# Patient Record
Sex: FEMALE | Race: WHITE | NOT HISPANIC OR LATINO | Employment: OTHER | URBAN - METROPOLITAN AREA
[De-identification: names, ages, dates, MRNs, and addresses within clinical notes are randomized per-mention and may not be internally consistent; named-entity substitution may affect disease eponyms.]

---

## 2017-01-10 ENCOUNTER — TREATMENT (OUTPATIENT)
Dept: CARDIAC REHAB | Facility: HOSPITAL | Age: 77
End: 2017-01-10

## 2017-01-10 DIAGNOSIS — J44.9 COPD, SEVERE (HCC): Primary | ICD-10-CM

## 2017-01-10 PROCEDURE — G0424 PULMONARY REHAB W EXER: HCPCS

## 2017-01-12 ENCOUNTER — TREATMENT (OUTPATIENT)
Dept: CARDIAC REHAB | Facility: HOSPITAL | Age: 77
End: 2017-01-12

## 2017-01-12 DIAGNOSIS — J44.9 COPD, SEVERE (HCC): Primary | ICD-10-CM

## 2017-01-12 PROCEDURE — G0424 PULMONARY REHAB W EXER: HCPCS

## 2017-01-19 ENCOUNTER — TREATMENT (OUTPATIENT)
Dept: CARDIAC REHAB | Facility: HOSPITAL | Age: 77
End: 2017-01-19

## 2017-01-19 DIAGNOSIS — J44.9 COPD, SEVERE (HCC): Primary | ICD-10-CM

## 2017-01-19 PROCEDURE — G0424 PULMONARY REHAB W EXER: HCPCS

## 2017-01-19 NOTE — PROGRESS NOTES
"CARDIAC/PULMONARY REHAB NUTRITION EDUCATION/ASSESSMENT      76 y.o.         Height: 62 in    Weight: 172 lb     BMI: 31.5 IBW: 110 +/- 10%    %IBW: 156%               Time seen: 2 PM   Diet Survey Score: 66   Weight Assessment: Obese  Weight Change:  weight loss of 8 lbs over the past 7 week(s)    Intentional?  Yes  Usual Weight: 181 lb  Desired Weight: 140 lb     Current Diet: \"Smaller portions\"   Appetite: fair  Factors limiting PO intake:  N/A  Taste/smell changes:  No Food records reviewed? Yes     Occupation: Retired  Job Activity Level: N/A    Routine Exercise: mild   Who does the patient live with: Lives alone  Who does the cooking at home:  patient  Spouse/significant other present for diet instruction today? No  Patient actively receiving lifestyle support from others at home? No       Pertinent Lab Values:   Total: No components found for: CHOLESTEROL  HDL:   HDL CHOLESTEROL   Date Value Ref Range Status   09/08/2015 52 40 - 60 mg/dL Final     Comment:     DF by IF @ 09/08/2015 06:58                                                       HDL Reference Ranges  (U.S[place]  Department of Health and Human Services ATP III Classifications)                     Low              < 40 mg/dl (major risk factor for CHD)                     High              >60 mg/dl (\"negative\" risk factor for CHD)       LDL:  LDL CHOLESTEROL    Date Value Ref Range Status   09/08/2015 51 0 - 100 mg/dL Final     Comment:     DF by IF @ 09/08/2015 06:58                                             LDL References Ranges   (U.S.  Department of Health and Human Services ATP III Classifications)                Optimal                       <100 mg/dL                Near Optimal               100-129 mg/dL                Borderline High            130-159 mg/dL                High                             160-189 mg/dL                Very High                     >189 mg/dL       Triglyceride: No components found for: TRIGLYCERIDE  Last " "HGBA1C with date if applicable:No components found for: A1C  Glucose:   GLUCOSE   Date Value Ref Range Status   12/21/2016 85 74 - 124 mg/dL Final   11/20/2015 149 (H) 65 - 99 mg/dL Final    Nutritional Supplements: multivitamin             Stated Problem Areas / Concerns: Eugenie states that her usual weight for \"years\" was 130 pounds. She attributes weight gain to steroids and decreased activity. She has recently lost 8 pounds by watching her portion sizes closer. She has a history of hypertension and high cholesterol.     Assessment / Recommendations: Offered encouragement for recent successful weight loss efforts. Encouraged more nutrient-dense, fiber-rich food choices. Discussed weight loss goals and strategies. Discussed the COPD Foundation's nutrition tips, DASH diet guidelines to help manage hypertension and cholesterol, food labels, and low sodium seasonings.   Motivation level toward diet compliance: strong         Instructed on: DASH diet, COPD Foundation's nutrition tips, weight loss strategies, food labels, low sodium seasonings  Written materials given: DASH diet, COPD Foundation's nutrition tips, weight loss strategies, food labels, low sodium seasonings       Goals: Follow DASH diet guidelines to help manage weight, hypertension, and COPD             2:49 PM  1/19/2017  Vonda Nair RD                        "

## 2017-01-26 ENCOUNTER — TREATMENT (OUTPATIENT)
Dept: CARDIAC REHAB | Facility: HOSPITAL | Age: 77
End: 2017-01-26

## 2017-01-26 DIAGNOSIS — J44.9 COPD, SEVERE (HCC): Primary | ICD-10-CM

## 2017-01-26 PROCEDURE — G0424 PULMONARY REHAB W EXER: HCPCS

## 2017-01-31 ENCOUNTER — TREATMENT (OUTPATIENT)
Dept: CARDIAC REHAB | Facility: HOSPITAL | Age: 77
End: 2017-01-31

## 2017-01-31 DIAGNOSIS — J44.9 COPD, SEVERE (HCC): Primary | ICD-10-CM

## 2017-01-31 PROCEDURE — G0424 PULMONARY REHAB W EXER: HCPCS

## 2017-02-08 ENCOUNTER — LAB (OUTPATIENT)
Dept: LAB | Facility: HOSPITAL | Age: 77
End: 2017-02-08

## 2017-02-08 ENCOUNTER — HOSPITAL ENCOUNTER (OUTPATIENT)
Dept: PET IMAGING | Facility: HOSPITAL | Age: 77
Discharge: HOME | End: 2017-02-08
Attending: INTERNAL MEDICINE | Admitting: INTERNAL MEDICINE

## 2017-02-08 DIAGNOSIS — D50.8 OTHER IRON DEFICIENCY ANEMIA: ICD-10-CM

## 2017-02-08 DIAGNOSIS — J70.0 PNEUMONITIS, RADIATION (HCC): ICD-10-CM

## 2017-02-08 DIAGNOSIS — C34.91 ADENOCARCINOMA OF LUNG, RIGHT (HCC): ICD-10-CM

## 2017-02-08 LAB
ALBUMIN SERPL-MCNC: 4 G/DL (ref 3.5–5.2)
ALBUMIN/GLOB SERPL: 1.2 G/DL (ref 1.1–2.4)
ALP SERPL-CCNC: 73 U/L (ref 38–116)
ALT SERPL W P-5'-P-CCNC: 12 U/L (ref 0–33)
ANION GAP SERPL CALCULATED.3IONS-SCNC: 13 MMOL/L
AST SERPL-CCNC: 15 U/L (ref 0–32)
BASOPHILS # BLD AUTO: 0.06 10*3/MM3 (ref 0–0.1)
BASOPHILS NFR BLD AUTO: 0.7 % (ref 0–1.1)
BILIRUB SERPL-MCNC: 0.3 MG/DL (ref 0.1–1.2)
BUN BLD-MCNC: 10 MG/DL (ref 6–20)
BUN/CREAT SERPL: 13 (ref 7.3–30)
CALCIUM SPEC-SCNC: 9.5 MG/DL (ref 8.5–10.2)
CHLORIDE SERPL-SCNC: 98 MMOL/L (ref 98–107)
CO2 SERPL-SCNC: 32 MMOL/L (ref 22–29)
CREAT BLD-MCNC: 0.77 MG/DL (ref 0.6–1.1)
CREAT BLDA-MCNC: 0.9 MG/DL (ref 0.6–1.3)
DEPRECATED RDW RBC AUTO: 47.2 FL (ref 37–49)
EOSINOPHIL # BLD AUTO: 0.1 10*3/MM3 (ref 0–0.36)
EOSINOPHIL NFR BLD AUTO: 1.2 % (ref 1–5)
ERYTHROCYTE [DISTWIDTH] IN BLOOD BY AUTOMATED COUNT: 13.8 % (ref 11.7–14.5)
FERRITIN SERPL-MCNC: 21.1 NG/ML
GFR SERPL CREATININE-BSD FRML MDRD: 73 ML/MIN/1.73
GLOBULIN UR ELPH-MCNC: 3.4 GM/DL (ref 1.8–3.5)
GLUCOSE BLD-MCNC: 122 MG/DL (ref 74–124)
HCT VFR BLD AUTO: 40.8 % (ref 34–45)
HGB BLD-MCNC: 12.8 G/DL (ref 11.5–14.9)
IMM GRANULOCYTES # BLD: 0.02 10*3/MM3 (ref 0–0.03)
IMM GRANULOCYTES NFR BLD: 0.2 % (ref 0–0.5)
IRON 24H UR-MRATE: 259 MCG/DL (ref 37–145)
IRON SATN MFR SERPL: 73 % (ref 14–48)
LYMPHOCYTES # BLD AUTO: 1.33 10*3/MM3 (ref 1–3.5)
LYMPHOCYTES NFR BLD AUTO: 16.3 % (ref 20–49)
MCH RBC QN AUTO: 29.2 PG (ref 27–33)
MCHC RBC AUTO-ENTMCNC: 31.4 G/DL (ref 32–35)
MCV RBC AUTO: 92.9 FL (ref 83–97)
MONOCYTES # BLD AUTO: 0.56 10*3/MM3 (ref 0.25–0.8)
MONOCYTES NFR BLD AUTO: 6.9 % (ref 4–12)
NEUTROPHILS # BLD AUTO: 6.09 10*3/MM3 (ref 1.5–7)
NEUTROPHILS NFR BLD AUTO: 74.7 % (ref 39–75)
NRBC BLD MANUAL-RTO: 0 /100 WBC (ref 0–0)
PLATELET # BLD AUTO: 328 10*3/MM3 (ref 150–375)
PMV BLD AUTO: 9.9 FL (ref 8.9–12.1)
POTASSIUM BLD-SCNC: 3.7 MMOL/L (ref 3.5–4.7)
PROT SERPL-MCNC: 7.4 G/DL (ref 6.3–8)
RBC # BLD AUTO: 4.39 10*6/MM3 (ref 3.9–5)
SODIUM BLD-SCNC: 143 MMOL/L (ref 134–145)
TIBC SERPL-MCNC: 353 MCG/DL (ref 249–505)
TRANSFERRIN SERPL-MCNC: 252 MG/DL (ref 200–360)
VIT B12 BLD-MCNC: 455 PG/ML (ref 250–999)
WBC NRBC COR # BLD: 8.16 10*3/MM3 (ref 4–10)

## 2017-02-08 PROCEDURE — 0 IOPAMIDOL 61 % SOLUTION: Performed by: INTERNAL MEDICINE

## 2017-02-08 PROCEDURE — 82565 ASSAY OF CREATININE: CPT

## 2017-02-08 PROCEDURE — 74177 CT ABD & PELVIS W/CONTRAST: CPT

## 2017-02-08 PROCEDURE — 82728 ASSAY OF FERRITIN: CPT | Performed by: INTERNAL MEDICINE

## 2017-02-08 PROCEDURE — 84466 ASSAY OF TRANSFERRIN: CPT | Performed by: INTERNAL MEDICINE

## 2017-02-08 PROCEDURE — 71260 CT THORAX DX C+: CPT

## 2017-02-08 PROCEDURE — 80053 COMPREHEN METABOLIC PANEL: CPT | Performed by: INTERNAL MEDICINE

## 2017-02-08 PROCEDURE — 82607 VITAMIN B-12: CPT | Performed by: INTERNAL MEDICINE

## 2017-02-08 PROCEDURE — 36415 COLL VENOUS BLD VENIPUNCTURE: CPT | Performed by: INTERNAL MEDICINE

## 2017-02-08 PROCEDURE — 83540 ASSAY OF IRON: CPT | Performed by: INTERNAL MEDICINE

## 2017-02-08 PROCEDURE — 85025 COMPLETE CBC W/AUTO DIFF WBC: CPT | Performed by: INTERNAL MEDICINE

## 2017-02-08 PROCEDURE — 25510000001 DIATRIZOATE MEGLUMINE & SODIUM PER 1 ML: Performed by: INTERNAL MEDICINE

## 2017-02-08 RX ADMIN — DIATRIZOATE MEGLUMINE AND DIATRIZOATE SODIUM 30 ML: 660; 100 LIQUID ORAL; RECTAL at 12:10

## 2017-02-08 RX ADMIN — IOPAMIDOL 85 ML: 612 INJECTION, SOLUTION INTRAVENOUS at 12:42

## 2017-02-09 ENCOUNTER — TREATMENT (OUTPATIENT)
Dept: CARDIAC REHAB | Facility: HOSPITAL | Age: 77
End: 2017-02-09

## 2017-02-09 DIAGNOSIS — J44.9 COPD, SEVERE (HCC): Primary | ICD-10-CM

## 2017-02-09 PROCEDURE — G0424 PULMONARY REHAB W EXER: HCPCS

## 2017-02-15 ENCOUNTER — OFFICE VISIT (OUTPATIENT)
Dept: ONCOLOGY | Facility: CLINIC | Age: 77
End: 2017-02-15

## 2017-02-15 ENCOUNTER — APPOINTMENT (OUTPATIENT)
Dept: LAB | Facility: HOSPITAL | Age: 77
End: 2017-02-15

## 2017-02-15 VITALS
WEIGHT: 174.4 LBS | RESPIRATION RATE: 18 BRPM | TEMPERATURE: 97.7 F | HEART RATE: 80 BPM | OXYGEN SATURATION: 91 % | DIASTOLIC BLOOD PRESSURE: 70 MMHG | HEIGHT: 62 IN | BODY MASS INDEX: 32.09 KG/M2 | SYSTOLIC BLOOD PRESSURE: 136 MMHG

## 2017-02-15 DIAGNOSIS — D50.8 OTHER IRON DEFICIENCY ANEMIA: ICD-10-CM

## 2017-02-15 DIAGNOSIS — C34.91 ADENOCARCINOMA OF RIGHT LUNG (HCC): Primary | ICD-10-CM

## 2017-02-15 PROCEDURE — G0463 HOSPITAL OUTPT CLINIC VISIT: HCPCS | Performed by: INTERNAL MEDICINE

## 2017-02-15 PROCEDURE — 99213 OFFICE O/P EST LOW 20 MIN: CPT | Performed by: INTERNAL MEDICINE

## 2017-02-15 RX ORDER — CHLORAL HYDRATE 500 MG
CAPSULE ORAL
COMMUNITY
End: 2019-09-17 | Stop reason: HOSPADM

## 2017-02-15 RX ORDER — ALBUTEROL SULFATE 2.5 MG/3ML
2.5 SOLUTION RESPIRATORY (INHALATION) EVERY 4 HOURS PRN
COMMUNITY
End: 2019-09-17 | Stop reason: HOSPADM

## 2017-02-15 NOTE — PROGRESS NOTES
Subjective  REASON FOR FOLLOWUP:  Two nodules in the right lung, each one of them pathologically confirmed to be adenocarcinoma. The first one is EGFR negative, ALK1 negative. The second one has not had any molecular analysis. The original one was requested to have EGFR and ALK1. So far we have not received any information in this regard. A CT scan of the brain shows no evidence of brain metastasis. ECOG performance status 1. No cancer-related pain. THE PATIENT UNDERWENT DEFINITIVE RADIATION THERAPY FOR BOTH NODULES..  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 Finally recovered from this.       History of Present Illness     This patient is here today in the company of her daughter. She is here today still complaining of shortness of breath but she is undergoing to pulmonary rehab that has been beneficial.. The patient is not having any alterations in her appetite. Her weight is stable. Her bowel function and urination are normal. No cardiac issues at this time. No edema in her lower extremities.         Past Medical History, Past Surgical HistoryVery extensive including hypertension, hyperlipidemia, carotid arterial disease with 1 carotid artery occluded on the right side, thrombophilia since age 25 when she developed vein thrombosis in the lower extremities, bilateral pulmonary emboli and she had an IVC filter placed then and required anticoagulation for almost the rest of her life.  She also has had a history of paroxysmal atrial fibrillation, she also has history of stress cardiomyopathy, and she also has a history of peripheral arterial disease, status post bypass operation for the lower extremities. The patient also has a history of COPD. She also has a history of hypothyroidism and also she has history of clostridium colitis.  She has had mammograms in the past, being normal. She has had colonoscopies  in the past, being normal or negative for malignant conditions. She never had any history of bladder cancer. 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 Margins of resection were free at that point.      The patient also developed a lung nodule in the right base in 2009 and after assessment by Dr. Lamb she had a wedge resection showing an adenocarcinoma. The EGFR analysis and ALK-1 analysis was never done on that tissue and we have requested this.  Later on this year she has had a couple of lung nodules as already stated and biopsy of one of them showing already an adenocarcinoma that is EGFR negative, ALK-1 pending.  A biopsy of the 2nd nodule is planned to be done on 02/22/2015.      As we recommended to the patient on 02/17/2016, we would like for her to have a CT scan of the brain to be sure that there is no brain metastases.  I did not order an MRI because the patient has an IVC filter in place and this is a metal piece that she has had in this location for a long time.      The patient also has a history of a stress fracture of one of her ankles and she has had osteoporosis with collapsed vertebrae in the past.      She also has a history of thrombophilia as stated before and never a workup was performed looking for any other specific cause.      ONCOLOGIC HISTORY: Ms. Narayan is a 75-year-old white female who was actually discussed in the multidisciplinary lung cancer conference a week ago with Alfredo Leiva and many other physicians. On that occasion we reviewed her CT scans and PET scans showing a couple of new nodules on the PET scan, one that was biopsied already proven to be adenocarcinoma and a 2nd one more laterally that was found incidentally on the PET scan that will be biopsied this coming Monday.  She has a history of wedge resection of a tumoral lesion in the right lung base in February 2009  for an adenocarcinoma. She actually is having her anniversary of smoking cessation today, 7 years with no cigarettes. In any event, since last summer, the patient has had a very difficult time, first of all with stress cardiomyopathy and ended up at [facility] for almost 3 weeks having terrible ejection fraction on an echocardiogram and subsequently after appropriate therapy, improved and has been well since then. She has an upcoming echocardiogram with Dr. Mcguire this week.  Besides this, she had almost 4 back-to-back infections with clostridium colitis since August until almost November requiring multiple therapies and visits to infectious disease to finally get rid of this infection altogether.  She lost close to 40 pounds of weight from this event.  Finally, since the 1st of December she has started to feel a little bit better.  She is back to eating, her weight has stabilized and she has not had any nausea, vomiting, abdominal pain, further diarrhea or fever. Urination is ongoing with no difficulty. She has had a minimal hacking cough with no sputum production or pleuritic pain. No modification in her usual degree of shortness of breath. She has not had any hemoptysis besides the one that she developed after the lung biopsy recently. This has already subsided.  The patient is not experiencing any need for O2 at nighttime, even though she has her machine at home.  She has had a better sense of well-being. She has gained back control of her life and she is running her house like she used to. Now she is taking care of her ill  who has Alzheimer’s dementia.     On 02/29/2016, the CT scan of the brain failed to document any brain metastasis and the biopsy of the second malignancy in the right lung was consistent with adenocarcinoma. We discussed the case with Dr. Thompson who will see her tomorrow morning in preparation for possible definitive radiation therapy. The patient will not be a  candidate to receive Tarceva on any of small molecules, and she will not be a candidate to receive any formal IV chemotherapy. She will be a candidate in the future to receive Opdivo at the most.   SURGICAL HISTORY:  She has had a partial hysterectomy. She has had a hernia in the lumbar spine.  She also has had a cyst in the stomach and completion of a hysterectomy. She also has had a biopsy and cyst removal of the left breast and also biopsy for skin lesions on the forehead. She has had a wedge resection on her right lung in . There was also removal of a pigmented lesion on the left nipple in .      Again, she also has a history of pulmonary embolus at age 24 and purple legs bilateral deep vein thrombosis that she has almost kept anticoagulated for most of her life.     GYN HISTORY:   1, para 1, no miscarriages. Last pelvic examination done in the last 3 years being normal.    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  She used to smoke up to 1-1/2 packs of cigarettes a day. She quit 7 years ago.  She does not drink any alcohol.    Review of Systems   General: no fever, chills, fatigue, weight changes, or lack of appetite.  Eyes: no epiphora, xerophthalmia,conjunctivitis, pain, glaucoma, blurred vision, blindness, secretion, photophobia, proptosis, diplopia.  Ears: no otorrhea, tinnitus, otorrhagia, deafness, pain, vertigo.  Nose: no rhinorrhea, epistaxis, alteration in perception of odors, sinuses pressure.  Mouth: no alteration in gums or teeth,  ulcers, no difficulty with mastication or deglut ion, no odynophagia.  Neck: no masses or pain, no thyroid alterations, no pain in muscles or arteries, no carotid odynia, no crepitation.  Respiratory: dry cough, sputum production,STABLE dyspnea,no trepopnea, pleuritic pain, hemoptysis.  Heart: no syncope, irregularity, palpitations, angina, orthopnea, paroxysmal nocturnal dyspnea.  Vascular  Venous: no tenderness,edema, palpable cords, postphlebitic syndrome, skin changes or ulcerations.  Vascular Arterial: no distal ischemia, claudication, gangrene, neuropathic ischemic pain, skin ulcers, paleness or cyanosis.  GI: no dysphagia, odynophagia, no regurgitation, no heartburn,no indigestion,no nausea,no vomiting,no hematemesis ,no melena,no jaundice,no distention, no obstipation,no enterorrhagia,no proctalgia,no anal  lesions, nochanges in bowel habits.  : no frequency, hesitancy, hematuria, discharge, pain.  Musculoskeletal: no muscle or tendon pain or inflammation, joint pain, edema, functional limitation, fasciculations, mass.  Neurologic: no headache, seizures, alterations on Craneal nerves, no motor or senssory deficit, normal coordination, no alteration in memory, orientation, calculation,writting, verbal or written language.  Skin: no rashes, pruritus or localized lesions.  Psychiatric: no anxiety, depression, agitation, delusions, proper insight.  A comprehensive 14 point review of systems was performed and was negative except as mentioned.    Medications:  The current medication list was reviewed in the EMR    ALLERGIES:    Allergies   Allergen Reactions   • Augmentin [Amoxicillin-Pot Clavulanate]    • Cephalexin    • Pentazocine    • Simvastatin        Objective      There were no vitals filed for this visit.  Current Status 7/27/2016   ECOG score 0       Physical Exam    GENERAL:  Well-developed, well-nourished  Patient  in no acute distress.   SKIN:  Warm, dry without rashes, purpura or petechiae.  HEENT:  Pupils were equal and reactive to light and accomodation, conjunctivas non injected, no pterigion, normal extraocular movements, normal visual acuity.   Mouth mucosa was moist, no exudates in oropharynx, normal gum line, normal roof of the mouth and pillars, normal papillations of the tongue.Ear canals were normal, as well tympanic membranes, normal hearing acuity.No pain in mastoid area or  erythema.  NECK:  Supple with good range of motion; no thyromegaly or masses, no JVD or bruits, no cervical adenopathies.No carotid arteries pain, no carotid abnormal pulsation or arterial dance.  LYMPHATICS:  No cervical, supraclavicular, axillary, epitrochlear or inguinal adenopathy.  CHEST:  Normal excursion of both lara thoraces, normal voice fremitus, no subcutaneous emphysema, normal axillas, no rashes or acanthosis nigricans. Lungs clear to percussion and auscultation, decreased breath sounds bilaterally, minimal wheezing,R BASE POSTERIOR crackles AND ronchi, no stridor, no rubs.  CARDIAC AND VASCULAR: PMI not displaced,no thrills, normal rate and regular rhythm, without murmurs, rubs or S3 or S4 right or left sided gallops. Normal femoral, popliteal, pedis, brachial and carotid pulses.  ABDOMEN:  Soft, nontender with no organomegaly or masses, no ascites, no collateral circulation,no distention,no Rod sign, no abdominal pain, no inguinal hernias,no umbilical hernias, no abdominal bruits. Normal bowel sounds.R abdominal wall hernia reductible  GENITAL: Not  Performed.  EXTREMITIES  AND SPINE:  No clubbing, cyanosis or edema, no deformities or pain .No kyphosis, scoliosis, deformities or pain in spine, ribs or pelvic bone.Trophic changes in the skin both LE associated with PVD, no ulcers.  NEUROLOGICAL:  Patient was awake, alert, oriented to time, person and place,normal gait and coordination.    RECENT LABS:  HeFerritin   Order: [id number]   Status:  Final result   Visible to patient:  No (Not Released) Dx:  Pneumonitis, radiation; Adenocarcinom...      Ref Range & Units 7d ago     Ferritin ng/mL 21.10   Resulting Agency   CBC LAB      Specimen Collected: 02/08/17 12:30 PM Last Resulted: 02/08/17  3:32 PM               WBC   Date Value Ref Range Status   02/08/2017 8.16 4.00 - 10.00 10*3/mm3 Final   11/20/2015 11.23 (H) 4.50 - 10.70 K/Cumm Final     RBC   Date Value Ref Range Status   02/08/2017 4.39 3.90  - 5.00 10*6/mm3 Final   11/20/2015 3.65 (L) 3.90 - 5.20 Million Final     HEMOGLOBIN   Date Value Ref Range Status   02/08/2017 12.8 11.5 - 14.9 g/dL Final   11/20/2015 10.3 (L) 11.9 - 15.5 g/dL Final     HEMATOCRIT   Date Value Ref Range Status   02/08/2017 40.8 34.0 - 45.0 % Final   11/20/2015 32.5 (L) 35.6 - 45.5 % Final     PLATELETS   Date Value Ref Range Status   02/08/2017 328 150 - 375 10*3/mm3 Final   11/20/2015 356 140 - 500 K/Cumm Final        Range & Units 7d ago      Glucose 74 - 124 mg/dL 122   BUN 6 - 20 mg/dL 10   Creatinine 0.60 - 1.10 mg/dL 0.77   Sodium 134 - 145 mmol/L 143   Potassium 3.5 - 4.7 mmol/L 3.7   Chloride 98 - 107 mmol/L 98   CO2 22.0 - 29.0 mmol/L 32.0 (H)   Calcium 8.5 - 10.2 mg/dL 9.5   Total Protein 6.3 - 8.0 g/dL 7.4   Albumin 3.50 - 5.20 g/dL 4.00   ALT (SGPT) 0 - 33 U/L 12   AST (SGOT) 0 - 32 U/L 15   Alkaline Phosphatase 38 - 116 U/L 73   Total Bilirubin 0.1 - 1.2 mg/dL 0.3   eGFR Non African Amer >60 mL/min/1.73 73   Globulin 1.8 - 3.5 gm/dL 3.4   A/G Ratio 1.1 - 2.4 g/dL 1.2   BUN/Creatinine Ratio 7.3 - 30.0 13.0   Anion Gap mmol/L 13.0   Resulting Agency   CBC LAB   Narrative   The MDRD GFR formula is only valid for adults with stable renal function between ages 18 and 70.      Specimen Collected: 02/08/17 12:30 PM Last Resulted: 02/08/17            FINDINGS CT CHEST  CHEST: 9 mm right upper lobe pulmonary nodule on image 25 is without  change. One centimeter right upper lobe nodule image 28 is without  change. There are right lower lobe surgical chain sutures with adjacent  soft tissue thickening without change in appearance. Peripheral right  lower lobe and middle lobe consolidation and scarring are present  obscuring evaluation of right middle lobe nodule. There has been no  change in appearance as compared to the previous exam. Tiny left  pulmonary nodules exhibit no change. These include a 4 mm subpleural  nodule left upper lobe on image 37. Bone windows demonstrate  no osseous  lesion.    Assessment/Plan       Problem #1. Two nodules in the right lung, each one of them pathologically confirmed to be adenocarcinoma. The first one is EGFR negative, ALK1 negative. The second one has not had any molecular analysis. The original one was requested to have EGFR and ALK1. . ECOG performance status 1. No cancer-related pain. THE PATIENT UNDERWENT DEFINITIVE RADIATION THERAPY FOR BOTH NODULES..  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 Finally recovered from this.         In the radiological analysis stated above, I see nothing to suggest any new pulmonary nodules or progressive disease at any level. Her physical examination otherwise remains unchanged. Under the present circumstances I advised the patient to remain in observation from my point of view, and I asked her to have a repeat CT scan of the chest and a CBC and a chemistry profile along with a ferritin in 11 weeks and review her back in 12 weeks.     The patient is taking a prenatal vitamin once a day for iron deficiency anemia. The hemoglobin has improved by 1 gram since the previous visit in December and I encouraged her to remain on this medicine.     She has no obvious clinical bleeding from receiving Xarelto.     She agrees to proceed.    The patient is under a lot of stress. Her  is very ill and she will not be surprised if he passes in the next few hours with dementia, living in a nursing facility.                                    2/15/2017      CC:

## 2017-03-03 ENCOUNTER — TELEPHONE (OUTPATIENT)
Dept: CARDIOLOGY | Facility: CLINIC | Age: 77
End: 2017-03-03

## 2017-03-03 DIAGNOSIS — R42 DIZZINESS: ICD-10-CM

## 2017-03-03 DIAGNOSIS — J98.4 DISEASE OF LUNG: ICD-10-CM

## 2017-03-03 DIAGNOSIS — R00.2 PALPITATIONS: ICD-10-CM

## 2017-03-03 DIAGNOSIS — I25.10 CARDIOVASCULAR DISEASE: ICD-10-CM

## 2017-03-03 DIAGNOSIS — R60.0 LOCALIZED EDEMA: ICD-10-CM

## 2017-03-03 DIAGNOSIS — I42.9 CARDIOMYOPATHY (HCC): Primary | ICD-10-CM

## 2017-03-03 NOTE — TELEPHONE ENCOUNTER
3/3/2017 @ 3:35pm Spoke to patient at length.  She has been having noticeable palpitations for about 1 week, mostly late in the afternoon.  She could not be specific about how long they last, just that she tries to get her mind off of it and ignore it and it eventually goes away.  She has also noticed swelling in her legs for the past 2 weeks with approx 6 lbs of water weight gain in that same time period.  She states she is on O2 and is always short of breath but that it is slightly worse when she is exerting herself recently.  Has not been seen in office since Feb 2016 and would like appt.  Right now next available is April 2017.  Please advise.

## 2017-03-03 NOTE — TELEPHONE ENCOUNTER
----- Message from Leela Michael sent at 3/2/2017  2:07 PM EST -----  Regarding: PALPITATIONS AND FEET ARE SWOLLEN  Contact: [phone]  She is calling with these concerns.  Is wanting to schedule an appt I told her it would be April.  Please advise

## 2017-03-09 ENCOUNTER — TREATMENT (OUTPATIENT)
Dept: CARDIAC REHAB | Facility: HOSPITAL | Age: 77
End: 2017-03-09

## 2017-03-09 DIAGNOSIS — J44.9 COPD, SEVERE (HCC): Primary | ICD-10-CM

## 2017-03-09 PROCEDURE — G0424 PULMONARY REHAB W EXER: HCPCS

## 2017-03-10 NOTE — TELEPHONE ENCOUNTER
Spoke to pt regarding testing Dr. Mcguire wants ordered and that he wants her worked in for an appointment to see him to review testing once complete.  Let her know that one of our schedulers will give her a call to get all of the appointments set up for her.  aw

## 2017-03-14 ENCOUNTER — TREATMENT (OUTPATIENT)
Dept: CARDIAC REHAB | Facility: HOSPITAL | Age: 77
End: 2017-03-14

## 2017-03-14 DIAGNOSIS — J44.9 COPD, SEVERE (HCC): Primary | ICD-10-CM

## 2017-03-14 PROCEDURE — G0424 PULMONARY REHAB W EXER: HCPCS

## 2017-03-15 ENCOUNTER — OFFICE VISIT (OUTPATIENT)
Dept: CARDIOLOGY | Facility: CLINIC | Age: 77
End: 2017-03-15

## 2017-03-15 ENCOUNTER — HOSPITAL ENCOUNTER (OUTPATIENT)
Dept: CARDIOLOGY | Facility: HOSPITAL | Age: 77
Discharge: HOME | End: 2017-03-15

## 2017-03-15 ENCOUNTER — HOSPITAL ENCOUNTER (OUTPATIENT)
Dept: CARDIOLOGY | Facility: HOSPITAL | Age: 77
Discharge: HOME | End: 2017-03-15
Attending: INTERNAL MEDICINE | Admitting: INTERNAL MEDICINE

## 2017-03-15 VITALS
WEIGHT: 174 LBS | HEIGHT: 62 IN | BODY MASS INDEX: 32.02 KG/M2 | SYSTOLIC BLOOD PRESSURE: 136 MMHG | DIASTOLIC BLOOD PRESSURE: 70 MMHG

## 2017-03-15 VITALS
DIASTOLIC BLOOD PRESSURE: 70 MMHG | HEIGHT: 62 IN | WEIGHT: 176 LBS | SYSTOLIC BLOOD PRESSURE: 116 MMHG | HEART RATE: 71 BPM | BODY MASS INDEX: 32.39 KG/M2

## 2017-03-15 DIAGNOSIS — R06.09 DYSPNEA ON EXERTION: ICD-10-CM

## 2017-03-15 DIAGNOSIS — R53.82 CHRONIC FATIGUE: ICD-10-CM

## 2017-03-15 DIAGNOSIS — I42.9 CARDIOMYOPATHY (HCC): ICD-10-CM

## 2017-03-15 DIAGNOSIS — I77.9 PERIPHERAL ARTERIAL OCCLUSIVE DISEASE (HCC): ICD-10-CM

## 2017-03-15 DIAGNOSIS — R42 DIZZINESS: ICD-10-CM

## 2017-03-15 DIAGNOSIS — R63.5 WEIGHT GAIN: ICD-10-CM

## 2017-03-15 DIAGNOSIS — R60.0 LOCALIZED EDEMA: ICD-10-CM

## 2017-03-15 DIAGNOSIS — R00.2 PALPITATIONS: Primary | ICD-10-CM

## 2017-03-15 DIAGNOSIS — C34.91 ADENOCARCINOMA OF RIGHT LUNG (HCC): ICD-10-CM

## 2017-03-15 LAB
CK MB SERPL-CCNC: 1.61 NG/ML
CK SERPL-CCNC: 43 U/L (ref 20–180)
ERYTHROCYTE [SEDIMENTATION RATE] IN BLOOD: 31 MM/HR (ref 0–30)
MAGNESIUM SERPL-MCNC: 2.2 MG/DL (ref 1.6–2.4)
NT-PROBNP SERPL-MCNC: 183.9 PG/ML (ref 0–900)
T3FREE SERPL-MCNC: 2.76 PG/ML (ref 2–4.4)
T4 FREE SERPL-MCNC: 1.04 NG/DL (ref 0.93–1.7)
TROPONIN T SERPL-MCNC: <0.01 NG/ML (ref 0–0.03)
TSH SERPL DL<=0.05 MIU/L-ACNC: 5.14 MIU/ML (ref 0.27–4.2)

## 2017-03-15 PROCEDURE — 84439 ASSAY OF FREE THYROXINE: CPT | Performed by: INTERNAL MEDICINE

## 2017-03-15 PROCEDURE — 82553 CREATINE MB FRACTION: CPT | Performed by: INTERNAL MEDICINE

## 2017-03-15 PROCEDURE — 36415 COLL VENOUS BLD VENIPUNCTURE: CPT | Performed by: INTERNAL MEDICINE

## 2017-03-15 PROCEDURE — 93000 ELECTROCARDIOGRAM COMPLETE: CPT | Performed by: INTERNAL MEDICINE

## 2017-03-15 PROCEDURE — 83880 ASSAY OF NATRIURETIC PEPTIDE: CPT | Performed by: INTERNAL MEDICINE

## 2017-03-15 PROCEDURE — 83735 ASSAY OF MAGNESIUM: CPT | Performed by: INTERNAL MEDICINE

## 2017-03-15 PROCEDURE — 84443 ASSAY THYROID STIM HORMONE: CPT | Performed by: INTERNAL MEDICINE

## 2017-03-15 PROCEDURE — 85652 RBC SED RATE AUTOMATED: CPT | Performed by: INTERNAL MEDICINE

## 2017-03-15 PROCEDURE — 84484 ASSAY OF TROPONIN QUANT: CPT | Performed by: INTERNAL MEDICINE

## 2017-03-15 PROCEDURE — 82550 ASSAY OF CK (CPK): CPT | Performed by: INTERNAL MEDICINE

## 2017-03-15 PROCEDURE — 93306 TTE W/DOPPLER COMPLETE: CPT | Performed by: INTERNAL MEDICINE

## 2017-03-15 PROCEDURE — 93306 TTE W/DOPPLER COMPLETE: CPT

## 2017-03-15 PROCEDURE — 99214 OFFICE O/P EST MOD 30 MIN: CPT | Performed by: INTERNAL MEDICINE

## 2017-03-15 PROCEDURE — 84481 FREE ASSAY (FT-3): CPT | Performed by: INTERNAL MEDICINE

## 2017-03-15 NOTE — PROGRESS NOTES
[facility]  Cardiology Office Visit Note        Subjective:     Encounter Date:03/15/2017      Patient ID: Ale Narayan   Age: 76 y.o.  Sex: female  :  1940  MRN: [id number]             Date of Office Visit: 03/15/2017  Encounter Provider: Artur Mcguire MD  Place of Service: [facility]     .    Chief Complaint:  History of Present Illness    The following portions of the patient's history were reviewed and updated as appropriate: allergies, current medications, past family history, past medical history, past social history, past surgical history and problem list.    Review of Systems   Constitution: Positive for chills. Negative for fever and weight gain.   HENT: Negative for congestion, headaches, hearing loss and sore throat.    Eyes: Positive for blurred vision. Negative for double vision.   Cardiovascular: Positive for palpitations. Negative for chest pain, claudication, cyanosis, dyspnea on exertion, irregular heartbeat, leg swelling, near-syncope, orthopnea, paroxysmal nocturnal dyspnea and syncope.   Respiratory: Positive for shortness of breath and wheezing. Negative for cough and snoring.    Endocrine: Negative for cold intolerance.   Hematologic/Lymphatic: Negative for adenopathy. Does not bruise/bleed easily.   Skin: Negative for rash.   Musculoskeletal: Negative for back pain.   Gastrointestinal: Negative for abdominal pain, change in bowel habit, constipation, diarrhea, nausea and vomiting.   Genitourinary: Negative for dysuria, frequency, hematuria and hesitancy.   Neurological: Positive for dizziness, light-headedness and numbness. Negative for disturbances in coordination, excessive daytime sleepiness, focal weakness and loss of balance.        Both Arms   Psychiatric/Behavioral: Positive for depression. Negative for memory loss. The patient is not nervous/anxious.    Allergic/Immunologic: Negative for hives.         ECG 12  Lead  Date/Time: 3/15/2017 10:06 AM  Performed by: YOLANDA RODRIGES JR  Authorized by: YOLANDA RODRIGES JR   Comparison: compared with previous ECG   Rhythm: atrial fibrillation  BPM: 78  Conduction: left bundle branch block  Clinical impression: abnormal ECG                       HPI     The patient is a 76-year-old white female, patient and partner Macy, history of stress induced cardiomyopathy in July 2012 with normal coronary arteries at that time, history of severe pulmonary hypertension due to COPD, hyperlipidemia, hypercoagulable state with recurrent DVT now with IVC filter placed, with recently diagnosed DVT in her neck she says, with recurrent C. difficile colitis, who presents for cardiac follow-up.  I last saw in the office in December 2015.  Her last echo was in September 2015 showing LVEF 60% with hypokinesis of the base of the inferior lateral wall, mild right ventricular enlargement with normal right ventricular systolic function, mild to moderate mitral regurgitation.  Cardiolite stress is that time showed no ischemia nor infarction with ejection fraction 75%.  She was treated for Tako Tsubo syndrome, she had various arrhythmias including multifocal atrial tachycardia, wandering atrial pacemaker as well as sinus rhythm with PACs.  She never had atrial fibrillation documented.    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  She thinks she may have redeveloped broken heart syndrome because of that.  Her ankles are not swollen now but it is in the morning she says.  She also complains of some dizziness when she changes her head position when standing, very briefly during no syncope.  She also complains of more palpitation were heart will race at times.  She also complains of chest tightness worse with palpation.    She is on Xarelto for recurrent DVT.    Cardiac risk factors: No  diabetes.  Positive for hypertension and elevated cholesterol.  No smoking for 5 years, 100-pack-year history.  No family history of early CAD.      Past Medical History   Diagnosis Date   • Adenocarcinoma of lung    • Asthma    • Carotid artery disease    • Clostridium difficile colitis    • Cough    • Deep vein thrombosis    • Emphysema of lung    • Hyperlipidemia    • Hypertension    • Hypothyroidism    • Lung cancer    • Osteoporosis    • Paroxysmal atrial fibrillation    • Peripheral arterial disease    • Pulmonary embolism    • Thrombophilia      Since age 25       Past Surgical History   Procedure Laterality Date   • Ankle surgery     • Brain surgery     • Hysterectomy     • Lung lobectomy  2009   • Other surgical history       Bypass of lower extremities   • Other surgical history       IVC filter placed   • Colonoscopy       Negative   • Breast surgery  2015     removal of benign melanoma spot on nipple of left breast 7/2013   • Hernia repair       Lumbar spine       Social History     Social History   • Marital status:      Spouse name: N/A   • Number of children: N/A   • Years of education: High School     Occupational History   • , retired [employer] [place], [place]      Social History Main Topics   • Smoking status: Former Smoker     Packs/day: 1.00     Years: 50.00     Types: Cigarettes     Start date: 1958     Quit date: 2008   • Smokeless tobacco: Former User   • Alcohol use No   • Drug use: No   • Sexual activity: Not on file     Other Topics Concern   • Not on file     Social History Narrative       Family History   Problem Relation Age of Onset   • Heart disease Mother    • Lung cancer Mother            Scheduled Meds:  Current Outpatient Prescriptions on File Prior to Visit   Medication Sig Dispense Refill   • ADVAIR DISKUS 500-50 MCG/DOSE DISKUS      • albuterol (PROVENTIL) (2.5 MG/3ML) 0.083% nebulizer solution Take 2.5 mg by nebulization Every 4 (Four) Hours As Needed  "for wheezing.     • ANORO ELLIPTA 62.5-25 MCG/INH aerosol powder       • atorvastatin (LIPITOR) 20 MG tablet Take 1 tablet by mouth every evening.     • carvedilol (COREG) 25 MG tablet Take 1 tablet by mouth 2 (two) times a day.     • cilostazol (PLETAL) 100 MG tablet Take 1 tablet by mouth 2 (two) times a day.     • esomeprazole (NexIUM) 40 MG capsule Take by mouth.     • furosemide (LASIX) 40 MG tablet Take 1 tablet by mouth daily.     • KLOR-CON 20 MEQ CR tablet      • Omega-3 Fatty Acids (FISH OIL) 1000 MG capsule capsule Take  by mouth Daily With Breakfast.     • Prenatal Vit-Fe Fumarate-FA (MULTI PRENATAL PO) Take  by mouth Daily.     • rivaroxaban (XARELTO) tablet Take 1 tablet by mouth daily.     • SYNTHROID 25 MCG tablet        No current facility-administered medications on file prior to visit.        Visit Vitals   • /70   • Pulse 71   • Ht 62\" (157.5 cm)   • Wt 176 lb (79.8 kg)   • LMP  (LMP Unknown)   • BMI 32.19 kg/m2       Objective:     Physical Exam   Constitutional: She is oriented to person, place, and time. She appears well-developed and well-nourished. No distress.   HENT:   Head: Normocephalic and atraumatic.   Right Ear: External ear normal.   Left Ear: External ear normal.   Mouth/Throat: Oropharynx is clear and moist. No oropharyngeal exudate.   Eyes: Conjunctivae and EOM are normal. Pupils are equal, round, and reactive to light. No scleral icterus.   Neck: Normal range of motion. Neck supple. No JVD present. No tracheal deviation present. No thyromegaly present.   Cardiovascular: Normal rate, S1 normal, S2 normal, normal heart sounds and intact distal pulses.  An irregularly irregular rhythm present. PMI is not displaced.  Exam reveals no gallop, no distant heart sounds, no friction rub and no decreased pulses.    No murmur heard.  Pulmonary/Chest: Effort normal and breath sounds normal. No accessory muscle usage. No respiratory distress. She has no wheezes. She has no rales. She " exhibits no tenderness.   Abdominal: Soft. Bowel sounds are normal. She exhibits no distension and no mass. There is no tenderness. There is no rebound and no guarding.   Musculoskeletal: Normal range of motion. She exhibits no edema, tenderness or deformity.   Lymphadenopathy:     She has no cervical adenopathy.   Neurological: She is alert and oriented to person, place, and time. She has normal reflexes. No cranial nerve deficit. Coordination normal.   Skin: Skin is dry. No rash noted. She is not diaphoretic. No erythema. No pallor.   Psychiatric: She has a normal mood and affect.             Lab Review:               Lab Review:         Lab Review     No results found for: CHOL  Lab Results   Component Value Date    HDL 52 09/08/2015     Lab Results   Component Value Date    LDL 51 09/08/2015     Lab Results   Component Value Date    TRIG 114 09/08/2015     No components found for: CHOLHDL  Lab Results   Component Value Date    GLUCOSE 122 02/08/2017    BUN 10 02/08/2017    CREATININE 0.90 02/08/2017    EGFRIFNONA 73 02/08/2017    BCR 13.0 02/08/2017    CO2 32.0 (H) 02/08/2017    CALCIUM 9.5 02/08/2017    ALBUMIN 4.00 02/08/2017    LABIL2 1.2 02/08/2017    AST 15 02/08/2017    ALT 12 02/08/2017     Lab Results   Component Value Date    GLUCOSE 122 02/08/2017    CALCIUM 9.5 02/08/2017     02/08/2017    K 3.7 02/08/2017    CO2 32.0 (H) 02/08/2017    CL 98 02/08/2017    BUN 10 02/08/2017    CREATININE 0.90 02/08/2017    EGFRIFNONA 73 02/08/2017    BCR 13.0 02/08/2017    ANIONGAP 13.0 02/08/2017     Lab Results   Component Value Date    WBC 8.16 02/08/2017    HGB 12.8 02/08/2017    HCT 40.8 02/08/2017    MCV 92.9 02/08/2017     02/08/2017     Lab Results   Component Value Date    DDIMER 235 09/08/2015     Lab Results   Component Value Date    TSH 0.11 (L) 09/07/2015     Lab Results   Component Value Date    CKTOTAL 34 09/15/2015     No results found for: DIGOXIN  Lab Results   Component Value Date     CKTOTAL 34 09/15/2015    CKMB 2.6 09/15/2015    TROPONINT <0.01 09/18/2015     Lab Results   Component Value Date    INR 1.1 02/22/2016    INR 1.3 (H) 02/01/2016    INR 1.2 (H) 09/09/2015    PROTIME 13.0 02/22/2016    PROTIME 15.8 (H) 02/01/2016    PROTIME 13.1 (H) 09/09/2015     CrCl cannot be calculated (Patient has no serum creatinine result on file.).    Assessment:          Diagnosis Plan   1. Palpitations  ECG 12 Lead    Cardiac Event Monitor    CK    CK-MB    Magnesium    proBNP    T3, Free    TSH    T4, free    Troponin T    Sedimentation Rate   2. Dizziness  ECG 12 Lead    Cardiac Event Monitor    CK    CK-MB    Magnesium    proBNP    T3, Free    TSH    T4, free    Troponin T    Sedimentation Rate   3. Cardiomyopathy  Cardiac Event Monitor    CK    CK-MB    Magnesium    proBNP    T3, Free    TSH    T4, free    Troponin T    Sedimentation Rate   4. Peripheral arterial occlusive disease     5. Dyspnea on exertion  Cardiac Event Monitor    CK    CK-MB    Magnesium    proBNP    T3, Free    TSH    T4, free    Troponin T    Sedimentation Rate   6. Adenocarcinoma of right lung     7. Localized edema  Cardiac Event Monitor    CK    CK-MB    Magnesium    proBNP    T3, Free    TSH    T4, free    Troponin T    Sedimentation Rate   8. Weight gain  Cardiac Event Monitor    CK    CK-MB    Magnesium    proBNP    T3, Free    TSH    T4, free    Troponin T    Sedimentation Rate   9. Chronic fatigue  Cardiac Event Monitor    CK    CK-MB    Magnesium    proBNP    T3, Free    TSH    T4, free    Troponin T    Sedimentation Rate          Assessment and Plan:    Ale was seen today for palpitations and dizziness.    Diagnoses and all orders for this visit:    Palpitations  -     ECG 12 Lead  -     Cardiac Event Monitor  -     CK  -     CK-MB  -     Magnesium  -     proBNP  -     T3, Free  -     TSH  -     T4, free  -     Troponin T  -     Sedimentation Rate    Dizziness  -     ECG 12 Lead  -     Cardiac Event Monitor  -      CK  -     CK-MB  -     Magnesium  -     proBNP  -     T3, Free  -     TSH  -     T4, free  -     Troponin T  -     Sedimentation Rate    Cardiomyopathy  -     Cardiac Event Monitor  -     CK  -     CK-MB  -     Magnesium  -     proBNP  -     T3, Free  -     TSH  -     T4, free  -     Troponin T  -     Sedimentation Rate    Peripheral arterial occlusive disease    Dyspnea on exertion  -     Cardiac Event Monitor  -     CK  -     CK-MB  -     Magnesium  -     proBNP  -     T3, Free  -     TSH  -     T4, free  -     Troponin T  -     Sedimentation Rate    Adenocarcinoma of right lung    Localized edema  -     Cardiac Event Monitor  -     CK  -     CK-MB  -     Magnesium  -     proBNP  -     T3, Free  -     TSH  -     T4, free  -     Troponin T  -     Sedimentation Rate    Weight gain  -     Cardiac Event Monitor  -     CK  -     CK-MB  -     Magnesium  -     proBNP  -     T3, Free  -     TSH  -     T4, free  -     Troponin T  -     Sedimentation Rate    Chronic fatigue  -     Cardiac Event Monitor  -     CK  -     CK-MB  -     Magnesium  -     proBNP  -     T3, Free  -     TSH  -     T4, free  -     Troponin T  -     Sedimentation Rate      She uses home oxygen for COPD.  She only uses it at night for the most part.  She is using oxygen now though.       she is not having diarrhea anymore.  She actually lost around 25 pounds with the C. difficile colitis.    Laboratory work was performed at Dr. JAGDISH Cavazos's office showing cholesterol 186, HDL 58, LDL 98, triglycerides 152, glucose 110, creatinine 0.85, potassium 4.6, CMP normal, CBC normal.  I will obtain a proBNP and other lab work to evaluate her edema, weight gain.      She does complain of chest pain is atypical for angina as it is worse with palpation.  She had normal coronary arteries in 2012.  I will not evaluate her for ischemia.  Her last stress test in 2015 was normal.    I will obtain echocardiogram with Doppler to evaluate her palpitations.  I'll get a two-week  Holter on her in fact.    She states that her last PET scan with [facility] was unremarkable, mean that her lung cancer is in check.      Her right ventricle is enlarged on echo the last time it was performed.  Echo has been done today but I have not had a chance to look at it yet.  She may have sleep apnea as a cause of her right ventricular enlargement.  Also, COPD could cause that as well.  She states she does not snore and does not have sleep apnea.    I think that some of the weight gain has depression from her 's passing.  Also, she recovered from C. difficile colitis and gained the weight back.  She lost 25 pounds with C. difficile colitis which she had 4 times.    A total of 25 minutes was spent in the care of this patient, including at least 13 minutes face-to-face with the patient.    I not only counseled the patient today on the significant factors noted in the assessment and plan, but I also recommended that the patient reduce salt and saturated animal fat intake in diet, as well as to perform scheduled exercise on a regular basis.      Plan:                  03/15/2017  3:21 PM  MD Artur Cabrales MD  3/15/2017, 3:21 PM    EMR Dragon/Transcription disclaimer:   Much of this encounter note is an electronic transcription/translation of spoken language to printed text. The electronic translation of spoken language may permit erroneous, or at times, nonsensical words or phrases to be inadvertently transcribed; Although I have reviewed the note for such errors, some may still exist.

## 2017-03-16 ENCOUNTER — TELEPHONE (OUTPATIENT)
Dept: CARDIOLOGY | Facility: HOSPITAL | Age: 77
End: 2017-03-16

## 2017-03-16 ENCOUNTER — TREATMENT (OUTPATIENT)
Dept: CARDIAC REHAB | Facility: HOSPITAL | Age: 77
End: 2017-03-16

## 2017-03-16 DIAGNOSIS — J44.9 COPD, SEVERE (HCC): Primary | ICD-10-CM

## 2017-03-16 LAB
BH CV ECHO MEAS - ACS: 1.5 CM
BH CV ECHO MEAS - AO MAX PG (FULL): 3.1 MMHG
BH CV ECHO MEAS - AO MAX PG: 5.9 MMHG
BH CV ECHO MEAS - AO MEAN PG (FULL): 2 MMHG
BH CV ECHO MEAS - AO MEAN PG: 4 MMHG
BH CV ECHO MEAS - AO ROOT AREA (BSA CORRECTED): 1.7
BH CV ECHO MEAS - AO ROOT AREA: 7.5 CM^2
BH CV ECHO MEAS - AO ROOT DIAM: 3.1 CM
BH CV ECHO MEAS - AO V2 MAX: 121 CM/SEC
BH CV ECHO MEAS - AO V2 MEAN: 90 CM/SEC
BH CV ECHO MEAS - AO V2 VTI: 26.4 CM
BH CV ECHO MEAS - AVA(I,A): 2.5 CM^2
BH CV ECHO MEAS - AVA(I,D): 2.5 CM^2
BH CV ECHO MEAS - AVA(V,A): 2.1 CM^2
BH CV ECHO MEAS - AVA(V,D): 2.1 CM^2
BH CV ECHO MEAS - BSA(HAYCOCK): 1.9 M^2
BH CV ECHO MEAS - BSA: 1.8 M^2
BH CV ECHO MEAS - BZI_BMI: 31.8 KILOGRAMS/M^2
BH CV ECHO MEAS - BZI_METRIC_HEIGHT: 157.5 CM
BH CV ECHO MEAS - BZI_METRIC_WEIGHT: 78.9 KG
BH CV ECHO MEAS - CONTRAST EF 4CH: 51.9 ML/M^2
BH CV ECHO MEAS - EDV(CUBED): 110.6 ML
BH CV ECHO MEAS - EDV(MOD-SP4): 54 ML
BH CV ECHO MEAS - EDV(TEICH): 107.5 ML
BH CV ECHO MEAS - EF(CUBED): 64.5 %
BH CV ECHO MEAS - EF(MOD-SP4): 51.9 %
BH CV ECHO MEAS - EF(TEICH): 55.9 %
BH CV ECHO MEAS - ESV(CUBED): 39.3 ML
BH CV ECHO MEAS - ESV(MOD-SP4): 26 ML
BH CV ECHO MEAS - ESV(TEICH): 47.4 ML
BH CV ECHO MEAS - FS: 29.2 %
BH CV ECHO MEAS - IVS/LVPW: 1.1
BH CV ECHO MEAS - IVSD: 1.1 CM
BH CV ECHO MEAS - LA DIMENSION: 3.7 CM
BH CV ECHO MEAS - LA/AO: 1.2
BH CV ECHO MEAS - LAT PEAK E' VEL: 11.3 CM/SEC
BH CV ECHO MEAS - LV DIASTOLIC VOL/BSA (35-75): 30 ML/M^2
BH CV ECHO MEAS - LV MASS(C)D: 181.9 GRAMS
BH CV ECHO MEAS - LV MASS(C)DI: 101 GRAMS/M^2
BH CV ECHO MEAS - LV MAX PG: 2.7 MMHG
BH CV ECHO MEAS - LV MEAN PG: 2 MMHG
BH CV ECHO MEAS - LV SYSTOLIC VOL/BSA (12-30): 14.4 ML/M^2
BH CV ECHO MEAS - LV V1 MAX: 82.8 CM/SEC
BH CV ECHO MEAS - LV V1 MEAN: 61.2 CM/SEC
BH CV ECHO MEAS - LV V1 VTI: 20.6 CM
BH CV ECHO MEAS - LVIDD: 4.8 CM
BH CV ECHO MEAS - LVIDS: 3.4 CM
BH CV ECHO MEAS - LVLD AP4: 7 CM
BH CV ECHO MEAS - LVLS AP4: 6.3 CM
BH CV ECHO MEAS - LVOT AREA (M): 3.1 CM^2
BH CV ECHO MEAS - LVOT AREA: 3.1 CM^2
BH CV ECHO MEAS - LVOT DIAM: 2 CM
BH CV ECHO MEAS - LVPWD: 1 CM
BH CV ECHO MEAS - MED PEAK E' VEL: 6.4 CM/SEC
BH CV ECHO MEAS - MV A DUR: 0.16 SEC
BH CV ECHO MEAS - MV A MAX VEL: 50 CM/SEC
BH CV ECHO MEAS - MV DEC SLOPE: 289 CM/SEC^2
BH CV ECHO MEAS - MV DEC TIME: 0.14 SEC
BH CV ECHO MEAS - MV E MAX VEL: 68.9 CM/SEC
BH CV ECHO MEAS - MV E/A: 1.4
BH CV ECHO MEAS - MV MAX PG: 1.8 MMHG
BH CV ECHO MEAS - MV MEAN PG: 1 MMHG
BH CV ECHO MEAS - MV P1/2T MAX VEL: 68.9 CM/SEC
BH CV ECHO MEAS - MV P1/2T: 69.8 MSEC
BH CV ECHO MEAS - MV V2 MAX: 68 CM/SEC
BH CV ECHO MEAS - MV V2 MEAN: 41.7 CM/SEC
BH CV ECHO MEAS - MV V2 VTI: 16.6 CM
BH CV ECHO MEAS - MVA P1/2T LCG: 3.2 CM^2
BH CV ECHO MEAS - MVA(P1/2T): 3.2 CM^2
BH CV ECHO MEAS - MVA(VTI): 3.9 CM^2
BH CV ECHO MEAS - PA MAX PG (FULL): 2.6 MMHG
BH CV ECHO MEAS - PA MAX PG: 4.6 MMHG
BH CV ECHO MEAS - PA V2 MAX: 107 CM/SEC
BH CV ECHO MEAS - PULM A REVS DUR: 0.11 SEC
BH CV ECHO MEAS - PULM A REVS VEL: 23.5 CM/SEC
BH CV ECHO MEAS - PULM DIAS VEL: 23.9 CM/SEC
BH CV ECHO MEAS - PULM S/D: 1.2
BH CV ECHO MEAS - PULM SYS VEL: 28.8 CM/SEC
BH CV ECHO MEAS - PVA(V,A): 3 CM^2
BH CV ECHO MEAS - PVA(V,D): 3 CM^2
BH CV ECHO MEAS - QP/QS: 0.91
BH CV ECHO MEAS - RAP SYSTOLE: 10 MMHG
BH CV ECHO MEAS - RV MAX PG: 2 MMHG
BH CV ECHO MEAS - RV MEAN PG: 1 MMHG
BH CV ECHO MEAS - RV V1 MAX: 70.9 CM/SEC
BH CV ECHO MEAS - RV V1 MEAN: 48.5 CM/SEC
BH CV ECHO MEAS - RV V1 VTI: 13 CM
BH CV ECHO MEAS - RVDD: 2.4 CM
BH CV ECHO MEAS - RVOT AREA: 4.5 CM^2
BH CV ECHO MEAS - RVOT DIAM: 2.4 CM
BH CV ECHO MEAS - RVSP: 42.7 MMHG
BH CV ECHO MEAS - SI(AO): 110.6 ML/M^2
BH CV ECHO MEAS - SI(CUBED): 39.6 ML/M^2
BH CV ECHO MEAS - SI(LVOT): 35.9 ML/M^2
BH CV ECHO MEAS - SI(MOD-SP4): 15.5 ML/M^2
BH CV ECHO MEAS - SI(TEICH): 33.3 ML/M^2
BH CV ECHO MEAS - SV(AO): 199.3 ML
BH CV ECHO MEAS - SV(CUBED): 71.3 ML
BH CV ECHO MEAS - SV(LVOT): 64.7 ML
BH CV ECHO MEAS - SV(MOD-SP4): 28 ML
BH CV ECHO MEAS - SV(RVOT): 58.8 ML
BH CV ECHO MEAS - SV(TEICH): 60.1 ML
BH CV ECHO MEAS - TAPSE (>1.6): 1.7 CM2
BH CV ECHO MEAS - TR MAX VEL: 286 CM/SEC
BH CV XLRA - RV BASE: 3.4 CM
BH CV XLRA - RV LENGTH: 6.3 CM
BH CV XLRA - RV MID: 2.5 CM
BH CV XLRA - TDI S': 8.7 CM/SEC
LEFT ATRIUM VOLUME INDEX: 43.6 ML/M2
LV EF 2D ECHO EST: 40 %

## 2017-03-16 PROCEDURE — G0424 PULMONARY REHAB W EXER: HCPCS

## 2017-03-16 NOTE — TELEPHONE ENCOUNTER
Echo shows LVEF 40% mostly due to septal hypokinesis from LBBB.  Mild RV enlargement and hypokinesis from COPD.  Mild AS.  This does not fit picture of takoTsubo s/d.  Will continue treatment for mild biventricular systolic CHF with lasix and coreg.  proBNP = 183, normal.  JVP normal on exam.  Will not change lasix dose.  Consider adding ace or ARB, but BP is on low side.    Await 2 week ziopatch.

## 2017-03-21 ENCOUNTER — TREATMENT (OUTPATIENT)
Dept: CARDIAC REHAB | Facility: HOSPITAL | Age: 77
End: 2017-03-21

## 2017-03-21 DIAGNOSIS — J44.9 COPD, SEVERE (HCC): Primary | ICD-10-CM

## 2017-03-21 PROCEDURE — G0424 PULMONARY REHAB W EXER: HCPCS

## 2017-03-28 ENCOUNTER — TREATMENT (OUTPATIENT)
Dept: CARDIAC REHAB | Facility: HOSPITAL | Age: 77
End: 2017-03-28

## 2017-03-28 DIAGNOSIS — J44.9 COPD, SEVERE (HCC): Primary | ICD-10-CM

## 2017-03-28 PROCEDURE — G0424 PULMONARY REHAB W EXER: HCPCS

## 2017-03-30 ENCOUNTER — TREATMENT (OUTPATIENT)
Dept: CARDIAC REHAB | Facility: HOSPITAL | Age: 77
End: 2017-03-30

## 2017-03-30 DIAGNOSIS — J44.9 COPD, SEVERE (HCC): Primary | ICD-10-CM

## 2017-03-30 PROCEDURE — G0424 PULMONARY REHAB W EXER: HCPCS

## 2017-04-04 ENCOUNTER — TREATMENT (OUTPATIENT)
Dept: CARDIAC REHAB | Facility: HOSPITAL | Age: 77
End: 2017-04-04

## 2017-04-04 DIAGNOSIS — J44.9 COPD, SEVERE (HCC): Primary | ICD-10-CM

## 2017-04-04 PROCEDURE — G0424 PULMONARY REHAB W EXER: HCPCS

## 2017-04-11 ENCOUNTER — TELEPHONE (OUTPATIENT)
Dept: CARDIOLOGY | Facility: CLINIC | Age: 77
End: 2017-04-11

## 2017-04-11 LAB — TOAL ENROLLMENT DAYS: 14

## 2017-04-12 NOTE — TELEPHONE ENCOUNTER
2 wk patch:  Rare PAC, SVT,  Benign findings.  Continue coreg.    No broken heart syndrome.                                Study Description  Monitor hooked-up on 3/15/2017 at 15:52 EDT. The monitor was scanned on 3/31/2017. The patient was monitored for 14 days 0 hours and 0 minutes. Indications for this exam include dizziness.        Study Findings  No symptoms reported during the monitoring period. No complications noted. The predominant rhythm noted during the testing period was sinus rhythm. Premature atrial contractions occured rarely. There were two episodes of supraventricular tachycardia. The peak heart rate was 163 beats per minute. Longest run of SVT was 12.2 seconds at a rate of 163 bpm. the other one was only 5 beats long.. Premature ventricular contractions occured rarely. Ventricular couplets. There were no episodes of ventricular tachycardia. Sinoatrial node conduction was normal. No atrioventricular block noted.       Study Impressions  An abnormal monitor study. Preliminary Findings  Patient had a min HR of 40 bpm, max HR of 184 bpm, and avg HR of 68 bpm.  Predominant underlying rhythm was Sinus Rhythm. First Degree AV Block was  present. Bundle Branch Block/IVCD was present. 2 Ventricular Tachycardia runs  occurred, the run with the fastest interval lasting 12.2 secs with a max rate of 184  bpm (avg 163 bpm); the run with the fastest interval was also the longest. Possible  Junctional Rhythm was present. Difficulty discerning atrial activity making definitive  diagnosis difficult to ascertain. Isolated SVEs were rare (<1.0%), SVE Couplets were  rare (<1.0%), and SVE Triplets were rare (<1.0%). Isolated VEs were rare (<1.0%),  VE Couplets were rare (<1.0%), and no VE Triplets were present. Ventricular  Trigeminy was present.

## 2017-04-18 ENCOUNTER — TREATMENT (OUTPATIENT)
Dept: CARDIAC REHAB | Facility: HOSPITAL | Age: 77
End: 2017-04-18

## 2017-04-18 DIAGNOSIS — J44.9 COPD, SEVERE (HCC): Primary | ICD-10-CM

## 2017-04-18 PROCEDURE — G0424 PULMONARY REHAB W EXER: HCPCS

## 2017-04-20 ENCOUNTER — TREATMENT (OUTPATIENT)
Dept: CARDIAC REHAB | Facility: HOSPITAL | Age: 77
End: 2017-04-20

## 2017-04-20 DIAGNOSIS — J44.9 COPD, SEVERE (HCC): Primary | ICD-10-CM

## 2017-04-20 PROCEDURE — G0424 PULMONARY REHAB W EXER: HCPCS

## 2017-04-25 ENCOUNTER — TREATMENT (OUTPATIENT)
Dept: CARDIAC REHAB | Facility: HOSPITAL | Age: 77
End: 2017-04-25

## 2017-04-25 DIAGNOSIS — J44.9 COPD, SEVERE (HCC): Primary | ICD-10-CM

## 2017-04-25 PROCEDURE — G0424 PULMONARY REHAB W EXER: HCPCS

## 2017-04-27 ENCOUNTER — TREATMENT (OUTPATIENT)
Dept: CARDIAC REHAB | Facility: HOSPITAL | Age: 77
End: 2017-04-27

## 2017-04-27 DIAGNOSIS — J44.9 COPD, SEVERE (HCC): Primary | ICD-10-CM

## 2017-04-27 PROCEDURE — G0424 PULMONARY REHAB W EXER: HCPCS

## 2017-05-03 ENCOUNTER — HOSPITAL ENCOUNTER (OUTPATIENT)
Dept: PET IMAGING | Facility: HOSPITAL | Age: 77
Discharge: HOME | End: 2017-05-03
Attending: INTERNAL MEDICINE | Admitting: INTERNAL MEDICINE

## 2017-05-03 ENCOUNTER — LAB (OUTPATIENT)
Dept: LAB | Facility: HOSPITAL | Age: 77
End: 2017-05-03

## 2017-05-03 DIAGNOSIS — C34.91 ADENOCARCINOMA OF RIGHT LUNG (HCC): ICD-10-CM

## 2017-05-03 DIAGNOSIS — D50.8 OTHER IRON DEFICIENCY ANEMIA: ICD-10-CM

## 2017-05-03 LAB
ALBUMIN SERPL-MCNC: 3.9 G/DL (ref 3.5–5.2)
ALBUMIN/GLOB SERPL: 1.1 G/DL (ref 1.1–2.4)
ALP SERPL-CCNC: 69 U/L (ref 38–116)
ALT SERPL W P-5'-P-CCNC: 8 U/L (ref 0–33)
ANION GAP SERPL CALCULATED.3IONS-SCNC: 11.1 MMOL/L
AST SERPL-CCNC: 14 U/L (ref 0–32)
BASOPHILS # BLD AUTO: 0.07 10*3/MM3 (ref 0–0.1)
BASOPHILS NFR BLD AUTO: 0.8 % (ref 0–1.1)
BILIRUB SERPL-MCNC: 0.2 MG/DL (ref 0.1–1.2)
BUN BLD-MCNC: 19 MG/DL (ref 6–20)
BUN/CREAT SERPL: 22.4 (ref 7.3–30)
CALCIUM SPEC-SCNC: 9.2 MG/DL (ref 8.5–10.2)
CHLORIDE SERPL-SCNC: 99 MMOL/L (ref 98–107)
CO2 SERPL-SCNC: 33.9 MMOL/L (ref 22–29)
CREAT BLD-MCNC: 0.85 MG/DL (ref 0.6–1.1)
CREAT BLDA-MCNC: 0.9 MG/DL (ref 0.6–1.3)
DEPRECATED RDW RBC AUTO: 48.2 FL (ref 37–49)
EOSINOPHIL # BLD AUTO: 0.17 10*3/MM3 (ref 0–0.36)
EOSINOPHIL NFR BLD AUTO: 1.9 % (ref 1–5)
ERYTHROCYTE [DISTWIDTH] IN BLOOD BY AUTOMATED COUNT: 13.6 % (ref 11.7–14.5)
FERRITIN SERPL-MCNC: 27.9 NG/ML
GFR SERPL CREATININE-BSD FRML MDRD: 65 ML/MIN/1.73
GLOBULIN UR ELPH-MCNC: 3.4 GM/DL (ref 1.8–3.5)
GLUCOSE BLD-MCNC: 110 MG/DL (ref 74–124)
HCT VFR BLD AUTO: 35 % (ref 34–45)
HGB BLD-MCNC: 10.9 G/DL (ref 11.5–14.9)
IMM GRANULOCYTES # BLD: 0.01 10*3/MM3 (ref 0–0.03)
IMM GRANULOCYTES NFR BLD: 0.1 % (ref 0–0.5)
LYMPHOCYTES # BLD AUTO: 1.39 10*3/MM3 (ref 1–3.5)
LYMPHOCYTES NFR BLD AUTO: 15.8 % (ref 20–49)
MCH RBC QN AUTO: 30.2 PG (ref 27–33)
MCHC RBC AUTO-ENTMCNC: 31.1 G/DL (ref 32–35)
MCV RBC AUTO: 97 FL (ref 83–97)
MONOCYTES # BLD AUTO: 0.78 10*3/MM3 (ref 0.25–0.8)
MONOCYTES NFR BLD AUTO: 8.9 % (ref 4–12)
NEUTROPHILS # BLD AUTO: 6.38 10*3/MM3 (ref 1.5–7)
NEUTROPHILS NFR BLD AUTO: 72.5 % (ref 39–75)
NRBC BLD MANUAL-RTO: 0 /100 WBC (ref 0–0)
PLATELET # BLD AUTO: 307 10*3/MM3 (ref 150–375)
PMV BLD AUTO: 10 FL (ref 8.9–12.1)
POTASSIUM BLD-SCNC: 4.7 MMOL/L (ref 3.5–4.7)
PROT SERPL-MCNC: 7.3 G/DL (ref 6.3–8)
RBC # BLD AUTO: 3.61 10*6/MM3 (ref 3.9–5)
SODIUM BLD-SCNC: 144 MMOL/L (ref 134–145)
WBC NRBC COR # BLD: 8.8 10*3/MM3 (ref 4–10)

## 2017-05-03 PROCEDURE — 0 IOPAMIDOL 61 % SOLUTION: Performed by: INTERNAL MEDICINE

## 2017-05-03 PROCEDURE — 36415 COLL VENOUS BLD VENIPUNCTURE: CPT

## 2017-05-03 PROCEDURE — 82565 ASSAY OF CREATININE: CPT

## 2017-05-03 PROCEDURE — 80053 COMPREHEN METABOLIC PANEL: CPT

## 2017-05-03 PROCEDURE — 71260 CT THORAX DX C+: CPT

## 2017-05-03 PROCEDURE — 82728 ASSAY OF FERRITIN: CPT

## 2017-05-03 PROCEDURE — 85025 COMPLETE CBC W/AUTO DIFF WBC: CPT

## 2017-05-03 RX ADMIN — IOPAMIDOL 75 ML: 612 INJECTION, SOLUTION INTRAVENOUS at 12:20

## 2017-05-09 ENCOUNTER — TREATMENT (OUTPATIENT)
Dept: CARDIAC REHAB | Facility: HOSPITAL | Age: 77
End: 2017-05-09

## 2017-05-09 DIAGNOSIS — J44.9 COPD, SEVERE (HCC): Primary | ICD-10-CM

## 2017-05-09 PROCEDURE — G0424 PULMONARY REHAB W EXER: HCPCS

## 2017-05-10 ENCOUNTER — APPOINTMENT (OUTPATIENT)
Dept: LAB | Facility: HOSPITAL | Age: 77
End: 2017-05-10

## 2017-05-10 ENCOUNTER — OFFICE VISIT (OUTPATIENT)
Dept: ONCOLOGY | Facility: CLINIC | Age: 77
End: 2017-05-10

## 2017-05-10 VITALS
RESPIRATION RATE: 18 BRPM | HEART RATE: 76 BPM | BODY MASS INDEX: 32.94 KG/M2 | SYSTOLIC BLOOD PRESSURE: 130 MMHG | DIASTOLIC BLOOD PRESSURE: 70 MMHG | TEMPERATURE: 97.9 F | WEIGHT: 179 LBS | HEIGHT: 62 IN

## 2017-05-10 DIAGNOSIS — C34.91 ADENOCARCINOMA OF RIGHT LUNG (HCC): Primary | ICD-10-CM

## 2017-05-10 DIAGNOSIS — D50.0 IRON DEFICIENCY ANEMIA DUE TO CHRONIC BLOOD LOSS: ICD-10-CM

## 2017-05-10 PROCEDURE — G0463 HOSPITAL OUTPT CLINIC VISIT: HCPCS | Performed by: INTERNAL MEDICINE

## 2017-05-10 PROCEDURE — 99214 OFFICE O/P EST MOD 30 MIN: CPT | Performed by: INTERNAL MEDICINE

## 2017-05-10 RX ORDER — DIPHENHYDRAMINE HCL 25 MG
25 CAPSULE ORAL ONCE
Status: CANCELLED | OUTPATIENT
Start: 2017-05-15

## 2017-05-10 RX ORDER — SODIUM CHLORIDE 9 MG/ML
250 INJECTION, SOLUTION INTRAVENOUS ONCE
Status: CANCELLED | OUTPATIENT
Start: 2017-05-15

## 2017-05-17 ENCOUNTER — INFUSION (OUTPATIENT)
Dept: ONCOLOGY | Facility: HOSPITAL | Age: 77
End: 2017-05-17

## 2017-05-17 ENCOUNTER — LAB (OUTPATIENT)
Dept: LAB | Facility: HOSPITAL | Age: 77
End: 2017-05-17

## 2017-05-17 VITALS
BODY MASS INDEX: 32.86 KG/M2 | DIASTOLIC BLOOD PRESSURE: 80 MMHG | TEMPERATURE: 97.9 F | HEART RATE: 63 BPM | SYSTOLIC BLOOD PRESSURE: 156 MMHG | WEIGHT: 180.8 LBS

## 2017-05-17 DIAGNOSIS — C34.91 ADENOCARCINOMA OF RIGHT LUNG (HCC): ICD-10-CM

## 2017-05-17 DIAGNOSIS — D50.0 IRON DEFICIENCY ANEMIA DUE TO CHRONIC BLOOD LOSS: Primary | ICD-10-CM

## 2017-05-17 LAB
BASOPHILS # BLD AUTO: 0.09 10*3/MM3 (ref 0–0.1)
BASOPHILS NFR BLD AUTO: 1 % (ref 0–1.1)
DEPRECATED RDW RBC AUTO: 44.7 FL (ref 37–49)
EOSINOPHIL # BLD AUTO: 0.15 10*3/MM3 (ref 0–0.36)
EOSINOPHIL NFR BLD AUTO: 1.6 % (ref 1–5)
ERYTHROCYTE [DISTWIDTH] IN BLOOD BY AUTOMATED COUNT: 13.2 % (ref 11.7–14.5)
HCT VFR BLD AUTO: 32.8 % (ref 34–45)
HGB BLD-MCNC: 10.7 G/DL (ref 11.5–14.9)
IMM GRANULOCYTES # BLD: 0.04 10*3/MM3 (ref 0–0.03)
IMM GRANULOCYTES NFR BLD: 0.4 % (ref 0–0.5)
IRON 24H UR-MRATE: 62 MCG/DL (ref 37–145)
IRON SATN MFR SERPL: 19 % (ref 14–48)
LYMPHOCYTES # BLD AUTO: 1.51 10*3/MM3 (ref 1–3.5)
LYMPHOCYTES NFR BLD AUTO: 16.2 % (ref 20–49)
MCH RBC QN AUTO: 30.2 PG (ref 27–33)
MCHC RBC AUTO-ENTMCNC: 32.6 G/DL (ref 32–35)
MCV RBC AUTO: 92.7 FL (ref 83–97)
MONOCYTES # BLD AUTO: 0.92 10*3/MM3 (ref 0.25–0.8)
MONOCYTES NFR BLD AUTO: 9.9 % (ref 4–12)
NEUTROPHILS # BLD AUTO: 6.61 10*3/MM3 (ref 1.5–7)
NEUTROPHILS NFR BLD AUTO: 70.9 % (ref 39–75)
NRBC BLD MANUAL-RTO: 0 /100 WBC (ref 0–0)
PLATELET # BLD AUTO: 231 10*3/MM3 (ref 150–375)
PMV BLD AUTO: 9.9 FL (ref 8.9–12.1)
RBC # BLD AUTO: 3.54 10*6/MM3 (ref 3.9–5)
TIBC SERPL-MCNC: 319 MCG/DL (ref 249–505)
TRANSFERRIN SERPL-MCNC: 228 MG/DL (ref 200–360)
WBC NRBC COR # BLD: 9.32 10*3/MM3 (ref 4–10)

## 2017-05-17 PROCEDURE — 63710000001 DIPHENHYDRAMINE PER 50 MG: Performed by: INTERNAL MEDICINE

## 2017-05-17 PROCEDURE — 85025 COMPLETE CBC W/AUTO DIFF WBC: CPT

## 2017-05-17 PROCEDURE — 25010000002 FERUMOXYTOL 510 MG/17ML SOLUTION 510 MG VIAL: Performed by: INTERNAL MEDICINE

## 2017-05-17 PROCEDURE — 83540 ASSAY OF IRON: CPT

## 2017-05-17 PROCEDURE — 96375 TX/PRO/DX INJ NEW DRUG ADDON: CPT | Performed by: INTERNAL MEDICINE

## 2017-05-17 PROCEDURE — 96374 THER/PROPH/DIAG INJ IV PUSH: CPT | Performed by: INTERNAL MEDICINE

## 2017-05-17 PROCEDURE — 36415 COLL VENOUS BLD VENIPUNCTURE: CPT

## 2017-05-17 PROCEDURE — 82728 ASSAY OF FERRITIN: CPT

## 2017-05-17 PROCEDURE — 36416 COLLJ CAPILLARY BLOOD SPEC: CPT

## 2017-05-17 PROCEDURE — 84466 ASSAY OF TRANSFERRIN: CPT

## 2017-05-17 RX ORDER — DIPHENHYDRAMINE HCL 25 MG
25 CAPSULE ORAL ONCE
Status: COMPLETED | OUTPATIENT
Start: 2017-05-17 | End: 2017-05-17

## 2017-05-17 RX ORDER — SODIUM CHLORIDE 9 MG/ML
250 INJECTION, SOLUTION INTRAVENOUS ONCE
Status: CANCELLED | OUTPATIENT
Start: 2017-05-17

## 2017-05-17 RX ORDER — DIPHENHYDRAMINE HCL 25 MG
25 CAPSULE ORAL ONCE
Status: CANCELLED | OUTPATIENT
Start: 2017-05-17

## 2017-05-17 RX ORDER — SODIUM CHLORIDE 9 MG/ML
250 INJECTION, SOLUTION INTRAVENOUS ONCE
Status: COMPLETED | OUTPATIENT
Start: 2017-05-17 | End: 2017-05-17

## 2017-05-17 RX ADMIN — FAMOTIDINE 20 MG: 10 INJECTION INTRAVENOUS at 13:48

## 2017-05-17 RX ADMIN — FERUMOXYTOL 510 MG: 510 INJECTION INTRAVENOUS at 14:18

## 2017-05-17 RX ADMIN — DIPHENHYDRAMINE HYDROCHLORIDE 25 MG: 25 CAPSULE ORAL at 13:48

## 2017-05-17 RX ADMIN — SODIUM CHLORIDE 250 ML: 900 INJECTION, SOLUTION INTRAVENOUS at 13:48

## 2017-05-18 LAB — FERRITIN SERPL-MCNC: 23.9 NG/ML

## 2017-05-24 ENCOUNTER — INFUSION (OUTPATIENT)
Dept: ONCOLOGY | Facility: HOSPITAL | Age: 77
End: 2017-05-24

## 2017-05-24 VITALS
DIASTOLIC BLOOD PRESSURE: 81 MMHG | SYSTOLIC BLOOD PRESSURE: 166 MMHG | TEMPERATURE: 98 F | HEART RATE: 75 BPM | WEIGHT: 179 LBS | BODY MASS INDEX: 32.53 KG/M2

## 2017-05-24 DIAGNOSIS — D50.0 IRON DEFICIENCY ANEMIA DUE TO CHRONIC BLOOD LOSS: Primary | ICD-10-CM

## 2017-05-24 PROCEDURE — 96375 TX/PRO/DX INJ NEW DRUG ADDON: CPT

## 2017-05-24 PROCEDURE — 25010000002 FERUMOXYTOL 510 MG/17ML SOLUTION 510 MG VIAL: Performed by: INTERNAL MEDICINE

## 2017-05-24 PROCEDURE — 96374 THER/PROPH/DIAG INJ IV PUSH: CPT

## 2017-05-24 PROCEDURE — 63710000001 DIPHENHYDRAMINE PER 50 MG: Performed by: INTERNAL MEDICINE

## 2017-05-24 RX ORDER — SODIUM CHLORIDE 9 MG/ML
250 INJECTION, SOLUTION INTRAVENOUS ONCE
Status: COMPLETED | OUTPATIENT
Start: 2017-05-24 | End: 2017-05-24

## 2017-05-24 RX ORDER — SODIUM CHLORIDE 9 MG/ML
250 INJECTION, SOLUTION INTRAVENOUS ONCE
Status: CANCELLED | OUTPATIENT
Start: 2017-05-24

## 2017-05-24 RX ORDER — DIPHENHYDRAMINE HCL 25 MG
25 CAPSULE ORAL ONCE
Status: COMPLETED | OUTPATIENT
Start: 2017-05-24 | End: 2017-05-24

## 2017-05-24 RX ORDER — DIPHENHYDRAMINE HCL 25 MG
25 CAPSULE ORAL ONCE
Status: CANCELLED | OUTPATIENT
Start: 2017-05-24

## 2017-05-24 RX ADMIN — FAMOTIDINE 20 MG: 10 INJECTION INTRAVENOUS at 14:22

## 2017-05-24 RX ADMIN — DIPHENHYDRAMINE HYDROCHLORIDE 25 MG: 25 CAPSULE ORAL at 14:22

## 2017-05-24 RX ADMIN — FERUMOXYTOL 510 MG: 510 INJECTION INTRAVENOUS at 14:49

## 2017-05-24 RX ADMIN — SODIUM CHLORIDE 250 ML: 900 INJECTION, SOLUTION INTRAVENOUS at 14:15

## 2017-06-06 ENCOUNTER — TREATMENT (OUTPATIENT)
Dept: CARDIAC REHAB | Facility: HOSPITAL | Age: 77
End: 2017-06-06

## 2017-06-06 ENCOUNTER — TELEPHONE (OUTPATIENT)
Dept: ONCOLOGY | Facility: CLINIC | Age: 77
End: 2017-06-06

## 2017-06-06 DIAGNOSIS — J44.9 COPD, SEVERE (HCC): Primary | ICD-10-CM

## 2017-06-06 PROCEDURE — G0424 PULMONARY REHAB W EXER: HCPCS

## 2017-06-06 NOTE — TELEPHONE ENCOUNTER
Pt. States she is on oxygen now (conserve only)  Is thinking about purchasing the constant which is $3500.00  rather than $2800.00 which is what the reserve will cost.  Feels like she does fine on the reserve.  Informed  Pt. It depends on how well she is doing with the reserve only and also what she can afford.  Pt. Feels she can get by with the reserve.  Thinks she will go with that for now.  Instructed to call for any other questions or concerns.  V/u.

## 2017-06-13 ENCOUNTER — TREATMENT (OUTPATIENT)
Dept: CARDIAC REHAB | Facility: HOSPITAL | Age: 77
End: 2017-06-13

## 2017-06-13 DIAGNOSIS — J44.9 COPD, SEVERE (HCC): Primary | ICD-10-CM

## 2017-06-13 PROCEDURE — G0424 PULMONARY REHAB W EXER: HCPCS

## 2017-06-27 ENCOUNTER — TREATMENT (OUTPATIENT)
Dept: CARDIAC REHAB | Facility: HOSPITAL | Age: 77
End: 2017-06-27

## 2017-06-27 DIAGNOSIS — J44.9 COPD, SEVERE (HCC): Primary | ICD-10-CM

## 2017-06-27 PROCEDURE — G0424 PULMONARY REHAB W EXER: HCPCS

## 2017-07-03 ENCOUNTER — HOSPITAL ENCOUNTER (OUTPATIENT)
Dept: PET IMAGING | Facility: HOSPITAL | Age: 77
Discharge: HOME | End: 2017-07-03
Attending: INTERNAL MEDICINE | Admitting: INTERNAL MEDICINE

## 2017-07-03 ENCOUNTER — LAB (OUTPATIENT)
Dept: LAB | Facility: HOSPITAL | Age: 77
End: 2017-07-03

## 2017-07-03 DIAGNOSIS — D50.0 IRON DEFICIENCY ANEMIA DUE TO CHRONIC BLOOD LOSS: ICD-10-CM

## 2017-07-03 DIAGNOSIS — C34.91 ADENOCARCINOMA OF RIGHT LUNG (HCC): ICD-10-CM

## 2017-07-03 LAB
ALBUMIN SERPL-MCNC: 4.1 G/DL (ref 3.5–5.2)
ALBUMIN/GLOB SERPL: 1.2 G/DL (ref 1.1–2.4)
ALP SERPL-CCNC: 65 U/L (ref 38–116)
ALT SERPL W P-5'-P-CCNC: 10 U/L (ref 0–33)
ANION GAP SERPL CALCULATED.3IONS-SCNC: 11.7 MMOL/L
AST SERPL-CCNC: 12 U/L (ref 0–32)
BASOPHILS # BLD AUTO: 0.06 10*3/MM3 (ref 0–0.1)
BASOPHILS NFR BLD AUTO: 0.8 % (ref 0–1.1)
BILIRUB SERPL-MCNC: 0.3 MG/DL (ref 0.1–1.2)
BUN BLD-MCNC: 13 MG/DL (ref 6–20)
BUN/CREAT SERPL: 16.5 (ref 7.3–30)
CALCIUM SPEC-SCNC: 9.3 MG/DL (ref 8.5–10.2)
CHLORIDE SERPL-SCNC: 100 MMOL/L (ref 98–107)
CO2 SERPL-SCNC: 32.3 MMOL/L (ref 22–29)
CREAT BLD-MCNC: 0.79 MG/DL (ref 0.6–1.1)
CREAT BLDA-MCNC: 0.8 MG/DL (ref 0.6–1.3)
DEPRECATED RDW RBC AUTO: 45.8 FL (ref 37–49)
EOSINOPHIL # BLD AUTO: 0.14 10*3/MM3 (ref 0–0.36)
EOSINOPHIL NFR BLD AUTO: 1.8 % (ref 1–5)
ERYTHROCYTE [DISTWIDTH] IN BLOOD BY AUTOMATED COUNT: 12.9 % (ref 11.7–14.5)
FERRITIN SERPL-MCNC: 193.7 NG/ML
GFR SERPL CREATININE-BSD FRML MDRD: 71 ML/MIN/1.73
GLOBULIN UR ELPH-MCNC: 3.5 GM/DL (ref 1.8–3.5)
GLUCOSE BLD-MCNC: 107 MG/DL (ref 74–124)
HCT VFR BLD AUTO: 36.2 % (ref 34–45)
HGB BLD-MCNC: 11.4 G/DL (ref 11.5–14.9)
IMM GRANULOCYTES # BLD: 0.02 10*3/MM3 (ref 0–0.03)
IMM GRANULOCYTES NFR BLD: 0.3 % (ref 0–0.5)
IRON 24H UR-MRATE: 76 MCG/DL (ref 37–145)
IRON SATN MFR SERPL: 30 % (ref 14–48)
LYMPHOCYTES # BLD AUTO: 1.22 10*3/MM3 (ref 1–3.5)
LYMPHOCYTES NFR BLD AUTO: 15.8 % (ref 20–49)
MCH RBC QN AUTO: 30.4 PG (ref 27–33)
MCHC RBC AUTO-ENTMCNC: 31.5 G/DL (ref 32–35)
MCV RBC AUTO: 96.5 FL (ref 83–97)
MONOCYTES # BLD AUTO: 0.54 10*3/MM3 (ref 0.25–0.8)
MONOCYTES NFR BLD AUTO: 7 % (ref 4–12)
NEUTROPHILS # BLD AUTO: 5.76 10*3/MM3 (ref 1.5–7)
NEUTROPHILS NFR BLD AUTO: 74.3 % (ref 39–75)
NRBC BLD MANUAL-RTO: 0 /100 WBC (ref 0–0)
PLATELET # BLD AUTO: 253 10*3/MM3 (ref 150–375)
PMV BLD AUTO: 10 FL (ref 8.9–12.1)
POTASSIUM BLD-SCNC: 4.1 MMOL/L (ref 3.5–4.7)
PROT SERPL-MCNC: 7.6 G/DL (ref 6.3–8)
RBC # BLD AUTO: 3.75 10*6/MM3 (ref 3.9–5)
SODIUM BLD-SCNC: 144 MMOL/L (ref 134–145)
TIBC SERPL-MCNC: 252 MCG/DL (ref 249–505)
TRANSFERRIN SERPL-MCNC: 180 MG/DL (ref 200–360)
WBC NRBC COR # BLD: 7.74 10*3/MM3 (ref 4–10)

## 2017-07-03 PROCEDURE — 0 IOPAMIDOL 61 % SOLUTION: Performed by: INTERNAL MEDICINE

## 2017-07-03 PROCEDURE — 85025 COMPLETE CBC W/AUTO DIFF WBC: CPT

## 2017-07-03 PROCEDURE — 84466 ASSAY OF TRANSFERRIN: CPT

## 2017-07-03 PROCEDURE — 82565 ASSAY OF CREATININE: CPT

## 2017-07-03 PROCEDURE — 36415 COLL VENOUS BLD VENIPUNCTURE: CPT

## 2017-07-03 PROCEDURE — 71260 CT THORAX DX C+: CPT

## 2017-07-03 PROCEDURE — 83540 ASSAY OF IRON: CPT

## 2017-07-03 PROCEDURE — 80053 COMPREHEN METABOLIC PANEL: CPT

## 2017-07-03 PROCEDURE — 82728 ASSAY OF FERRITIN: CPT

## 2017-07-03 RX ADMIN — IOPAMIDOL 75 ML: 612 INJECTION, SOLUTION INTRAVENOUS at 12:52

## 2017-07-10 ENCOUNTER — OFFICE VISIT (OUTPATIENT)
Dept: ONCOLOGY | Facility: CLINIC | Age: 77
End: 2017-07-10

## 2017-07-10 ENCOUNTER — APPOINTMENT (OUTPATIENT)
Dept: LAB | Facility: HOSPITAL | Age: 77
End: 2017-07-10

## 2017-07-10 VITALS
DIASTOLIC BLOOD PRESSURE: 80 MMHG | BODY MASS INDEX: 32.72 KG/M2 | HEART RATE: 72 BPM | RESPIRATION RATE: 16 BRPM | WEIGHT: 177.8 LBS | HEIGHT: 62 IN | SYSTOLIC BLOOD PRESSURE: 132 MMHG | TEMPERATURE: 98.3 F

## 2017-07-10 DIAGNOSIS — D50.0 IRON DEFICIENCY ANEMIA DUE TO CHRONIC BLOOD LOSS: ICD-10-CM

## 2017-07-10 DIAGNOSIS — R91.1 NODULE OF LEFT LUNG: ICD-10-CM

## 2017-07-10 DIAGNOSIS — J70.0 PNEUMONITIS, RADIATION (HCC): ICD-10-CM

## 2017-07-10 DIAGNOSIS — C34.91 ADENOCARCINOMA OF RIGHT LUNG (HCC): Primary | ICD-10-CM

## 2017-07-10 PROCEDURE — 99214 OFFICE O/P EST MOD 30 MIN: CPT | Performed by: INTERNAL MEDICINE

## 2017-07-10 PROCEDURE — G0463 HOSPITAL OUTPT CLINIC VISIT: HCPCS | Performed by: INTERNAL MEDICINE

## 2017-07-10 NOTE — PROGRESS NOTES
Subjective  REASON FOR FOLLOWUP:  Two nodules in the right lung, each one of them pathologically confirmed to be adenocarcinoma. The first one is EGFR negative, ALK1 negative. The second one has not had any molecular analysis. The original one was requested to have EGFR and ALK1. So far we have not received any information in this regard. A CT scan of the brain shows no evidence of brain metastasis. ECOG performance status 1. No cancer-related pain. THE PATIENT UNDERWENT DEFINITIVE RADIATION THERAPY FOR BOTH NODULES..  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 Finally recovered from this.       History of Present Illness     This patient is here today in the company of her daughter. She is here today still complaining of shortness of breath but she is undergoing to pulmonary rehab that has been beneficial.. The patient is not having any alterations in her appetite. Her weight is stable. Her bowel function and urination are normal. No cardiac issues at this time. No edema in her lower extremities.    She remains anticoagulated and she denies any evidence of blood loss in the gastrointestinal tract.  Today with her daughter already Haven difficult argumentative regarding the ability for her to function independently the ability for her to drive the car .  The patient has developed progressive amount or shortness of breath due to her COPD and the radiation pneumonitis and she has more limitations now the liver in spite of having proper oxygen a and has undergoing cardiac and Pulmonary rehabilitation.            Past Medical History, Past Surgical HistoryVery extensive including hypertension, hyperlipidemia, carotid arterial disease with 1 carotid artery occluded on the right side, thrombophilia since age 25 when she developed vein thrombosis in the lower extremities, bilateral pulmonary emboli and she had an  IVC filter placed then and required anticoagulation for almost the rest of her life.  She also has had a history of paroxysmal atrial fibrillation, she also has history of stress cardiomyopathy, and she also has a history of peripheral arterial disease, status post bypass operation for the lower extremities. The patient also has a history of COPD. She also has a history of hypothyroidism and also she has history of clostridium colitis.  She has had mammograms in the past, being normal. She has had colonoscopies in the past, being normal or negative for malignant conditions. She never had any history of bladder cancer. 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 Margins of resection were free at that point.      The patient also developed a lung nodule in the right base in 2009 and after assessment by Dr. Lamb she had a wedge resection showing an adenocarcinoma. The EGFR analysis and ALK-1 analysis was never done on that tissue and we have requested this.  Later on this year she has had a couple of lung nodules as already stated and biopsy of one of them showing already an adenocarcinoma that is EGFR negative, ALK-1 pending.  A biopsy of the 2nd nodule is planned to be done on 02/22/2015.      As we recommended to the patient on 02/17/2016, we would like for her to have a CT scan of the brain to be sure that there is no brain metastases.  I did not order an MRI because the patient has an IVC filter in place and this is a metal piece that she has had in this location for a long time.      The patient also has a history of a stress fracture of one of her ankles and she has had osteoporosis with collapsed vertebrae in the past.      She also has a history of thrombophilia as stated before and never a workup was performed looking for any other specific cause.      ONCOLOGIC HISTORY: Ms. Narayan is a 75-year-old white  female who was actually discussed in the multidisciplinary lung cancer conference a week ago with Alfredo Leiva and many other physicians. On that occasion we reviewed her CT scans and PET scans showing a couple of new nodules on the PET scan, one that was biopsied already proven to be adenocarcinoma and a 2nd one more laterally that was found incidentally on the PET scan that will be biopsied this coming Monday.  She has a history of wedge resection of a tumoral lesion in the right lung base in February 2009 for an adenocarcinoma. She actually is having her anniversary of smoking cessation today, 7 years with no cigarettes. In any event, since last summer, the patient has had a very difficult time, first of all with stress cardiomyopathy and ended up at [facility] for almost 3 weeks having terrible ejection fraction on an echocardiogram and subsequently after appropriate therapy, improved and has been well since then. She has an upcoming echocardiogram with Dr. Mcguire this week.  Besides this, she had almost 4 back-to-back infections with clostridium colitis since August until almost November requiring multiple therapies and visits to infectious disease to finally get rid of this infection altogether.  She lost close to 40 pounds of weight from this event.  Finally, since the 1st of December she has started to feel a little bit better.  She is back to eating, her weight has stabilized and she has not had any nausea, vomiting, abdominal pain, further diarrhea or fever. Urination is ongoing with no difficulty. She has had a minimal hacking cough with no sputum production or pleuritic pain. No modification in her usual degree of shortness of breath. She has not had any hemoptysis besides the one that she developed after the lung biopsy recently. This has already subsided.  The patient is not experiencing any need for O2 at nighttime, even though she has her machine at home.  She has had a better  sense of well-being. She has gained back control of her life and she is running her house like she used to. Now she is taking care of her ill  who has Alzheimer’s dementia.     On 2016, the CT scan of the brain failed to document any brain metastasis and the biopsy of the second malignancy in the right lung was consistent with adenocarcinoma. We discussed the case with Dr. Thompson who will see her tomorrow morning in preparation for possible definitive radiation therapy. The patient will not be a candidate to receive Tarceva on any of small molecules, and she will not be a candidate to receive any formal IV chemotherapy. She will be a candidate in the future to receive Opdivo at the most.   SURGICAL HISTORY:  She has had a partial hysterectomy. She has had a hernia in the lumbar spine.  She also has had a cyst in the stomach and completion of a hysterectomy. She also has had a biopsy and cyst removal of the left breast and also biopsy for skin lesions on the forehead. She has had a wedge resection on her right lung in . There was also removal of a pigmented lesion on the left nipple in .      Again, she also has a history of pulmonary embolus at age 24 and purple legs bilateral deep vein thrombosis that she has almost kept anticoagulated for most of her life.     GYN HISTORY:   1, para 1, no miscarriages. Last pelvic examination done in the last 3 years being normal.    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  She used to smoke up to 1-1/2 packs of cigarettes a day. She quit 7 years ago.  She does not drink any alcohol.    Review of Systems   General: no fever, chills, fatigue, weight changes, or lack of appetite.  Eyes: no epiphora, xerophthalmia,conjunctivitis, pain, glaucoma, blurred vision, blindness, secretion, photophobia, proptosis, diplopia.  Ears: no otorrhea, tinnitus, otorrhagia, deafness, pain, vertigo.  Nose:  no rhinorrhea, epistaxis, alteration in perception of odors, sinuses pressure.  Mouth: no alteration in gums or teeth,  ulcers, no difficulty with mastication or deglut ion, no odynophagia.  Neck: no masses or pain, no thyroid alterations, no pain in muscles or arteries, no carotid odynia, no crepitation.  Respiratory: dry cough, sputum production,STABLE dyspnea,no trepopnea, pleuritic pain, hemoptysis.  Heart: no syncope, irregularity, palpitations, angina, orthopnea, paroxysmal nocturnal dyspnea.  Vascular Venous: no tenderness,edema, palpable cords, postphlebitic syndrome, skin changes or ulcerations.  Vascular Arterial: no distal ischemia, claudication, gangrene, neuropathic ischemic pain, skin ulcers, paleness or cyanosis.  GI: no dysphagia, odynophagia, no regurgitation, no heartburn,no indigestion,no nausea,no vomiting,no hematemesis ,no melena,no jaundice,no distention, no obstipation,no enterorrhagia,no proctalgia,no anal  lesions, nochanges in bowel habits.  : no frequency, hesitancy, hematuria, discharge, pain.  Musculoskeletal: no muscle or tendon pain or inflammation, joint pain, edema, functional limitation, fasciculations, mass.  Neurologic: no headache, seizures, alterations on Craneal nerves, no motor or senssory deficit, normal coordination, no alteration in memory, orientation, calculation,writting, verbal or written language.  Skin: no rashes, pruritus or localized lesions.  Psychiatric: no anxiety, depression, agitation, delusions, proper insight.  A comprehensive 14 point review of systems was performed and was negative except as mentioned.    Medications:  The current medication list was reviewed in the EMR    ALLERGIES:    Allergies   Allergen Reactions   • Adhesive Tape    • Augmentin [Amoxicillin-Pot Clavulanate]    • Cephalexin    • Pentazocine    • Simvastatin        Objective      Vitals:    07/10/17 1553   BP: 132/80   Pulse: 72   Resp: 16   Temp: 98.3 °F (36.8 °C)   Weight: 177 lb  "12.8 oz (80.6 kg)   Height: 62.2\" (158 cm)   PainSc: 0-No pain     Current Status 7/10/2017   ECOG score 0       Physical Exam    GENERAL:  Well-developed, well-nourished  Patient  in no acute distress.   SKIN:  Warm, dry without rashes, purpura or petechiae.  HEENT:  Pupils were equal and reactive to light and accomodation, conjunctivas non injected, no pterigion, normal extraocular movements, normal visual acuity.   Mouth mucosa was moist, no exudates in oropharynx, normal gum line, normal roof of the mouth and pillars, normal papillations of the tongue.Ear canals were normal, as well tympanic membranes, normal hearing acuity.No pain in mastoid area or erythema.  NECK:  Supple with good range of motion; no thyromegaly or masses, no JVD or bruits, no cervical adenopathies.No carotid arteries pain, no carotid abnormal pulsation or arterial dance.  LYMPHATICS:  No cervical, supraclavicular, axillary, epitrochlear or inguinal adenopathy.  CHEST:  poor excursion of both lara thoraces, normal voice fremitus, no subcutaneous emphysema, normal axillas, no rashes or acanthosis nigricans. Lungs clear to percussion and auscultation, decreased breath sounds bilaterally, minimal wheezing,R BASE POSTERIOR crackles AND ronchi, no stridor, no rubs.  CARDIAC AND VASCULAR: PMI not displaced,no thrills, normal rate and regular rhythm, without murmurs, rubs or S3 or S4 right or left sided gallops. Normal femoral, popliteal, pedis, brachial and carotid pulses.  ABDOMEN:  Soft, nontender with no organomegaly or masses, no ascites, no collateral circulation,no distention,no Rod sign, no abdominal pain, no inguinal hernias,no umbilical hernias, no abdominal bruits. Normal bowel sounds.R abdominal wall hernia reductible  GENITAL: Not  Performed.  EXTREMITIES  AND SPINE:  No clubbing, cyanosis or edema, no deformities or pain .No kyphosis, scoliosis, deformities or pain in spine, ribs or pelvic bone.Trophic changes in the skin both LE " associated with PVD, no ulcers.  NEUROLOGICAL:  Patient was awake, alert, oriented to time, person and place,normal gait and coordination.    RECENT LABS:  HeFerritin   Order: [id number]   Status:  Final result   Visible to patient:  No (Not Released) Dx:  Pneumonitis, radiation; Adenocarcinom...      Ref Range & Units 7d ago     Ferritin ng/mL 21.10   Resulting Agency   CBC LAB      Specimen Collected: 02/08/17 12:30 PM Last Resulted: 02/08/17  3:32 PM               WBC   Date Value Ref Range Status   07/03/2017 7.74 4.00 - 10.00 10*3/mm3 Final     RBC   Date Value Ref Range Status   07/03/2017 3.75 (L) 3.90 - 5.00 10*6/mm3 Final     Hemoglobin   Date Value Ref Range Status   07/03/2017 11.4 (L) 11.5 - 14.9 g/dL Final     Hematocrit   Date Value Ref Range Status   07/03/2017 36.2 34.0 - 45.0 % Final     Platelets   Date Value Ref Range Status   07/03/2017 253 150 - 375 10*3/mm3 Final        CT CHEST WITH CONTRAST-      CLINICAL: Follow-up lung carcinoma.      COMPARISON: 05/03/2017.      FINDINGS: There is unchanged airspace disease within the right lung with  2 stable 9 mm nodular components again demonstrated. There has been no  interval change in the 6 mm noncalcified left upper lobe pulmonary  nodule nor the 2-3 mm subpleural nodule. Stable postoperative change at  the right lung base. There is bibasilar scar formation. Chronic pleural  and parenchymal changes involving both lung bases as well. No acute  airspace disease or new/enlarging mass.      Stable mediastinum. There are small mediastinal and hilar lymph nodes,  centimeter/subcentimeter in size. There is cardiac enlargement which is  stable, possible small sliding-type hiatal hernia. The esophagus is  otherwise within normal limits. Limited images through the upper abdomen  demonstrate an IVC filter in position.      CONCLUSION: Stable imaging of the chest, specifically no change in the 6  or 2-3 mm left upper lobe pulmonary nodules nor the nodular  airspace  disease at the right apex. Continue conservative CT surveillance.      This report was finalized on 7/5/2017 3:19 PM by Dr. Arnulfo Singh MD.      Component      Latest Ref Rng & Units 5/17/2017 7/3/2017   Ferritin      ng/mL 23.90 193.70         Component      Latest Ref Rng & Units 5/17/2017 7/3/2017   Iron      37 - 145 mcg/dL 62 76   Iron Saturation      14 - 48 % 19 30   Transferrin      200 - 360 mg/dL 228 180 (L)   TIBC      249 - 505 mcg/dL 319 252     Assessment/Plan       Problem #1. Two nodules in the right lung, each one of them pathologically confirmed to be adenocarcinoma. The first one is EGFR negative, ALK1 negative. The second one has not had any molecular analysis. The original one was requested to have EGFR and ALK1. . ECOG performance status 1. No cancer-related pain. THE PATIENT UNDERWENT DEFINITIVE RADIATION THERAPY FOR BOTH NODULES..  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 Finally recovered from this.          I personally reviewed her CT scan and agree with the radiologist interpretation in regard to the stability of her disease in the right hemithorax. On the other hand she has developed a 6 mm nodule in the left lung that is new. This is probably representation of another lung cancer. This is too small for biopsy. This is too small for PET scan therefore we are going to decide what to do as below. Also the patient has developed progressive iron deficiency anemia in the stage of taking prenatal vitamin. She remains on Coumadin. It is very likely that she has gastrointestinal blood loss even though she has no GI manifestations of this and neither evidence of melena. The patient does not eat any meat.   News is that the 6 mm left lung nodule has to stop growing and no other diseases B she will and the CT scan besides her very advanced COPD.  Area radiation pneumonitis in  the right hemithorax remains the same with no new lesions happening. there Is no pericardial or pleural effusions.  RECOMMENDATIONS:    1. For her lung cancer we have advised her to repeat a CT scan of the chest in11 weeks and review her back in 12 weeks. If the left lung nodule continues growing, I think I eventually will ask Dr. Thompson to review. Given the location of this doubt that the patient will be able to handle a CT-guided biopsy and a bronchoscopy could be challenging and cumbersome given her advanced COPD.     In regard to her iron deficiency anemia, the patient will be treated with Feraheme 510 mg IV on 2 different occasions.numbers above state improvement.    Physical part of handle his the argument between care of her daughter regarding the ability to function.  I explained to each one of them personally and individually as well as bilaterally at the issues in regard overall health that she is spurious and including care advanced COPD and Peripheral vascular disease and oxygen dependency.  The Patient Does Believe That She Will Be Able to Continuetaking Care on Living on Her Own , Driving on Her Own for the Time Being.  She Has Not Required Any Help at This Point a.  Going to Cardiac and Pulmonary Rehabilitation  Not Change Anything Else from the Point of View of Her Comorbidities with Medications for My Side of the Story, finally they calmed down.                              7/10/2017      CC:

## 2017-07-18 ENCOUNTER — TREATMENT (OUTPATIENT)
Dept: CARDIAC REHAB | Facility: HOSPITAL | Age: 77
End: 2017-07-18

## 2017-07-18 DIAGNOSIS — J44.9 COPD, SEVERE (HCC): Primary | ICD-10-CM

## 2017-07-18 PROCEDURE — G0424 PULMONARY REHAB W EXER: HCPCS

## 2017-07-25 ENCOUNTER — TREATMENT (OUTPATIENT)
Dept: CARDIAC REHAB | Facility: HOSPITAL | Age: 77
End: 2017-07-25

## 2017-07-25 DIAGNOSIS — J44.9 COPD, SEVERE (HCC): Primary | ICD-10-CM

## 2017-07-25 PROCEDURE — G0424 PULMONARY REHAB W EXER: HCPCS

## 2017-10-02 ENCOUNTER — HOSPITAL ENCOUNTER (OUTPATIENT)
Dept: PET IMAGING | Facility: HOSPITAL | Age: 77
Discharge: HOME | End: 2017-10-02
Attending: INTERNAL MEDICINE | Admitting: INTERNAL MEDICINE

## 2017-10-02 ENCOUNTER — LAB (OUTPATIENT)
Dept: LAB | Facility: HOSPITAL | Age: 77
End: 2017-10-02

## 2017-10-02 DIAGNOSIS — D50.0 IRON DEFICIENCY ANEMIA DUE TO CHRONIC BLOOD LOSS: ICD-10-CM

## 2017-10-02 DIAGNOSIS — C34.91 ADENOCARCINOMA OF RIGHT LUNG (HCC): ICD-10-CM

## 2017-10-02 DIAGNOSIS — J70.0 PNEUMONITIS, RADIATION (HCC): ICD-10-CM

## 2017-10-02 LAB
ALBUMIN SERPL-MCNC: 4.1 G/DL (ref 3.5–5.2)
ALBUMIN/GLOB SERPL: 1.2 G/DL (ref 1.1–2.4)
ALP SERPL-CCNC: 66 U/L (ref 38–116)
ALT SERPL W P-5'-P-CCNC: 7 U/L (ref 0–33)
ANION GAP SERPL CALCULATED.3IONS-SCNC: 10.3 MMOL/L
AST SERPL-CCNC: 12 U/L (ref 0–32)
BASOPHILS # BLD AUTO: 0.06 10*3/MM3 (ref 0–0.1)
BASOPHILS NFR BLD AUTO: 0.7 % (ref 0–1.1)
BILIRUB SERPL-MCNC: 0.3 MG/DL (ref 0.1–1.2)
BUN BLD-MCNC: 18 MG/DL (ref 6–20)
BUN/CREAT SERPL: 22.2 (ref 7.3–30)
CALCIUM SPEC-SCNC: 9.4 MG/DL (ref 8.5–10.2)
CHLORIDE SERPL-SCNC: 100 MMOL/L (ref 98–107)
CO2 SERPL-SCNC: 34.7 MMOL/L (ref 22–29)
CREAT BLD-MCNC: 0.81 MG/DL (ref 0.6–1.1)
CREAT BLDA-MCNC: 1 MG/DL (ref 0.6–1.3)
DEPRECATED RDW RBC AUTO: 44.6 FL (ref 37–49)
EOSINOPHIL # BLD AUTO: 0.2 10*3/MM3 (ref 0–0.36)
EOSINOPHIL NFR BLD AUTO: 2.4 % (ref 1–5)
ERYTHROCYTE [DISTWIDTH] IN BLOOD BY AUTOMATED COUNT: 12.4 % (ref 11.7–14.5)
GFR SERPL CREATININE-BSD FRML MDRD: 69 ML/MIN/1.73
GLOBULIN UR ELPH-MCNC: 3.3 GM/DL (ref 1.8–3.5)
GLUCOSE BLD-MCNC: 86 MG/DL (ref 74–124)
HCT VFR BLD AUTO: 37 % (ref 34–45)
HGB BLD-MCNC: 11.7 G/DL (ref 11.5–14.9)
IMM GRANULOCYTES # BLD: 0.01 10*3/MM3 (ref 0–0.03)
IMM GRANULOCYTES NFR BLD: 0.1 % (ref 0–0.5)
LYMPHOCYTES # BLD AUTO: 1.42 10*3/MM3 (ref 1–3.5)
LYMPHOCYTES NFR BLD AUTO: 17.2 % (ref 20–49)
MCH RBC QN AUTO: 30.9 PG (ref 27–33)
MCHC RBC AUTO-ENTMCNC: 31.6 G/DL (ref 32–35)
MCV RBC AUTO: 97.6 FL (ref 83–97)
MONOCYTES # BLD AUTO: 0.72 10*3/MM3 (ref 0.25–0.8)
MONOCYTES NFR BLD AUTO: 8.7 % (ref 4–12)
NEUTROPHILS # BLD AUTO: 5.85 10*3/MM3 (ref 1.5–7)
NEUTROPHILS NFR BLD AUTO: 70.9 % (ref 39–75)
NRBC BLD MANUAL-RTO: 0 /100 WBC (ref 0–0)
PLATELET # BLD AUTO: 265 10*3/MM3 (ref 150–375)
PMV BLD AUTO: 10.2 FL (ref 8.9–12.1)
POTASSIUM BLD-SCNC: 4.4 MMOL/L (ref 3.5–4.7)
PROT SERPL-MCNC: 7.4 G/DL (ref 6.3–8)
RBC # BLD AUTO: 3.79 10*6/MM3 (ref 3.9–5)
SODIUM BLD-SCNC: 145 MMOL/L (ref 134–145)
WBC NRBC COR # BLD: 8.26 10*3/MM3 (ref 4–10)

## 2017-10-02 PROCEDURE — 85025 COMPLETE CBC W/AUTO DIFF WBC: CPT | Performed by: INTERNAL MEDICINE

## 2017-10-02 PROCEDURE — 82565 ASSAY OF CREATININE: CPT

## 2017-10-02 PROCEDURE — 0 IOPAMIDOL 61 % SOLUTION: Performed by: INTERNAL MEDICINE

## 2017-10-02 PROCEDURE — 71260 CT THORAX DX C+: CPT

## 2017-10-02 PROCEDURE — 36415 COLL VENOUS BLD VENIPUNCTURE: CPT | Performed by: INTERNAL MEDICINE

## 2017-10-02 PROCEDURE — 80053 COMPREHEN METABOLIC PANEL: CPT | Performed by: INTERNAL MEDICINE

## 2017-10-02 RX ADMIN — IOPAMIDOL 85 ML: 612 INJECTION, SOLUTION INTRAVENOUS at 13:42

## 2017-10-09 ENCOUNTER — APPOINTMENT (OUTPATIENT)
Dept: LAB | Facility: HOSPITAL | Age: 77
End: 2017-10-09

## 2017-10-09 ENCOUNTER — OFFICE VISIT (OUTPATIENT)
Dept: ONCOLOGY | Facility: CLINIC | Age: 77
End: 2017-10-09

## 2017-10-09 VITALS
HEART RATE: 61 BPM | TEMPERATURE: 98.1 F | HEIGHT: 62 IN | OXYGEN SATURATION: 95 % | WEIGHT: 178.2 LBS | RESPIRATION RATE: 16 BRPM | BODY MASS INDEX: 32.79 KG/M2 | DIASTOLIC BLOOD PRESSURE: 92 MMHG | SYSTOLIC BLOOD PRESSURE: 180 MMHG

## 2017-10-09 DIAGNOSIS — C34.91 ADENOCARCINOMA OF RIGHT LUNG (HCC): Primary | ICD-10-CM

## 2017-10-09 DIAGNOSIS — R91.1 NODULE OF LEFT LUNG: ICD-10-CM

## 2017-10-09 DIAGNOSIS — D50.0 IRON DEFICIENCY ANEMIA DUE TO CHRONIC BLOOD LOSS: ICD-10-CM

## 2017-10-09 PROCEDURE — 99213 OFFICE O/P EST LOW 20 MIN: CPT | Performed by: INTERNAL MEDICINE

## 2017-10-09 PROCEDURE — G0463 HOSPITAL OUTPT CLINIC VISIT: HCPCS | Performed by: INTERNAL MEDICINE

## 2017-10-09 NOTE — PROGRESS NOTES
Subjective  REASON FOR FOLLOWUP:  Two nodules in the right lung, each one of them pathologically confirmed to be adenocarcinoma. The first one is EGFR negative, ALK1 negative. The second one has not had any molecular analysis. The original one was requested to have EGFR and ALK1. So far we have not received any information in this regard. A CT scan of the brain shows no evidence of brain metastasis. ECOG performance status 1. No cancer-related pain. THE PATIENT UNDERWENT DEFINITIVE RADIATION THERAPY FOR BOTH NODULES..  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 Finally recovered from this.       History of Present Illness     This patient is here today in the company of her daughter. She is here today still complaining of shortness of breath but she is undergoing to pulmonary rehab that has been beneficial.. The patient is not having any alterations in her appetite. Her weight is stable. Her bowel function and urination are normal. No cardiac issues at this time. No edema in her lower extremities.    She remains anticoagulated and she denies any evidence of blood loss in the gastrointestinal tract.            Past Medical History, Past Surgical HistoryVery extensive including hypertension, hyperlipidemia, carotid arterial disease with 1 carotid artery occluded on the right side, thrombophilia since age 25 when she developed vein thrombosis in the lower extremities, bilateral pulmonary emboli and she had an IVC filter placed then and required anticoagulation for almost the rest of her life.  She also has had a history of paroxysmal atrial fibrillation, she also has history of stress cardiomyopathy, and she also has a history of peripheral arterial disease, status post bypass operation for the lower extremities. The patient also has a history of COPD. She also has a history of hypothyroidism and also she has  history of clostridium colitis.  She has had mammograms in the past, being normal. She has had colonoscopies in the past, being normal or negative for malignant conditions. She never had any history of bladder cancer. 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 Margins of resection were free at that point.      The patient also developed a lung nodule in the right base in 2009 and after assessment by Dr. Lamb she had a wedge resection showing an adenocarcinoma. The EGFR analysis and ALK-1 analysis was never done on that tissue and we have requested this.  Later on this year she has had a couple of lung nodules as already stated and biopsy of one of them showing already an adenocarcinoma that is EGFR negative, ALK-1 pending.  A biopsy of the 2nd nodule is planned to be done on 02/22/2015.      As we recommended to the patient on 02/17/2016, we would like for her to have a CT scan of the brain to be sure that there is no brain metastases.  I did not order an MRI because the patient has an IVC filter in place and this is a metal piece that she has had in this location for a long time.      The patient also has a history of a stress fracture of one of her ankles and she has had osteoporosis with collapsed vertebrae in the past.      She also has a history of thrombophilia as stated before and never a workup was performed looking for any other specific cause.      ONCOLOGIC HISTORY: Ms. Narayan is a 75-year-old white female who was actually discussed in the multidisciplinary lung cancer conference a week ago with Alfredo Leiva and many other physicians. On that occasion we reviewed her CT scans and PET scans showing a couple of new nodules on the PET scan, one that was biopsied already proven to be adenocarcinoma and a 2nd one more laterally that was found incidentally on the PET scan that will be biopsied this  coming Monday.  She has a history of wedge resection of a tumoral lesion in the right lung base in February 2009 for an adenocarcinoma. She actually is having her anniversary of smoking cessation today, 7 years with no cigarettes. In any event, since last summer, the patient has had a very difficult time, first of all with stress cardiomyopathy and ended up at [facility] for almost 3 weeks having terrible ejection fraction on an echocardiogram and subsequently after appropriate therapy, improved and has been well since then. She has an upcoming echocardiogram with Dr. Mcguire this week.  Besides this, she had almost 4 back-to-back infections with clostridium colitis since August until almost November requiring multiple therapies and visits to infectious disease to finally get rid of this infection altogether.  She lost close to 40 pounds of weight from this event.  Finally, since the 1st of December she has started to feel a little bit better.  She is back to eating, her weight has stabilized and she has not had any nausea, vomiting, abdominal pain, further diarrhea or fever. Urination is ongoing with no difficulty. She has had a minimal hacking cough with no sputum production or pleuritic pain. No modification in her usual degree of shortness of breath. She has not had any hemoptysis besides the one that she developed after the lung biopsy recently. This has already subsided.  The patient is not experiencing any need for O2 at nighttime, even though she has her machine at home.  She has had a better sense of well-being. She has gained back control of her life and she is running her house like she used to. Now she is taking care of her ill  who has Alzheimer’s dementia.     On 02/29/2016, the CT scan of the brain failed to document any brain metastasis and the biopsy of the second malignancy in the right lung was consistent with adenocarcinoma. We discussed the case with Dr. Thompson who will see  her tomorrow morning in preparation for possible definitive radiation therapy. The patient will not be a candidate to receive Tarceva on any of small molecules, and she will not be a candidate to receive any formal IV chemotherapy. She will be a candidate in the future to receive Opdivo at the most.   SURGICAL HISTORY:  She has had a partial hysterectomy. She has had a hernia in the lumbar spine.  She also has had a cyst in the stomach and completion of a hysterectomy. She also has had a biopsy and cyst removal of the left breast and also biopsy for skin lesions on the forehead. She has had a wedge resection on her right lung in . There was also removal of a pigmented lesion on the left nipple in .      Again, she also has a history of pulmonary embolus at age 24 and purple legs bilateral deep vein thrombosis that she has almost kept anticoagulated for most of her life.     GYN HISTORY:   1, para 1, no miscarriages. Last pelvic examination done in the last 3 years being normal.    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  She used to smoke up to 1-1/2 packs of cigarettes a day. She quit 7 years ago.  She does not drink any alcohol.    Review of Systems   General: no fever, chills, fatigue, weight changes, or lack of appetite.  Eyes: no epiphora, xerophthalmia,conjunctivitis, pain, glaucoma, blurred vision, blindness, secretion, photophobia, proptosis, diplopia.  Ears: no otorrhea, tinnitus, otorrhagia, deafness, pain, vertigo.  Nose: no rhinorrhea, epistaxis, alteration in perception of odors, sinuses pressure.  Mouth: no alteration in gums or teeth,  ulcers, no difficulty with mastication or deglut ion, no odynophagia.  Neck: no masses or pain, no thyroid alterations, no pain in muscles or arteries, no carotid odynia, no crepitation.  Respiratory: dry cough, sputum production,STABLE dyspnea,no trepopnea, pleuritic pain,  "hemoptysis.  Heart: no syncope, irregularity, palpitations, angina, orthopnea, paroxysmal nocturnal dyspnea.  Vascular Venous: no tenderness,edema, palpable cords, postphlebitic syndrome, skin changes or ulcerations.  Vascular Arterial: no distal ischemia, claudication, gangrene, neuropathic ischemic pain, skin ulcers, paleness or cyanosis.  GI: no dysphagia, odynophagia, no regurgitation, no heartburn,no indigestion,no nausea,no vomiting,no hematemesis ,no melena,no jaundice,no distention, no obstipation,no enterorrhagia,no proctalgia,no anal  lesions, nochanges in bowel habits.  : no frequency, hesitancy, hematuria, discharge, pain.  Musculoskeletal: no muscle or tendon pain or inflammation, joint pain, edema, functional limitation, fasciculations, mass.  Neurologic: no headache, seizures, alterations on Craneal nerves, no motor or senssory deficit, normal coordination, no alteration in memory, orientation, calculation,writting, verbal or written language.  Skin: no rashes, pruritus or localized lesions.  Psychiatric: no anxiety, depression, agitation, delusions, proper insight.  A comprehensive 14 point review of systems was performed and was negative except as mentioned.    Medications:  The current medication list was reviewed in the EMR    ALLERGIES:    Allergies   Allergen Reactions   • Adhesive Tape    • Augmentin [Amoxicillin-Pot Clavulanate]    • Cephalexin    • Pentazocine    • Simvastatin        Objective      Vitals:    10/09/17 1500   BP: 180/92   Pulse: 61   Resp: 16   Temp: 98.1 °F (36.7 °C)   TempSrc: Oral   SpO2: 95%   Weight: 178 lb 3.2 oz (80.8 kg)   Height: 62.2\" (158 cm)   PainSc: 0-No pain     Current Status 10/9/2017   ECOG score 0       Physical Exam    GENERAL:  Well-developed, well-nourished  Patient  in no acute distress.   SKIN:  Warm, dry without rashes, purpura or petechiae.  HEENT:  Pupils were equal and reactive to light and accomodation, conjunctivas non injected, no pterigion, " normal extraocular movements, normal visual acuity.   Mouth mucosa was moist, no exudates in oropharynx, normal gum line, normal roof of the mouth and pillars, normal papillations of the tongue.Ear canals were normal, as well tympanic membranes, normal hearing acuity.No pain in mastoid area or erythema.  NECK:  Supple with good range of motion; no thyromegaly or masses, no JVD or bruits, no cervical adenopathies.No carotid arteries pain, no carotid abnormal pulsation or arterial dance.  LYMPHATICS:  No cervical, supraclavicular, axillary, epitrochlear or inguinal adenopathy.  CHEST:  poor excursion of both lara thoraces, normal voice fremitus, no subcutaneous emphysema, normal axillas, no rashes or acanthosis nigricans. Lungs clear to percussion and auscultation, decreased breath sounds bilaterally, minimal wheezing,R BASE POSTERIOR crackles AND ronchi, no stridor, no rubs.  CARDIAC AND VASCULAR: PMI not displaced,no thrills, normal rate and regular rhythm, without murmurs, rubs or S3 or S4 right or left sided gallops. Normal femoral, popliteal, pedis, brachial and carotid pulses.  ABDOMEN:  Soft, nontender with no organomegaly or masses, no ascites, no collateral circulation,no distention,no [place] sign, no abdominal pain, no inguinal hernias,no umbilical hernias, no abdominal bruits. Normal bowel sounds.R abdominal wall hernia reductible  GENITAL: Not  Performed.  EXTREMITIES  AND SPINE:  No clubbing, cyanosis or edema, no deformities or pain .No kyphosis, scoliosis, deformities or pain in spine, ribs or pelvic bone.Trophic changes in the skin both LE associated with PVD, no ulcers.  NEUROLOGICAL:  Patient was awake, alert, oriented to time, person and place,normal gait and coordination.    RECENT LABS:          WBC   Date Value Ref Range Status   10/02/2017 8.26 4.00 - 10.00 10*3/mm3 Final     RBC   Date Value Ref Range Status   10/02/2017 3.79 (L) 3.90 - 5.00 10*6/mm3 Final     Hemoglobin   Date Value Ref Range  Status   10/02/2017 11.7 11.5 - 14.9 g/dL Final     Hematocrit   Date Value Ref Range Status   10/02/2017 37.0 34.0 - 45.0 % Final     Platelets   Date Value Ref Range Status   10/02/2017 265 150 - 375 10*3/mm3 Final      CT CHEST WITH IV CONTRAST      HISTORY: 77-year-old female with wedge resection of lung cancer in the  right lower lobe in the past. Subsequently, chemoradiation for a right  upper lobe lung cancer. Follow-up pulmonary nodules.      TECHNIQUE: Radiation dose reduction techniques were utilized, including  automated exposure control and exposure modulation based on body size.   3 mm images were obtained through the chest after the administration of  IV contrast. Compared with previous chest CT from 07/03/2017.      FINDINGS: The nodular density at the posterior aspect of the right upper  lobe where there is radiation pneumonitis appears stable. Slightly  laterally is a 9 mm nodular density which is stable as well. There may  be a new 5 mm right upper lobe pulmonary nodule, image 26. There are a  few tiny left upper lobe nodular opacities which are stable. The  scarring and atelectatic change at the lower lobes is stable. There are  no pleural or pericardial effusions. There is no lymphadenopathy within  the chest.      IMPRESSION:  The previously seen nodular densities appear stable, but  there may be a new 5 mm right upper lobe nodule. Conservative  surveillance is recommended with a follow-up chest CT in 3 months.      This report was finalized on 10/4/2017 1:44 PM by Dr. Lynda Kwon MD.      Assessment/Plan       Problem #1. Two nodules in the right lung, each one of them pathologically confirmed to be adenocarcinoma. The first one is EGFR negative, ALK1 negative. The second one has not had any molecular analysis. The original one was requested to have EGFR and ALK1. . ECOG performance status 1. No cancer-related pain. THE PATIENT UNDERWENT DEFINITIVE RADIATION THERAPY FOR BOTH NODULES..   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 Finally recovered from this.          I personally reviewed her CT scan and agree with the radiologist interpretation in regard to the stability of her disease in the right hemithorax. I question new nodule on the left that has changed up and down over time, no definitive growth .    RECOMMENDATIONS:    1. For her lung cancer we have advised her to repeat a CT scan of the chest in11 weeks and review her back in 12 weeks. If the left lung nodule  grows, I think I eventually will ask radiation to review. Given the location of this doubt that the patient will be able to handle a CT-guided biopsy and a bronchoscopy could be challenging and cumbersome given her advanced COPD. Check ferritin iron profile in 11 weeks and cmp                                  10/9/2017      CC:

## 2017-10-18 ENCOUNTER — OFFICE VISIT (OUTPATIENT)
Dept: CARDIOLOGY | Facility: CLINIC | Age: 77
End: 2017-10-18

## 2017-10-18 VITALS
WEIGHT: 179 LBS | DIASTOLIC BLOOD PRESSURE: 68 MMHG | HEART RATE: 62 BPM | BODY MASS INDEX: 30.56 KG/M2 | HEIGHT: 64 IN | SYSTOLIC BLOOD PRESSURE: 124 MMHG

## 2017-10-18 DIAGNOSIS — I48.0 PAROXYSMAL ATRIAL FIBRILLATION (HCC): ICD-10-CM

## 2017-10-18 DIAGNOSIS — J43.9 PULMONARY EMPHYSEMA, UNSPECIFIED EMPHYSEMA TYPE (HCC): ICD-10-CM

## 2017-10-18 DIAGNOSIS — I42.0 DILATED CARDIOMYOPATHY (HCC): Primary | ICD-10-CM

## 2017-10-18 PROCEDURE — 99214 OFFICE O/P EST MOD 30 MIN: CPT | Performed by: INTERNAL MEDICINE

## 2017-10-18 PROCEDURE — 93000 ELECTROCARDIOGRAM COMPLETE: CPT | Performed by: INTERNAL MEDICINE

## 2017-10-18 RX ORDER — ALBUTEROL SULFATE 90 UG/1
2 AEROSOL, METERED RESPIRATORY (INHALATION) EVERY 4 HOURS PRN
COMMUNITY
End: 2019-09-17 | Stop reason: HOSPADM

## 2017-10-18 RX ORDER — MULTIVIT WITH MINERALS/LUTEIN
1000 TABLET ORAL DAILY
COMMUNITY
End: 2019-09-17 | Stop reason: HOSPADM

## 2017-10-18 RX ORDER — LANOLIN ALCOHOL/MO/W.PET/CERES
1000 CREAM (GRAM) TOPICAL DAILY
COMMUNITY
End: 2019-09-17 | Stop reason: HOSPADM

## 2017-10-18 RX ORDER — PROPAFENONE HYDROCHLORIDE 150 MG/1
150 TABLET, COATED ORAL EVERY 8 HOURS
COMMUNITY
End: 2019-09-17 | Stop reason: HOSPADM

## 2017-10-18 NOTE — PROGRESS NOTES
Date of Office Visit: 10/18/2017  Encounter Provider: Flavio Del Rosario MD  Place of Service: [facility] CARDIOLOGY  Patient Name: Ale Narayan  :1940  Harmeet Payne MD    Chief Complaint   Patient presents with   • Atrial Fibrillation     History of Present Illness  The patient is a 7-year-old white female with a history of paroxysmal atrial fibrillation who enters the office today to continue her cardiac care here at [facility].  She had previously seen Dr. Artur Mcguire before he left after [facility].    The patient was diagnosed with atrial fibrillation a while back.  She has been on Rythmol and this has been fairly effective controlling her atrial fibrillation.  Today in the office she is in sinus rhythm with a first-degree AV block.  She had recent evaluation done in March of this past year with an echocardiogram that shows that her left ventricular function was mildly decreased at 40%.  She also has mild mitral valve regurgitation as well as aortic valve stenosis which was also considered to be mild.  Her right ventricle is dilated.  She does have a history of severe COPD and is now oxygen dependent.  Symptomatically he occasionally she'll notice some palpitations but she does not complain of any chest pain, dizziness, peripheral edema.  She is chronically dyspneic.  She is on anticoagulation primarily for deep venous thrombosis and thromboembolism.  She has had an IVC filter placed in the past because of pulmonary emboli.  She also continues on Pletal because of peripheral vascular disease.    Past Medical History:   Diagnosis Date   • Adenocarcinoma of lung    • Asthma    • Carotid artery disease    • Cough    • Deep vein thrombosis    • Emphysema of lung    • Hyperlipidemia    • Hypertension    • Hypothyroidism    • Lung cancer    • Osteoporosis    • Paroxysmal atrial fibrillation    • Peripheral arterial disease    • Pulmonary embolism    • Thrombophilia      Since age 25         Past Surgical History:   Procedure Laterality Date   • ANKLE SURGERY     • BREAST SURGERY  2015    removal of benign melanoma spot on nipple of left breast 7/2013   • COLONOSCOPY      Negative   • HERNIA REPAIR      Lumbar spine   • HYSTERECTOMY     • LUNG LOBECTOMY  2009   • OTHER SURGICAL HISTORY      Bypass of lower extremities   • OTHER SURGICAL HISTORY      IVC filter placed           Current Outpatient Prescriptions:   •  albuterol (PROVENTIL HFA;VENTOLIN HFA) 108 (90 Base) MCG/ACT inhaler, Inhale 2 puffs Every 4 (Four) Hours As Needed for Wheezing., Disp: , Rfl:   •  albuterol (PROVENTIL) (2.5 MG/3ML) 0.083% nebulizer solution, Take 2.5 mg by nebulization Every 4 (Four) Hours As Needed for wheezing., Disp: , Rfl:   •  ANORO ELLIPTA 62.5-25 MCG/INH aerosol powder , , Disp: , Rfl:   •  atorvastatin (LIPITOR) 20 MG tablet, Take 10 mg by mouth Every Evening., Disp: , Rfl:   •  carvedilol (COREG) 25 MG tablet, Take 1 tablet by mouth 2 (two) times a day., Disp: , Rfl:   •  cilostazol (PLETAL) 100 MG tablet, Take 1 tablet by mouth 2 (two) times a day., Disp: , Rfl:   •  esomeprazole (NexIUM) 40 MG capsule, Take 20 mg by mouth Every Morning Before Breakfast., Disp: , Rfl:   •  furosemide (LASIX) 40 MG tablet, Take 20 mg by mouth Daily., Disp: , Rfl:   •  KLOR-CON 20 MEQ CR tablet, Take 20 mEq by mouth Daily., Disp: , Rfl:   •  magnesium oxide (MAGOX) 400 (241.3 Mg) MG tablet tablet, Take 400 mg by mouth Daily., Disp: , Rfl:   •  O2 (OXYGEN), Inhale As Needed. 2 1/2 liter, Disp: , Rfl:   •  Omega-3 Fatty Acids (FISH OIL) 1000 MG capsule capsule, Take  by mouth Daily With Breakfast., Disp: , Rfl:   •  Prenatal Vit-Fe Fumarate-FA (MULTI PRENATAL PO), Take  by mouth Daily., Disp: , Rfl:   •  propafenone (RYTHMOL) 150 MG tablet, Take 150 mg by mouth Every 8 (Eight) Hours., Disp: , Rfl:   •  rivaroxaban (XARELTO) tablet, Take 1 tablet by mouth daily., Disp: , Rfl:   •  SYNTHROID 25 MCG tablet, Take 25  "mcg by mouth Daily., Disp: , Rfl:   •  vitamin B-12 (CYANOCOBALAMIN) 1000 MCG tablet, Take 1,000 mcg by mouth Daily., Disp: , Rfl:   •  vitamin E 1000 UNIT capsule, Take 1,000 Units by mouth Daily., Disp: , Rfl:   •  vitamin E 1000 UNIT capsule, Take 1,000 Units by mouth Daily., Disp: , Rfl:       Social History     Social History   • Marital status:      Spouse name: N/A   • Number of children: N/A   • Years of education: High School     Occupational History   • , retired [employer] [place], [place]      Social History Main Topics   • Smoking status: Former Smoker     Packs/day: 1.00     Years: 50.00     Types: Cigarettes     Start date: 1958     Quit date: 2008   • Smokeless tobacco: Former User   • Alcohol use No   • Drug use: No   • Sexual activity: Not on file     Other Topics Concern   • Not on file     Social History Narrative         Review of Systems   Constitution: Negative.   HENT: Negative.    Eyes: Negative.    Cardiovascular: Positive for dyspnea on exertion and palpitations.   Respiratory: Positive for shortness of breath.    Endocrine: Negative.    Skin: Negative.    Musculoskeletal: Negative.    Gastrointestinal: Negative.    Neurological: Negative.    Psychiatric/Behavioral: Negative.        Procedures      ECG 12 Lead  Date/Time: 10/18/2017 3:23 PM  Performed by: HOWARD LAND  Authorized by: HOWARD LAND   Comparison: compared with previous ECG from 3/15/2017  Comparison to previous ECG: Atrial fibrillation no longer present  Rhythm: sinus rhythm  Rate: normal  Conduction: 1st degree and non-specific intraventricular conduction delay  QRS axis: normal                 Objective:    /68  Pulse 62  Ht 64\" (162.6 cm)  Wt 179 lb (81.2 kg)  LMP  (LMP Unknown)  BMI 30.73 kg/m2        Physical Exam   Constitutional: She is oriented to person, place, and time. She appears well-developed and well-nourished.   HENT:   Head: Normocephalic.   Eyes: Pupils are " equal, round, and reactive to light.   Neck: Normal range of motion. No JVD present. Carotid bruit is not present. No thyromegaly present.   Cardiovascular: Normal rate, regular rhythm, S1 normal, S2 normal, normal heart sounds and intact distal pulses.  Exam reveals no gallop and no friction rub.    No murmur heard.  Pulmonary/Chest: Effort normal. She has decreased breath sounds.   Abdominal: Soft. Bowel sounds are normal.   Musculoskeletal: She exhibits no edema.   Neurological: She is alert and oriented to person, place, and time.   Skin: Skin is warm, dry and intact. No erythema.   Psychiatric: She has a normal mood and affect.   Vitals reviewed.          Assessment:       Diagnosis Plan   1. Dilated cardiomyopathy     2. Paroxysmal atrial fibrillation     3. Pulmonary emphysema, unspecified emphysema type       Atrial Fibrillation and Atrial Flutter  Assessment  • The patient has paroxysmal atrial fibrillation  • The patient's CHADS2-VASc score is 5  • A PCG1WY2-VACe score of 2 or more is considered a high risk for a thromboembolic event    Plan  • Attempt to maintain sinus rhythm  • Continue propafenone for rhythm control         Plan:     I did not make any changes in her medication.  She appears to be stable.  I will see her back in 6 months.

## 2017-12-27 ENCOUNTER — HOSPITAL ENCOUNTER (OUTPATIENT)
Dept: PET IMAGING | Facility: HOSPITAL | Age: 77
Discharge: HOME | End: 2017-12-27
Attending: INTERNAL MEDICINE | Admitting: INTERNAL MEDICINE

## 2017-12-27 ENCOUNTER — LAB (OUTPATIENT)
Dept: LAB | Facility: HOSPITAL | Age: 77
End: 2017-12-27

## 2017-12-27 DIAGNOSIS — D50.0 IRON DEFICIENCY ANEMIA DUE TO CHRONIC BLOOD LOSS: ICD-10-CM

## 2017-12-27 DIAGNOSIS — R91.1 NODULE OF LEFT LUNG: ICD-10-CM

## 2017-12-27 DIAGNOSIS — C34.91 ADENOCARCINOMA OF RIGHT LUNG (HCC): ICD-10-CM

## 2017-12-27 LAB
ALBUMIN SERPL-MCNC: 4 G/DL (ref 3.5–5.2)
ALBUMIN/GLOB SERPL: 1.2 G/DL (ref 1.1–2.4)
ALP SERPL-CCNC: 67 U/L (ref 38–116)
ALT SERPL W P-5'-P-CCNC: <5 U/L (ref 0–33)
ANION GAP SERPL CALCULATED.3IONS-SCNC: 8.2 MMOL/L
AST SERPL-CCNC: 11 U/L (ref 0–32)
BASOPHILS # BLD AUTO: 0.08 10*3/MM3 (ref 0–0.1)
BASOPHILS NFR BLD AUTO: 0.9 % (ref 0–1.1)
BILIRUB SERPL-MCNC: 0.2 MG/DL (ref 0.1–1.2)
BUN BLD-MCNC: 20 MG/DL (ref 6–20)
BUN/CREAT SERPL: 23.5 (ref 7.3–30)
CALCIUM SPEC-SCNC: 8.9 MG/DL (ref 8.5–10.2)
CHLORIDE SERPL-SCNC: 101 MMOL/L (ref 98–107)
CO2 SERPL-SCNC: 33.8 MMOL/L (ref 22–29)
CREAT BLD-MCNC: 0.85 MG/DL (ref 0.6–1.1)
CREAT BLDA-MCNC: 0.9 MG/DL (ref 0.6–1.3)
DEPRECATED RDW RBC AUTO: 45.9 FL (ref 37–49)
EOSINOPHIL # BLD AUTO: 0.22 10*3/MM3 (ref 0–0.36)
EOSINOPHIL NFR BLD AUTO: 2.5 % (ref 1–5)
ERYTHROCYTE [DISTWIDTH] IN BLOOD BY AUTOMATED COUNT: 12.7 % (ref 11.7–14.5)
FERRITIN SERPL-MCNC: 121.8 NG/ML
GFR SERPL CREATININE-BSD FRML MDRD: 65 ML/MIN/1.73
GLOBULIN UR ELPH-MCNC: 3.4 GM/DL (ref 1.8–3.5)
GLUCOSE BLD-MCNC: 102 MG/DL (ref 74–124)
HCT VFR BLD AUTO: 36.2 % (ref 34–45)
HGB BLD-MCNC: 11 G/DL (ref 11.5–14.9)
IMM GRANULOCYTES # BLD: 0.02 10*3/MM3 (ref 0–0.03)
IMM GRANULOCYTES NFR BLD: 0.2 % (ref 0–0.5)
IRON 24H UR-MRATE: 90 MCG/DL (ref 37–145)
IRON SATN MFR SERPL: 31 % (ref 14–48)
LYMPHOCYTES # BLD AUTO: 1.52 10*3/MM3 (ref 1–3.5)
LYMPHOCYTES NFR BLD AUTO: 17.2 % (ref 20–49)
MCH RBC QN AUTO: 30.2 PG (ref 27–33)
MCHC RBC AUTO-ENTMCNC: 30.4 G/DL (ref 32–35)
MCV RBC AUTO: 99.5 FL (ref 83–97)
MONOCYTES # BLD AUTO: 0.68 10*3/MM3 (ref 0.25–0.8)
MONOCYTES NFR BLD AUTO: 7.7 % (ref 4–12)
NEUTROPHILS # BLD AUTO: 6.34 10*3/MM3 (ref 1.5–7)
NEUTROPHILS NFR BLD AUTO: 71.5 % (ref 39–75)
NRBC BLD MANUAL-RTO: 0 /100 WBC (ref 0–0)
PLATELET # BLD AUTO: 276 10*3/MM3 (ref 150–375)
PMV BLD AUTO: 10.2 FL (ref 8.9–12.1)
POTASSIUM BLD-SCNC: 5 MMOL/L (ref 3.5–4.7)
PROT SERPL-MCNC: 7.4 G/DL (ref 6.3–8)
RBC # BLD AUTO: 3.64 10*6/MM3 (ref 3.9–5)
SODIUM BLD-SCNC: 143 MMOL/L (ref 134–145)
TIBC SERPL-MCNC: 291 MCG/DL (ref 249–505)
TRANSFERRIN SERPL-MCNC: 208 MG/DL (ref 200–360)
WBC NRBC COR # BLD: 8.86 10*3/MM3 (ref 4–10)

## 2017-12-27 PROCEDURE — 80053 COMPREHEN METABOLIC PANEL: CPT | Performed by: INTERNAL MEDICINE

## 2017-12-27 PROCEDURE — 0 IOPAMIDOL 61 % SOLUTION: Performed by: INTERNAL MEDICINE

## 2017-12-27 PROCEDURE — 85025 COMPLETE CBC W/AUTO DIFF WBC: CPT | Performed by: INTERNAL MEDICINE

## 2017-12-27 PROCEDURE — 71260 CT THORAX DX C+: CPT

## 2017-12-27 PROCEDURE — 36415 COLL VENOUS BLD VENIPUNCTURE: CPT | Performed by: INTERNAL MEDICINE

## 2017-12-27 PROCEDURE — 84466 ASSAY OF TRANSFERRIN: CPT | Performed by: INTERNAL MEDICINE

## 2017-12-27 PROCEDURE — 82728 ASSAY OF FERRITIN: CPT | Performed by: INTERNAL MEDICINE

## 2017-12-27 PROCEDURE — 83540 ASSAY OF IRON: CPT | Performed by: INTERNAL MEDICINE

## 2017-12-27 PROCEDURE — 82565 ASSAY OF CREATININE: CPT

## 2017-12-27 RX ADMIN — IOPAMIDOL 75 ML: 612 INJECTION, SOLUTION INTRAVENOUS at 13:42

## 2018-01-03 ENCOUNTER — OFFICE VISIT (OUTPATIENT)
Dept: ONCOLOGY | Facility: CLINIC | Age: 78
End: 2018-01-03

## 2018-01-03 ENCOUNTER — APPOINTMENT (OUTPATIENT)
Dept: LAB | Facility: HOSPITAL | Age: 78
End: 2018-01-03

## 2018-01-03 VITALS
TEMPERATURE: 98.1 F | BODY MASS INDEX: 33.31 KG/M2 | DIASTOLIC BLOOD PRESSURE: 70 MMHG | RESPIRATION RATE: 16 BRPM | HEART RATE: 66 BPM | HEIGHT: 62 IN | WEIGHT: 181 LBS | SYSTOLIC BLOOD PRESSURE: 138 MMHG

## 2018-01-03 DIAGNOSIS — J70.0 PNEUMONITIS, RADIATION (HCC): ICD-10-CM

## 2018-01-03 DIAGNOSIS — D50.0 IRON DEFICIENCY ANEMIA DUE TO CHRONIC BLOOD LOSS: ICD-10-CM

## 2018-01-03 DIAGNOSIS — C34.91 ADENOCARCINOMA OF RIGHT LUNG (HCC): Primary | ICD-10-CM

## 2018-01-03 PROCEDURE — G0463 HOSPITAL OUTPT CLINIC VISIT: HCPCS | Performed by: INTERNAL MEDICINE

## 2018-01-03 PROCEDURE — 99214 OFFICE O/P EST MOD 30 MIN: CPT | Performed by: INTERNAL MEDICINE

## 2018-01-03 RX ORDER — TRAMADOL HYDROCHLORIDE 50 MG/1
50 TABLET ORAL EVERY 6 HOURS PRN
Status: ON HOLD | COMMUNITY
Start: 2017-09-29 | End: 2019-10-07

## 2018-01-03 NOTE — PROGRESS NOTES
Subjective  REASON FOR FOLLOWUP:  Two nodules in the right lung, each one of them pathologically confirmed to be adenocarcinoma. The first one is EGFR negative, ALK1 negative. The second one has not had any molecular analysis. The original one was requested to have EGFR and ALK1. So far we have not received any information in this regard. A CT scan of the brain shows no evidence of brain metastasis. ECOG performance status 1. No cancer-related pain. THE PATIENT UNDERWENT DEFINITIVE RADIATION THERAPY FOR BOTH NODULES..  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 Finally recovered from this.       History of Present Illness     This patient is here today in the company of her daughter. She is here today still complaining of shortness of breath but she is undergoing to pulmonary rehab that has been beneficial.. The patient is not having any alterations in her appetite. Her weight is stable. Her bowel function and urination are normal. No cardiac issues at this time. No edema in her lower extremities.    She remains anticoagulated and she denies any evidence of blood loss in the gastrointestinal tract.            Past Medical History, Past Surgical HistoryVery extensive including hypertension, hyperlipidemia, carotid arterial disease with 1 carotid artery occluded on the right side, thrombophilia since age 25 when she developed vein thrombosis in the lower extremities, bilateral pulmonary emboli and she had an IVC filter placed then and required anticoagulation for almost the rest of her life.  She also has had a history of paroxysmal atrial fibrillation, she also has history of stress cardiomyopathy, and she also has a history of peripheral arterial disease, status post bypass operation for the lower extremities. The patient also has a history of COPD. She also has a history of hypothyroidism and also she has  history of clostridium colitis.  She has had mammograms in the past, being normal. She has had colonoscopies in the past, being normal or negative for malignant conditions. She never had any history of bladder cancer. 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 Margins of resection were free at that point.      The patient also developed a lung nodule in the right base in 2009 and after assessment by Dr. Lamb she had a wedge resection showing an adenocarcinoma. The EGFR analysis and ALK-1 analysis was never done on that tissue and we have requested this.  Later on this year she has had a couple of lung nodules as already stated and biopsy of one of them showing already an adenocarcinoma that is EGFR negative, ALK-1 pending.  A biopsy of the 2nd nodule is planned to be done on 02/22/2015.      As we recommended to the patient on 02/17/2016, we would like for her to have a CT scan of the brain to be sure that there is no brain metastases.  I did not order an MRI because the patient has an IVC filter in place and this is a metal piece that she has had in this location for a long time.      The patient also has a history of a stress fracture of one of her ankles and she has had osteoporosis with collapsed vertebrae in the past.      She also has a history of thrombophilia as stated before and never a workup was performed looking for any other specific cause.      ONCOLOGIC HISTORY: Ms. Narayan is a 75-year-old white female who was actually discussed in the multidisciplinary lung cancer conference a week ago with Alfredo Leiva and many other physicians. On that occasion we reviewed her CT scans and PET scans showing a couple of new nodules on the PET scan, one that was biopsied already proven to be adenocarcinoma and a 2nd one more laterally that was found incidentally on the PET scan that will be biopsied this  coming Monday.  She has a history of wedge resection of a tumoral lesion in the right lung base in February 2009 for an adenocarcinoma. She actually is having her anniversary of smoking cessation today, 7 years with no cigarettes. In any event, since last summer, the patient has had a very difficult time, first of all with stress cardiomyopathy and ended up at [facility] for almost 3 weeks having terrible ejection fraction on an echocardiogram and subsequently after appropriate therapy, improved and has been well since then. She has an upcoming echocardiogram with Dr. Mcguire this week.  Besides this, she had almost 4 back-to-back infections with clostridium colitis since August until almost November requiring multiple therapies and visits to infectious disease to finally get rid of this infection altogether.  She lost close to 40 pounds of weight from this event.  Finally, since the 1st of December she has started to feel a little bit better.  She is back to eating, her weight has stabilized and she has not had any nausea, vomiting, abdominal pain, further diarrhea or fever. Urination is ongoing with no difficulty. She has had a minimal hacking cough with no sputum production or pleuritic pain. No modification in her usual degree of shortness of breath. She has not had any hemoptysis besides the one that she developed after the lung biopsy recently. This has already subsided.  The patient is not experiencing any need for O2 at nighttime, even though she has her machine at home.  She has had a better sense of well-being. She has gained back control of her life and she is running her house like she used to. Now she is taking care of her ill  who has Alzheimer’s dementia.     On 02/29/2016, the CT scan of the brain failed to document any brain metastasis and the biopsy of the second malignancy in the right lung was consistent with adenocarcinoma. We discussed the case with Dr. Thompson who will see  her tomorrow morning in preparation for possible definitive radiation therapy. The patient will not be a candidate to receive Tarceva on any of small molecules, and she will not be a candidate to receive any formal IV chemotherapy. She will be a candidate in the future to receive Opdivo at the most.   SURGICAL HISTORY:  She has had a partial hysterectomy. She has had a hernia in the lumbar spine.  She also has had a cyst in the stomach and completion of a hysterectomy. She also has had a biopsy and cyst removal of the left breast and also biopsy for skin lesions on the forehead. She has had a wedge resection on her right lung in . There was also removal of a pigmented lesion on the left nipple in .      Again, she also has a history of pulmonary embolus at age 24 and purple legs bilateral deep vein thrombosis that she has almost kept anticoagulated for most of her life.     GYN HISTORY:   1, para 1, no miscarriages. Last pelvic examination done in the last 3 years being normal.    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  She used to smoke up to 1-1/2 packs of cigarettes a day. She quit 7 years ago.  She does not drink any alcohol.    Review of Systems   General: no fever, chills, fatigue, weight changes, or lack of appetite.  Eyes: no epiphora, xerophthalmia,conjunctivitis, pain, glaucoma, blurred vision, blindness, secretion, photophobia, proptosis, diplopia.  Ears: no otorrhea, tinnitus, otorrhagia, deafness, pain, vertigo.  Nose: no rhinorrhea, epistaxis, alteration in perception of odors, sinuses pressure.  Mouth: no alteration in gums or teeth,  ulcers, no difficulty with mastication or deglut ion, no odynophagia.  Neck: no masses or pain, no thyroid alterations, no pain in muscles or arteries, no carotid odynia, no crepitation.  Respiratory: dry cough, sputum production,STABLE dyspnea,no trepopnea, pleuritic pain,  "hemoptysis.  Heart: no syncope, irregularity, palpitations, angina, orthopnea, paroxysmal nocturnal dyspnea.  Vascular Venous: no tenderness,edema, palpable cords, postphlebitic syndrome, skin changes or ulcerations.  Vascular Arterial: no distal ischemia, claudication, gangrene, neuropathic ischemic pain, skin ulcers, paleness or cyanosis.  GI: no dysphagia, odynophagia, no regurgitation, no heartburn,no indigestion,no nausea,no vomiting,no hematemesis ,no melena,no jaundice,no distention, no obstipation,no enterorrhagia,no proctalgia,no anal  lesions, nochanges in bowel habits.  : no frequency, hesitancy, hematuria, discharge, pain.  Musculoskeletal: no muscle or tendon pain or inflammation, joint pain, edema, functional limitation, fasciculations, mass.  Neurologic: no headache, seizures, alterations on Craneal nerves, no motor or senssory deficit, normal coordination, no alteration in memory, orientation, calculation,writting, verbal or written language.  Skin: no rashes, pruritus or localized lesions.  Psychiatric: no anxiety, depression, agitation, delusions, proper insight.  A comprehensive 14 point review of systems was performed and was negative except as mentioned.    Medications:  The current medication list was reviewed in the EMR    ALLERGIES:    Allergies   Allergen Reactions   • Adhesive Tape    • Augmentin [Amoxicillin-Pot Clavulanate]    • Cephalexin    • Pentazocine        Objective      Vitals:    01/03/18 1602   BP: 138/70   Pulse: 66   Resp: 16   Temp: 98.1 °F (36.7 °C)   Weight: 82.1 kg (181 lb)   Height: 158 cm (62.21\")   PainSc: 0-No pain     Current Status 1/3/2018   ECOG score 0       Physical Exam    GENERAL:  Well-developed, well-nourished  Patient  in no acute distress.   SKIN:  Warm, dry without rashes, purpura or petechiae.  HEENT:  Pupils were equal and reactive to light and accomodation, conjunctivas non injected, no pterigion, normal extraocular movements, normal visual acuity. "   Mouth mucosa was moist, no exudates in oropharynx, normal gum line, normal roof of the mouth and pillars, normal papillations of the tongue.Ear canals were normal, as well tympanic membranes, normal hearing acuity.No pain in mastoid area or erythema.  NECK:  Supple with good range of motion; no thyromegaly or masses, no JVD or bruits, no cervical adenopathies.No carotid arteries pain, no carotid abnormal pulsation or arterial dance.  LYMPHATICS:  No cervical, supraclavicular, axillary, epitrochlear or inguinal adenopathy.  CHEST:  poor excursion of both lara thoraces, normal voice fremitus, no subcutaneous emphysema, normal axillas, no rashes or acanthosis nigricans. Lungs clear to percussion and auscultation, decreased breath sounds bilaterally, minimal wheezing,R BASE POSTERIOR crackles AND ronchi, no stridor, no rubs.  CARDIAC AND VASCULAR: PMI not displaced,no thrills, normal rate and regular rhythm, without murmurs, rubs or S3 or S4 right or left sided gallops. Normal femoral, popliteal, pedis, brachial and carotid pulses.  ABDOMEN:  Soft, nontender with no organomegaly or masses, no ascites, no collateral circulation,no distention,no Rod sign, no abdominal pain, no inguinal hernias,no umbilical hernias, no abdominal bruits. Normal bowel sounds.R abdominal wall hernia reductible  GENITAL: Not  Performed.  EXTREMITIES  AND SPINE:  No clubbing, cyanosis or edema, no deformities or pain .No kyphosis, scoliosis, deformities or pain in spine, ribs or pelvic bone.Trophic changes in the skin both LE associated with PVD, no ulcers.  NEUROLOGICAL:  Patient was awake, alert, oriented to time, person and place,normal gait and coordination.    RECENT LABS:CT CHEST W CONTRAST-      CLINICAL HISTORY: Follow-up pulmonary nodules. History of lung carcinoma  and resection.      TECHNIQUE: Spiral CT images were obtained through the chest with IV  contrast and were reconstructed in 3 mm thick axial slices.      Radiation  dose reduction techniques were utilized, including automated  exposure control and exposure modulation based on body size.      COMPARISON: Multiple previous CT scans of the chest, the most recent  dated 10/2/2017.      FINDINGS: Surgical chain sutures are noted in the right lower lobe.  There is curvilinear increased soft tissue density adjacent to the  sutures that appears unchanged. This is compatible with postoperative  scarring. Other areas of similar curvilinear increased density in the  right upper lobe and in the left lower lobe are also stable. A few tiny  areas of nodularity in both lungs are also unchanged. There is no  compelling evidence of recurrent primary neoplasm or metastatic disease  within the chest. There is no mediastinal or hilar or axillary  lymphadenopathy. There are no pleural effusions. Pleural thickening in  the lateral aspect of the right lung base is unchanged.      IMPRESSION:  Multiple areas of curvilinear increased density in both  lungs and also few tiny nodular opacities in both lungs showing no  significant change since the most recent CT chest dated 10/2/2017. There  is no compelling evidence of recurrent primary lung neoplasm or  metastatic disease within the chest.      This report was finalized on 12/28/2017 9:57 AM by Dr. Jad Garrido        Assessment/Plan       Problem #1. Two nodules in the right lung, each one of them pathologically confirmed to be adenocarcinoma. The first one is EGFR negative, ALK1 negative. The second one has not had any molecular analysis. The original one was requested to have EGFR and ALK1. . ECOG performance status 1. No cancer-related pain. THE PATIENT UNDERWENT DEFINITIVE RADIATION THERAPY FOR BOTH NODULES..  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 Finally recovered from this.          I personally reviewed her CT scan and agree with the  radiologist interpretation in regard to the stability of her disease .I do not find any obvious radiological evidence of recurrent disease or hemithorax and the chronic lung changes associated with previous surgery, COPD and previous radiation therapy remain the same in my opinion with no obvious pericardial effusion, no obvious pleural effusion, no mediastinal adenopathy. The visible areas of the liver remain unchanged and there is no obvious alteration in the adrenal glands as far as I can tell.    2. The patient has iron deficiency anemia. She is chronically anticoagulated. We have checked Hemoccults on her in the past and been negative. Her hemoglobin at this time is 11. Her ferritin, iron profile are appropriate. The patient has been advised to remain on her ferrous sulfate for the time being.     From the point of view of argument that she has with her daughter in regard to the status of her disease, I tried to settle them down in the best way possible, discussing issues with them in regard to the radiological analysis, the clinical evolution of her disease process and took me 20 more minutes into this discussion today.     Overall, I think it is already a major good issue for the patient to be stable from the point of view of all her comorbidities and her problems. I discussed this with them and they were willing to be in agreement once for the first time.      RECOMMENDATIONS:  I advised the patient to have a repeat CT scan of the chest in 15 weeks along with a CBC, CMP, ferritin, iron profile at the same time. Hopefully she will remain with no need for any further intervention from my point of view.                                 1/3/2018      CC:

## 2018-04-20 ENCOUNTER — LAB (OUTPATIENT)
Dept: LAB | Facility: HOSPITAL | Age: 78
End: 2018-04-20

## 2018-04-20 ENCOUNTER — HOSPITAL ENCOUNTER (OUTPATIENT)
Dept: PET IMAGING | Facility: HOSPITAL | Age: 78
Discharge: HOME | End: 2018-04-20
Attending: INTERNAL MEDICINE | Admitting: INTERNAL MEDICINE

## 2018-04-20 DIAGNOSIS — J70.0 PNEUMONITIS, RADIATION (HCC): ICD-10-CM

## 2018-04-20 DIAGNOSIS — C34.91 ADENOCARCINOMA OF RIGHT LUNG (HCC): ICD-10-CM

## 2018-04-20 DIAGNOSIS — D50.0 IRON DEFICIENCY ANEMIA DUE TO CHRONIC BLOOD LOSS: ICD-10-CM

## 2018-04-20 LAB
ALBUMIN SERPL-MCNC: 4 G/DL (ref 3.5–5.2)
ALBUMIN/GLOB SERPL: 1.2 G/DL (ref 1.1–2.4)
ALP SERPL-CCNC: 64 U/L (ref 38–116)
ALT SERPL W P-5'-P-CCNC: <5 U/L (ref 0–33)
ANION GAP SERPL CALCULATED.3IONS-SCNC: 7.8 MMOL/L
AST SERPL-CCNC: 12 U/L (ref 0–32)
BASOPHILS # BLD AUTO: 0.06 10*3/MM3 (ref 0–0.1)
BASOPHILS NFR BLD AUTO: 0.8 % (ref 0–1.1)
BILIRUB SERPL-MCNC: 0.3 MG/DL (ref 0.1–1.2)
BUN BLD-MCNC: 17 MG/DL (ref 6–20)
BUN/CREAT SERPL: 19.5 (ref 7.3–30)
CALCIUM SPEC-SCNC: 9.2 MG/DL (ref 8.5–10.2)
CHLORIDE SERPL-SCNC: 100 MMOL/L (ref 98–107)
CO2 SERPL-SCNC: 36.2 MMOL/L (ref 22–29)
CREAT BLD-MCNC: 0.87 MG/DL (ref 0.6–1.1)
CREAT BLDA-MCNC: 0.9 MG/DL (ref 0.6–1.3)
DEPRECATED RDW RBC AUTO: 45.6 FL (ref 37–49)
EOSINOPHIL # BLD AUTO: 0.2 10*3/MM3 (ref 0–0.36)
EOSINOPHIL NFR BLD AUTO: 2.6 % (ref 1–5)
ERYTHROCYTE [DISTWIDTH] IN BLOOD BY AUTOMATED COUNT: 13 % (ref 11.7–14.5)
FERRITIN SERPL-MCNC: 64.7 NG/ML
GFR SERPL CREATININE-BSD FRML MDRD: 63 ML/MIN/1.73
GLOBULIN UR ELPH-MCNC: 3.3 GM/DL (ref 1.8–3.5)
GLUCOSE BLD-MCNC: 92 MG/DL (ref 74–124)
HCT VFR BLD AUTO: 34.9 % (ref 34–45)
HGB BLD-MCNC: 10.3 G/DL (ref 11.5–14.9)
IMM GRANULOCYTES # BLD: 0.02 10*3/MM3 (ref 0–0.03)
IMM GRANULOCYTES NFR BLD: 0.3 % (ref 0–0.5)
IRON 24H UR-MRATE: 74 MCG/DL (ref 37–145)
IRON SATN MFR SERPL: 27 % (ref 14–48)
LYMPHOCYTES # BLD AUTO: 1.15 10*3/MM3 (ref 1–3.5)
LYMPHOCYTES NFR BLD AUTO: 14.8 % (ref 20–49)
MCH RBC QN AUTO: 28.3 PG (ref 27–33)
MCHC RBC AUTO-ENTMCNC: 29.5 G/DL (ref 32–35)
MCV RBC AUTO: 95.9 FL (ref 83–97)
MONOCYTES # BLD AUTO: 0.63 10*3/MM3 (ref 0.25–0.8)
MONOCYTES NFR BLD AUTO: 8.1 % (ref 4–12)
NEUTROPHILS # BLD AUTO: 5.73 10*3/MM3 (ref 1.5–7)
NEUTROPHILS NFR BLD AUTO: 73.4 % (ref 39–75)
NRBC BLD MANUAL-RTO: 0 /100 WBC (ref 0–0)
PLATELET # BLD AUTO: 283 10*3/MM3 (ref 150–375)
PMV BLD AUTO: 10.1 FL (ref 8.9–12.1)
POTASSIUM BLD-SCNC: 4.4 MMOL/L (ref 3.5–4.7)
PROT SERPL-MCNC: 7.3 G/DL (ref 6.3–8)
RBC # BLD AUTO: 3.64 10*6/MM3 (ref 3.9–5)
SODIUM BLD-SCNC: 144 MMOL/L (ref 134–145)
TIBC SERPL-MCNC: 270 MCG/DL (ref 249–505)
TRANSFERRIN SERPL-MCNC: 193 MG/DL (ref 200–360)
WBC NRBC COR # BLD: 7.79 10*3/MM3 (ref 4–10)

## 2018-04-20 PROCEDURE — 83540 ASSAY OF IRON: CPT | Performed by: INTERNAL MEDICINE

## 2018-04-20 PROCEDURE — 71260 CT THORAX DX C+: CPT

## 2018-04-20 PROCEDURE — 25010000002 IOPAMIDOL 61 % SOLUTION: Performed by: INTERNAL MEDICINE

## 2018-04-20 PROCEDURE — 82565 ASSAY OF CREATININE: CPT

## 2018-04-20 PROCEDURE — 85025 COMPLETE CBC W/AUTO DIFF WBC: CPT | Performed by: INTERNAL MEDICINE

## 2018-04-20 PROCEDURE — 36415 COLL VENOUS BLD VENIPUNCTURE: CPT | Performed by: INTERNAL MEDICINE

## 2018-04-20 PROCEDURE — 80053 COMPREHEN METABOLIC PANEL: CPT | Performed by: INTERNAL MEDICINE

## 2018-04-20 PROCEDURE — 82728 ASSAY OF FERRITIN: CPT | Performed by: INTERNAL MEDICINE

## 2018-04-20 PROCEDURE — 84466 ASSAY OF TRANSFERRIN: CPT | Performed by: INTERNAL MEDICINE

## 2018-04-20 RX ADMIN — IOPAMIDOL 75 ML: 612 INJECTION, SOLUTION INTRAVENOUS at 14:00

## 2018-04-27 ENCOUNTER — OFFICE VISIT (OUTPATIENT)
Dept: ONCOLOGY | Facility: CLINIC | Age: 78
End: 2018-04-27

## 2018-04-27 ENCOUNTER — APPOINTMENT (OUTPATIENT)
Dept: LAB | Facility: HOSPITAL | Age: 78
End: 2018-04-27

## 2018-04-27 VITALS
TEMPERATURE: 98 F | HEIGHT: 62 IN | RESPIRATION RATE: 18 BRPM | BODY MASS INDEX: 33.05 KG/M2 | SYSTOLIC BLOOD PRESSURE: 138 MMHG | HEART RATE: 60 BPM | DIASTOLIC BLOOD PRESSURE: 70 MMHG | OXYGEN SATURATION: 96 % | WEIGHT: 179.6 LBS

## 2018-04-27 DIAGNOSIS — J70.0 PNEUMONITIS, RADIATION (HCC): ICD-10-CM

## 2018-04-27 DIAGNOSIS — D50.0 IRON DEFICIENCY ANEMIA DUE TO CHRONIC BLOOD LOSS: ICD-10-CM

## 2018-04-27 DIAGNOSIS — C34.91 ADENOCARCINOMA OF RIGHT LUNG (HCC): Primary | ICD-10-CM

## 2018-04-27 PROCEDURE — G0463 HOSPITAL OUTPT CLINIC VISIT: HCPCS | Performed by: INTERNAL MEDICINE

## 2018-04-27 PROCEDURE — 99214 OFFICE O/P EST MOD 30 MIN: CPT | Performed by: INTERNAL MEDICINE

## 2018-04-27 RX ORDER — HEPATITIS A VACCINE, INACTIVATED 50 [IU]/ML
INJECTION, SUSPENSION INTRAMUSCULAR
COMMUNITY
Start: 2018-04-26 | End: 2019-09-17 | Stop reason: HOSPADM

## 2018-04-27 NOTE — PROGRESS NOTES
Subjective  REASON FOR FOLLOWUP:  Two nodules in the right lung, each one of them pathologically confirmed to be adenocarcinoma. The first one is EGFR negative, ALK1 negative. The second one has not had any molecular analysis. The original one was requested to have EGFR and ALK1. So far we have not received any information in this regard. A CT scan of the brain shows no evidence of brain metastasis. ECOG performance status 1. No cancer-related pain. THE PATIENT UNDERWENT DEFINITIVE RADIATION THERAPY FOR BOTH NODULES..  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 Finally recovered from this.       History of Present Illness   This patient returns today to the office for followup in company of her son. She remains on oxygen. She has advanced COPD. The good news is that she has not had any new respiratory infections. Her appetite remains acceptable. Her weight is stable. Bowel function is normal. Urination is normal. No new cardiovascular issues. No recent infections. She remains functional and able to participate in the life of her family. She just got yesterday a hepatitis A vaccine. No issues in regard to this fact.            Past Medical History, Past Surgical HistoryVery extensive including hypertension, hyperlipidemia, carotid arterial disease with 1 carotid artery occluded on the right side, thrombophilia since age 25 when she developed vein thrombosis in the lower extremities, bilateral pulmonary emboli and she had an IVC filter placed then and required anticoagulation for almost the rest of her life.  She also has had a history of paroxysmal atrial fibrillation, she also has history of stress cardiomyopathy, and she also has a history of peripheral arterial disease, status post bypass operation for the lower extremities. The patient also has a history of COPD. She also has a history of hypothyroidism  and also she has history of clostridium colitis.  She has had mammograms in the past, being normal. She has had colonoscopies in the past, being normal or negative for malignant conditions. She never had any history of bladder cancer. 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 Margins of resection were free at that point.      The patient also developed a lung nodule in the right base in 2009 and after assessment by Dr. Lamb she had a wedge resection showing an adenocarcinoma. The EGFR analysis and ALK-1 analysis was never done on that tissue and we have requested this.  Later on this year she has had a couple of lung nodules as already stated and biopsy of one of them showing already an adenocarcinoma that is EGFR negative, ALK-1 pending.  A biopsy of the 2nd nodule is planned to be done on 02/22/2015.      As we recommended to the patient on 02/17/2016, we would like for her to have a CT scan of the brain to be sure that there is no brain metastases.  I did not order an MRI because the patient has an IVC filter in place and this is a metal piece that she has had in this location for a long time.      The patient also has a history of a stress fracture of one of her ankles and she has had osteoporosis with collapsed vertebrae in the past.      She also has a history of thrombophilia as stated before and never a workup was performed looking for any other specific cause.      ONCOLOGIC HISTORY: Ms. Narayan is a 75-year-old white female who was actually discussed in the multidisciplinary lung cancer conference a week ago with Gary Lamb, Alfredo and many other physicians. On that occasion we reviewed her CT scans and PET scans showing a couple of new nodules on the PET scan, one that was biopsied already proven to be adenocarcinoma and a 2nd one more laterally that was found incidentally on the PET scan that will be  biopsied this coming Monday.  She has a history of wedge resection of a tumoral lesion in the right lung base in February 2009 for an adenocarcinoma. She actually is having her anniversary of smoking cessation today, 7 years with no cigarettes. In any event, since last summer, the patient has had a very difficult time, first of all with stress cardiomyopathy and ended up at [facility] for almost 3 weeks having terrible ejection fraction on an echocardiogram and subsequently after appropriate therapy, improved and has been well since then. She has an upcoming echocardiogram with Dr. Mcguire this week.  Besides this, she had almost 4 back-to-back infections with clostridium colitis since August until almost November requiring multiple therapies and visits to infectious disease to finally get rid of this infection altogether.  She lost close to 40 pounds of weight from this event.  Finally, since the 1st of December she has started to feel a little bit better.  She is back to eating, her weight has stabilized and she has not had any nausea, vomiting, abdominal pain, further diarrhea or fever. Urination is ongoing with no difficulty. She has had a minimal hacking cough with no sputum production or pleuritic pain. No modification in her usual degree of shortness of breath. She has not had any hemoptysis besides the one that she developed after the lung biopsy recently. This has already subsided.  The patient is not experiencing any need for O2 at nighttime, even though she has her machine at home.  She has had a better sense of well-being. She has gained back control of her life and she is running her house like she used to. Now she is taking care of her ill  who has Alzheimer’s dementia.     On 02/29/2016, the CT scan of the brain failed to document any brain metastasis and the biopsy of the second malignancy in the right lung was consistent with adenocarcinoma. We discussed the case with   Donna who will see her tomorrow morning in preparation for possible definitive radiation therapy. The patient will not be a candidate to receive Tarceva on any of small molecules, and she will not be a candidate to receive any formal IV chemotherapy. She will be a candidate in the future to receive Opdivo at the most.   SURGICAL HISTORY:  She has had a partial hysterectomy. She has had a hernia in the lumbar spine.  She also has had a cyst in the stomach and completion of a hysterectomy. She also has had a biopsy and cyst removal of the left breast and also biopsy for skin lesions on the forehead. She has had a wedge resection on her right lung in . There was also removal of a pigmented lesion on the left nipple in .      Again, she also has a history of pulmonary embolus at age 24 and purple legs bilateral deep vein thrombosis that she has almost kept anticoagulated for most of her life.     GYN HISTORY:   1, para 1, no miscarriages. Last pelvic examination done in the last 3 years being normal.    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  She used to smoke up to 1-1/2 packs of cigarettes a day. She quit 7 years ago.  She does not drink any alcohol.    Review of Systems     General: no fever, chills, fatigue, weight changes, or lack of appetite.  Eyes: no epiphora, xerophthalmia,conjunctivitis, pain, glaucoma, blurred vision, blindness, secretion, photophobia, proptosis, diplopia.  Ears: no otorrhea, tinnitus, otorrhagia, deafness, pain, vertigo.  Nose: no rhinorrhea, epistaxis, alteration in perception of odors, sinuses pressure.  Mouth: no alteration in gums or teeth,  ulcers, no difficulty with mastication or deglut ion, no odynophagia.  Neck: no masses or pain, no thyroid alterations, no pain in muscles or arteries, no carotid odynia, no crepitation.  Respiratory: unchanged cough, sputum production, dyspnea,no trepopnea,  "pleuritic pain, hemoptysis.  Heart: no syncope, irregularity, palpitations, angina, orthopnea, paroxysmal nocturnal dyspnea.  Vascular Venous: no tenderness,edema, palpable cords, postphlebitic syndrome, skin changes or ulcerations.  Vascular Arterial: no distal ischemia, claudication, gangrene, neuropathic ischemic pain, skin ulcers, paleness or cyanosis.  GI: no dysphagia, odynophagia, no regurgitation, no heartburn,no indigestion,no nausea,no vomiting,no hematemesis ,no melena,no jaundice,no distention, no obstipation,no enterorrhagia,no proctalgia,no anal  lesions, nochanges in bowel habits.  : no frequency, hesitancy, hematuria, discharge, pain.  Musculoskeletal: no muscle or tendon pain or inflammation, joint pain, edema, functional limitation, fasciculations, mass.  Neurologic: no headache, seizures, alterations on Craneal nerves, no motor or senssory deficit, normal coordination, no alteration in memory, orientation, calculation,writting, verbal or written language.  Skin: no rashes, pruritus or localized lesions.  Psychiatric: no anxiety, depression, agitation, delusions, proper insight.    Medications:  The current medication list was reviewed in the EMR    ALLERGIES:    Allergies   Allergen Reactions   • Adhesive Tape    • Augmentin [Amoxicillin-Pot Clavulanate]    • Cephalexin    • Pentazocine        Objective      Vitals:    04/27/18 1508   BP: 138/70   Pulse: 60   Resp: 18   Temp: 98 °F (36.7 °C)   SpO2: 96%  Comment: at rest   Weight: 81.5 kg (179 lb 9.6 oz)   Height: 158 cm (62.21\")   PainSc: 0-No pain     Current Status 4/27/2018   ECOG score 0       Physical Exam    GENERAL:  Well-developed, well-nourished  Patient  in no acute distress.   SKIN:  Warm, dry without rashes, purpura or petechiae.  HEENT:  Pupils were equal and reactive to light and accomodation, conjunctivas non injected, no pterigion, normal extraocular movements, normal visual acuity.   Mouth mucosa was moist, no exudates in " oropharynx, normal gum line, normal roof of the mouth and pillars, normal papillations of the tongue  NECK:  Supple with good range of motion; no thyromegaly or masses, no JVD or bruits, no cervical adenopathies.No carotid arteries pain, no carotid abnormal pulsation or arterial dance.  LYMPHATICS:  No cervical, supraclavicular, axillary, epitrochlear or inguinal adenopathy.  CHEST:  decreased excursion of both lara thoraces, normal voice fremitus, no subcutaneous emphysema, normal axillas, no rashes or acanthosis nigricans. Lungs clear to percussion and auscultation, decreased breath sounds bilaterally, minimal wheezing, no crackles or ronchi, no stridor, no rubs.  CARDIAC AND VASCULAR  normal rate and regular rhythm, without murmurs, rubs or S3 or S4 right or left sided gallops. Normal femoral, popliteal, pedis, brachial and carotid pulses.  ABDOMEN:  Soft, nontender with no organomegaly or masses, no ascites, no collateral circulation,no distention,no Buckley sign, no abdominal pain, no inguinal hernias,no umbilical hernias, no abdominal bruits. Normal bowel sounds.  GENITAL: Not  Performed.  EXTREMITIES  AND SPINE:  No clubbing, cyanosis or edema, no deformities or pain .No kyphosis, scoliosis, deformities or pain in spine, ribs or pelvic bone.  NEUROLOGICAL:  Patient was awake, alert, oriented to time        RECENT LABS:Order: [id number]   Status:  Final result   Visible to patient:  No (Not Released) Dx:  Pneumonitis, radiation; Iron deficien...    Ref Range & Units 7d ago   Ferritin ng/mL 64.70    Resulting Agency   [facility]      Specimen Collected: 04/20/18 14:00 Last Resulted: 04/20/18 16:04              CT CHEST W CONTRAST-     CLINICAL HISTORY: History of lung carcinoma and resection. Follow-up  pulmonary nodules.     TECHNIQUE: Spiral CT images were obtained through the chest with IV  contrast and were reconstructed in 3 mm thick axial slices.     Radiation dose reduction techniques were utilized,  including automated  exposure control and exposure modulation based on body size.     COMPARISON: CT chest dated 12/27/2017.     FINDINGS: Again seen are surgical chain sutures in the right lower lobe.  There is a curvilinear band of soft tissue adjacent to the surgical  sutures consistent with postop scarring appears unchanged. A thin  curvilinear opacities in both lungs are also unchanged and are  consistent with fibrotic scarring. There are no focal infiltrates or  discrete pulmonary nodules. There is no mediastinal or hilar or axillary  lymphadenopathy. There are no pleural effusions. Images through the  upper abdomen show no significant abnormality.     IMPRESSION:  Postoperative changes as noted. Scattered areas of  curvilinear fibrosis in both lungs showing stability since 12/27/2017.  There is no compelling evidence of recurrent primary lung neoplasm or  metastatic disease in the chest or visualized upper abdomen.     This report was finalized on 4/24/2018 3:00 PM by Dr. Jad Garrido M.D..      Assessment/Plan       Problem #1. Two nodules in the right lung, each one of them pathologically confirmed to be adenocarcinoma. The first one is EGFR negative, ALK1 negative. The second one has not had any molecular analysis. The original one was requested to have EGFR and ALK1. . ECOG performance status 1. No cancer-related pain. THE PATIENT UNDERWENT DEFINITIVE RADIATION THERAPY FOR BOTH NODULES..  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 Finally recovered from this.          I personally reviewed her CT scan and agree with the radiologist interpretation in regard to the stability of her disease .I do not find any obvious radiological evidence of recurrent disease and the chronic lung changes associated with previous surgery, COPD and previous radiation therapy remain the same in my opinion with no  obvious pericardial effusion, no obvious pleural effusion, no mediastinal adenopathy. The visible areas of the liver remain unchanged and there is no obvious alteration in the adrenal glands as far as I can tell.    2. The patient has iron deficiency anemia. She is chronically anticoagulated. We have checked Hemoccults on her in the past and been negative. Her hemoglobin at this time is 12. Her ferritin, iron profile are appropriate. The patient has been advised to remain on her ferrous sulfate for the time being.     Given all the above facts and the lack of recurrence of lung cancer, the patient raised the question if she could have cataract surgery. From my point of view, I find no reason why now.     Otherwise, I will review her back in 4 months with a CBC, CMP and CT scan of the chest the week before MD appointment. I advised her to try to stay away from people who are sick, avoid respiratory infection, contagious process at any cost because any kind of cold or respiratory problem can take her into major difficulties. She is aware of that. I discussed all these facts with the patient and her son who asked the question about if she would be a candidate to receive Keytruda. I told him that if in the future she has recurrence of her cancer that will be the way to go but right now, I find no reason to proceed with this medication. She understood.                                     4/27/2018      CC:

## 2018-08-17 ENCOUNTER — LAB (OUTPATIENT)
Dept: LAB | Facility: HOSPITAL | Age: 78
End: 2018-08-17

## 2018-08-17 ENCOUNTER — HOSPITAL ENCOUNTER (OUTPATIENT)
Dept: PET IMAGING | Facility: HOSPITAL | Age: 78
Discharge: HOME | End: 2018-08-17
Attending: INTERNAL MEDICINE | Admitting: INTERNAL MEDICINE

## 2018-08-17 DIAGNOSIS — C34.91 ADENOCARCINOMA OF RIGHT LUNG (HCC): ICD-10-CM

## 2018-08-17 LAB
ALBUMIN SERPL-MCNC: 4.1 G/DL (ref 3.5–5.2)
ALBUMIN/GLOB SERPL: 1.1 G/DL (ref 1.1–2.4)
ALP SERPL-CCNC: 75 U/L (ref 38–116)
ALT SERPL W P-5'-P-CCNC: 9 U/L (ref 0–33)
ANION GAP SERPL CALCULATED.3IONS-SCNC: 10.1 MMOL/L
AST SERPL-CCNC: 13 U/L (ref 0–32)
BASOPHILS # BLD AUTO: 0.07 10*3/MM3 (ref 0–0.1)
BASOPHILS NFR BLD AUTO: 0.8 % (ref 0–1.1)
BILIRUB SERPL-MCNC: 0.3 MG/DL (ref 0.1–1.2)
BUN BLD-MCNC: 16 MG/DL (ref 6–20)
BUN/CREAT SERPL: 21.9 (ref 7.3–30)
CALCIUM SPEC-SCNC: 9.3 MG/DL (ref 8.5–10.2)
CHLORIDE SERPL-SCNC: 98 MMOL/L (ref 98–107)
CO2 SERPL-SCNC: 35.9 MMOL/L (ref 22–29)
CREAT BLD-MCNC: 0.73 MG/DL (ref 0.6–1.1)
CREAT BLDA-MCNC: 0.8 MG/DL (ref 0.6–1.3)
DEPRECATED RDW RBC AUTO: 47 FL (ref 37–49)
EOSINOPHIL # BLD AUTO: 0.17 10*3/MM3 (ref 0–0.36)
EOSINOPHIL NFR BLD AUTO: 1.9 % (ref 1–5)
ERYTHROCYTE [DISTWIDTH] IN BLOOD BY AUTOMATED COUNT: 13.5 % (ref 11.7–14.5)
GFR SERPL CREATININE-BSD FRML MDRD: 78 ML/MIN/1.73
GLOBULIN UR ELPH-MCNC: 3.6 GM/DL (ref 1.8–3.5)
GLUCOSE BLD-MCNC: 95 MG/DL (ref 74–124)
HCT VFR BLD AUTO: 34.9 % (ref 34–45)
HGB BLD-MCNC: 10.5 G/DL (ref 11.5–14.9)
IMM GRANULOCYTES # BLD: 0.02 10*3/MM3 (ref 0–0.03)
IMM GRANULOCYTES NFR BLD: 0.2 % (ref 0–0.5)
LYMPHOCYTES # BLD AUTO: 1.2 10*3/MM3 (ref 1–3.5)
LYMPHOCYTES NFR BLD AUTO: 13.5 % (ref 20–49)
MCH RBC QN AUTO: 28.5 PG (ref 27–33)
MCHC RBC AUTO-ENTMCNC: 30.1 G/DL (ref 32–35)
MCV RBC AUTO: 94.8 FL (ref 83–97)
MONOCYTES # BLD AUTO: 0.7 10*3/MM3 (ref 0.25–0.8)
MONOCYTES NFR BLD AUTO: 7.9 % (ref 4–12)
NEUTROPHILS # BLD AUTO: 6.75 10*3/MM3 (ref 1.5–7)
NEUTROPHILS NFR BLD AUTO: 75.7 % (ref 39–75)
NRBC BLD MANUAL-RTO: 0 /100 WBC (ref 0–0)
PLATELET # BLD AUTO: 304 10*3/MM3 (ref 150–375)
PMV BLD AUTO: 10.4 FL (ref 8.9–12.1)
POTASSIUM BLD-SCNC: 4.4 MMOL/L (ref 3.5–4.7)
PROT SERPL-MCNC: 7.7 G/DL (ref 6.3–8)
RBC # BLD AUTO: 3.68 10*6/MM3 (ref 3.9–5)
SODIUM BLD-SCNC: 144 MMOL/L (ref 134–145)
WBC NRBC COR # BLD: 8.91 10*3/MM3 (ref 4–10)

## 2018-08-17 PROCEDURE — 80053 COMPREHEN METABOLIC PANEL: CPT | Performed by: INTERNAL MEDICINE

## 2018-08-17 PROCEDURE — 36415 COLL VENOUS BLD VENIPUNCTURE: CPT | Performed by: INTERNAL MEDICINE

## 2018-08-17 PROCEDURE — 71260 CT THORAX DX C+: CPT

## 2018-08-17 PROCEDURE — 82565 ASSAY OF CREATININE: CPT

## 2018-08-17 PROCEDURE — 25010000002 IOPAMIDOL 61 % SOLUTION: Performed by: INTERNAL MEDICINE

## 2018-08-17 PROCEDURE — 85025 COMPLETE CBC W/AUTO DIFF WBC: CPT | Performed by: INTERNAL MEDICINE

## 2018-08-17 RX ADMIN — IOPAMIDOL 75 ML: 612 INJECTION, SOLUTION INTRAVENOUS at 13:31

## 2018-08-24 ENCOUNTER — APPOINTMENT (OUTPATIENT)
Dept: LAB | Facility: HOSPITAL | Age: 78
End: 2018-08-24

## 2018-08-24 ENCOUNTER — OFFICE VISIT (OUTPATIENT)
Dept: ONCOLOGY | Facility: CLINIC | Age: 78
End: 2018-08-24

## 2018-08-24 VITALS
HEART RATE: 56 BPM | SYSTOLIC BLOOD PRESSURE: 184 MMHG | RESPIRATION RATE: 16 BRPM | TEMPERATURE: 98.6 F | DIASTOLIC BLOOD PRESSURE: 90 MMHG | BODY MASS INDEX: 32.39 KG/M2 | WEIGHT: 176 LBS | HEIGHT: 62 IN

## 2018-08-24 DIAGNOSIS — C34.91 ADENOCARCINOMA OF RIGHT LUNG (HCC): Primary | ICD-10-CM

## 2018-08-24 DIAGNOSIS — J70.0 PNEUMONITIS, RADIATION (HCC): ICD-10-CM

## 2018-08-24 DIAGNOSIS — D50.0 IRON DEFICIENCY ANEMIA DUE TO CHRONIC BLOOD LOSS: ICD-10-CM

## 2018-08-24 PROCEDURE — 99214 OFFICE O/P EST MOD 30 MIN: CPT | Performed by: INTERNAL MEDICINE

## 2018-08-24 PROCEDURE — G0463 HOSPITAL OUTPT CLINIC VISIT: HCPCS | Performed by: INTERNAL MEDICINE

## 2018-08-24 RX ORDER — RIVAROXABAN 20 MG/1
TABLET, FILM COATED ORAL
Status: ON HOLD | COMMUNITY
End: 2019-10-07

## 2018-08-24 RX ORDER — CILOSTAZOL 100 MG/1
TABLET ORAL
COMMUNITY
End: 2019-09-17 | Stop reason: HOSPADM

## 2018-08-24 NOTE — PROGRESS NOTES
Subjective  REASON FOR FOLLOWUP:  Two nodules in the right lung, each one of them pathologically confirmed to be adenocarcinoma. The first one is EGFR negative, ALK1 negative. The second one has not had any molecular analysis. The original one was requested to have EGFR and ALK1. So far we have not received any information in this regard. A CT scan of the brain shows no evidence of brain metastasis. ECOG performance status 1. No cancer-related pain. THE PATIENT UNDERWENT DEFINITIVE RADIATION THERAPY FOR BOTH NODULES..  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 Finally recovered from this.       History of Present Illness   This patient returns today to the office for followup in company of her son. She remains on oxygen. She has advanced COPD. The good news is that she has not had any new respiratory infections. Her appetite remains acceptable. Her weight is stable. Bowel function is normal. Urination is normal. No new cardiovascular issues. No recent infections. She remains functional and able to participate in the life of her family. She just got yesterday a hepatitis A vaccine. No issues in regard to this fact.            Past Medical History, Past Surgical HistoryVery extensive including hypertension, hyperlipidemia, carotid arterial disease with 1 carotid artery occluded on the right side, thrombophilia since age 25 when she developed vein thrombosis in the lower extremities, bilateral pulmonary emboli and she had an IVC filter placed then and required anticoagulation for almost the rest of her life.  She also has had a history of paroxysmal atrial fibrillation, she also has history of stress cardiomyopathy, and she also has a history of peripheral arterial disease, status post bypass operation for the lower extremities. The patient also has a history of COPD. She also has a history of hypothyroidism  and also she has history of clostridium colitis.  She has had mammograms in the past, being normal. She has had colonoscopies in the past, being normal or negative for malignant conditions. She never had any history of bladder cancer. 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 Margins of resection were free at that point.      The patient also developed a lung nodule in the right base in 2009 and after assessment by Dr. Lamb she had a wedge resection showing an adenocarcinoma. The EGFR analysis and ALK-1 analysis was never done on that tissue and we have requested this.  Later on this year she has had a couple of lung nodules as already stated and biopsy of one of them showing already an adenocarcinoma that is EGFR negative, ALK-1 pending.  A biopsy of the 2nd nodule is planned to be done on 02/22/2015.      As we recommended to the patient on 02/17/2016, we would like for her to have a CT scan of the brain to be sure that there is no brain metastases.  I did not order an MRI because the patient has an IVC filter in place and this is a metal piece that she has had in this location for a long time.      The patient also has a history of a stress fracture of one of her ankles and she has had osteoporosis with collapsed vertebrae in the past.      She also has a history of thrombophilia as stated before and never a workup was performed looking for any other specific cause.      ONCOLOGIC HISTORY: Ms. Narayan is a 75-year-old white female who was actually discussed in the multidisciplinary lung cancer conference a week ago with Gary Lamb, Alfredo and many other physicians. On that occasion we reviewed her CT scans and PET scans showing a couple of new nodules on the PET scan, one that was biopsied already proven to be adenocarcinoma and a 2nd one more laterally that was found incidentally on the PET scan that will be  biopsied this coming Monday.  She has a history of wedge resection of a tumoral lesion in the right lung base in February 2009 for an adenocarcinoma. She actually is having her anniversary of smoking cessation today, 7 years with no cigarettes. In any event, since last summer, the patient has had a very difficult time, first of all with stress cardiomyopathy and ended up at [facility] for almost 3 weeks having terrible ejection fraction on an echocardiogram and subsequently after appropriate therapy, improved and has been well since then. She has an upcoming echocardiogram with Dr. Mcguire this week.  Besides this, she had almost 4 back-to-back infections with clostridium colitis since August until almost November requiring multiple therapies and visits to infectious disease to finally get rid of this infection altogether.  She lost close to 40 pounds of weight from this event.  Finally, since the 1st of December she has started to feel a little bit better.  She is back to eating, her weight has stabilized and she has not had any nausea, vomiting, abdominal pain, further diarrhea or fever. Urination is ongoing with no difficulty. She has had a minimal hacking cough with no sputum production or pleuritic pain. No modification in her usual degree of shortness of breath. She has not had any hemoptysis besides the one that she developed after the lung biopsy recently. This has already subsided.  The patient is not experiencing any need for O2 at nighttime, even though she has her machine at home.  She has had a better sense of well-being. She has gained back control of her life and she is running her house like she used to. Now she is taking care of her ill  who has Alzheimer’s dementia.     On 02/29/2016, the CT scan of the brain failed to document any brain metastasis and the biopsy of the second malignancy in the right lung was consistent with adenocarcinoma. We discussed the case with   Donna who will see her tomorrow morning in preparation for possible definitive radiation therapy. The patient will not be a candidate to receive Tarceva on any of small molecules, and she will not be a candidate to receive any formal IV chemotherapy. She will be a candidate in the future to receive Opdivo at the most.   SURGICAL HISTORY:  She has had a partial hysterectomy. She has had a hernia in the lumbar spine.  She also has had a cyst in the stomach and completion of a hysterectomy. She also has had a biopsy and cyst removal of the left breast and also biopsy for skin lesions on the forehead. She has had a wedge resection on her right lung in . There was also removal of a pigmented lesion on the left nipple in .      Again, she also has a history of pulmonary embolus at age 24 and purple legs bilateral deep vein thrombosis that she has almost kept anticoagulated for most of her life.     GYN HISTORY:   1, para 1, no miscarriages. Last pelvic examination done in the last 3 years being normal.    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  She used to smoke up to 1-1/2 packs of cigarettes a day. She quit 7 years ago.  She does not drink any alcohol.    Review of Systems     General: no fever, chills, fatigue, weight changes, or lack of appetite.  Eyes: no epiphora, xerophthalmia,conjunctivitis, pain, glaucoma, blurred vision, blindness, secretion, photophobia, proptosis, diplopia.  Ears: no otorrhea, tinnitus, otorrhagia, deafness, pain, vertigo.  Nose: no rhinorrhea, epistaxis, alteration in perception of odors, sinuses pressure.  Mouth: no alteration in gums or teeth,  ulcers, no difficulty with mastication or deglut ion, no odynophagia.  Neck: no masses or pain, no thyroid alterations, no pain in muscles or arteries, no carotid odynia, no crepitation.  Respiratory: unchanged cough, sputum production, dyspnea,no trepopnea,  "pleuritic pain, hemoptysis.  Heart: no syncope, irregularity, palpitations, angina, orthopnea, paroxysmal nocturnal dyspnea.  Vascular Venous: no tenderness,edema, palpable cords, postphlebitic syndrome, skin changes or ulcerations.  Vascular Arterial: no distal ischemia, claudication, gangrene, neuropathic ischemic pain, skin ulcers, paleness or cyanosis.  GI: no dysphagia, odynophagia, no regurgitation, no heartburn,no indigestion,no nausea,no vomiting,no hematemesis ,no melena,no jaundice,no distention, no obstipation,no enterorrhagia,no proctalgia,no anal  lesions, nochanges in bowel habits.  : no frequency, hesitancy, hematuria, discharge, pain.  Musculoskeletal: no muscle or tendon pain or inflammation, joint pain, edema, functional limitation, fasciculations, mass.  Neurologic: no headache, seizures, alterations on Craneal nerves, no motor or senssory deficit, normal coordination, no alteration in memory, orientation, calculation,writting, verbal or written language.  Skin: no rashes, pruritus or localized lesions.  Psychiatric: no anxiety, depression, agitation, delusions, proper insight.    Medications:  The current medication list was reviewed in the EMR    ALLERGIES:    Allergies   Allergen Reactions   • Adhesive Tape    • Augmentin [Amoxicillin-Pot Clavulanate]    • Cephalexin    • Metoclopramide Unknown (See Comments)   • Pentazocine    • Simvastatin Unknown (See Comments)   • Sucralfate Unknown (See Comments)       Objective      Vitals:    08/24/18 1500   BP: (!) 184/90  Comment: rechecked by Asha manually   Pulse: 56   Resp: 16   Temp: 98.6 °F (37 °C)   TempSrc: Oral   SpO2: Comment: unable to get a reading   Weight: 79.8 kg (176 lb)   Height: 157 cm (61.81\")  Comment: new height   PainSc: 0-No pain     Current Status 8/24/2018   ECOG score 1       Physical Exam    GENERAL:  Well-developed, well-nourished  Patient  in no acute distress.   SKIN:  Warm, dry ,NO rashes,NO purpura ,NO " petechiae.  HEENT:  Pupils were equal and reactive to light and accomodation, conjunctivas non injected, no pterigion, normal extraocular movements, normal visual acuity.   Mouth mucosa was moist, no exudates in oropharynx, normal gum line, normal roof of the mouth and pillars, normal papillations of the tongue.  NECK:  Supple with good range of motion; no thyromegaly or masses, no JVD or bruits, no cervical adenopathies.No carotid arteries pain, no carotid abnormal pulsation , NO arterial dance.  LYMPHATICS:  No cervical, NO supraclavicular, NO axillary,NO epitrochlear , NO inguinal adenopathy.  CHEST:  decreased excursion of both lara thoraces, normal voice fremitus, no subcutaneous emphysema, normal axillas, no rashes or acanthosis nigricans. decreased breath sounds bilaterally, no wheezing,NO crackles NO ronchi, NO stridor, NO rubs.  CARDIAC AND VASCULAR:  normal rate and regular rhythm, without murmurs,NO rubs NO S3 NO S4 right or left . Normal femoral, popliteal, pedis, brachial and carotid pulses.  ABDOMEN:  Soft, nontender with no organomegaly or masses, no ascites, no collateral circulation,no distention,no Welda sign, no abdominal pain, no inguinal hernias,no umbilical hernia, no abdominal bruits. Normal bowel sounds.  GENITAL: Not  Performed.  EXTREMITIES  AND SPINE:  No clubbing, cyanosis or edema, no deformities or pain .No kyphosis, scoliosis, deformities or pain in spine, ribs or pelvic bone.  NEUROLOGICAL:  Patient was awake, alert, oriented to time, person and place.            RECENT LABS:CT CHEST W CONTRAST-     CLINICAL HISTORY: Lung carcinoma. Status post resection. Restaging.     TECHNIQUE: Spiral CT images were obtained through the chest with IV  contrast and were reconstructed in 3 mm thick slices.     Radiation dose reduction techniques were utilized, including automated  exposure control and exposure modulation based on body size.     COMPARISON: CT chest dated 4/20/2018.     FINDINGS:  Again seen are surgical chain sutures in the inferior aspect  of the right lower lobe thin curvilinear band of increased density  persists within the right lower lobe adjacent to the sutures. This is  unchanged and consistent with scarring. Additional linear areas of  abnormal density are also identified in both lungs that appear stable.  There are no discrete masses or focal infiltrates. There is no  mediastinal or hilar or axillary lymphadenopathy. The heart is mildly  enlarged. No pleural effusions are identified.     IMPRESSION:  Stable areas of linear scarring scattered throughout both  lungs. There is no evidence of recurrent primary lung neoplasm or  metastatic disease within the chest or visualized upper abdomen.     This report was finalized on 8/20/2018 1:41 PM by Dr. Jad Garrido M.D.           Assessment/Plan       Problem #1. Two nodules in the right lung, each one of them pathologically confirmed to be adenocarcinoma. The first one is EGFR negative, ALK1 negative. The second one has not had any molecular analysis. The original one was requested to have EGFR and ALK1. . ECOG performance status 1. No cancer-related pain. THE PATIENT UNDERWENT DEFINITIVE RADIATION THERAPY FOR BOTH NODULES..  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 Finally recovered from this.          I personally reviewed her CT scan and agree with the radiologist interpretation in regard to the stability of her disease .I do not find any obvious radiological evidence of recurrent disease and the chronic lung changes associated with previous surgery, COPD and previous radiation therapy remain the same in my opinion with no obvious pericardial effusion, no obvious pleural effusion, no mediastinal adenopathy. The visible areas of the liver remain unchanged and there is no obvious alteration in the adrenal glands as far as I can  tell.    2. The patient has iron deficiency anemia. She is chronically anticoagulated. We have checked Hemoccults on her in the past and been negative. Her hemoglobin at this time is 10.5. Her ferritin, iron profile are appropriate. The patient has been advised to remain on her ferrous sulfate for the time being.         Otherwise, I will review her back in 4 months with a CBC, CMP and CT scan of the chest the week before MD appointment. I advised her to try to stay away from people who are sick, avoid respiratory infection, contagious process at any cost because any kind of cold or respiratory problem can take her into major difficulties. She is aware of that. I discussed all these facts with the patient and her son who asked the question about if she would be a candidate to receive Keytruda. I told him that if in the future she has recurrence of her cancer that will be the way to go but right now, I find no reason to proceed with this medication. She understood.                                     8/24/2018      CC:

## 2018-12-07 ENCOUNTER — LAB (OUTPATIENT)
Dept: LAB | Facility: HOSPITAL | Age: 78
End: 2018-12-07

## 2018-12-07 ENCOUNTER — HOSPITAL ENCOUNTER (OUTPATIENT)
Dept: PET IMAGING | Facility: HOSPITAL | Age: 78
Discharge: HOME | End: 2018-12-07
Attending: INTERNAL MEDICINE | Admitting: INTERNAL MEDICINE

## 2018-12-07 DIAGNOSIS — C34.91 ADENOCARCINOMA OF RIGHT LUNG (HCC): ICD-10-CM

## 2018-12-07 DIAGNOSIS — D50.0 IRON DEFICIENCY ANEMIA DUE TO CHRONIC BLOOD LOSS: ICD-10-CM

## 2018-12-07 DIAGNOSIS — J70.0 PNEUMONITIS, RADIATION (HCC): ICD-10-CM

## 2018-12-07 LAB
ALBUMIN SERPL-MCNC: 4.1 G/DL (ref 3.5–5.2)
ALBUMIN/GLOB SERPL: 1.2 G/DL (ref 1.1–2.4)
ALP SERPL-CCNC: 74 U/L (ref 38–116)
ALT SERPL W P-5'-P-CCNC: 8 U/L (ref 0–33)
ANION GAP SERPL CALCULATED.3IONS-SCNC: 10.9 MMOL/L
AST SERPL-CCNC: 11 U/L (ref 0–32)
BASOPHILS # BLD AUTO: 0.06 10*3/MM3 (ref 0–0.1)
BASOPHILS NFR BLD AUTO: 0.7 % (ref 0–1.1)
BILIRUB SERPL-MCNC: 0.3 MG/DL (ref 0.1–1.2)
BUN BLD-MCNC: 14 MG/DL (ref 6–20)
BUN/CREAT SERPL: 20 (ref 7.3–30)
CALCIUM SPEC-SCNC: 9.2 MG/DL (ref 8.5–10.2)
CHLORIDE SERPL-SCNC: 100 MMOL/L (ref 98–107)
CO2 SERPL-SCNC: 35.1 MMOL/L (ref 22–29)
CREAT BLD-MCNC: 0.7 MG/DL (ref 0.6–1.1)
CREAT BLDA-MCNC: 0.8 MG/DL (ref 0.6–1.3)
DEPRECATED RDW RBC AUTO: 48.7 FL (ref 37–49)
EOSINOPHIL # BLD AUTO: 0.16 10*3/MM3 (ref 0–0.36)
EOSINOPHIL NFR BLD AUTO: 1.8 % (ref 1–5)
ERYTHROCYTE [DISTWIDTH] IN BLOOD BY AUTOMATED COUNT: 14 % (ref 11.7–14.5)
FERRITIN SERPL-MCNC: 21.3 NG/ML
GFR SERPL CREATININE-BSD FRML MDRD: 81 ML/MIN/1.73
GLOBULIN UR ELPH-MCNC: 3.3 GM/DL (ref 1.8–3.5)
GLUCOSE BLD-MCNC: 103 MG/DL (ref 74–124)
HCT VFR BLD AUTO: 34.5 % (ref 34–45)
HGB BLD-MCNC: 10.4 G/DL (ref 11.5–14.9)
HGB RETIC QN: 30.4 PG (ref 29.8–36.1)
IMM GRANULOCYTES # BLD: 0.03 10*3/MM3 (ref 0–0.03)
IMM GRANULOCYTES NFR BLD: 0.3 % (ref 0–0.5)
IMM RETICS NFR: 9.2 % (ref 3–15.8)
IRON 24H UR-MRATE: 59 MCG/DL (ref 37–145)
IRON SATN MFR SERPL: 17 % (ref 14–48)
LYMPHOCYTES # BLD AUTO: 1.05 10*3/MM3 (ref 1–3.5)
LYMPHOCYTES NFR BLD AUTO: 12.1 % (ref 20–49)
MCH RBC QN AUTO: 28.7 PG (ref 27–33)
MCHC RBC AUTO-ENTMCNC: 30.1 G/DL (ref 32–35)
MCV RBC AUTO: 95 FL (ref 83–97)
MONOCYTES # BLD AUTO: 0.67 10*3/MM3 (ref 0.25–0.8)
MONOCYTES NFR BLD AUTO: 7.7 % (ref 4–12)
NEUTROPHILS # BLD AUTO: 6.69 10*3/MM3 (ref 1.5–7)
NEUTROPHILS NFR BLD AUTO: 77.4 % (ref 39–75)
NRBC BLD MANUAL-RTO: 0 /100 WBC (ref 0–0)
PLATELET # BLD AUTO: 281 10*3/MM3 (ref 150–375)
PMV BLD AUTO: 9.9 FL (ref 8.9–12.1)
POTASSIUM BLD-SCNC: 4.4 MMOL/L (ref 3.5–4.7)
PROT SERPL-MCNC: 7.4 G/DL (ref 6.3–8)
RBC # BLD AUTO: 3.63 10*6/MM3 (ref 3.9–5)
RETICS/RBC NFR AUTO: 1.62 % (ref 0.6–2)
SODIUM BLD-SCNC: 146 MMOL/L (ref 134–145)
TIBC SERPL-MCNC: 342 MCG/DL (ref 249–505)
TRANSFERRIN SERPL-MCNC: 244 MG/DL (ref 200–360)
WBC NRBC COR # BLD: 8.66 10*3/MM3 (ref 4–10)

## 2018-12-07 PROCEDURE — 25010000002 IOPAMIDOL 61 % SOLUTION: Performed by: INTERNAL MEDICINE

## 2018-12-07 PROCEDURE — 71260 CT THORAX DX C+: CPT

## 2018-12-07 PROCEDURE — 83540 ASSAY OF IRON: CPT | Performed by: INTERNAL MEDICINE

## 2018-12-07 PROCEDURE — 80053 COMPREHEN METABOLIC PANEL: CPT | Performed by: INTERNAL MEDICINE

## 2018-12-07 PROCEDURE — 85046 RETICYTE/HGB CONCENTRATE: CPT | Performed by: INTERNAL MEDICINE

## 2018-12-07 PROCEDURE — 82565 ASSAY OF CREATININE: CPT

## 2018-12-07 PROCEDURE — 36415 COLL VENOUS BLD VENIPUNCTURE: CPT | Performed by: INTERNAL MEDICINE

## 2018-12-07 PROCEDURE — 85025 COMPLETE CBC W/AUTO DIFF WBC: CPT | Performed by: INTERNAL MEDICINE

## 2018-12-07 PROCEDURE — 84466 ASSAY OF TRANSFERRIN: CPT | Performed by: INTERNAL MEDICINE

## 2018-12-07 PROCEDURE — 82728 ASSAY OF FERRITIN: CPT | Performed by: INTERNAL MEDICINE

## 2018-12-07 RX ADMIN — IOPAMIDOL 75 ML: 612 INJECTION, SOLUTION INTRAVENOUS at 13:42

## 2018-12-18 ENCOUNTER — APPOINTMENT (OUTPATIENT)
Dept: LAB | Facility: HOSPITAL | Age: 78
End: 2018-12-18

## 2018-12-18 ENCOUNTER — OFFICE VISIT (OUTPATIENT)
Dept: ONCOLOGY | Facility: CLINIC | Age: 78
End: 2018-12-18

## 2018-12-18 VITALS
WEIGHT: 177.5 LBS | DIASTOLIC BLOOD PRESSURE: 74 MMHG | TEMPERATURE: 98.4 F | SYSTOLIC BLOOD PRESSURE: 145 MMHG | HEIGHT: 62 IN | HEART RATE: 65 BPM | OXYGEN SATURATION: 92 % | RESPIRATION RATE: 18 BRPM | BODY MASS INDEX: 32.66 KG/M2

## 2018-12-18 DIAGNOSIS — D50.0 IRON DEFICIENCY ANEMIA DUE TO CHRONIC BLOOD LOSS: ICD-10-CM

## 2018-12-18 DIAGNOSIS — I42.0 DILATED CARDIOMYOPATHY (HCC): ICD-10-CM

## 2018-12-18 DIAGNOSIS — C34.91 ADENOCARCINOMA OF RIGHT LUNG (HCC): Primary | ICD-10-CM

## 2018-12-18 PROCEDURE — G0463 HOSPITAL OUTPT CLINIC VISIT: HCPCS | Performed by: INTERNAL MEDICINE

## 2018-12-18 PROCEDURE — 99215 OFFICE O/P EST HI 40 MIN: CPT | Performed by: INTERNAL MEDICINE

## 2018-12-18 RX ORDER — FERROUS SULFATE 325(65) MG
325 TABLET ORAL EVERY OTHER DAY
Qty: 30 TABLET | Refills: 3 | Status: SHIPPED | OUTPATIENT
Start: 2018-12-18 | End: 2019-09-02 | Stop reason: SDUPTHER

## 2018-12-18 NOTE — PROGRESS NOTES
Subjective  REASON FOR FOLLOWUP:  Two nodules in the right lung, each one of them pathologically confirmed to be adenocarcinoma. The first one is EGFR negative, ALK1 negative. The second one has not had any molecular analysis. The original one was requested to have EGFR and ALK1. So far we have not received any information in this regard. A CT scan of the brain shows no evidence of brain metastasis. ECOG performance status 1. No cancer-related pain. THE PATIENT UNDERWENT DEFINITIVE RADIATION THERAPY FOR BOTH NODULES..  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 Finally recovered from this.       History of Present Illness   This patient returns today to the office for followup in company of her friend. She remains on oxygen. She has advanced COPD. The good news is that she has not had any new respiratory infections. Her appetite remains acceptable. Her weight is stable. Bowel function is normal. Urination is normal. No new cardiovascular issues. No recent infections. She remains functional and able to participate in the life of her family.            Past Medical History, Past Surgical HistoryVery extensive including hypertension, hyperlipidemia, carotid arterial disease with 1 carotid artery occluded on the right side, thrombophilia since age 25 when she developed vein thrombosis in the lower extremities, bilateral pulmonary emboli and she had an IVC filter placed then and required anticoagulation for almost the rest of her life.  She also has had a history of paroxysmal atrial fibrillation, she also has history of stress cardiomyopathy, and she also has a history of peripheral arterial disease, status post bypass operation for the lower extremities. The patient also has a history of COPD. She also has a history of hypothyroidism and also she has history of clostridium colitis.  She has had mammograms in the  past, being normal. She has had colonoscopies in the past, being normal or negative for malignant conditions. She never had any history of bladder cancer. 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 Margins of resection were free at that point.      The patient also developed a lung nodule in the right base in 2009 and after assessment by Dr. Lamb she had a wedge resection showing an adenocarcinoma. The EGFR analysis and ALK-1 analysis was never done on that tissue and we have requested this.  Later on this year she has had a couple of lung nodules as already stated and biopsy of one of them showing already an adenocarcinoma that is EGFR negative, ALK-1 pending.  A biopsy of the 2nd nodule is planned to be done on 02/22/2015.      As we recommended to the patient on 02/17/2016, we would like for her to have a CT scan of the brain to be sure that there is no brain metastases.  I did not order an MRI because the patient has an IVC filter in place and this is a metal piece that she has had in this location for a long time.      The patient also has a history of a stress fracture of one of her ankles and she has had osteoporosis with collapsed vertebrae in the past.      She also has a history of thrombophilia as stated before and never a workup was performed looking for any other specific cause.      ONCOLOGIC HISTORY: Ms. Narayan is a 75-year-old white female who was actually discussed in the multidisciplinary lung cancer conference a week ago with Gary Lamb, Alfredo and many other physicians. On that occasion we reviewed her CT scans and PET scans showing a couple of new nodules on the PET scan, one that was biopsied already proven to be adenocarcinoma and a 2nd one more laterally that was found incidentally on the PET scan that will be biopsied this coming Monday.  She has a history of wedge resection of a tumoral  lesion in the right lung base in February 2009 for an adenocarcinoma. She actually is having her anniversary of smoking cessation today, 7 years with no cigarettes. In any event, since last summer, the patient has had a very difficult time, first of all with stress cardiomyopathy and ended up at [facility] for almost 3 weeks having terrible ejection fraction on an echocardiogram and subsequently after appropriate therapy, improved and has been well since then. She has an upcoming echocardiogram with Dr. Mcugire this week.  Besides this, she had almost 4 back-to-back infections with clostridium colitis since August until almost November requiring multiple therapies and visits to infectious disease to finally get rid of this infection altogether.  She lost close to 40 pounds of weight from this event.  Finally, since the 1st of December she has started to feel a little bit better.  She is back to eating, her weight has stabilized and she has not had any nausea, vomiting, abdominal pain, further diarrhea or fever. Urination is ongoing with no difficulty. She has had a minimal hacking cough with no sputum production or pleuritic pain. No modification in her usual degree of shortness of breath. She has not had any hemoptysis besides the one that she developed after the lung biopsy recently. This has already subsided.  The patient is not experiencing any need for O2 at nighttime, even though she has her machine at home.  She has had a better sense of well-being. She has gained back control of her life and she is running her house like she used to. Now she is taking care of her ill  who has Alzheimer’s dementia.     On 02/29/2016, the CT scan of the brain failed to document any brain metastasis and the biopsy of the second malignancy in the right lung was consistent with adenocarcinoma. We discussed the case with Dr. Thompson who will see her tomorrow morning in preparation for possible definitive  radiation therapy. The patient will not be a candidate to receive Tarceva on any of small molecules, and she will not be a candidate to receive any formal IV chemotherapy. She will be a candidate in the future to receive Opdivo at the most.   SURGICAL HISTORY:  She has had a partial hysterectomy. She has had a hernia in the lumbar spine.  She also has had a cyst in the stomach and completion of a hysterectomy. She also has had a biopsy and cyst removal of the left breast and also biopsy for skin lesions on the forehead. She has had a wedge resection on her right lung in . There was also removal of a pigmented lesion on the left nipple in .      Again, she also has a history of pulmonary embolus at age 24 and purple legs bilateral deep vein thrombosis that she has almost kept anticoagulated for most of her life.     GYN HISTORY:   1, para 1, no miscarriages. Last pelvic examination done in the last 3 years being normal.    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  She used to smoke up to 1-1/2 packs of cigarettes a day. She quit 7 years ago.  She does not drink any alcohol.    Review of Systems         General: no fever, no chills, no fatigue,no weight changes, no lack of appetite.  Eyes: no epiphora, xerophthalmia,conjunctivitis, pain, glaucoma, blurred vision, blindness, secretion, photophobia, proptosis, diplopia.  Ears: no otorrhea, tinnitus, otorrhagia, deafness, pain, vertigo.  Nose: no rhinorrhea, no epistaxis, no alteration in perception of odors, no sinuses pressure.  Mouth: no alteration in gums or teeth,  No ulcers, no difficulty with mastication or deglut ion, no odynophagia.  Neck: no masses or pain, no thyroid alterations, no pain in muscles or arteries, no carotid odynia, no crepitation.  Respiratory: no changes in her chronic cough, and sputum production,and dyspnea,no trepopnea, no pleuritic pain,no hemoptysis.  Heart:  "no syncope, no irregularity, no palpitations, no angina,no orthopnea,no paroxysmal nocturnal dyspnea.  Vascular Venous: no tenderness,no edema,no palpable cords,no postphlebitic syndrome, no skin changes no ulcerations.  Vascular Arterial: no distal ischemia, noclaudication, no gangrene, no neuropathic ischemic pain, no skin ulcers, no paleness no cyanosis.  GI: no dysphagia, no odynophagia, no regurgitation, no heartburn,no indigestion,no nausea,no vomiting,no hematemesis ,no melena,no jaundice,no distention, no obstipation,no enterorrhagia,no proctalgia,no anal  lesions, no changes in bowel habits.  : no frequency, no hesitancy, no hematuria, no discharge,no  pain.  Musculoskeletal: no muscle or tendon pain or inflammation,no  joint pain, no edema, no functional limitation,no fasciculations, no mass.  Neurologic: no headache, no seizures, noalterations on Craneal nerves, no motor deficit, no sensory deficit, normal coordination, no alteration in memory,normal orientation, calculation,normal writting, verbal and written language.  Skin: no rashes,no pruritus no localized lesions.  Psychiatric: no anxiety, no depression,no agitation, no delusions, proper insight.    Medications:  The current medication list was reviewed in the EMR    ALLERGIES:    Allergies   Allergen Reactions   • Adhesive Tape    • Augmentin [Amoxicillin-Pot Clavulanate]    • Cephalexin    • Metoclopramide Unknown (See Comments)   • Pentazocine    • Simvastatin Unknown (See Comments)   • Sucralfate Unknown (See Comments)       Objective      Vitals:    12/18/18 1437   BP: 145/74   Pulse: 65   Resp: 18   Temp: 98.4 °F (36.9 °C)   TempSrc: Oral   SpO2: 92%  Comment: 3L   Weight: 80.5 kg (177 lb 8 oz)   Height: 157 cm (61.81\")   PainSc: 0-No pain     Current Status 12/18/2018   ECOG score 1       Physical Exam        GENERAL:  Well-developed, well-nourished  Patient  in no acute distress.   SKIN:  Warm, dry ,NO rashes,NO purpura ,NO " petechiae.  HEENT:  Pupils were equal and reactive to light and accomodation, conjunctivas non injected, no pterigion, normal extraocular movements, normal visual acuity.   Mouth mucosa was moist, no exudates in oropharynx, normal gum line, normal roof of the mouth and pillars, normal papillations of the tongue.  NECK:  Supple with good range of motion; no thyromegaly or masses, no JVD or bruits, no cervical adenopathies.No carotid arteries pain, no carotid abnormal pulsation , NO arterial dance.  LYMPHATICS:  No cervical, NO supraclavicular, NO axillary,NO epitrochlear , NO inguinal adenopathy.  CHEST:   no subcutaneous emphysema, normal axillas, no rashes or acanthosis nigricans. Lungs clear to percussion and auscultation, severe decrease breath sounds bilaterally, no wheezing,NO crackles NO ronchi, NO stridor, NO rubs.  CARDIAC AND VASCULAR:  normal rate and regular rhythm, without murmurs,NO rubs NO S3 NO S4 right or left . Normal femoral, popliteal, pedis, brachial and carotid pulses.  ABDOMEN:  Soft, nontender with no organomegaly or masses, no ascites, no collateral circulation,no distention,no Rod sign, no abdominal pain, no inguinal hernias,no umbilical hernia, no abdominal bruits. Normal bowel sounds.  GENITAL: Not  Performed.  EXTREMITIES  AND SPINE:  No clubbing, cyanosis or edema, no deformities or pain .No kyphosis, scoliosis, deformities or pain in spine, ribs or pelvic bone.  NEUROLOGICAL:  Patient was awake, alert, oriented to time, person and place.            RECENT LABS:CT CHEST WITH IV CONTRAST     HISTORY: 78-year-old female with wedge resection of right lower lobe  lung cancer in the past. Subsequently, chemoradiation for right lower  lobe lung cancer. Follow-up.     TECHNIQUE: Radiation dose reduction techniques were utilized, including  automated exposure control and exposure modulation based on body size.   3 mm images were obtained through the chest after the administration of  IV  contrast. Compared with previous CT from 08/17/2018.     FINDINGS: The parenchymal scars and chronic opacities which include  nodular densities and some ground glass opacities appear stable. No  definite new pulmonary opacities are seen and there are no pleural or  pericardial effusions. There is no lymphadenopathy. There is no new  abnormality in the visualized upper abdomen. Examination is stable and  there is no new abnormality.     This report was finalized on 12/10/2018 4:08 PM by Dr. Lynda Kwon M.D.     to patient:  No (Not Released) Dx:  Pneumonitis, radiation (CMS/HCC); Iro...   Important Suggestion Newer results are available. Click to view them now.    Ref Range & Units 11d ago   Glucose 74 - 124 mg/dL 103    BUN 6 - 20 mg/dL 14    Creatinine 0.60 - 1.10 mg/dL 0.70    Sodium 134 - 145 mmol/L 146 Abnormally high     Potassium 3.5 - 4.7 mmol/L 4.4    Chloride 98 - 107 mmol/L 100    CO2 22.0 - 29.0 mmol/L 35.1 Abnormally high     Calcium 8.5 - 10.2 mg/dL 9.2    Total Protein 6.3 - 8.0 g/dL 7.4    Albumin 3.50 - 5.20 g/dL 4.10    ALT (SGPT) 0 - 33 U/L 8    AST (SGOT) 0 - 32 U/L 11    Alkaline Phosphatase 38 - 116 U/L 74    Total Bilirubin 0.1 - 1.2 mg/dL 0.3    eGFR Non African Amer >60 mL/min/1.73 81            Auto Differential   Order: [id number] - Part of Panel Order [id number]   Status:  Final result   Visible to patient:  No (Not Released) Dx:  Pneumonitis, radiation (CMS/HCC); Iro...    Ref Range & Units 11d ago   WBC 4.00 - 10.00 10*3/mm3 8.66    RBC 3.90 - 5.00 10*6/mm3 3.63 Abnormally low     Hemoglobin 11.5 - 14.9 g/dL 10.4 Abnormally low     Hematocrit 34.0 - 45.0 % 34.5    MCV 83.0 - 97.0 fL 95.0    MCH 27.0 - 33.0 pg 28.7    MCHC 32.0 - 35.0 g/dL 30.1 Abnormally low     RDW 11.7 - 14.5 % 14.0    RDW-SD 37.0 - 49.0 fl 48.7    MPV 8.9 - 12.1 fL 9.9    Platelets 150 - 375 10*3/mm3 281    Neutrophil % 39.0 - 75.0 % 77.4 Abnormally high     Lymphocyte % 20.0 - 49.0 % 12.1 Abnormally low              Ferritin   Order: [id number]   Status:  Final result   Visible to patient:  No (Not Released) Dx:  Pneumonitis, radiation (CMS/HCC); Iro...    Ref Range & Units 11d ago   Ferritin ng/mL 21.30    Resulting Agency   CBC LAB         Specimen Collected: 12/07/18 11:10 Last Resulted                      Assessment/Plan         1. This patient has history of lung cancer on many occasions. The last time that we treated her was close to 2 years ago with radiation therapy to 2 different locations by Dr. Thompson. Since then the patient has not had any evidence of recurrent disease and she is very cynthia that this phenomenon has not happened at this point. Radiologically speaking with the CT scan stated above, nothing new has happened besides the significant change in her lungs associated with her COPD.     Given the above facts, I have advised the patient to have a repeat CT scan of the chest in 4 months and review her at that time.   2. The patient has iron deficiency anemia. I have advised her to take her ferrous sulfate that she is not taking and I have sent a new prescription to her pharmacy. The patient has been on chronic Coumadin therapy and it is very likely that she has an element of GI bleeding. Obviously, stopping Coumadin is not an option and given the fact that she has no major GI symptomatology makes me to wonder about how to proceed. Hopefully with the iron, her hemoglobin will improve. With the CT scan of the chest that she will have done for the next visit, I will include a CT scan of the abdomen to further delineate her abdominal anatomy including her colon and her small intestine.   3. The patient has COPD dependent on oxygen. She raised the question about any other devices to carry oxygen throughout and now she has a concentrator that is doing a great job. She has learned to carry concentrator in a moraima and this has made a difference in regard to her mobility. We were glad to collaborate into this  situation.     I discussed all these facts with the patient and her friend today. A copy of the report of the CT scan that I personally reviewed in the PACS system at [facility] was provided to her. I E-prescribed her iron tablet that she will take 3 times a week.                                       12/18/2018      CC:

## 2019-03-05 ENCOUNTER — HOSPITAL ENCOUNTER (OUTPATIENT)
Dept: PET IMAGING | Facility: HOSPITAL | Age: 79
Discharge: HOME | End: 2019-03-05
Attending: INTERNAL MEDICINE | Admitting: INTERNAL MEDICINE

## 2019-03-05 ENCOUNTER — LAB (OUTPATIENT)
Dept: LAB | Facility: HOSPITAL | Age: 79
End: 2019-03-05

## 2019-03-05 DIAGNOSIS — I42.0 DILATED CARDIOMYOPATHY (HCC): ICD-10-CM

## 2019-03-05 DIAGNOSIS — D50.0 IRON DEFICIENCY ANEMIA DUE TO CHRONIC BLOOD LOSS: ICD-10-CM

## 2019-03-05 DIAGNOSIS — C34.91 ADENOCARCINOMA OF RIGHT LUNG (HCC): ICD-10-CM

## 2019-03-05 LAB
ALBUMIN SERPL-MCNC: 4.3 G/DL (ref 3.5–5.2)
ALBUMIN/GLOB SERPL: 1.2 G/DL (ref 1.1–2.4)
ALP SERPL-CCNC: 77 U/L (ref 38–116)
ALT SERPL W P-5'-P-CCNC: 6 U/L (ref 0–33)
ANION GAP SERPL CALCULATED.3IONS-SCNC: 11.7 MMOL/L
AST SERPL-CCNC: 13 U/L (ref 0–32)
BASOPHILS # BLD AUTO: 0.08 10*3/MM3 (ref 0–0.2)
BASOPHILS NFR BLD AUTO: 0.9 % (ref 0–1.5)
BILIRUB SERPL-MCNC: 0.3 MG/DL (ref 0.1–1.2)
BUN BLD-MCNC: 15 MG/DL (ref 6–20)
BUN/CREAT SERPL: 21.4 (ref 7.3–30)
CALCIUM SPEC-SCNC: 9.6 MG/DL (ref 8.5–10.2)
CHLORIDE SERPL-SCNC: 98 MMOL/L (ref 98–107)
CO2 SERPL-SCNC: 34.3 MMOL/L (ref 22–29)
CREAT BLD-MCNC: 0.7 MG/DL (ref 0.6–1.1)
CREAT BLDA-MCNC: 0.8 MG/DL (ref 0.6–1.3)
DEPRECATED RDW RBC AUTO: 47.6 FL (ref 37–54)
EOSINOPHIL # BLD AUTO: 0.14 10*3/MM3 (ref 0–0.4)
EOSINOPHIL NFR BLD AUTO: 1.6 % (ref 0.3–6.2)
ERYTHROCYTE [DISTWIDTH] IN BLOOD BY AUTOMATED COUNT: 13.6 % (ref 12.3–15.4)
FERRITIN SERPL-MCNC: 44.4 NG/ML (ref 13–150)
GFR SERPL CREATININE-BSD FRML MDRD: 81 ML/MIN/1.73
GLOBULIN UR ELPH-MCNC: 3.5 GM/DL (ref 1.8–3.5)
GLUCOSE BLD-MCNC: 103 MG/DL (ref 74–124)
HCT VFR BLD AUTO: 38.6 % (ref 34–46.6)
HGB BLD-MCNC: 11.8 G/DL (ref 12–15.9)
HGB RETIC QN AUTO: 32.3 PG (ref 29.8–36.1)
IMM GRANULOCYTES # BLD AUTO: 0.02 10*3/MM3 (ref 0–0.05)
IMM GRANULOCYTES NFR BLD AUTO: 0.2 % (ref 0–0.5)
IMM RETICS NFR: 8.1 % (ref 3–15.8)
IRON 24H UR-MRATE: 74 MCG/DL (ref 37–145)
IRON SATN MFR SERPL: 22 % (ref 14–48)
LYMPHOCYTES # BLD AUTO: 1.16 10*3/MM3 (ref 0.7–3.1)
LYMPHOCYTES NFR BLD AUTO: 13 % (ref 19.6–45.3)
MCH RBC QN AUTO: 29 PG (ref 26.6–33)
MCHC RBC AUTO-ENTMCNC: 30.6 G/DL (ref 31.5–35.7)
MCV RBC AUTO: 94.8 FL (ref 79–97)
MONOCYTES # BLD AUTO: 0.63 10*3/MM3 (ref 0.1–0.9)
MONOCYTES NFR BLD AUTO: 7 % (ref 5–12)
NEUTROPHILS # BLD AUTO: 6.92 10*3/MM3 (ref 1.4–7)
NEUTROPHILS NFR BLD AUTO: 77.3 % (ref 42.7–76)
NRBC BLD AUTO-RTO: 0 /100 WBC (ref 0–0)
PLATELET # BLD AUTO: 275 10*3/MM3 (ref 140–450)
PMV BLD AUTO: 10.4 FL (ref 6–12)
POTASSIUM BLD-SCNC: 4.1 MMOL/L (ref 3.5–4.7)
PROT SERPL-MCNC: 7.8 G/DL (ref 6.3–8)
RBC # BLD AUTO: 4.07 10*6/MM3 (ref 3.77–5.28)
RETICS/RBC NFR AUTO: 1.41 % (ref 0.5–1.5)
SODIUM BLD-SCNC: 144 MMOL/L (ref 134–145)
TIBC SERPL-MCNC: 339 MCG/DL (ref 249–505)
TRANSFERRIN SERPL-MCNC: 242 MG/DL (ref 200–360)
WBC NRBC COR # BLD: 8.95 10*3/MM3 (ref 3.4–10.8)

## 2019-03-05 PROCEDURE — 83540 ASSAY OF IRON: CPT | Performed by: INTERNAL MEDICINE

## 2019-03-05 PROCEDURE — 80053 COMPREHEN METABOLIC PANEL: CPT | Performed by: INTERNAL MEDICINE

## 2019-03-05 PROCEDURE — 71260 CT THORAX DX C+: CPT

## 2019-03-05 PROCEDURE — 74160 CT ABDOMEN W/CONTRAST: CPT

## 2019-03-05 PROCEDURE — 84466 ASSAY OF TRANSFERRIN: CPT | Performed by: INTERNAL MEDICINE

## 2019-03-05 PROCEDURE — 36415 COLL VENOUS BLD VENIPUNCTURE: CPT | Performed by: INTERNAL MEDICINE

## 2019-03-05 PROCEDURE — 82565 ASSAY OF CREATININE: CPT

## 2019-03-05 PROCEDURE — 85025 COMPLETE CBC W/AUTO DIFF WBC: CPT | Performed by: INTERNAL MEDICINE

## 2019-03-05 PROCEDURE — 0 DIATRIZOATE MEGLUMINE & SODIUM PER 1 ML: Performed by: INTERNAL MEDICINE

## 2019-03-05 PROCEDURE — 82728 ASSAY OF FERRITIN: CPT | Performed by: INTERNAL MEDICINE

## 2019-03-05 PROCEDURE — 25010000002 IOPAMIDOL 61 % SOLUTION: Performed by: INTERNAL MEDICINE

## 2019-03-05 PROCEDURE — 85046 RETICYTE/HGB CONCENTRATE: CPT | Performed by: INTERNAL MEDICINE

## 2019-03-05 RX ADMIN — IOPAMIDOL 85 ML: 612 INJECTION, SOLUTION INTRAVENOUS at 13:30

## 2019-03-05 RX ADMIN — DIATRIZOATE MEGLUMINE AND DIATRIZOATE SODIUM 30 ML: 660; 100 LIQUID ORAL; RECTAL at 12:37

## 2019-03-12 ENCOUNTER — APPOINTMENT (OUTPATIENT)
Dept: LAB | Facility: HOSPITAL | Age: 79
End: 2019-03-12

## 2019-03-12 ENCOUNTER — OFFICE VISIT (OUTPATIENT)
Dept: ONCOLOGY | Facility: CLINIC | Age: 79
End: 2019-03-12

## 2019-03-12 ENCOUNTER — APPOINTMENT (OUTPATIENT)
Dept: ONCOLOGY | Facility: CLINIC | Age: 79
End: 2019-03-12

## 2019-03-12 VITALS
HEIGHT: 62 IN | RESPIRATION RATE: 18 BRPM | DIASTOLIC BLOOD PRESSURE: 70 MMHG | TEMPERATURE: 98.1 F | HEART RATE: 58 BPM | SYSTOLIC BLOOD PRESSURE: 110 MMHG | OXYGEN SATURATION: 92 % | BODY MASS INDEX: 33.2 KG/M2 | WEIGHT: 180.4 LBS

## 2019-03-12 DIAGNOSIS — C34.91 ADENOCARCINOMA OF RIGHT LUNG (HCC): Primary | ICD-10-CM

## 2019-03-12 PROCEDURE — 99214 OFFICE O/P EST MOD 30 MIN: CPT | Performed by: NURSE PRACTITIONER

## 2019-03-12 PROCEDURE — G0463 HOSPITAL OUTPT CLINIC VISIT: HCPCS | Performed by: NURSE PRACTITIONER

## 2019-03-12 RX ORDER — PREDNISONE 1 MG/1
TABLET ORAL
COMMUNITY
End: 2019-09-17 | Stop reason: HOSPADM

## 2019-03-12 NOTE — PROGRESS NOTES
Subjective  REASON FOR FOLLOWUP:  Two nodules in the right lung, each one of them pathologically confirmed to be adenocarcinoma. The first one is EGFR negative, ALK1 negative. The second one has not had any molecular analysis. The original one was requested to have EGFR and ALK1. So far we have not received any information in this regard. A CT scan of the brain shows no evidence of brain metastasis. ECOG performance status 1. No cancer-related pain. THE PATIENT UNDERWENT DEFINITIVE RADIATION THERAPY FOR BOTH NODULES..  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 Finally recovered from this.       Lung Cancer   Associated symptoms include fatigue and headaches. Pertinent negatives include no abdominal pain, chest pain, chills, coughing, fever, nausea, numbness, rash, vomiting or weakness.   Patient is a 78-year-old female with the above-mentioned history here today for follow-up visit and scan review.  She reports that she has been having some intermittent headaches.  She states sometimes they only last about 45 minutes and resolve on their own, other times she has to take tramadol.  She denies any balance or coordination problems.  She does report some occasional blurred vision but states that she has cataracts.  She denies double vision.  She denies any bleeding issues and continues on Xarelto.  She denies any changes in her respiratory status.  She continues on oxygen at 3 L.  She denies fevers or chills.      Past Medical History, Past Surgical HistoryVery extensive including hypertension, hyperlipidemia, carotid arterial disease with 1 carotid artery occluded on the right side, thrombophilia since age 25 when she developed vein thrombosis in the lower extremities, bilateral pulmonary emboli and she had an IVC filter placed then and required anticoagulation for almost the rest of her life.  She also has had  a history of paroxysmal atrial fibrillation, she also has history of stress cardiomyopathy, and she also has a history of peripheral arterial disease, status post bypass operation for the lower extremities. The patient also has a history of COPD. She also has a history of hypothyroidism and also she has history of clostridium colitis.  She has had mammograms in the past, being normal. She has had colonoscopies in the past, being normal or negative for malignant conditions. She never had any history of bladder cancer. 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 Margins of resection were free at that point.      The patient also developed a lung nodule in the right base in 2009 and after assessment by Dr. Lamb she had a wedge resection showing an adenocarcinoma. The EGFR analysis and ALK-1 analysis was never done on that tissue and we have requested this.  Later on this year she has had a couple of lung nodules as already stated and biopsy of one of them showing already an adenocarcinoma that is EGFR negative, ALK-1 pending.  A biopsy of the 2nd nodule is planned to be done on 02/22/2015.      As we recommended to the patient on 02/17/2016, we would like for her to have a CT scan of the brain to be sure that there is no brain metastases.  I did not order an MRI because the patient has an IVC filter in place and this is a metal piece that she has had in this location for a long time.      The patient also has a history of a stress fracture of one of her ankles and she has had osteoporosis with collapsed vertebrae in the past.      She also has a history of thrombophilia as stated before and never a workup was performed looking for any other specific cause.      ONCOLOGIC HISTORY: Ms. Narayan is a 75-year-old white female who was actually discussed in the multidisciplinary lung cancer conference a week ago with Gary  Alfredo Lamb and many other physicians. On that occasion we reviewed her CT scans and PET scans showing a couple of new nodules on the PET scan, one that was biopsied already proven to be adenocarcinoma and a 2nd one more laterally that was found incidentally on the PET scan that will be biopsied this coming Monday.  She has a history of wedge resection of a tumoral lesion in the right lung base in February 2009 for an adenocarcinoma. She actually is having her anniversary of smoking cessation today, 7 years with no cigarettes. In any event, since last summer, the patient has had a very difficult time, first of all with stress cardiomyopathy and ended up at [facility] for almost 3 weeks having terrible ejection fraction on an echocardiogram and subsequently after appropriate therapy, improved and has been well since then. She has an upcoming echocardiogram with Dr. Mcguire this week.  Besides this, she had almost 4 back-to-back infections with clostridium colitis since August until almost November requiring multiple therapies and visits to infectious disease to finally get rid of this infection altogether.  She lost close to 40 pounds of weight from this event.  Finally, since the 1st of December she has started to feel a little bit better.  She is back to eating, her weight has stabilized and she has not had any nausea, vomiting, abdominal pain, further diarrhea or fever. Urination is ongoing with no difficulty. She has had a minimal hacking cough with no sputum production or pleuritic pain. No modification in her usual degree of shortness of breath. She has not had any hemoptysis besides the one that she developed after the lung biopsy recently. This has already subsided.  The patient is not experiencing any need for O2 at nighttime, even though she has her machine at home.  She has had a better sense of well-being. She has gained back control of her life and she is running her house like she used  to. Now she is taking care of her ill  who has Alzheimer’s dementia.     On 2016, the CT scan of the brain failed to document any brain metastasis and the biopsy of the second malignancy in the right lung was consistent with adenocarcinoma. We discussed the case with Dr. Thompson who will see her tomorrow morning in preparation for possible definitive radiation therapy. The patient will not be a candidate to receive Tarceva on any of small molecules, and she will not be a candidate to receive any formal IV chemotherapy. She will be a candidate in the future to receive Opdivo at the most.   SURGICAL HISTORY:  She has had a partial hysterectomy. She has had a hernia in the lumbar spine.  She also has had a cyst in the stomach and completion of a hysterectomy. She also has had a biopsy and cyst removal of the left breast and also biopsy for skin lesions on the forehead. She has had a wedge resection on her right lung in . There was also removal of a pigmented lesion on the left nipple in .      Again, she also has a history of pulmonary embolus at age 24 and purple legs bilateral deep vein thrombosis that she has almost kept anticoagulated for most of her life.     GYN HISTORY:   1, para 1, no miscarriages. Last pelvic examination done in the last 3 years being normal.    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  She used to smoke up to 1-1/2 packs of cigarettes a day. She quit 7 years ago.  She does not drink any alcohol.      Review of Systems   Constitutional: Positive for fatigue. Negative for activity change, appetite change, chills and fever.   HENT: Negative for mouth sores, nosebleeds and trouble swallowing.    Respiratory: Positive for shortness of breath (chronic, unchanged). Negative for cough.    Cardiovascular: Negative for chest pain and leg swelling.   Gastrointestinal: Negative for abdominal pain, constipation,  "diarrhea, nausea and vomiting.   Genitourinary: Negative for difficulty urinating.   Skin: Negative for rash.   Neurological: Positive for headaches. Negative for dizziness, weakness and numbness.   Hematological: Negative for adenopathy. Does not bruise/bleed easily.   Psychiatric/Behavioral: Negative for sleep disturbance.          Medications:  The current medication list was reviewed in the EMR    ALLERGIES:    Allergies   Allergen Reactions   • Adhesive Tape    • Augmentin [Amoxicillin-Pot Clavulanate]    • Cephalexin    • Metoclopramide Unknown (See Comments)   • Pentazocine    • Simvastatin Unknown (See Comments)   • Sucralfate Unknown (See Comments)       Objective      Vitals:    03/12/19 1349   BP: 110/70   Pulse: 58   Resp: 18   Temp: 98.1 °F (36.7 °C)   SpO2: 92%  Comment: 3 liters of oxygen   Weight: 81.8 kg (180 lb 6.4 oz)   Height: 157 cm (61.81\")   PainSc: 0-No pain     Current Status 3/12/2019   ECOG score 1       Physical Exam   Constitutional: She is oriented to person, place, and time. She appears well-developed and well-nourished. No distress.   Wearing oxygen per nasal cannula at 3 L.   HENT:   Head: Normocephalic and atraumatic.   Mouth/Throat: Oropharynx is clear and moist and mucous membranes are normal. No oropharyngeal exudate.   Eyes: Pupils are equal, round, and reactive to light.   Neck: Normal range of motion.   Cardiovascular: Normal rate, regular rhythm and normal heart sounds.   No murmur heard.  Pulmonary/Chest: Effort normal. No respiratory distress. She has decreased breath sounds. She has no wheezes. She has no rhonchi. She has no rales.   Abdominal: Soft. Normal appearance and bowel sounds are normal. She exhibits no distension. There is no hepatosplenomegaly.   Musculoskeletal: Normal range of motion. She exhibits no edema.   Neurological: She is alert and oriented to person, place, and time.   Skin: Skin is warm and dry. No rash noted.   Psychiatric: She has a normal mood and " affect.   Vitals reviewed.        RECENT LABS:  Lab Results   Component Value Date    WBC 8.95 03/05/2019    HGB 11.8 (L) 03/05/2019    HCT 38.6 03/05/2019    MCV 94.8 03/05/2019     03/05/2019     Lab Results   Component Value Date    GLUCOSE 103 03/05/2019    BUN 15 03/05/2019    CREATININE 0.70 03/05/2019    EGFRIFNONA 81 03/05/2019    BCR 21.4 03/05/2019    K 4.1 03/05/2019    CO2 34.3 (H) 03/05/2019    CALCIUM 9.6 03/05/2019    ALBUMIN 4.30 03/05/2019    AST 13 03/05/2019    ALT 6 03/05/2019     3/5/2019 CT CHEST AND ABDOMEN WITH IV CONTRAST  IMPRESSION:  1. Stable chronic findings within the chest. There is no lymphadenopathy  or new abnormality.  2. There is no evidence for metastatic disease within the abdomen and  there is no new abnormality.      Assessment/Plan         1. History of lung cancer on many occasions. The last time that we treated her was close to 2 years ago with radiation therapy to 2 different locations by Dr. Thompson. Since then the patient has not had any evidence of recurrent disease.  The patient will continue close follow-up and return in 3 months with repeat CT scan of the chest at that time.     2. Iron deficiency anemia:   She continues on ferrous sulfate and reports that she is taking it as prescribed every other day.  She seems to be tolerating this well.  Her hemoglobin has improved to 11.8.  Continue to monitor.     3.  Chronic anticoagulation: on Xarelto due to history of PE in 2009. Denies bleeding.     4.  COPD, oxygen dependent.  Followed by Dr. Fernandes    PLAN:  1.  Return in 3 months for follow-up with Dr. Cash with a CT scan of the chest and labs done 1 week prior.  2.  Continue iron.                                     3/13/2019      CC:

## 2019-05-28 ENCOUNTER — HOSPITAL ENCOUNTER (OUTPATIENT)
Dept: PET IMAGING | Facility: HOSPITAL | Age: 79
Discharge: HOME | End: 2019-05-28
Admitting: INTERNAL MEDICINE

## 2019-05-28 ENCOUNTER — TELEPHONE (OUTPATIENT)
Dept: GENERAL RADIOLOGY | Facility: HOSPITAL | Age: 79
End: 2019-05-28

## 2019-05-28 DIAGNOSIS — C34.91 ADENOCARCINOMA OF RIGHT LUNG (HCC): ICD-10-CM

## 2019-05-28 DIAGNOSIS — C34.91 ADENOCARCINOMA OF RIGHT LUNG (HCC): Primary | ICD-10-CM

## 2019-05-28 DIAGNOSIS — D50.0 IRON DEFICIENCY ANEMIA DUE TO CHRONIC BLOOD LOSS: ICD-10-CM

## 2019-05-28 PROCEDURE — 71250 CT THORAX DX C-: CPT

## 2019-05-28 NOTE — TELEPHONE ENCOUNTER
----- Message from Lashawn Robin RN sent at 5/28/2019  1:29 PM EDT -----  Please add lab appt (CBC CMP Ferritin Iron Profile and RETIC) to MD visit next week  Labs were not done at scan appt  Thanks  Mayank

## 2019-05-28 NOTE — PROGRESS NOTES
Lab orders noted in place prior to CT scan so labs not drawn at CT scan appt.  Lab orders placed to be drawn at MD appt 6/4/19  Message sent to appt desk to add lab appt prior to seeing MD

## 2019-06-04 ENCOUNTER — APPOINTMENT (OUTPATIENT)
Dept: LAB | Facility: HOSPITAL | Age: 79
End: 2019-06-04

## 2019-06-04 ENCOUNTER — OFFICE VISIT (OUTPATIENT)
Dept: ONCOLOGY | Facility: CLINIC | Age: 79
End: 2019-06-04

## 2019-06-04 ENCOUNTER — LAB (OUTPATIENT)
Dept: LAB | Facility: HOSPITAL | Age: 79
End: 2019-06-04

## 2019-06-04 VITALS
OXYGEN SATURATION: 95 % | TEMPERATURE: 98.5 F | HEART RATE: 72 BPM | SYSTOLIC BLOOD PRESSURE: 164 MMHG | DIASTOLIC BLOOD PRESSURE: 70 MMHG | WEIGHT: 177.4 LBS | RESPIRATION RATE: 18 BRPM | BODY MASS INDEX: 32.65 KG/M2 | HEIGHT: 62 IN

## 2019-06-04 DIAGNOSIS — D50.0 IRON DEFICIENCY ANEMIA DUE TO CHRONIC BLOOD LOSS: ICD-10-CM

## 2019-06-04 DIAGNOSIS — C34.91 ADENOCARCINOMA OF RIGHT LUNG (HCC): Primary | ICD-10-CM

## 2019-06-04 DIAGNOSIS — C34.91 ADENOCARCINOMA OF RIGHT LUNG (HCC): ICD-10-CM

## 2019-06-04 LAB
ALBUMIN SERPL-MCNC: 4 G/DL (ref 3.5–5.2)
ALBUMIN/GLOB SERPL: 1.2 G/DL (ref 1.1–2.4)
ALP SERPL-CCNC: 69 U/L (ref 38–116)
ALT SERPL W P-5'-P-CCNC: 7 U/L (ref 0–33)
ANION GAP SERPL CALCULATED.3IONS-SCNC: 8.5 MMOL/L
AST SERPL-CCNC: 10 U/L (ref 0–32)
BASOPHILS # BLD AUTO: 0.07 10*3/MM3 (ref 0–0.2)
BASOPHILS NFR BLD AUTO: 0.8 % (ref 0–1.5)
BILIRUB SERPL-MCNC: 0.3 MG/DL (ref 0.2–1.2)
BUN BLD-MCNC: 14 MG/DL (ref 6–20)
BUN/CREAT SERPL: 18.4 (ref 7.3–30)
CALCIUM SPEC-SCNC: 9.4 MG/DL (ref 8.5–10.2)
CHLORIDE SERPL-SCNC: 100 MMOL/L (ref 98–107)
CO2 SERPL-SCNC: 35.5 MMOL/L (ref 22–29)
CREAT BLD-MCNC: 0.76 MG/DL (ref 0.6–1.1)
DEPRECATED RDW RBC AUTO: 46.1 FL (ref 37–54)
EOSINOPHIL # BLD AUTO: 0.14 10*3/MM3 (ref 0–0.4)
EOSINOPHIL NFR BLD AUTO: 1.7 % (ref 0.3–6.2)
ERYTHROCYTE [DISTWIDTH] IN BLOOD BY AUTOMATED COUNT: 13 % (ref 12.3–15.4)
FERRITIN SERPL-MCNC: 34.6 NG/ML (ref 13–150)
GFR SERPL CREATININE-BSD FRML MDRD: 74 ML/MIN/1.73
GLOBULIN UR ELPH-MCNC: 3.3 GM/DL (ref 1.8–3.5)
GLUCOSE BLD-MCNC: 107 MG/DL (ref 74–124)
HCT VFR BLD AUTO: 36 % (ref 34–46.6)
HGB BLD-MCNC: 11 G/DL (ref 12–15.9)
HGB RETIC QN AUTO: 33.8 PG (ref 29.8–36.1)
IMM GRANULOCYTES # BLD AUTO: 0.02 10*3/MM3 (ref 0–0.05)
IMM GRANULOCYTES NFR BLD AUTO: 0.2 % (ref 0–0.5)
IMM RETICS NFR: 7.2 % (ref 3–15.8)
IRON 24H UR-MRATE: 45 MCG/DL (ref 37–145)
IRON SATN MFR SERPL: 14 % (ref 14–48)
LYMPHOCYTES # BLD AUTO: 1.28 10*3/MM3 (ref 0.7–3.1)
LYMPHOCYTES NFR BLD AUTO: 15.3 % (ref 19.6–45.3)
MCH RBC QN AUTO: 29.2 PG (ref 26.6–33)
MCHC RBC AUTO-ENTMCNC: 30.6 G/DL (ref 31.5–35.7)
MCV RBC AUTO: 95.5 FL (ref 79–97)
MONOCYTES # BLD AUTO: 0.56 10*3/MM3 (ref 0.1–0.9)
MONOCYTES NFR BLD AUTO: 6.7 % (ref 5–12)
NEUTROPHILS # BLD AUTO: 6.31 10*3/MM3 (ref 1.7–7)
NEUTROPHILS NFR BLD AUTO: 75.3 % (ref 42.7–76)
NRBC BLD AUTO-RTO: 0 /100 WBC (ref 0–0.2)
PLATELET # BLD AUTO: 264 10*3/MM3 (ref 140–450)
PMV BLD AUTO: 9.9 FL (ref 6–12)
POTASSIUM BLD-SCNC: 4.5 MMOL/L (ref 3.5–4.7)
PROT SERPL-MCNC: 7.3 G/DL (ref 6.3–8)
RBC # BLD AUTO: 3.77 10*6/MM3 (ref 3.77–5.28)
RETICS/RBC NFR AUTO: 1.22 % (ref 0.7–1.9)
SODIUM BLD-SCNC: 144 MMOL/L (ref 134–145)
TIBC SERPL-MCNC: 332 MCG/DL (ref 249–505)
TRANSFERRIN SERPL-MCNC: 237 MG/DL (ref 200–360)
WBC NRBC COR # BLD: 8.38 10*3/MM3 (ref 3.4–10.8)

## 2019-06-04 PROCEDURE — 80053 COMPREHEN METABOLIC PANEL: CPT | Performed by: INTERNAL MEDICINE

## 2019-06-04 PROCEDURE — 36415 COLL VENOUS BLD VENIPUNCTURE: CPT | Performed by: INTERNAL MEDICINE

## 2019-06-04 PROCEDURE — 99214 OFFICE O/P EST MOD 30 MIN: CPT | Performed by: INTERNAL MEDICINE

## 2019-06-04 PROCEDURE — 85046 RETICYTE/HGB CONCENTRATE: CPT | Performed by: INTERNAL MEDICINE

## 2019-06-04 PROCEDURE — 84466 ASSAY OF TRANSFERRIN: CPT | Performed by: INTERNAL MEDICINE

## 2019-06-04 PROCEDURE — 82728 ASSAY OF FERRITIN: CPT | Performed by: INTERNAL MEDICINE

## 2019-06-04 PROCEDURE — 83540 ASSAY OF IRON: CPT | Performed by: INTERNAL MEDICINE

## 2019-06-04 PROCEDURE — 85025 COMPLETE CBC W/AUTO DIFF WBC: CPT | Performed by: INTERNAL MEDICINE

## 2019-06-04 NOTE — PROGRESS NOTES
Subjective  REASON FOR FOLLOWUP:  Two nodules in the right lung, each one of them pathologically confirmed to be adenocarcinoma. The first one is EGFR negative, ALK1 negative. The second one has not had any molecular analysis. The original one was requested to have EGFR and ALK1. So far we have not received any information in this regard. A CT scan of the brain shows no evidence of brain metastasis. ECOG performance status 1. No cancer-related pain. THE PATIENT UNDERWENT DEFINITIVE RADIATION THERAPY FOR BOTH NODULES..  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 Finally recovered from this.       This patient returns today to the office for followup in company of her son. She is here today after recent CT scans have been done that fortunately show no evidence of lung cancer recurrence. Overall, the patient’s shortness of breath associated with her COPD remains about the same with no hemoptysis, no recent infections and no exacerbations. She is not using any prednisone at this time. She has been seen by Pulmonary Medicine in this regard. Her appetite remains good. Her weight is stable. Her bowel function and urination are normal. No musculoskeletal new problems. No neurological issues. She remains at home on permanent oxygen supervised and helped by her family.            Past Medical History, Past Surgical HistoryVery extensive including hypertension, hyperlipidemia, carotid arterial disease with 1 carotid artery occluded on the right side, thrombophilia since age 25 when she developed vein thrombosis in the lower extremities, bilateral pulmonary emboli and she had an IVC filter placed then and required anticoagulation for almost the rest of her life.  She also has had a history of paroxysmal atrial fibrillation, she also has history of stress cardiomyopathy, and she also has a history of peripheral arterial  disease, status post bypass operation for the lower extremities. The patient also has a history of COPD. She also has a history of hypothyroidism and also she has history of clostridium colitis.  She has had mammograms in the past, being normal. She has had colonoscopies in the past, being normal or negative for malignant conditions. She never had any history of bladder cancer. 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 Margins of resection were free at that point.      The patient also developed a lung nodule in the right base in 2009 and after assessment by Dr. Lamb she had a wedge resection showing an adenocarcinoma. The EGFR analysis and ALK-1 analysis was never done on that tissue and we have requested this.  Later on this year she has had a couple of lung nodules as already stated and biopsy of one of them showing already an adenocarcinoma that is EGFR negative, ALK-1 pending.  A biopsy of the 2nd nodule is planned to be done on 02/22/2015.      As we recommended to the patient on 02/17/2016, we would like for her to have a CT scan of the brain to be sure that there is no brain metastases.  I did not order an MRI because the patient has an IVC filter in place and this is a metal piece that she has had in this location for a long time.      The patient also has a history of a stress fracture of one of her ankles and she has had osteoporosis with collapsed vertebrae in the past.      She also has a history of thrombophilia as stated before and never a workup was performed looking for any other specific cause.      ONCOLOGIC HISTORY: Ms. Narayan is a 75-year-old white female who was actually discussed in the multidisciplinary lung cancer conference a week ago with Alfredo Leiva and many other physicians. On that occasion we reviewed her CT scans and PET scans showing a couple of new nodules on the PET scan,  one that was biopsied already proven to be adenocarcinoma and a 2nd one more laterally that was found incidentally on the PET scan that will be biopsied this coming Monday.  She has a history of wedge resection of a tumoral lesion in the right lung base in February 2009 for an adenocarcinoma. She actually is having her anniversary of smoking cessation today, 7 years with no cigarettes. In any event, since last summer, the patient has had a very difficult time, first of all with stress cardiomyopathy and ended up at [facility] for almost 3 weeks having terrible ejection fraction on an echocardiogram and subsequently after appropriate therapy, improved and has been well since then. She has an upcoming echocardiogram with Dr. Mcguire this week.  Besides this, she had almost 4 back-to-back infections with clostridium colitis since August until almost November requiring multiple therapies and visits to infectious disease to finally get rid of this infection altogether.  She lost close to 40 pounds of weight from this event.  Finally, since the 1st of December she has started to feel a little bit better.  She is back to eating, her weight has stabilized and she has not had any nausea, vomiting, abdominal pain, further diarrhea or fever. Urination is ongoing with no difficulty. She has had a minimal hacking cough with no sputum production or pleuritic pain. No modification in her usual degree of shortness of breath. She has not had any hemoptysis besides the one that she developed after the lung biopsy recently. This has already subsided.  The patient is not experiencing any need for O2 at nighttime, even though she has her machine at home.  She has had a better sense of well-being. She has gained back control of her life and she is running her house like she used to. Now she is taking care of her ill  who has Alzheimer’s dementia.     On 02/29/2016, the CT scan of the brain failed to document any brain  metastasis and the biopsy of the second malignancy in the right lung was consistent with adenocarcinoma. We discussed the case with Dr. Thompson who will see her tomorrow morning in preparation for possible definitive radiation therapy. The patient will not be a candidate to receive Tarceva on any of small molecules, and she will not be a candidate to receive any formal IV chemotherapy. She will be a candidate in the future to receive Opdivo at the most.   SURGICAL HISTORY:  She has had a partial hysterectomy. She has had a hernia in the lumbar spine.  She also has had a cyst in the stomach and completion of a hysterectomy. She also has had a biopsy and cyst removal of the left breast and also biopsy for skin lesions on the forehead. She has had a wedge resection on her right lung in . There was also removal of a pigmented lesion on the left nipple in .      Again, she also has a history of pulmonary embolus at age 24 and purple legs bilateral deep vein thrombosis that she has almost kept anticoagulated for most of her life.     GYN HISTORY:   1, para 1, no miscarriages. Last pelvic examination done in the last 3 years being normal.    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  She used to smoke up to 1-1/2 packs of cigarettes a day. She quit 7 years ago.  She does not drink any alcohol.     Review of Systems   General: no fever, no chills, no fatigue,no weight changes, no lack of appetite.  Eyes: no epiphora, xerophthalmia,conjunctivitis, pain, glaucoma, blurred vision, blindness, secretion, photophobia, proptosis, diplopia.  Ears: no otorrhea, tinnitus, otorrhagia, deafness, pain, vertigo.  Nose: no rhinorrhea, no epistaxis, no alteration in perception of odors, no sinuses pressure.  Mouth: no alteration in gums or teeth,  No ulcers, no difficulty with mastication or deglut ion, no odynophagia.  Neck: no masses or pain, no thyroid  "alterations, no pain in muscles or arteries, no carotid odynia, no crepitation.  Respiratory: no cough, no sputum production,no dyspnea,no trepopnea, no pleuritic pain,no hemoptysis.  Heart: no syncope, no irregularity, no palpitations, no angina,no orthopnea,no paroxysmal nocturnal dyspnea.  Vascular Venous: no tenderness,no edema,no palpable cords,no postphlebitic syndrome, no skin changes no ulcerations.  Vascular Arterial: no distal ischemia, noclaudication, no gangrene, no neuropathic ischemic pain, no skin ulcers, no paleness no cyanosis.  GI: no dysphagia, no odynophagia, no regurgitation, no heartburn,no indigestion,no nausea,no vomiting,no hematemesis ,no melena,no jaundice,no distention, no obstipation,no enterorrhagia,no proctalgia,no anal  lesions, no changes in bowel habits.  : no frequency, no hesitancy, no hematuria, no discharge,no  pain.  Musculoskeletal: no muscle or tendon pain or inflammation,no  joint pain, no edema, no functional limitation,no fasciculations, no mass.  Neurologic: no headache, no seizures, noalterations on Craneal nerves, no motor deficit, no sensory deficit, normal coordination, no alteration in memory,normal orientation, calculation,normal writting, verbal and written language.  Skin: no rashes,no pruritus no localized lesions.  Psychiatric: no anxiety, no depression,no agitation, no delusions, proper insight.              Medications:  The current medication list was reviewed in the EMR    ALLERGIES:    Allergies   Allergen Reactions   • Adhesive Tape    • Augmentin [Amoxicillin-Pot Clavulanate]    • Cephalexin    • Metoclopramide Unknown (See Comments)   • Pentazocine    • Simvastatin Unknown (See Comments)   • Sucralfate Unknown (See Comments)       Objective      Vitals:    06/04/19 1358   BP: 164/70   Pulse: 72   Resp: 18   Temp: 98.5 °F (36.9 °C)   TempSrc: Oral   SpO2: 95%   Weight: 80.5 kg (177 lb 6.4 oz)   Height: 157 cm (61.81\")   PainSc: 0-No pain     Current " Status 6/4/2019   ECOG score 1       Physical Exam            GENERAL:  Well-developed, well-nourished  Patient  in no acute distress.   SKIN:  Warm, dry ,NO rashes,NO purpura ,NO petechiae.  HEENT:  Pupils were equal and reactive to light and accomodation, conjunctivas non injected, no pterigion, normal extraocular movements, normal visual acuity.   Mouth mucosa was moist, no exudates in oropharynx, normal gum line, normal roof of the mouth and pillars, normal papillations of the tongue.  NECK:  Supple with good range of motion; no thyromegaly or masses, no JVD or bruits, no cervical adenopathies.No carotid arteries pain, no carotid abnormal pulsation , NO arterial dance.  LYMPHATICS:  No cervical, NO supraclavicular, NO axillary,NO epitrochlear , NO inguinal adenopathy.  CHEST:  Normal excursion of both lara thoraces, normal voice fremitus, no subcutaneous emphysema, normal axillas, no rashes or acanthosis nigricans. Lungs clear to percussion and auscultation, decreased breath sounds bilaterally, minimal wheezing,NO crackles NO ronchi, NO stridor, NO rubs.  CARDIAC AND VASCULAR:  normal rate and regular rhythm, without murmurs,NO rubs NO S3 NO S4 right or left . Normal femoral, popliteal, pedis, brachial and carotid pulses.  ABDOMEN:  Soft, nontender with no organomegaly or masses, no ascites, no collateral circulation,no distention,no Milan sign, no abdominal pain, no inguinal hernias,no umbilical hernia, no abdominal bruits. Normal bowel sounds.  GENITAL: Not  Performed.  EXTREMITIES  AND SPINE:  No clubbing, cyanosis or edema, no deformities or pain .No kyphosis, scoliosis, deformities or pain in spine, ribs or pelvic bone.  NEUROLOGICAL:  Patient was awake, alert, oriented to time, person and place.            RECENT LABS:Differential   Order: [id number] - Part of Panel Order [id number]   Status:  Final result   Visible to patient:  No (Not Released)   Dx:  Iron deficiency anemia due to chronic...    Ref Range  & Units 14:02   WBC 3.40 - 10.80 10*3/mm3 8.38    RBC 3.77 - 5.28 10*6/mm3 3.77    Hemoglobin 12.0 - 15.9 g/dL 11.0 Abnormally low     Hematocrit 34.0 - 46.6 % 36.0    MCV 79.0 - 97.0 fL 95.5    MCH 26.6 - 33.0 pg 29.2    MCHC 31.5 - 35.7 g/dL 30.6 Abnormally low     RDW 12.3 - 15.4 % 13.0    RDW-SD 37.0 - 54.0 fl 46.1    MPV 6.0 - 12.0 fL 9.9    Platelets 140 - 450 10*3/mm3 264    Neutrophil % 42.7 - 76.0 % 75.3    Lymphocyte % 19.6 - 45.3 % 15.3 Abnormally low     Monocyte % 5.0 - 12.0 % 6.7    Eosinophil % 0.3 - 6.2 % 1.7           CT CHEST WITHOUT CONTRAST-     Radiation dose reduction techniques were utilized, including automated  exposure control and exposure modulation based on body size.     CLINICAL: History of lung carcinoma status post right lower lobe wedge  resection and chemoradiation.     COMPARISON: 03/05/2019.     FINDINGS: No mediastinal or hilar mass/adenopathy has developed. Cardiac  size stable. No pericardial or esophageal abnormality.     Volume loss right hemithorax, similar to the previous examination. There  is chronic pleural and parenchymal change, no acute airspace disease,  pleural effusion or right lung mass has developed.     There is chronic pleural and parenchymal change involving the left lung.  No effusion, acute airspace disease or suspicious left lung mass has  developed.     No lytic or sclerotic bone lesion. Limited images through the upper  abdomen are unremarkable.     CONCLUSION: Stable CT scan of the chest. No indication of an acute  pulmonary process, tumor recurrence or metastasis.        This report was finalized on 5/29/2019 10:28 AM by Dr. Arnulfo Singh M.D.                Assessment/Plan         1. This patient has history of lung cancer on many occasions. The last time that we treated her was close to 3 years ago with radiation therapy to 2 different locations by Dr. Thompson. Since then the patient has not had any evidence of recurrent disease and she is very  cynthia that this phenomenon has not happened at this point. Radiologically speaking with the CT scan stated above, nothing new has happened besides the significant change in her lungs associated with her COPD.     Given the above facts, I have advised the patient to have a repeat CT scan of the chest in 6 months and review her at that time.   2. The patient has iron deficiency anemia. I have advised her to take her ferrous sulfate that she is not taking and I have sent a new prescription to her pharmacy. The patient has been on chronic Coumadin therapy and it is very likely that she has an element of GI bleeding. Obviously, stopping Coumadin is not an option and given the fact that she has no major GI symptomatology makes me to wonder about how to proceed. Hopefully with the iron, her hemoglobin will improve.   3. The patient has COPD dependent on oxygen. She raised the question about any other devices to carry oxygen throughout and now she has a concentrator that is doing a great job. She has learned to carry concentrator in a moraima and this has made a difference in regard to her mobility. We were glad to collaborate into this situation.     4.The patient raised the question about the possibility of being seen at the [facility] in order to have a special procedure for patients who have chronic hypoxemia and respiratory failure. I am aware of the procedure but I am not sure that a patient with so many cancers and radiation therapy will be a candidate for this. Besides her pulmonary capacity is so marginal that anything that goes wrong it will tilt her into intubation mechanical ventilation, tracheostomy and long-term care. Obviously she is not willing to go in this direction by the end of her life. She already has discussed these facts with Nelson Fernandes MD who has discouraged her as well from going in this direction.     She will be reviewed by her primary care this afternoon.    I made her an  appointment to continue with her followup CT scans in 6 months chest and abdomen along with CBC, CMP, ferritin, iron profile the week before MD appointment.     I discussed this with her son as well.                                          6/4/2019      CC:

## 2019-09-03 RX ORDER — FERROUS SULFATE 325(65) MG
TABLET ORAL
Qty: 30 TABLET | Refills: 2 | Status: SHIPPED | OUTPATIENT
Start: 2019-09-03 | End: 2019-09-17 | Stop reason: HOSPADM

## 2019-09-05 ENCOUNTER — HOSPITAL ENCOUNTER (INPATIENT)
Facility: HOSPITAL | Age: 79
LOS: 12 days | Discharge: NURSING, REHAB OR LONG-TERM CARE | End: 2019-09-17
Attending: INTERNAL MEDICINE | Admitting: INTERNAL MEDICINE

## 2019-09-05 DIAGNOSIS — D50.0 BLOOD LOSS ANEMIA: Primary | ICD-10-CM

## 2019-09-05 DIAGNOSIS — K62.5 RECTAL BLEEDING: ICD-10-CM

## 2019-09-05 PROBLEM — D64.9 ANEMIA: Status: ACTIVE | Noted: 2019-09-05

## 2019-09-05 LAB
ABO GROUP BLD: NORMAL
BASOPHILS # BLD AUTO: 0.1 10*3/MM3 (ref 0–0.2)
BASOPHILS NFR BLD AUTO: 1 % (ref 0–1.5)
BILIRUB UR QL STRIP: NEGATIVE
BLD GP AB SCN SERPL QL: NEGATIVE
CLARITY UR: CLEAR
COLOR UR: YELLOW
DEPRECATED RDW RBC AUTO: 49.6 FL (ref 37–54)
EOSINOPHIL # BLD AUTO: 0.21 10*3/MM3 (ref 0–0.4)
EOSINOPHIL NFR BLD AUTO: 2 % (ref 0.3–6.2)
ERYTHROCYTE [DISTWIDTH] IN BLOOD BY AUTOMATED COUNT: 14.2 % (ref 12.3–15.4)
GLUCOSE UR STRIP-MCNC: NEGATIVE MG/DL
HCT VFR BLD AUTO: 28.7 % (ref 34–46.6)
HGB BLD-MCNC: 8.5 G/DL (ref 12–15.9)
HGB UR QL STRIP.AUTO: NEGATIVE
IMM GRANULOCYTES # BLD AUTO: 0.04 10*3/MM3 (ref 0–0.05)
IMM GRANULOCYTES NFR BLD AUTO: 0.4 % (ref 0–0.5)
KETONES UR QL STRIP: NEGATIVE
LEUKOCYTE ESTERASE UR QL STRIP.AUTO: NEGATIVE
LYMPHOCYTES # BLD AUTO: 1.71 10*3/MM3 (ref 0.7–3.1)
LYMPHOCYTES NFR BLD AUTO: 16.5 % (ref 19.6–45.3)
MAGNESIUM SERPL-MCNC: 1.9 MG/DL (ref 1.6–2.4)
MCH RBC QN AUTO: 28.8 PG (ref 26.6–33)
MCHC RBC AUTO-ENTMCNC: 29.6 G/DL (ref 31.5–35.7)
MCV RBC AUTO: 97.3 FL (ref 79–97)
MONOCYTES # BLD AUTO: 0.66 10*3/MM3 (ref 0.1–0.9)
MONOCYTES NFR BLD AUTO: 6.4 % (ref 5–12)
NEUTROPHILS # BLD AUTO: 7.63 10*3/MM3 (ref 1.7–7)
NEUTROPHILS NFR BLD AUTO: 73.7 % (ref 42.7–76)
NITRITE UR QL STRIP: NEGATIVE
NRBC BLD AUTO-RTO: 0 /100 WBC (ref 0–0.2)
PH UR STRIP.AUTO: <=5 [PH] (ref 5–8)
PLATELET # BLD AUTO: 302 10*3/MM3 (ref 140–450)
PMV BLD AUTO: 9.7 FL (ref 6–12)
PROT UR QL STRIP: NEGATIVE
RBC # BLD AUTO: 2.95 10*6/MM3 (ref 3.77–5.28)
RH BLD: POSITIVE
SP GR UR STRIP: 1.01 (ref 1–1.03)
T&S EXPIRATION DATE: NORMAL
UROBILINOGEN UR QL STRIP: NORMAL
WBC NRBC COR # BLD: 10.35 10*3/MM3 (ref 3.4–10.8)

## 2019-09-05 PROCEDURE — 85025 COMPLETE CBC W/AUTO DIFF WBC: CPT | Performed by: INTERNAL MEDICINE

## 2019-09-05 PROCEDURE — 86850 RBC ANTIBODY SCREEN: CPT | Performed by: INTERNAL MEDICINE

## 2019-09-05 PROCEDURE — 86923 COMPATIBILITY TEST ELECTRIC: CPT

## 2019-09-05 PROCEDURE — 94799 UNLISTED PULMONARY SVC/PX: CPT

## 2019-09-05 PROCEDURE — 86900 BLOOD TYPING SEROLOGIC ABO: CPT | Performed by: INTERNAL MEDICINE

## 2019-09-05 PROCEDURE — 93005 ELECTROCARDIOGRAM TRACING: CPT | Performed by: INTERNAL MEDICINE

## 2019-09-05 PROCEDURE — 86901 BLOOD TYPING SEROLOGIC RH(D): CPT | Performed by: INTERNAL MEDICINE

## 2019-09-05 PROCEDURE — 83735 ASSAY OF MAGNESIUM: CPT | Performed by: INTERNAL MEDICINE

## 2019-09-05 PROCEDURE — 93010 ELECTROCARDIOGRAM REPORT: CPT | Performed by: INTERNAL MEDICINE

## 2019-09-05 PROCEDURE — 81003 URINALYSIS AUTO W/O SCOPE: CPT | Performed by: INTERNAL MEDICINE

## 2019-09-05 PROCEDURE — 94640 AIRWAY INHALATION TREATMENT: CPT

## 2019-09-05 PROCEDURE — 99223 1ST HOSP IP/OBS HIGH 75: CPT | Performed by: INTERNAL MEDICINE

## 2019-09-05 RX ORDER — FUROSEMIDE 40 MG/1
40 TABLET ORAL DAILY
Status: CANCELLED | OUTPATIENT
Start: 2019-09-05

## 2019-09-05 RX ORDER — LEVOTHYROXINE SODIUM 0.05 MG/1
50 TABLET ORAL
Status: CANCELLED | OUTPATIENT
Start: 2019-09-06

## 2019-09-05 RX ORDER — IPRATROPIUM BROMIDE AND ALBUTEROL SULFATE 2.5; .5 MG/3ML; MG/3ML
3 SOLUTION RESPIRATORY (INHALATION)
Status: DISCONTINUED | OUTPATIENT
Start: 2019-09-05 | End: 2019-09-17 | Stop reason: HOSPADM

## 2019-09-05 RX ORDER — FUROSEMIDE 20 MG/1
20 TABLET ORAL 2 TIMES DAILY
COMMUNITY
End: 2019-10-06

## 2019-09-05 RX ORDER — FUROSEMIDE 40 MG/1
40 TABLET ORAL DAILY
Status: DISCONTINUED | OUTPATIENT
Start: 2019-09-05 | End: 2019-09-05

## 2019-09-05 RX ORDER — BUDESONIDE 0.5 MG/2ML
0.5 INHALANT ORAL
Status: CANCELLED | OUTPATIENT
Start: 2019-09-05

## 2019-09-05 RX ORDER — POTASSIUM CHLORIDE 1.5 G/1.77G
20 POWDER, FOR SOLUTION ORAL DAILY
Status: DISCONTINUED | OUTPATIENT
Start: 2019-09-05 | End: 2019-09-17 | Stop reason: HOSPADM

## 2019-09-05 RX ORDER — PROPAFENONE HYDROCHLORIDE 150 MG/1
150 TABLET, COATED ORAL EVERY 8 HOURS SCHEDULED
Status: DISCONTINUED | OUTPATIENT
Start: 2019-09-05 | End: 2019-09-06

## 2019-09-05 RX ORDER — DEXTROSE, SODIUM CHLORIDE, AND POTASSIUM CHLORIDE 5; .45; .15 G/100ML; G/100ML; G/100ML
50 INJECTION INTRAVENOUS CONTINUOUS
Status: DISCONTINUED | OUTPATIENT
Start: 2019-09-05 | End: 2019-09-07

## 2019-09-05 RX ORDER — LEVOTHYROXINE SODIUM 0.05 MG/1
50 TABLET ORAL
Status: DISCONTINUED | OUTPATIENT
Start: 2019-09-06 | End: 2019-09-17 | Stop reason: HOSPADM

## 2019-09-05 RX ORDER — ATORVASTATIN CALCIUM 20 MG/1
20 TABLET, FILM COATED ORAL NIGHTLY
Status: DISCONTINUED | OUTPATIENT
Start: 2019-09-05 | End: 2019-09-17 | Stop reason: HOSPADM

## 2019-09-05 RX ORDER — POTASSIUM CHLORIDE 750 MG/1
20 CAPSULE, EXTENDED RELEASE ORAL DAILY
Status: ON HOLD | COMMUNITY
End: 2019-10-09

## 2019-09-05 RX ORDER — POLYETHYLENE GLYCOL 3350, SODIUM CHLORIDE, POTASSIUM CHLORIDE, SODIUM BICARBONATE, AND SODIUM SULFATE 240; 5.84; 2.98; 6.72; 22.72 G/4L; G/4L; G/4L; G/4L; G/4L
2000 POWDER, FOR SOLUTION ORAL EVERY 8 HOURS SCHEDULED
Status: COMPLETED | OUTPATIENT
Start: 2019-09-05 | End: 2019-09-06

## 2019-09-05 RX ORDER — CARVEDILOL 25 MG/1
25 TABLET ORAL 2 TIMES DAILY WITH MEALS
Status: DISCONTINUED | OUTPATIENT
Start: 2019-09-05 | End: 2019-09-17 | Stop reason: HOSPADM

## 2019-09-05 RX ORDER — POTASSIUM CHLORIDE 1.5 G/1.77G
20 POWDER, FOR SOLUTION ORAL DAILY
Status: CANCELLED | OUTPATIENT
Start: 2019-09-05

## 2019-09-05 RX ORDER — CARVEDILOL 25 MG/1
25 TABLET ORAL 2 TIMES DAILY WITH MEALS
Status: CANCELLED | OUTPATIENT
Start: 2019-09-05

## 2019-09-05 RX ORDER — ATORVASTATIN CALCIUM 20 MG/1
20 TABLET, FILM COATED ORAL NIGHTLY
Status: CANCELLED | OUTPATIENT
Start: 2019-09-05

## 2019-09-05 RX ORDER — ESOMEPRAZOLE MAGNESIUM 40 MG/1
20 CAPSULE, DELAYED RELEASE ORAL
Status: ON HOLD | COMMUNITY
End: 2021-01-01

## 2019-09-05 RX ORDER — DEXTROSE, SODIUM CHLORIDE, AND POTASSIUM CHLORIDE 5; .45; .15 G/100ML; G/100ML; G/100ML
100 INJECTION INTRAVENOUS CONTINUOUS
Status: CANCELLED | OUTPATIENT
Start: 2019-09-05

## 2019-09-05 RX ORDER — MELATONIN
1000 DAILY
Status: ON HOLD | COMMUNITY
End: 2021-01-01

## 2019-09-05 RX ORDER — CARVEDILOL 25 MG/1
25 TABLET ORAL 2 TIMES DAILY WITH MEALS
COMMUNITY
End: 2019-10-13 | Stop reason: HOSPADM

## 2019-09-05 RX ORDER — IPRATROPIUM BROMIDE AND ALBUTEROL SULFATE 2.5; .5 MG/3ML; MG/3ML
3 SOLUTION RESPIRATORY (INHALATION)
Status: CANCELLED | OUTPATIENT
Start: 2019-09-05

## 2019-09-05 RX ORDER — ARFORMOTEROL TARTRATE 15 UG/2ML
15 SOLUTION RESPIRATORY (INHALATION)
Status: DISCONTINUED | OUTPATIENT
Start: 2019-09-05 | End: 2019-09-17 | Stop reason: HOSPADM

## 2019-09-05 RX ORDER — BUDESONIDE 0.5 MG/2ML
0.5 INHALANT ORAL
Status: DISCONTINUED | OUTPATIENT
Start: 2019-09-05 | End: 2019-09-17 | Stop reason: HOSPADM

## 2019-09-05 RX ORDER — ZOLPIDEM TARTRATE 5 MG/1
5 TABLET ORAL NIGHTLY PRN
Status: DISCONTINUED | OUTPATIENT
Start: 2019-09-05 | End: 2019-09-06

## 2019-09-05 RX ORDER — PANTOPRAZOLE SODIUM 40 MG/1
40 TABLET, DELAYED RELEASE ORAL
Status: DISCONTINUED | OUTPATIENT
Start: 2019-09-05 | End: 2019-09-17 | Stop reason: HOSPADM

## 2019-09-05 RX ADMIN — CARVEDILOL 25 MG: 25 TABLET, FILM COATED ORAL at 22:09

## 2019-09-05 RX ADMIN — POLYETHYLENE GLYCOL-3350 AND ELECTROLYTES WITH FLAVOR PACK 2000 ML: 240; 5.84; 2.98; 6.72; 22.72 POWDER, FOR SOLUTION ORAL at 20:55

## 2019-09-05 RX ADMIN — BUDESONIDE 0.5 MG: 0.5 SUSPENSION RESPIRATORY (INHALATION) at 21:30

## 2019-09-05 RX ADMIN — ATORVASTATIN CALCIUM 20 MG: 20 TABLET, FILM COATED ORAL at 21:08

## 2019-09-05 RX ADMIN — ARFORMOTEROL TARTRATE 15 MCG: 15 SOLUTION RESPIRATORY (INHALATION) at 21:30

## 2019-09-05 RX ADMIN — PROPAFENONE HYDROCHLORIDE 150 MG: 150 TABLET, COATED ORAL at 21:08

## 2019-09-05 RX ADMIN — IPRATROPIUM BROMIDE AND ALBUTEROL SULFATE 3 ML: 2.5; .5 SOLUTION RESPIRATORY (INHALATION) at 17:29

## 2019-09-05 NOTE — H&P
Date of admission: September 5, 2019    Chief complaint: Anemia, black stool and blood per rectum    History of present illness:    This is a 78-year-old lady.  She has a history of past adenocarcinoma of the lung.  She had a resection for lung cancer in 2009 and radiation treatment for a second lesion in 2016.  Patient has atherosclerotic vascular disease, COPD, hypothyroidism, hypertension, past PE/DVTs (on Xarelto) and osteoarthritis.  The patient was in her usual state of health and then 5 days ago she noticed red blood on her stool with wiping.  Her stools have been black (she is on iron) since that time she has had 2 or 3 bloody bowel movements a day.  Her stools remain black.  Her stools are formed but soft.  She has had no abdominal discomfort.  She has had no chest pain, shortness of breath or fatigue.    Patient saw me yesterday.  On physical exam she had melanotic stool which was markedly heme positive.  I did a CBC and her hemoglobin came back at 8.5.  This is a significant decrease from prior H&H's.  Patient was called and told to come to the hospital for admission.    Past medical history:    Allergies: Augmentin, Carafate, Keflex, Reglan, Talwin, Zocor    Current medications:  Atorvastatin 20 mg a day  Carvedilol 25 twice daily  Pletal 100 mg twice daily  Ferrous sulfate 325 daily  Fish oil 1000 mg daily  Lasix 40 mill grams a day  Magnesium 400 mg daily  Nexium 20 mg daily  Potassium chloride 20 mg a day  Pro-air HFA 2 puffs every 4 as needed  Rythmol 150 3 times a day  Sertraline 50 mg a day  Synthroid 50 mcg a day  Tramadol 50 mg 3 times daily as needed  Trelegy 1 puff daily  Tylenol as needed  Xarelto 20 mg a day (she has been off this medication for the past 2 days)    Past medical history:  Essential hypertension  Hyperlipidemia  GERD  MISSY  Osteoarthritis  Hypothyroid  Peripheral vascular disease  COPD  Distant polymyalgia rheumatica  Past DVT/PEs, multiple occasions  Paroxysmal atrial  fibrillation  Takasubo cardiomyopathy  and   Malignant melanoma in situ left breast region   Adenocarcinoma of the lung 2009 likely resected, 2016 treated with radiation    Surgical history:  Bilateral tubal ligation 196  Total hysterectomy 1971  Nephrectomy   Vena cava filter 1989  Hernia repair at   Breast biopsy   Vena cava filter   Video-assisted thorascopic wedge resection right lower lobe 2009  Vein stripping 2010  Femorofemoral bypass right lower extremity 2012  Open reduction internal fixation left bimalleolar ankle fracture 2013  Irrigation and debridement removal of hardware left ankle     Last colonoscopy  4 5  polyps resected from the rectum.  One polyp was adenomatous.  The others were hyperplastic.  EGD 2015 small hiatal hernia, otherwise normal.    Family history:  Father  age 54, esophageal rupture  Mother  age 65, lung cancer and heart disease.  Patient has 3 brothers and one sister who are living.  They are in their 70s.  One has prostate cancer.  One has polymyalgia rheumatica.  She had a brother who  age 7.  The death was accidental.  The patient has a daughter 62.  She has hypertension irritable bowel syndrome and hyperlipidemia.  The patient has 8 grandchildren and 6 great-grandchildren    Social history  Former smoker.  80-pack-year history of cigarette abuse.  Quit   The patient rarely drinks alcohol.  The patient is a .  Her   in 2016.  She has been  5 times.    The patient lives alone.  The patient is sedentary.    Review of systems:  General: No fevers or chills.  Energy down.  Patient is lost several pounds over the past few weeks.  She sleeps well at night.  Neurologic: No current issues.  Cardiorespiratory: No chest pain.  Shortness of breath with more than usual activity.  No leg edema.  No palpitations.  GI: See HPI.  : Urinates 5-6 times a day and couple times at night.  No incontinence.  GYN:  Negative.  Dermatologic: Unremarkable.  Musculoskeletal: Unremarkable.  Psychiatric: No depression.  Patient does suffer from anxiety.  She is not suicidal homicidal.    Physical exam:  Vital signs: /72, pulse 66, respirate 12, temperature afebrile, weight 170, height 5 feet 4 inches  Appearance: Elderly  lady with central obesity.  She is sitting in bed.  She is in no distress.  HEENT: Normocephalic and atraumatic.  Scalp unremarkable.  Pupils are round and equal.  Pharynx clear mucous membranes moist.  Neck: Decreased range of motion.  No lymphadenopathy, JVD or thyromegaly.  Lungs: Decreased breath sounds.  Wheezes, rales or rhonchi.  Heart: PMI not palpable.  Tones distant.  Regular rate and rhythm.  Peripheral vascular exam: Radial pulses difficult to palpate.  Good brachial pulses.  Good popliteal pulses.  Dorsal needle and posterior tibial pulses not palpable.  Abdomen: Protuberant with a large fat pad.  Normal active bowel sounds.  No rebound, tenderness or guarding.  Rectal exam: (Done yesterday) patient has some skin tags and hemorrhoids.  She had decent sphincter tone.  Was black.  Stool was markedly heme positive.  I cannot appreciate any masses.  Extremities: No clubbing, cyanosis or edema.  There was atrophy in the muscles of her arms and legs.  Neurologic: Nonfocal    Laboratory data:  CMP from yesterday: Glucose 109, BUN 27, creatinine 0.80, sodium 143, potassium 3.9, chloride 100, carbon dioxide 34, calcium 9.4, total protein 7, albumin 4, globulin 3, total bilirubin 0.3, alk phos 63, AST 10, ALT 6  CBC from yesterday: White blood cell count 11, hemoglobin 8.5, hematocrit 26.9, MCV 90, platelet count 315  TSH from yesterday 1.69    Impression:  1.  GI bleed.  I am not ensure entirely sure if this is an upper or lower GI bleed.  Patient has been on Xarelto.  She has been on iron.  2.  Anemia.  Patient had anemia of chronic inflammation.  Due to blood loss she has an acute drop in her  H&H.  3.  COPD  4.  Atherosclerotic vascular disease  5.  Essential hypertension  6.  Hyperlipidemia.  7.  History of DVTs and PEs.  Patient was anticoagulated    Plan:  The patient has been admitted.  Xarelto was discontinued over 24 hours ago.  The patient was placed on a proton pump inhibitor.  We will follow her H&H is closely.  She has been typed and screened for blood.  The patient is going to be seen by gastroenterology.  I believe she will need panendoscopy  The patient is a no code

## 2019-09-05 NOTE — H&P (VIEW-ONLY)
[facility], [facility]  Initial Inpatient Consult Note    Referring Provider: Dr Payne    Reason for Consultation: anemia, GI bleed    Subjective     History of present illness:      Thank you for requesting my opinion.    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  GI is consulted for these issues.  She says that symptoms started on the 1st.  She had a bowel movement and noticed that there was some red blood mixed in with the stool as well as with wiping.  Her stools were also black but she does take iron.  She thought it was related to medication that she was taking so did not pay too much attention to it but then the symptoms recurred yesterday.  She subsequently went to her primary care provider who performed a rectal exam, this did demonstrate dark stool that was heme positive.  He had her stop her Xarelto and checked labs.  Her hemoglobin, which was 11 on June 4 had declined to 8.5.  She denies any excessive hematochezia.  She denies any recent NSAID use.  She denies any abdominal pain associated with the bleeding.  Her last colonoscopy was in 2015 and was notable for multiple colon polyps.  She has not had repeat colonoscopy since that time.  She does not think that she has had any recent EGD.    She is on continuous home oxygen at 3 L per nasal cannula which is stable for her.  She denies that she has had any changes with her baseline shortness of breath or any chest pain.  However, she does say that she is not as active as she used to be due to being on oxygen.    There is no family history of GI malignancy to her knowledge.    She says her last dose of xarelto was 2 days ago.        Past Medical History:  Past Medical History:   Diagnosis Date   • Adenocarcinoma of lung (CMS/HCC)    • Asthma    • Carotid artery disease (CMS/HCC)    • Cough    • Deep vein thrombosis (CMS/HCC)    •  Emphysema of lung (CMS/HCC)    • Hyperlipidemia    • Hypertension    • Hypothyroidism    • Lung cancer (CMS/HCC)    • Osteoporosis    • Paroxysmal atrial fibrillation (CMS/HCC)    • Peripheral arterial disease (CMS/HCC)    • Pulmonary embolism (CMS/HCC)    • Thrombophilia (CMS/HCC)     Since age 25       Past Surgical History:  Past Surgical History:   Procedure Laterality Date   • ANKLE SURGERY     • BREAST SURGERY      removal of benign melanoma spot on nipple of left breast 2013   • COLONOSCOPY      Negative   • HERNIA REPAIR      Lumbar spine   • HYSTERECTOMY     • LUNG LOBECTOMY     • OTHER SURGICAL HISTORY      Bypass of lower extremities   • OTHER SURGICAL HISTORY      IVC filter placed        Social History:   Social History     Tobacco Use   • Smoking status: Former Smoker     Packs/day: 1.00     Years: 50.00     Pack years: 50.00     Types: Cigarettes     Start date:      Last attempt to quit:      Years since quittin.6   • Smokeless tobacco: Former User   Substance Use Topics   • Alcohol use: No        Family History:  Family History   Problem Relation Age of Onset   • Lung cancer Mother        Home Meds:  Medications Prior to Admission   Medication Sig Dispense Refill Last Dose   • Albuterol Sulfate (PROAIR HFA IN) ProAir HFA   2 puffs every 4 hours as needed   Taking   • ascorbic acid (EQL VITAMIN C) 1000 MG tablet Take 1,000 mg by mouth Daily.   Taking   • atorvastatin (LIPITOR) 20 MG tablet Take 10 mg by mouth Every Evening.   Taking   • carvedilol (COREG) 25 MG tablet Take 25 mg by mouth 2 (Two) Times a Day With Meals.      • cholecalciferol (VITAMIN D3) 1000 units tablet Take 1,000 Units by mouth Daily.      • cilostazol (PLETAL) 100 MG tablet Take 1 tablet by mouth 2 (two) times a day.   Taking   • Diphenhydramine-APAP, sleep, (TYLENOL PM EXTRA STRENGTH PO) Tylenol PM   Substitutions-   Taking   • esomeprazole (nexIUM) 40 MG capsule Take 20 mg by mouth Every Morning Before  Breakfast.      • ferrous sulfate 325 (65 FE) MG tablet TAKE ONE TABLET BY MOUTH EVERY OTHER DAY 30 tablet 2    • furosemide (LASIX) 20 MG tablet Take 20 mg by mouth 2 (Two) Times a Day.      • O2 (OXYGEN) Inhale As Needed. 2 1/2 liter   Taking   • Omega-3 Fatty Acids (FISH OIL) 1000 MG capsule capsule Take  by mouth Daily With Breakfast.   Taking   • potassium chloride (MICRO-K) 10 MEQ CR capsule Take 20 mEq by mouth Daily.      • propafenone (RYTHMOL) 150 MG tablet Take 150 mg by mouth Every 8 (Eight) Hours.   Taking   • rivaroxaban (XARELTO) 20 MG tablet Xarelto 20 mg tablet   1 tab(s) orally once a day #30 Tablet(s) Substitutions Allowed   9/3/2019   • sertraline (ZOLOFT) 50 MG tablet Take 50 mg by mouth Daily.      • SYNTHROID 25 MCG tablet Take 50 mcg by mouth Daily.   Taking   • traMADol (ULTRAM) 50 MG tablet 50 mg Every 6 (Six) Hours As Needed for Moderate Pain .   Taking   • vitamin B-12 (CYANOCOBALAMIN) 1000 MCG tablet Take 1,000 mcg by mouth Daily.   Taking   • vitamin E 1000 UNIT capsule Take 1,000 Units by mouth Daily.   Taking   • albuterol (PROVENTIL HFA;VENTOLIN HFA) 108 (90 Base) MCG/ACT inhaler Inhale 2 puffs Every 4 (Four) Hours As Needed for Wheezing.   Not Taking   • albuterol (PROVENTIL) (2.5 MG/3ML) 0.083% nebulizer solution Take 2.5 mg by nebulization Every 4 (Four) Hours As Needed for wheezing.   Taking   • Ascorbic Acid (VITAMIN C PO) Take  by mouth Daily.   Not Taking   • cilostazol (PLETAL) 100 MG tablet Pletal 100 mg tablet   1 tab(s) 2 times a day orally #60 Tablet(s) Substitutions Allowed   Not Taking   • magnesium oxide (MAGOX) 400 (241.3 Mg) MG tablet tablet Take 400 mg by mouth Daily.   Not Taking   • predniSONE (DELTASONE) 5 MG tablet prednisone 5 mg tablet   Take 1 tablet every day by oral route.   Not Taking   • Prenatal Vit-Fe Fumarate-FA (MULTI PRENATAL PO) Take  by mouth Daily.   Not Taking   • rivaroxaban (XARELTO) tablet Take 1 tablet by mouth daily.   Not Taking   • VAQTA  50 UNIT/ML injection    Taking   • vitamin E 1000 UNIT capsule Take 1,000 Units by mouth Daily.   Not Taking       Current Meds:     arformoterol 15 mcg Nebulization BID - RT   atorvastatin 20 mg Oral Nightly   budesonide 0.5 mg Nebulization BID - RT   carvedilol 25 mg Oral BID With Meals   ipratropium-albuterol 3 mL Nebulization 4x Daily - RT   [START ON 9/6/2019] levothyroxine 50 mcg Oral Q AM   pantoprazole 40 mg Oral Q AM   potassium chloride 20 mEq Oral Daily   propafenone 150 mg Oral Q8H   sertraline 50 mg Oral Daily       Allergies:  Allergies   Allergen Reactions   • Adhesive Tape    • Augmentin [Amoxicillin-Pot Clavulanate]    • Cephalexin    • Metoclopramide Unknown (See Comments)   • Pentazocine    • Simvastatin Unknown (See Comments)   • Sucralfate Unknown (See Comments)       Review of Systems  All systems were reviewed and negative except for:  Gastrointestinal: positive for  dark stool, bright red blood per rectum     Objective     Vital Signs  Heart Rate:  [60] 60  Resp:  [24] 24    Physical Exam:  Constitutional:    Alert, cooperative, in no acute distress, appears stated age   Eyes:            Lids and lashes normal, conjunctivae and sclerae normal, no   icterus   Ears, nose, mouth and throat:   Normal appearance of external ears and nose, no oral lesions, no thrush, oral mucosa moist   Respiratory:     Diminished bilaterally on auscultation, respirations regular, even and unlabored, on nasal cannula    Cardiovascular:    Regular rhythm and normal rate, normal S1 and S2, no            murmur, no gallop, palpable distal pulses, no lower extremity edema   Gastrointestinal:    Soft, non-distended, non-tender to palpation, no guarding, no rebound tenderness, normal bowel sounds, no palpable masses or organomegaly  Rectal exam: deferred   Musculoskeletal:   Normal station, no atrophy, no tenderness to palpation, normal digits and nails   Skin:   Normal color, no bleeding, bruising, rashes or lesions    Lymphatics:   No palpable cervical or supraclavicular adenopathy   Psychiatric:  Judgement and insight: normal   Orientation to person, place and time: normal   Mood and affect: normal       Results Review:   I reviewed the patient's new clinical results.    Results from last 7 days   Lab Units 09/05/19  1545   WBC 10*3/mm3 10.35   HEMOGLOBIN g/dL 8.5*   HEMATOCRIT % 28.7*   PLATELETS 10*3/mm3 302             Invalid input(s): LABALBU, PROT          Lab Results   Lab Value Date/Time    LIPASE 12 (L) 11/17/2015 1401       Radiology:  Imaging Results (last 72 hours)     ** No results found for the last 72 hours. **          Assessment/Plan       Anemia      Impression  1. Anemia: drop of ~ 3 g since June, setting of rectal bleeding, heme positive stool    2. Rectal bleeding, dark stool: heme positive in her pcp's office    3. Lung cancer s/p resection, COPD: on 3L nasal cannula    4. Venous thromboembolic disease: on xarelto, last dose 2 days ago    5. Personal hx of polyps: last colonoscopy 4 years ago    Plan  Given the patient's lung disease as well as need for continuous oxygen, I requested pulmonary consultation for clearance prior to endoscopy.  I spoke with Dr. Moore who feels that the patient is at her baseline and that there are not any medication changes to be made to improve her status.  Given the patient's anemia, rectal bleeding, history of polyps as well as need for ongoing anticoagulation, will proceed with EGD and colonoscopy tomorrow for further evaluation.  She is a higher risk endoscopy candidate given her overall medical conditions but overall the benefit of endoscopy outweighs the risk at this time.    Follow Hb    Transfuse as per Dr Payne    Clear liquids, NPO at MN, bowel prep this evening    I discussed the patients findings and my recommendations with patient, nursing staff and Dr Payne and Dr Angelina Ruiz MD  [facility]      Dictated utilizing Tommy  dictation

## 2019-09-05 NOTE — CONSULTS
[facility]  Initial Inpatient Consult Note    Referring Provider: Dr Payne    Reason for Consultation: anemia, GI bleed    Subjective     History of present illness:      Thank you for requesting my opinion.    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  GI is consulted for these issues.  She says that symptoms started on the 1st.  She had a bowel movement and noticed that there was some red blood mixed in with the stool as well as with wiping.  Her stools were also black but she does take iron.  She thought it was related to medication that she was taking so did not pay too much attention to it but then the symptoms recurred yesterday.  She subsequently went to her primary care provider who performed a rectal exam, this did demonstrate dark stool that was heme positive.  He had her stop her Xarelto and checked labs.  Her hemoglobin, which was 11 on June 4 had declined to 8.5.  She denies any excessive hematochezia.  She denies any recent NSAID use.  She denies any abdominal pain associated with the bleeding.  Her last colonoscopy was in 2015 and was notable for multiple colon polyps.  She has not had repeat colonoscopy since that time.  She does not think that she has had any recent EGD.    She is on continuous home oxygen at 3 L per nasal cannula which is stable for her.  She denies that she has had any changes with her baseline shortness of breath or any chest pain.  However, she does say that she is not as active as she used to be due to being on oxygen.    There is no family history of GI malignancy to her knowledge.    She says her last dose of xarelto was 2 days ago.        Past Medical History:  Past Medical History:   Diagnosis Date   • Adenocarcinoma of lung (CMS/HCC)    • Asthma    • Carotid artery disease (CMS/HCC)    • Cough    • Deep vein thrombosis (CMS/HCC)    •  Emphysema of lung (CMS/HCC)    • Hyperlipidemia    • Hypertension    • Hypothyroidism    • Lung cancer (CMS/HCC)    • Osteoporosis    • Paroxysmal atrial fibrillation (CMS/HCC)    • Peripheral arterial disease (CMS/HCC)    • Pulmonary embolism (CMS/HCC)    • Thrombophilia (CMS/HCC)     Since age 25       Past Surgical History:  Past Surgical History:   Procedure Laterality Date   • ANKLE SURGERY     • BREAST SURGERY      removal of benign melanoma spot on nipple of left breast 2013   • COLONOSCOPY      Negative   • HERNIA REPAIR      Lumbar spine   • HYSTERECTOMY     • LUNG LOBECTOMY     • OTHER SURGICAL HISTORY      Bypass of lower extremities   • OTHER SURGICAL HISTORY      IVC filter placed        Social History:   Social History     Tobacco Use   • Smoking status: Former Smoker     Packs/day: 1.00     Years: 50.00     Pack years: 50.00     Types: Cigarettes     Start date:      Last attempt to quit:      Years since quittin.6   • Smokeless tobacco: Former User   Substance Use Topics   • Alcohol use: No        Family History:  Family History   Problem Relation Age of Onset   • Lung cancer Mother        Home Meds:  Medications Prior to Admission   Medication Sig Dispense Refill Last Dose   • Albuterol Sulfate (PROAIR HFA IN) ProAir HFA   2 puffs every 4 hours as needed   Taking   • ascorbic acid (EQL VITAMIN C) 1000 MG tablet Take 1,000 mg by mouth Daily.   Taking   • atorvastatin (LIPITOR) 20 MG tablet Take 10 mg by mouth Every Evening.   Taking   • carvedilol (COREG) 25 MG tablet Take 25 mg by mouth 2 (Two) Times a Day With Meals.      • cholecalciferol (VITAMIN D3) 1000 units tablet Take 1,000 Units by mouth Daily.      • cilostazol (PLETAL) 100 MG tablet Take 1 tablet by mouth 2 (two) times a day.   Taking   • Diphenhydramine-APAP, sleep, (TYLENOL PM EXTRA STRENGTH PO) Tylenol PM   Substitutions-   Taking   • esomeprazole (nexIUM) 40 MG capsule Take 20 mg by mouth Every Morning Before  Breakfast.      • ferrous sulfate 325 (65 FE) MG tablet TAKE ONE TABLET BY MOUTH EVERY OTHER DAY 30 tablet 2    • furosemide (LASIX) 20 MG tablet Take 20 mg by mouth 2 (Two) Times a Day.      • O2 (OXYGEN) Inhale As Needed. 2 1/2 liter   Taking   • Omega-3 Fatty Acids (FISH OIL) 1000 MG capsule capsule Take  by mouth Daily With Breakfast.   Taking   • potassium chloride (MICRO-K) 10 MEQ CR capsule Take 20 mEq by mouth Daily.      • propafenone (RYTHMOL) 150 MG tablet Take 150 mg by mouth Every 8 (Eight) Hours.   Taking   • rivaroxaban (XARELTO) 20 MG tablet Xarelto 20 mg tablet   1 tab(s) orally once a day #30 Tablet(s) Substitutions Allowed   9/3/2019   • sertraline (ZOLOFT) 50 MG tablet Take 50 mg by mouth Daily.      • SYNTHROID 25 MCG tablet Take 50 mcg by mouth Daily.   Taking   • traMADol (ULTRAM) 50 MG tablet 50 mg Every 6 (Six) Hours As Needed for Moderate Pain .   Taking   • vitamin B-12 (CYANOCOBALAMIN) 1000 MCG tablet Take 1,000 mcg by mouth Daily.   Taking   • vitamin E 1000 UNIT capsule Take 1,000 Units by mouth Daily.   Taking   • albuterol (PROVENTIL HFA;VENTOLIN HFA) 108 (90 Base) MCG/ACT inhaler Inhale 2 puffs Every 4 (Four) Hours As Needed for Wheezing.   Not Taking   • albuterol (PROVENTIL) (2.5 MG/3ML) 0.083% nebulizer solution Take 2.5 mg by nebulization Every 4 (Four) Hours As Needed for wheezing.   Taking   • Ascorbic Acid (VITAMIN C PO) Take  by mouth Daily.   Not Taking   • cilostazol (PLETAL) 100 MG tablet Pletal 100 mg tablet   1 tab(s) 2 times a day orally #60 Tablet(s) Substitutions Allowed   Not Taking   • magnesium oxide (MAGOX) 400 (241.3 Mg) MG tablet tablet Take 400 mg by mouth Daily.   Not Taking   • predniSONE (DELTASONE) 5 MG tablet prednisone 5 mg tablet   Take 1 tablet every day by oral route.   Not Taking   • Prenatal Vit-Fe Fumarate-FA (MULTI PRENATAL PO) Take  by mouth Daily.   Not Taking   • rivaroxaban (XARELTO) tablet Take 1 tablet by mouth daily.   Not Taking   • VAQTA  50 UNIT/ML injection    Taking   • vitamin E 1000 UNIT capsule Take 1,000 Units by mouth Daily.   Not Taking       Current Meds:     arformoterol 15 mcg Nebulization BID - RT   atorvastatin 20 mg Oral Nightly   budesonide 0.5 mg Nebulization BID - RT   carvedilol 25 mg Oral BID With Meals   ipratropium-albuterol 3 mL Nebulization 4x Daily - RT   [START ON 9/6/2019] levothyroxine 50 mcg Oral Q AM   pantoprazole 40 mg Oral Q AM   potassium chloride 20 mEq Oral Daily   propafenone 150 mg Oral Q8H   sertraline 50 mg Oral Daily       Allergies:  Allergies   Allergen Reactions   • Adhesive Tape    • Augmentin [Amoxicillin-Pot Clavulanate]    • Cephalexin    • Metoclopramide Unknown (See Comments)   • Pentazocine    • Simvastatin Unknown (See Comments)   • Sucralfate Unknown (See Comments)       Review of Systems  All systems were reviewed and negative except for:  Gastrointestinal: positive for  dark stool, bright red blood per rectum     Objective     Vital Signs  Heart Rate:  [60] 60  Resp:  [24] 24    Physical Exam:  Constitutional:    Alert, cooperative, in no acute distress, appears stated age   Eyes:            Lids and lashes normal, conjunctivae and sclerae normal, no   icterus   Ears, nose, mouth and throat:   Normal appearance of external ears and nose, no oral lesions, no thrush, oral mucosa moist   Respiratory:     Diminished bilaterally on auscultation, respirations regular, even and unlabored, on nasal cannula    Cardiovascular:    Regular rhythm and normal rate, normal S1 and S2, no            murmur, no gallop, palpable distal pulses, no lower extremity edema   Gastrointestinal:    Soft, non-distended, non-tender to palpation, no guarding, no rebound tenderness, normal bowel sounds, no palpable masses or organomegaly  Rectal exam: deferred   Musculoskeletal:   Normal station, no atrophy, no tenderness to palpation, normal digits and nails   Skin:   Normal color, no bleeding, bruising, rashes or lesions    Lymphatics:   No palpable cervical or supraclavicular adenopathy   Psychiatric:  Judgement and insight: normal   Orientation to person, place and time: normal   Mood and affect: normal       Results Review:   I reviewed the patient's new clinical results.    Results from last 7 days   Lab Units 09/05/19  1545   WBC 10*3/mm3 10.35   HEMOGLOBIN g/dL 8.5*   HEMATOCRIT % 28.7*   PLATELETS 10*3/mm3 302             Invalid input(s): LABALBU, PROT          Lab Results   Lab Value Date/Time    LIPASE 12 (L) 11/17/2015 1401       Radiology:  Imaging Results (last 72 hours)     ** No results found for the last 72 hours. **          Assessment/Plan       Anemia      Impression  1. Anemia: drop of ~ 3 g since June, setting of rectal bleeding, heme positive stool    2. Rectal bleeding, dark stool: heme positive in her pcp's office    3. Lung cancer s/p resection, COPD: on 3L nasal cannula    4. Venous thromboembolic disease: on xarelto, last dose 2 days ago    5. Personal hx of polyps: last colonoscopy 4 years ago    Plan  Given the patient's lung disease as well as need for continuous oxygen, I requested pulmonary consultation for clearance prior to endoscopy.  I spoke with Dr. Moore who feels that the patient is at her baseline and that there are not any medication changes to be made to improve her status.  Given the patient's anemia, rectal bleeding, history of polyps as well as need for ongoing anticoagulation, will proceed with EGD and colonoscopy tomorrow for further evaluation.  She is a higher risk endoscopy candidate given her overall medical conditions but overall the benefit of endoscopy outweighs the risk at this time.    Follow Hb    Transfuse as per Dr Payne    Clear liquids, NPO at MN, bowel prep this evening    I discussed the patients findings and my recommendations with patient, nursing staff and Dr Payne and Dr Angelina Ruiz MD  [facility], [place] [facility]      Dictated utilizing Tommy  dictation

## 2019-09-05 NOTE — CONSULTS
CONSULT NOTE    Patient Identification:  Ale Narayan  78 y.o.  female  1940  [id number]            Requesting physician: Dr RODRIGUEZ Ruiz    Reason for Consultation:  COPD chronic hypoxic resp faliure    CC: bleeding    History of Present Illness:  Patient is a very nice 78-year-old female with a history of COPD with an FEV1 around 36% diffusion capacity of 17 who follows with Dr. Fernandes in our office who presented to the hospital with bloody bowel movements ongoing over the past 4 to 5 days.  She states her bowel movements have been black but also tinges of red when she wipes.  Of note is she does take iron supplements.  Her hemoglobin was checked in her primary care physician's office and Hemoccult was 8.5 rectally stable today.  Her Xarelto was stopped at greater than 24 hours ago.  She has no chest pain no worsening shortness of breath.  She is compliant with her trelegy.  She does have a nebulizer her she does not have to use it daily.  She has not been in the hospital in the past year for respiratory difficulty.  She has not required a prednisone burst as an outpatient either.  She feels that her breathing is at its baseline.  No worse at present no chest tightness no wheezing.  No increased sputum production no hemoptysis  Review of Systems:  CONSTITUTIONAL:  Denies fevers or chills  EYE:  No new vision changes  EAR:  No change in hearing  CARDIAC:  No chest pain  PULMONARY:  No productive cough or shortness of breath  GI: Melena hematochezia  RENAL:  No dysuria or urinary frequency  MUSCULOSKELETAL:  No musculoskeletal complaints  ENDOCRINE:  No heat or cold intolerance  INTEGUMENTARY: No skin rashes  NEUROLOGICAL:  No dizziness or confusion.  No seizure activity  PSYCHIATRIC:  No new anxiety or depression  12 system review of systems performed and all else negative    Past Medical History:   Diagnosis Date   • Adenocarcinoma of lung (CMS/HCC)    • Asthma    • Carotid artery disease (CMS/HCC)     • Cough    • Deep vein thrombosis (CMS/HCC)    • Emphysema of lung (CMS/HCC)    • Hyperlipidemia    • Hypertension    • Hypothyroidism    • Lung cancer (CMS/HCC)    • Osteoporosis    • Paroxysmal atrial fibrillation (CMS/HCC)    • Peripheral arterial disease (CMS/HCC)    • Pulmonary embolism (CMS/HCC)    • Thrombophilia (CMS/HCC)     Since age 25       Past Surgical History:   Procedure Laterality Date   • ANKLE SURGERY     • BREAST SURGERY  2015    removal of benign melanoma spot on nipple of left breast 7/2013   • COLONOSCOPY      Negative   • HERNIA REPAIR      Lumbar spine   • HYSTERECTOMY     • LUNG LOBECTOMY  2009   • OTHER SURGICAL HISTORY      Bypass of lower extremities   • OTHER SURGICAL HISTORY      IVC filter placed        Medications Prior to Admission   Medication Sig Dispense Refill Last Dose   • Albuterol Sulfate (PROAIR HFA IN) ProAir HFA   2 puffs every 4 hours as needed   Taking   • ascorbic acid (EQL VITAMIN C) 1000 MG tablet Take 1,000 mg by mouth Daily.   Taking   • atorvastatin (LIPITOR) 20 MG tablet Take 10 mg by mouth Every Evening.   Taking   • carvedilol (COREG) 25 MG tablet Take 25 mg by mouth 2 (Two) Times a Day With Meals.      • cholecalciferol (VITAMIN D3) 1000 units tablet Take 1,000 Units by mouth Daily.      • cilostazol (PLETAL) 100 MG tablet Take 1 tablet by mouth 2 (two) times a day.   Taking   • Diphenhydramine-APAP, sleep, (TYLENOL PM EXTRA STRENGTH PO) Tylenol PM   Substitutions-   Taking   • esomeprazole (nexIUM) 40 MG capsule Take 20 mg by mouth Every Morning Before Breakfast.      • ferrous sulfate 325 (65 FE) MG tablet TAKE ONE TABLET BY MOUTH EVERY OTHER DAY 30 tablet 2    • furosemide (LASIX) 20 MG tablet Take 20 mg by mouth 2 (Two) Times a Day.      • O2 (OXYGEN) Inhale As Needed. 2 1/2 liter   Taking   • Omega-3 Fatty Acids (FISH OIL) 1000 MG capsule capsule Take  by mouth Daily With Breakfast.   Taking   • potassium chloride (MICRO-K) 10 MEQ CR capsule Take 20 mEq  by mouth Daily.      • propafenone (RYTHMOL) 150 MG tablet Take 150 mg by mouth Every 8 (Eight) Hours.   Taking   • rivaroxaban (XARELTO) 20 MG tablet Xarelto 20 mg tablet   1 tab(s) orally once a day #30 Tablet(s) Substitutions Allowed   9/3/2019   • sertraline (ZOLOFT) 50 MG tablet Take 50 mg by mouth Daily.      • SYNTHROID 25 MCG tablet Take 50 mcg by mouth Daily.   Taking   • traMADol (ULTRAM) 50 MG tablet 50 mg Every 6 (Six) Hours As Needed for Moderate Pain .   Taking   • vitamin B-12 (CYANOCOBALAMIN) 1000 MCG tablet Take 1,000 mcg by mouth Daily.   Taking   • vitamin E 1000 UNIT capsule Take 1,000 Units by mouth Daily.   Taking   • albuterol (PROVENTIL HFA;VENTOLIN HFA) 108 (90 Base) MCG/ACT inhaler Inhale 2 puffs Every 4 (Four) Hours As Needed for Wheezing.   Not Taking   • albuterol (PROVENTIL) (2.5 MG/3ML) 0.083% nebulizer solution Take 2.5 mg by nebulization Every 4 (Four) Hours As Needed for wheezing.   Taking   • Ascorbic Acid (VITAMIN C PO) Take  by mouth Daily.   Not Taking   • cilostazol (PLETAL) 100 MG tablet Pletal 100 mg tablet   1 tab(s) 2 times a day orally #60 Tablet(s) Substitutions Allowed   Not Taking   • magnesium oxide (MAGOX) 400 (241.3 Mg) MG tablet tablet Take 400 mg by mouth Daily.   Not Taking   • predniSONE (DELTASONE) 5 MG tablet prednisone 5 mg tablet   Take 1 tablet every day by oral route.   Not Taking   • Prenatal Vit-Fe Fumarate-FA (MULTI PRENATAL PO) Take  by mouth Daily.   Not Taking   • rivaroxaban (XARELTO) tablet Take 1 tablet by mouth daily.   Not Taking   • VAQTA 50 UNIT/ML injection    Taking   • vitamin E 1000 UNIT capsule Take 1,000 Units by mouth Daily.   Not Taking       Allergies   Allergen Reactions   • Adhesive Tape    • Augmentin [Amoxicillin-Pot Clavulanate]    • Cephalexin    • Metoclopramide Unknown (See Comments)   • Pentazocine    • Simvastatin Unknown (See Comments)   • Sucralfate Unknown (See Comments)       Social History     Socioeconomic History   •  Marital status:      Spouse name: Not on file   • Number of children: Not on file   • Years of education: High School   • Highest education level: Not on file   Occupational History   • Occupation: [employer], retired     Employer: [employer] [place], [place]    Tobacco Use   • Smoking status: Former Smoker     Packs/day: 1.00     Years: 50.00     Pack years: 50.00     Types: Cigarettes     Start date:      Last attempt to quit:      Years since quittin.6   • Smokeless tobacco: Former User   Substance and Sexual Activity   • Alcohol use: No   • Drug use: No   • Sexual activity: Defer       Family History   Problem Relation Age of Onset   • Lung cancer Mother        Physical Exam:  LMP  (LMP Unknown)   There is no height or weight on file to calculate BMI.   General appearance: NAD, conversant   Eyes: anicteric sclerae, moist conjunctivae; no lid-lag; PERRLA  HENT: Atraumatic; oropharynx clear with moist mucous membranes and no mucosal ulcerations; normal hard and soft palate  Neck: Trachea midline; FROM, supple, no thyromegaly or lymphadenopathy  Lungs: Diminished but no wheeze no rhonchi unlabored respiratory effort with normal respiratory effort and no intercostal retractions  CV: RRR, no rub  Abdomen: Soft, non-tender; no masses or HSM  Extremities: No peripheral edema or extremity lymphadenopathy  Skin: Normal temperature, turgor and texture; no rash, ulcers or subcutaneous nodules  Psych: Appropriate affect, alert and oriented to person, place and time    LABS:  Results from last 7 days   Lab Units 19  1545   WBC 10*3/mm3 10.35   HEMOGLOBIN g/dL 8.5*   PLATELETS 10*3/mm3 302     Results from last 7 days   Lab Units 19  1545   MAGNESIUM mg/dL 1.9   CrCl cannot be calculated (Patient's most recent lab result is older than the maximum 30 days allowed.).    Imaging: I personally visualized the images of scans/x-rays performed within last 3 days.  Imaging Results (most recent)      None          Assessment / Recommendations:  Very severe COPD FEV1 36%  History of lung cancer 2009  Chronic hypoxic respiratory failure on her home 3 L  GI bleed  Anemia  History of PE on anticoagulation held now for greater than 24 hours    Her hemoglobin is stable compared to yesterday.  I will leave decision regarding Kcentra to GI and primary however her hemoglobin is relatively stable she is hemodynamically stable.  Spent over 24 hours since her last dose.    Ill place her on brovana, budesonide, and duonebs scheduled while in house to sub for her trelegy.     Cont 3L     Breathing seems stable at her baseline.  She is on her baseline oxygen she has no wheeze.  She has no frequent exacerbations.  She is certainly at increased risk of perioperative pulmonary complications in regards to her endoscopy however I do not believe she has any modifiable pulmonary disease.  She is not wheezing at present shows no signs of acute exacerbations of steroids are not indicated.    I would suggest trying to minimize operative time as best able.    Reviewed outpatient pulmonary function testing last imaging and laboratory values.    D/w Dr Ruiz    Thanks for allowing us to participate in the care of this patient.  LPC is always available to discuss any concerns, questions or to help coordinate the patient's care.          Alirio Alfaro MD  [facility]  09/05/19  5:13 PM

## 2019-09-06 ENCOUNTER — ANESTHESIA (OUTPATIENT)
Dept: GASTROENTEROLOGY | Facility: HOSPITAL | Age: 79
End: 2019-09-06

## 2019-09-06 ENCOUNTER — ANESTHESIA EVENT (OUTPATIENT)
Dept: GASTROENTEROLOGY | Facility: HOSPITAL | Age: 79
End: 2019-09-06

## 2019-09-06 LAB
ANION GAP SERPL CALCULATED.3IONS-SCNC: 13.3 MMOL/L (ref 5–15)
BASOPHILS # BLD AUTO: 0.09 10*3/MM3 (ref 0–0.2)
BASOPHILS NFR BLD AUTO: 0.9 % (ref 0–1.5)
BUN BLD-MCNC: 23 MG/DL (ref 8–23)
BUN/CREAT SERPL: 26.1 (ref 7–25)
CALCIUM SPEC-SCNC: 8.2 MG/DL (ref 8.6–10.5)
CHLORIDE SERPL-SCNC: 98 MMOL/L (ref 98–107)
CO2 SERPL-SCNC: 30.7 MMOL/L (ref 22–29)
CREAT BLD-MCNC: 0.88 MG/DL (ref 0.57–1)
DEPRECATED RDW RBC AUTO: 50.2 FL (ref 37–54)
EOSINOPHIL # BLD AUTO: 0.3 10*3/MM3 (ref 0–0.4)
EOSINOPHIL NFR BLD AUTO: 2.9 % (ref 0.3–6.2)
ERYTHROCYTE [DISTWIDTH] IN BLOOD BY AUTOMATED COUNT: 14.3 % (ref 12.3–15.4)
GFR SERPL CREATININE-BSD FRML MDRD: 62 ML/MIN/1.73
GLUCOSE BLD-MCNC: 122 MG/DL (ref 65–99)
HCT VFR BLD AUTO: 27 % (ref 34–46.6)
HCT VFR BLD AUTO: 27.6 % (ref 34–46.6)
HCT VFR BLD AUTO: 28.6 % (ref 34–46.6)
HCT VFR BLD AUTO: 28.8 % (ref 34–46.6)
HGB BLD-MCNC: 8 G/DL (ref 12–15.9)
HGB BLD-MCNC: 8.4 G/DL (ref 12–15.9)
HGB BLD-MCNC: 8.4 G/DL (ref 12–15.9)
HGB BLD-MCNC: 8.5 G/DL (ref 12–15.9)
IMM GRANULOCYTES # BLD AUTO: 0.05 10*3/MM3 (ref 0–0.05)
IMM GRANULOCYTES NFR BLD AUTO: 0.5 % (ref 0–0.5)
INR PPP: 1.08 (ref 0.9–1.1)
LYMPHOCYTES # BLD AUTO: 2.19 10*3/MM3 (ref 0.7–3.1)
LYMPHOCYTES NFR BLD AUTO: 21.2 % (ref 19.6–45.3)
MAGNESIUM SERPL-MCNC: 2 MG/DL (ref 1.6–2.4)
MCH RBC QN AUTO: 28.7 PG (ref 26.6–33)
MCHC RBC AUTO-ENTMCNC: 29.6 G/DL (ref 31.5–35.7)
MCV RBC AUTO: 96.8 FL (ref 79–97)
MONOCYTES # BLD AUTO: 0.85 10*3/MM3 (ref 0.1–0.9)
MONOCYTES NFR BLD AUTO: 8.2 % (ref 5–12)
NEUTROPHILS # BLD AUTO: 6.84 10*3/MM3 (ref 1.7–7)
NEUTROPHILS NFR BLD AUTO: 66.3 % (ref 42.7–76)
NRBC BLD AUTO-RTO: 0 /100 WBC (ref 0–0.2)
PLATELET # BLD AUTO: 281 10*3/MM3 (ref 140–450)
PMV BLD AUTO: 10.4 FL (ref 6–12)
POTASSIUM BLD-SCNC: 3.8 MMOL/L (ref 3.5–5.2)
PROTHROMBIN TIME: 13.7 SECONDS (ref 11.7–14.2)
RBC # BLD AUTO: 2.79 10*6/MM3 (ref 3.77–5.28)
SODIUM BLD-SCNC: 142 MMOL/L (ref 136–145)
WBC NRBC COR # BLD: 10.32 10*3/MM3 (ref 3.4–10.8)

## 2019-09-06 PROCEDURE — 25010000002 ONDANSETRON PER 1 MG: Performed by: INTERNAL MEDICINE

## 2019-09-06 PROCEDURE — 0DB68ZX EXCISION OF STOMACH, VIA NATURAL OR ARTIFICIAL OPENING ENDOSCOPIC, DIAGNOSTIC: ICD-10-PCS | Performed by: INTERNAL MEDICINE

## 2019-09-06 PROCEDURE — 85018 HEMOGLOBIN: CPT | Performed by: INTERNAL MEDICINE

## 2019-09-06 PROCEDURE — 25010000002 PROPOFOL 10 MG/ML EMULSION: Performed by: ANESTHESIOLOGY

## 2019-09-06 PROCEDURE — 85014 HEMATOCRIT: CPT | Performed by: INTERNAL MEDICINE

## 2019-09-06 PROCEDURE — 93010 ELECTROCARDIOGRAM REPORT: CPT | Performed by: INTERNAL MEDICINE

## 2019-09-06 PROCEDURE — 25010000002 PROPOFOL 1000 MG/ML EMULSION: Performed by: ANESTHESIOLOGY

## 2019-09-06 PROCEDURE — 93005 ELECTROCARDIOGRAM TRACING: CPT | Performed by: INTERNAL MEDICINE

## 2019-09-06 PROCEDURE — 63710000001 DIPHENHYDRAMINE PER 50 MG: Performed by: INTERNAL MEDICINE

## 2019-09-06 PROCEDURE — 45382 COLONOSCOPY W/CONTROL BLEED: CPT | Performed by: INTERNAL MEDICINE

## 2019-09-06 PROCEDURE — 83735 ASSAY OF MAGNESIUM: CPT | Performed by: INTERNAL MEDICINE

## 2019-09-06 PROCEDURE — 85610 PROTHROMBIN TIME: CPT | Performed by: INTERNAL MEDICINE

## 2019-09-06 PROCEDURE — 0DBN8ZX EXCISION OF SIGMOID COLON, VIA NATURAL OR ARTIFICIAL OPENING ENDOSCOPIC, DIAGNOSTIC: ICD-10-PCS | Performed by: INTERNAL MEDICINE

## 2019-09-06 PROCEDURE — 94799 UNLISTED PULMONARY SVC/PX: CPT

## 2019-09-06 PROCEDURE — 88305 TISSUE EXAM BY PATHOLOGIST: CPT | Performed by: INTERNAL MEDICINE

## 2019-09-06 PROCEDURE — 99232 SBSQ HOSP IP/OBS MODERATE 35: CPT | Performed by: INTERNAL MEDICINE

## 2019-09-06 PROCEDURE — 45380 COLONOSCOPY AND BIOPSY: CPT | Performed by: INTERNAL MEDICINE

## 2019-09-06 PROCEDURE — 85025 COMPLETE CBC W/AUTO DIFF WBC: CPT | Performed by: INTERNAL MEDICINE

## 2019-09-06 PROCEDURE — 80048 BASIC METABOLIC PNL TOTAL CA: CPT | Performed by: INTERNAL MEDICINE

## 2019-09-06 PROCEDURE — 0W3P8ZZ CONTROL BLEEDING IN GASTROINTESTINAL TRACT, VIA NATURAL OR ARTIFICIAL OPENING ENDOSCOPIC: ICD-10-PCS | Performed by: INTERNAL MEDICINE

## 2019-09-06 PROCEDURE — 43239 EGD BIOPSY SINGLE/MULTIPLE: CPT | Performed by: INTERNAL MEDICINE

## 2019-09-06 RX ORDER — SODIUM CHLORIDE 0.9 % (FLUSH) 0.9 %
10 SYRINGE (ML) INJECTION AS NEEDED
Status: CANCELLED | OUTPATIENT
Start: 2019-09-06

## 2019-09-06 RX ORDER — ACETAMINOPHEN 325 MG/1
650 TABLET ORAL EVERY 6 HOURS PRN
Status: DISCONTINUED | OUTPATIENT
Start: 2019-09-06 | End: 2019-09-17 | Stop reason: HOSPADM

## 2019-09-06 RX ORDER — PROMETHAZINE HYDROCHLORIDE 25 MG/ML
12.5 INJECTION, SOLUTION INTRAMUSCULAR; INTRAVENOUS ONCE AS NEEDED
Status: DISCONTINUED | OUTPATIENT
Start: 2019-09-06 | End: 2019-09-06 | Stop reason: HOSPADM

## 2019-09-06 RX ORDER — LIDOCAINE HYDROCHLORIDE 20 MG/ML
INJECTION, SOLUTION INFILTRATION; PERINEURAL AS NEEDED
Status: DISCONTINUED | OUTPATIENT
Start: 2019-09-06 | End: 2019-09-06 | Stop reason: SURG

## 2019-09-06 RX ORDER — SODIUM CHLORIDE, SODIUM LACTATE, POTASSIUM CHLORIDE, CALCIUM CHLORIDE 600; 310; 30; 20 MG/100ML; MG/100ML; MG/100ML; MG/100ML
1000 INJECTION, SOLUTION INTRAVENOUS CONTINUOUS
Status: CANCELLED | OUTPATIENT
Start: 2019-09-06

## 2019-09-06 RX ORDER — DIPHENHYDRAMINE HCL 25 MG
50 CAPSULE ORAL NIGHTLY PRN
Status: DISCONTINUED | OUTPATIENT
Start: 2019-09-06 | End: 2019-09-17 | Stop reason: HOSPADM

## 2019-09-06 RX ORDER — PROMETHAZINE HYDROCHLORIDE 25 MG/1
25 TABLET ORAL ONCE AS NEEDED
Status: DISCONTINUED | OUTPATIENT
Start: 2019-09-06 | End: 2019-09-06 | Stop reason: HOSPADM

## 2019-09-06 RX ORDER — SODIUM CHLORIDE 0.9 % (FLUSH) 0.9 %
10 SYRINGE (ML) INJECTION AS NEEDED
Status: DISCONTINUED | OUTPATIENT
Start: 2019-09-06 | End: 2019-09-06

## 2019-09-06 RX ORDER — LIDOCAINE HYDROCHLORIDE 10 MG/ML
0.5 INJECTION, SOLUTION INFILTRATION; PERINEURAL ONCE AS NEEDED
Status: DISCONTINUED | OUTPATIENT
Start: 2019-09-06 | End: 2019-09-06

## 2019-09-06 RX ORDER — ACETAMINOPHEN 500 MG
1000 TABLET ORAL NIGHTLY PRN
Status: DISCONTINUED | OUTPATIENT
Start: 2019-09-06 | End: 2019-09-17 | Stop reason: HOSPADM

## 2019-09-06 RX ORDER — PROPOFOL 10 MG/ML
VIAL (ML) INTRAVENOUS AS NEEDED
Status: DISCONTINUED | OUTPATIENT
Start: 2019-09-06 | End: 2019-09-06 | Stop reason: SURG

## 2019-09-06 RX ORDER — SODIUM CHLORIDE, SODIUM LACTATE, POTASSIUM CHLORIDE, CALCIUM CHLORIDE 600; 310; 30; 20 MG/100ML; MG/100ML; MG/100ML; MG/100ML
1000 INJECTION, SOLUTION INTRAVENOUS CONTINUOUS
Status: DISCONTINUED | OUTPATIENT
Start: 2019-09-06 | End: 2019-09-07

## 2019-09-06 RX ORDER — ONDANSETRON 2 MG/ML
4 INJECTION INTRAMUSCULAR; INTRAVENOUS EVERY 6 HOURS PRN
Status: DISCONTINUED | OUTPATIENT
Start: 2019-09-06 | End: 2019-09-09

## 2019-09-06 RX ORDER — PROMETHAZINE HYDROCHLORIDE 25 MG/1
25 SUPPOSITORY RECTAL ONCE AS NEEDED
Status: DISCONTINUED | OUTPATIENT
Start: 2019-09-06 | End: 2019-09-06 | Stop reason: HOSPADM

## 2019-09-06 RX ORDER — LIDOCAINE HYDROCHLORIDE 10 MG/ML
0.5 INJECTION, SOLUTION INFILTRATION; PERINEURAL ONCE AS NEEDED
Status: CANCELLED | OUTPATIENT
Start: 2019-09-06

## 2019-09-06 RX ADMIN — ACETAMINOPHEN 650 MG: 325 TABLET, FILM COATED ORAL at 02:16

## 2019-09-06 RX ADMIN — DIPHENHYDRAMINE HYDROCHLORIDE 50 MG: 25 CAPSULE ORAL at 21:29

## 2019-09-06 RX ADMIN — PROPOFOL 100 MCG/KG/MIN: 10 INJECTION, EMULSION INTRAVENOUS at 14:43

## 2019-09-06 RX ADMIN — SODIUM CHLORIDE, POTASSIUM CHLORIDE, SODIUM LACTATE AND CALCIUM CHLORIDE 1000 ML: 600; 310; 30; 20 INJECTION, SOLUTION INTRAVENOUS at 13:24

## 2019-09-06 RX ADMIN — SERTRALINE 50 MG: 50 TABLET, FILM COATED ORAL at 17:43

## 2019-09-06 RX ADMIN — BUDESONIDE 0.5 MG: 0.5 SUSPENSION RESPIRATORY (INHALATION) at 08:03

## 2019-09-06 RX ADMIN — PROPAFENONE HYDROCHLORIDE 150 MG: 150 TABLET, COATED ORAL at 06:12

## 2019-09-06 RX ADMIN — LEVOTHYROXINE SODIUM 50 MCG: 50 TABLET ORAL at 06:12

## 2019-09-06 RX ADMIN — ONDANSETRON 4 MG: 2 INJECTION INTRAMUSCULAR; INTRAVENOUS at 20:12

## 2019-09-06 RX ADMIN — ARFORMOTEROL TARTRATE 15 MCG: 15 SOLUTION RESPIRATORY (INHALATION) at 07:56

## 2019-09-06 RX ADMIN — ATORVASTATIN CALCIUM 20 MG: 20 TABLET, FILM COATED ORAL at 20:12

## 2019-09-06 RX ADMIN — POLYETHYLENE GLYCOL-3350 AND ELECTROLYTES WITH FLAVOR PACK 2000 ML: 240; 5.84; 2.98; 6.72; 22.72 POWDER, FOR SOLUTION ORAL at 06:12

## 2019-09-06 RX ADMIN — PROPOFOL 50 MG: 10 INJECTION, EMULSION INTRAVENOUS at 14:43

## 2019-09-06 RX ADMIN — ARFORMOTEROL TARTRATE 15 MCG: 15 SOLUTION RESPIRATORY (INHALATION) at 20:01

## 2019-09-06 RX ADMIN — ACETAMINOPHEN 1000 MG: 500 TABLET, FILM COATED ORAL at 21:28

## 2019-09-06 RX ADMIN — BUDESONIDE 0.5 MG: 0.5 SUSPENSION RESPIRATORY (INHALATION) at 20:01

## 2019-09-06 RX ADMIN — POTASSIUM CHLORIDE, DEXTROSE MONOHYDRATE AND SODIUM CHLORIDE 50 ML/HR: 150; 5; 450 INJECTION, SOLUTION INTRAVENOUS at 01:02

## 2019-09-06 RX ADMIN — PROPOFOL 50 MG: 10 INJECTION, EMULSION INTRAVENOUS at 14:45

## 2019-09-06 RX ADMIN — SODIUM CHLORIDE, PRESERVATIVE FREE 10 ML: 5 INJECTION INTRAVENOUS at 13:24

## 2019-09-06 RX ADMIN — IPRATROPIUM BROMIDE AND ALBUTEROL SULFATE 3 ML: 2.5; .5 SOLUTION RESPIRATORY (INHALATION) at 11:54

## 2019-09-06 RX ADMIN — LIDOCAINE HYDROCHLORIDE 60 MG: 20 INJECTION, SOLUTION INFILTRATION; PERINEURAL at 14:43

## 2019-09-06 RX ADMIN — PROPOFOL 50 MG: 10 INJECTION, EMULSION INTRAVENOUS at 14:48

## 2019-09-06 RX ADMIN — CARVEDILOL 25 MG: 25 TABLET, FILM COATED ORAL at 17:43

## 2019-09-06 RX ADMIN — POTASSIUM CHLORIDE 20 MEQ: 1.5 POWDER, FOR SOLUTION ORAL at 17:43

## 2019-09-06 NOTE — INTERVAL H&P NOTE
H&P reviewed. The patient was examined and there are no changes to the H&P.         .     [facility]– [place]         Critical Care Progress Note    Patient: Berto Davila Date of Service: 4/14/2019   male, 46 year old  Admit Date: 4/12/2019   Attending: Any Lomas MD      Patient Description:  46 year old year old male admitted with ALTERED MENTAL STATUS      ICU Interval History  Berto Davila is a 46 year old male who presents with the reported history that he was found unresponsive. His parents tell me that he has history of severe alcoholism for at least 7 years and when he drinks daily he sometimes does not eat well. He also smokes heavily. He has history of seizures in the past. No suggestion by friends, EMS, or ED staff at [facility] for seizure-like activity.  There was no mention of any recent head trauma.  CT scan showed an old right cerebellar lesion and no acute appearing abnormalities.  Seen by Dr. Lorenzo Neurology for Altered mental status yesterday.      13 Apr 18  Pt remains sedated, intubated, and mechanically ventilated.  Lactate improved    24 HOUR Summary:  Pt loaded with valproic acid overnight for seizure activity.  Remains sedated on ventilator.  Followed by Neurology for AMS/Seizure disorder.  Ketonuria persists.  IVF increased to 125.  Heparin held for hematuria. Continue restraints    PHYSICAL EXAM    Vital 24 Hour Range Most Recent Value   Temperature Temp  Min: 97.4 °F (36.3 °C)  Max: 98.3 °F (36.8 °C) 98.3 °F (36.8 °C)   Pulse Pulse  Min: 53  Max: 101 53   Respiratory Resp  Min: 10  Max: 16 10   Blood Pressure BP  Min: 85/53  Max: 130/79 98/58   Pulse Oximetry SpO2  Min: 98 %  Max: 100 % 100 %   Art. BP No data recorded     O2 No data recorded       Vital Most Recent Value First Value   Weight 70.4 kg Weight: 65.5 kg   Height 5' 5\" (165.1 cm) Height: 5' 5\" (165.1 cm)   BMI 25.83 N/A     Examination:  Appearence: 46 year old year old male who appears sedated.   Neurologic:  Sedated, not currently seizing.   HEENT: Pupils equal and Sclera  nonicteric  Neck:  Supple, Non-tender and JVD absent  Chest:  Symmetric and Nontender  Lungs:  Clear to auscultation bilaterally  Heart:  Regular rate and rhythm and S1, S2 present  Abdomen: Soft, Bowel sounds present and Nontender  Extremities: Edema absent    Busby catheter, PIV x 3.     Imaging:         ABGs:  Recent Labs   Lab 04/13/19  2211 04/13/19  1015 04/13/19  0452   APH 7.40 7.48* 7.46*   APCO2 42 33* 35   APO2 114* 150* 147*   AHCO3 26 24 24       Laboratory Results:  Recent Labs   Lab 04/14/19  0751 04/14/19  0545 04/13/19 2000 04/13/19  1416 04/13/19  0907 04/13/19  0353 04/12/19  1940 04/12/19  1115   SODIUM  --  144  --   --   --  142 141 142   POTASSIUM  --  3.5  --  4.8 3.9 3.6 3.0* 3.1*   CHLORIDE  --  109*  --   --   --  108* 104 99   CO2  --  27  --   --   --  24 25 26   BUN  --  6  --   --   --  11 13 10   CREATININE  --  0.56*  --   --   --  0.66* 0.80 0.77   GLUCOSE  --  94  --   --   --  88 170* 153*   PHOS  --  3.0 2.8  --   --  1.8*  --   --    AST  --  46*  --   --   --  18 18 22   GPT  --  18  --   --   --  17 21 24   BILIRUBIN  --  0.2  --   --   --  0.3 0.3 0.4   WBC  --  3.7*  --   --   --  10.8  --  3.9*   HGB 11.9* 11.3* 12.2* 12.7*  --  12.7*  --  17.2*   HCT  --  35.0*  --   --   --  37.0*  --  48.3   PLT  --  101*  --   --   --  149  --  275   PT  --   --   --   --   --   --   --  10.4   INR  --   --   --   --   --   --   --  1.0   PTT  --   --   --   --   --   --   --  25            CURRENT MEDICATIONS  • folic acid  1 mg Intravenous Daily   • pantoprazole  40 mg Intravenous 2 times per day   • thiamine  100 mg Per NG tube Daily   • vitamin - therapeutic multivitamins w/minerals  1 tablet Per NG tube Daily   • levothyroxine  100 mcg Per NG tube QA AC   • valproate sodium (DEPACON) MAINTENANCE IVPB  250 mg Intravenous 3 times per day   • VALPROIC ACID - PHARMACIST MONITORED   Does not apply See Admin Instructions   • sodium chloride (PF)  2 mL Injection 2 times per day           CONTINUOUS INFUSIONS  • propofol (DIPRIVAN) infusion 40 mcg/kg/min (04/14/19 0622)   • dextrose 5 % / sodium chloride 0.45% infusion 75 mL/hr at 04/14/19 0753   • sodium chloride 0.9% infusion     • sodium chloride 0.9% infusion     • MIDAZolam (VERSED) 100 mg in sodium chloride 0.9% 100 mL infusion 10 mg/hr (04/14/19 0108)   • dextrose 5 % infusion     • fentaNYL 1000 mcg in sodium chloride 0.9% 100 mL infusion premix 25 mcg/hr (04/13/19 1201)       Plan:  Continue supportive and ventilator care.  Myoclonic activity: Continue propofol as written.  Case d/w Dr. Lorenzo Neurology.  Will obtain continuous EEG.   Hematuria appears to be improved.  Will resume low dose SQ hep  Ketosis: Increase IVF to 125ml/hr  Increase TF to 25 ml/hr.   DKA protocol and D51/2 NS infusion for Case discussed with Dr. Rueda.    Hypothyroidism:  Continue synthroid per preop regimen    MAINTENANCE THERAPIES  DVT prophylaxis:  SCDs, SQ hep 5000 tid.   GI prophylaxis: Protonix  Nutrition:  Tube feedings, static dosing  Sedation Holiday: Unable to perform today due to medical contraindications plan for tomorrow.     I have personally examined the patient and reviewed all pertinent data. Evaluation and management 37  time was  minutes.  This does not include time spent in procedures and teaching.  The patient's cares and treatment plan were discussed with Patient/Family, Nursing, Pharmacist and Intensivist.    Bucky Felix PA-C  4/14/2019  9:03 AM    Critical Care Staff:  I have seen and examined the patient. I agree with the PA assessment and plan. Medical decision making by me. Patient continues to have intermittent convulsive appearing activity involving eyes, upper ext and bucking the vent per RN. No such activity was noted by me. Wonder if those movements represent myoclonic jerks. No report of GPEDs/PLEDs on EEG yet. He is on large doze midazolam, propofol and was loaded with valproic acid overnight. He is placed on EEG and  neurology is following.  Appreciate Neurology input. Will discuss need for repeat imaging.     Will continue to monitor    He is on appropriate prophylaxis while watching for hematuria    Full code    I personally spent 32 min of Critical care time.    Any Lomas MD

## 2019-09-06 NOTE — ANESTHESIA POSTPROCEDURE EVALUATION
"Patient: Ale Narayan    Procedure Summary     Date:  09/06/19 [facility]    Anesthesia Start:  1436 Anesthesia Stop:  1550    Procedures:       COLONOSCOPY to cecum with biopsy / polypectomy and  APC cautery (N/A )      ESOPHAGOGASTRODUODENOSCOPY with biopsies (N/A Esophagus) Diagnosis:       Blood loss anemia      Rectal bleeding      (Blood loss anemia [D50.0])      (Rectal bleeding [K62.5])    Surgeon:  Alexsandra Ruiz MD Provider:  Gunnar Bernstein MD    Anesthesia Type:  MAC ASA Status:  3          Anesthesia Type: MAC  Last vitals  BP   178/79 (09/06/19 1316)   Temp   36.3 °C (97.3 °F) (09/06/19 0748)   Pulse   63 (09/06/19 1310)   Resp   21 (09/06/19 1310)     SpO2   99 % (09/06/19 1316)     Post Anesthesia Care and Evaluation    Level of consciousness: awake  Pain management: adequate  Airway patency: patent  Anesthetic complications: No anesthetic complications    Cardiovascular status: acceptable  Respiratory status: acceptable  Hydration status: acceptable    Comments: /79 (BP Location: Right arm, Patient Position: Lying)   Pulse 63   Temp 36.3 °C (97.3 °F) (Oral)   Resp 21   Ht 157.5 cm (62\")   LMP  (LMP Unknown)   SpO2 99%   BMI 32.45 kg/m²         "

## 2019-09-06 NOTE — PLAN OF CARE
Problem: Patient Care Overview  Goal: Plan of Care Review  Outcome: Ongoing (interventions implemented as appropriate)   09/06/19 0621   Coping/Psychosocial   Plan of Care Reviewed With patient   Plan of Care Review   Progress no change   OTHER   Outcome Summary pt hgb remaining stable so far. Having EGD/C-scope today. Denies dizziness/lightheadedness. ALLISON. Will closely monitor.       Problem: Fall Risk (Adult)  Goal: Absence of Fall  Outcome: Ongoing (interventions implemented as appropriate)      Problem: Anemia (Adult)  Goal: Symptom Improvement  Outcome: Ongoing (interventions implemented as appropriate)      Problem: Gastrointestinal Bleeding (Adult)  Goal: Signs and Symptoms of Listed Potential Problems Will be Absent, Minimized or Managed (Gastrointestinal Bleeding)  Outcome: Ongoing (interventions implemented as appropriate)

## 2019-09-06 NOTE — PLAN OF CARE
Problem: Patient Care Overview  Goal: Plan of Care Review  Outcome: Ongoing (interventions implemented as appropriate)   09/05/19 2021   Coping/Psychosocial   Plan of Care Reviewed With patient   Plan of Care Review   Progress no change   OTHER   Outcome Summary Direct admit for GI bleeding, anemia. on 2-3L o2, ALLISON. To get bowel prep this p.m. for EGD/C-scope tomorrow. H/H Q8. Will closely monitor.       Problem: Fall Risk (Adult)  Goal: Identify Related Risk Factors and Signs and Symptoms  Outcome: Outcome(s) achieved Date Met: 09/05/19    Goal: Absence of Fall  Outcome: Ongoing (interventions implemented as appropriate)      Problem: Anemia (Adult)  Goal: Identify Related Risk Factors and Signs and Symptoms  Outcome: Outcome(s) achieved Date Met: 09/05/19    Goal: Symptom Improvement  Outcome: Ongoing (interventions implemented as appropriate)      Problem: Gastrointestinal Bleeding (Adult)  Goal: Signs and Symptoms of Listed Potential Problems Will be Absent, Minimized or Managed (Gastrointestinal Bleeding)  Outcome: Ongoing (interventions implemented as appropriate)      Problem: Skin Injury Risk (Adult)  Goal: Identify Related Risk Factors and Signs and Symptoms  Outcome: Outcome(s) achieved Date Met: 09/05/19    Goal: Skin Health and Integrity  Outcome: Ongoing (interventions implemented as appropriate)

## 2019-09-06 NOTE — PROGRESS NOTES
"  Daily Progress Note.   [address]  9/6/2019    Patient:  Name:  Ale Narayan  MRN:  [id number]  1940  78 y.o.  female         CC:bms dark    Interval History:  Follow-up for severe COPD and chronic hypoxia.  Patient denies any worsening shortness of breath.  No cough no significant sputum production.  No hemoptysis overnight.  No chest tightness no wheeze.  She feels her breathing is at her baseline.  Bowel movements have cleared for her overnight.  ROS: No fever, no diarrhea, no chest pain  PMFSSH: no change    Physical Exam:  /42 (BP Location: Right arm, Patient Position: Sitting)   Pulse 57   Temp 97.3 °F (36.3 °C) (Oral)   Resp 18   Ht 157.5 cm (62\")   LMP  (LMP Unknown)   SpO2 95%   BMI 32.45 kg/m²   Body mass index is 32.45 kg/m².    Intake/Output Summary (Last 24 hours) at 9/6/2019 1003  Last data filed at 9/5/2019 1645  Gross per 24 hour   Intake --   Output 300 ml   Net -300 ml   General appearance: NAD, conversant   Eyes: anicteric sclerae, moist conjunctivae; no lid-lag; PERRLA  HENT: Atraumatic; oropharynx clear with moist mucous membranes and no mucosal ulcerations; normal hard and soft palate  Neck: Trachea midline; FROM, supple, no thyromegaly or lymphadenopathy  Lungs: Diminished but no wheeze no rhonchi unlabored respiratory effort with normal respiratory effort and no intercostal retractions  CV: RRR, no rub  Abdomen: Soft, non-tender; no masses or HSM  Extremities: No peripheral edema or extremity lymphadenopathy  Skin: Normal temperature, turgor and texture; no rash, ulcers or subcutaneous nodules  Psych: Appropriate affect, alert and oriented to person, place and time    Data Review:  Notable Labs:  Results from last 7 days   Lab Units 09/06/19  0755 09/06/19  0351 09/06/19  0014 09/05/19  1545   WBC 10*3/mm3  --  10.32  --  10.35   HEMOGLOBIN g/dL 8.5* 8.0* 8.4* 8.5*   PLATELETS 10*3/mm3  --  281  --  302     Results from last 7 days   Lab Units " 09/06/19  0351 09/05/19  1545   SODIUM mmol/L 142  --    POTASSIUM mmol/L 3.8  --    CHLORIDE mmol/L 98  --    CO2 mmol/L 30.7*  --    BUN mg/dL 23  --    CREATININE mg/dL 0.88  --    GLUCOSE mg/dL 122*  --    CALCIUM mg/dL 8.2*  --    MAGNESIUM mg/dL 2.0 1.9   CrCl cannot be calculated (Unknown ideal weight.).    Imaging:  Reviewed chest images personally from past 3 days    Scheduled meds:      arformoterol 15 mcg Nebulization BID - RT   atorvastatin 20 mg Oral Nightly   budesonide 0.5 mg Nebulization BID - RT   carvedilol 25 mg Oral BID With Meals   ipratropium-albuterol 3 mL Nebulization 4x Daily - RT   levothyroxine 50 mcg Oral Q AM   pantoprazole 40 mg Oral Q AM   potassium chloride 20 mEq Oral Daily   propafenone 150 mg Oral Q8H   sertraline 50 mg Oral Daily       ASSESSMENT  /  PLAN:  Very severe COPD FEV1 36%  History of lung cancer 2009  Chronic hypoxic respiratory failure on her home 3 L  GI bleed  Anemia  History of PE on anticoagulation held now for greater than 24 hours    Cont brovana  Cont neb budesonide  Cont duonebs scheduled  Encourage IS  Cont 3L home oxygen  gib investigations per gi/primary    D/w Dr Ruiz last night, Dr Payne this am.    From perioperative risk perspective:  Breathing seems stable at her baseline.  She is on her baseline oxygen she has no wheeze.  She has no frequent exacerbations.  She is certainly at increased risk of perioperative pulmonary complications in regards to her endoscopy however I do not believe she has any modifiable pulmonary disease.  She is not wheezing at present shows no signs of acute exacerbations of steroids are not indicated.     I would suggest trying to minimize operative time as best able    Alirio Alfaro MD  [facility]  09/06/19  10:03 AM

## 2019-09-06 NOTE — ANESTHESIA PREPROCEDURE EVALUATION
Anesthesia Evaluation                  Airway   Mallampati: II  Dental      Pulmonary - normal exam   (+) a smoker Former, lung cancer, COPD, asthma,   Cardiovascular - normal exam    (+) hypertension, dysrhythmias Paroxysmal Atrial Fib, PVD, DVT, hyperlipidemia,       Neuro/Psych  (+) psychiatric history,     GI/Hepatic/Renal/Endo    (+)   hypothyroidism,     Musculoskeletal     Abdominal    Substance History      OB/GYN          Other                        Anesthesia Plan    ASA 3     MAC     Anesthetic plan, all risks, benefits, and alternatives have been provided, discussed and informed consent has been obtained with: patient.

## 2019-09-06 NOTE — PROGRESS NOTES
History:  This is a 78-year-old lady is resting comfortably today.  He is having no abdominal pain.  No diarrhea.  She is having no chest pain.  She is having no shortness of air she has had some dysrhythmia.  Her EKG on admission was either atrial fibrillation controlled ventricular rate or junctional rhythm.  EKG this morning looks like a junctional rhythm.  Cardiology has been consulted.  Patient is scheduled for panendoscopy to evaluate her GI bleed and anemia.    Allergies  Adhesive tape; Augmentin [amoxicillin-pot clavulanate]; Cephalexin; Metoclopramide; Pentazocine; Simvastatin; and Sucralfate      Current Facility-Administered Medications:   •  acetaminophen (TYLENOL) tablet 650 mg, 650 mg, Oral, Q6H PRN, Harmeet Payne MD, 650 mg at 09/06/19 0216  •  arformoterol (BROVANA) nebulizer solution 15 mcg, 15 mcg, Nebulization, BID - RT, Alirio Alfaro MD, 15 mcg at 09/06/19 0756  •  atorvastatin (LIPITOR) tablet 20 mg, 20 mg, Oral, Nightly, Chet Payne Jr., MD, 20 mg at 09/05/19 2108  •  budesonide (PULMICORT) nebulizer solution 0.5 mg, 0.5 mg, Nebulization, BID - RT, Chet Payne Jr., MD, 0.5 mg at 09/06/19 0803  •  carvedilol (COREG) tablet 25 mg, 25 mg, Oral, BID With Meals, Chet Payne Jr., MD, 25 mg at 09/05/19 2209  •  dextrose 5 % and sodium chloride 0.45 % with KCl 20 mEq/L infusion, 50 mL/hr, Intravenous, Continuous, Harmeet Payne MD, Last Rate: 50 mL/hr at 09/06/19 0102, 50 mL/hr at 09/06/19 0102  •  ipratropium-albuterol (DUO-NEB) nebulizer solution 3 mL, 3 mL, Nebulization, 4x Daily - RT, Chet Payne Jr., MD, 3 mL at 09/05/19 1729  •  levothyroxine (SYNTHROID, LEVOTHROID) tablet 50 mcg, 50 mcg, Oral, Q AM, Chet Payne Jr., MD, 50 mcg at 09/06/19 0612  •  pantoprazole (PROTONIX) EC tablet 40 mg, 40 mg, Oral, Q AM, Harmeet Payne MD  •  potassium chloride (KLOR-CON) packet 20 mEq, 20 mEq, Oral, Daily, Chet Payne Jr., MD  •  propafenone (RYTHMOL)  "tablet 150 mg, 150 mg, Oral, Q8H, Harmeet Payne MD, 150 mg at 09/06/19 0612  •  sertraline (ZOLOFT) tablet 50 mg, 50 mg, Oral, Daily, Chet Payne Jr., MD  •  zolpidem (AMBIEN) tablet 5 mg, 5 mg, Oral, Nightly PRN, Harmeet Payne MD    /42 (BP Location: Right arm, Patient Position: Sitting)   Pulse 57   Temp 97.3 °F (36.3 °C) (Oral)   Resp 18   Ht 157.5 cm (62\")   LMP  (LMP Unknown)   SpO2 95%   BMI 32.45 kg/m²     Physical Exam  Without change from admission    Lab Results (last 24 hours)     Procedure Component Value Units Date/Time    Basic Metabolic Panel [[id number]]  (Abnormal) Collected:  09/06/19 0351    Specimen:  Blood Updated:  09/06/19 0538     Glucose 122 mg/dL      BUN 23 mg/dL      Creatinine 0.88 mg/dL      Sodium 142 mmol/L      Potassium 3.8 mmol/L      Chloride 98 mmol/L      CO2 30.7 mmol/L      Calcium 8.2 mg/dL      eGFR Non African Amer 62 mL/min/1.73      BUN/Creatinine Ratio 26.1     Anion Gap 13.3 mmol/L     Narrative:       GFR Normal >60  Chronic Kidney Disease <60  Kidney Failure <15    Magnesium [[id number]]  (Normal) Collected:  09/06/19 0351    Specimen:  Blood Updated:  09/06/19 0447     Magnesium 2.0 mg/dL     Protime-INR [[id number]]  (Normal) Collected:  09/06/19 0351    Specimen:  Blood Updated:  09/06/19 0440     Protime 13.7 Seconds      INR 1.08    CBC Auto Differential [[id number]]  (Abnormal) Collected:  09/06/19 0351    Specimen:  Blood Updated:  09/06/19 0428     WBC 10.32 10*3/mm3      RBC 2.79 10*6/mm3      Hemoglobin 8.0 g/dL      Hematocrit 27.0 %      MCV 96.8 fL      MCH 28.7 pg      MCHC 29.6 g/dL      RDW 14.3 %      RDW-SD 50.2 fl      MPV 10.4 fL      Platelets 281 10*3/mm3      Neutrophil % 66.3 %      Lymphocyte % 21.2 %      Monocyte % 8.2 %      Eosinophil % 2.9 %      Basophil % 0.9 %      Immature Grans % 0.5 %      Neutrophils, Absolute 6.84 10*3/mm3      Lymphocytes, Absolute 2.19 10*3/mm3      Monocytes, Absolute 0.85 10*3/mm3      " Eosinophils, Absolute 0.30 10*3/mm3      Basophils, Absolute 0.09 10*3/mm3      Immature Grans, Absolute 0.05 10*3/mm3      nRBC 0.0 /100 WBC     Hemoglobin & Hematocrit, Blood [[id number]]  (Abnormal) Collected:  09/06/19 0014    Specimen:  Blood from Arm, Right Updated:  09/06/19 0024     Hemoglobin 8.4 g/dL      Hematocrit 27.6 %     Urinalysis With Microscopic If Indicated (No Culture) - Urine, Clean Catch [[id number]]  (Normal) Collected:  09/05/19 1615    Specimen:  Urine, Clean Catch Updated:  09/05/19 1646     Color, UA Yellow     Appearance, UA Clear     pH, UA <=5.0     Specific Gravity, UA 1.013     Glucose, UA Negative     Ketones, UA Negative     Bilirubin, UA Negative     Blood, UA Negative     Protein, UA Negative     Leuk Esterase, UA Negative     Nitrite, UA Negative     Urobilinogen, UA 0.2 E.U./dL    Narrative:       Urine microscopic not indicated.    Magnesium [[id number]]  (Normal) Collected:  09/05/19 1545    Specimen:  Blood from Arm, Right Updated:  09/05/19 1616     Magnesium 1.9 mg/dL     CBC Auto Differential [[id number]]  (Abnormal) Collected:  09/05/19 1545    Specimen:  Blood from Arm, Right Updated:  09/05/19 1558     WBC 10.35 10*3/mm3      RBC 2.95 10*6/mm3      Hemoglobin 8.5 g/dL      Hematocrit 28.7 %      MCV 97.3 fL      MCH 28.8 pg      MCHC 29.6 g/dL      RDW 14.2 %      RDW-SD 49.6 fl      MPV 9.7 fL      Platelets 302 10*3/mm3      Neutrophil % 73.7 %      Lymphocyte % 16.5 %      Monocyte % 6.4 %      Eosinophil % 2.0 %      Basophil % 1.0 %      Immature Grans % 0.4 %      Neutrophils, Absolute 7.63 10*3/mm3      Lymphocytes, Absolute 1.71 10*3/mm3      Monocytes, Absolute 0.66 10*3/mm3      Eosinophils, Absolute 0.21 10*3/mm3      Basophils, Absolute 0.10 10*3/mm3      Immature Grans, Absolute 0.04 10*3/mm3      nRBC 0.0 /100 WBC         EKG on admission was read as atrial fibrillation rate of about 63.  Left bundle branch block.  I am not sure if it did not represent  first-degree AV block or junctional rhythm.  The rhythm was very regular.    EKG this morning is a junctional rhythm with a rate in the mid 40s.  The patient still has the left bundle branch block.    Imp:  1.  GI bleed.  Panendoscopy as planned.  2.  Anemia.  Hemoglobin is 8.  The patient is having no symptoms from the anemia  4.  History of atrial fib, cardiomyopathy and atherosclerotic vascular disease.  Patient has a supraventricular dysrhythmia      Plan:  I will transfuse if her hemoglobin drops below 8.  I am going to have cardiology see this patient.  I reviewed her old records.  She had a Zio patch in the past which demonstrated similar dysrhythmia.  Ejection fraction on last echocardiogram was about 40  Hopefully she can get the endoscopy soon    Harmeet Payne MD  9/6/2019  8:10 AM

## 2019-09-06 NOTE — CONSULTS
Patient Name: Ale Narayan  Age/Sex: 78 y.o. female  : 1940  MRN: [id number]    Date of Admission: 2019  Date of Encounter Visit: 19  Encounter Provider: Flavio Del Rosario MD  Place of Service: [facility] CARDIOLOGY      Referring Provider: Harmeet Payne MD  Patient Care Team:  Harmeet Payne MD as PCP - General  Harmeet Payne MD as PCP - Claims Attributed  Yoan Cash MD as Consulting Physician (Hematology and Oncology)  Devon Lamb MD as Referring Physician (Thoracic Surgery)    Subjective:   Consulted for: Atrial fibrillation     Chief Complaint: Anemia    History of Present Illness:  Ale Narayan is a 78 y.o. female patient of mine with a history of PAF (on Xarelto), hypertension, hyperlipidemia, PE/DVT (IVC filter), COPD, and lung cancer (s/p resection ).    She has had issues with atrial fibrillation for a while and has been on Rhythmol. At her last office visit in 2017 she was in NSR. She denied any chest pain or palpitations at that time.  Previously it was thought that she might have a Takotsubo cardiomyopathy.  Her echocardiogram however did not seem to fit the exact picture.  She has been treated for dilated cardiomyopathy.    She was admitted to the hospital yesterday after seeing her PCP and complaining of bloody stool and occasional black stools since . Her stool was heme positive and her Hgb 8.5.  She was admitted for further evaluation. EKG on arrival showed atrial fibrillation with controlled rate of 60's.  Her Xarelto has been stopped (none since Wed).  GI has been consulted. There is plan for scope today.     We have been consulted for her atrial fibrillation.  At times her telemetry appears to be NSR alternating with atrial fibrillation. She is on her home Coreg and Rhythmol.           Previous Cardiac Testing  ECHO 03/15/2017  · Right ventricular cavity is mildly dilated.  · Mildly reduced right  ventricular systolic function noted.  · Left ventricular wall thickness is consistent with borderline concentric hypertrophy.  · Left ventricular function is mildly decreased. Estimated EF = 40%.  · Mild mitral valve regurgitation is present  · Mild aortic valve stenosis is present.    ZIO patch 04/15/2017  Study Findings No symptoms reported during the monitoring period. No complications noted. The predominant rhythm noted during the testing period was sinus rhythm. Premature atrial contractions occured rarely. There were two episodes of supraventricular tachycardia. The peak heart rate was 163 beats per minute. Longest run of SVT was 12.2 seconds at a rate of 163 bpm. the other one was only 5 beats long.. Premature ventricular contractions occured rarely. Ventricular couplets. There were no episodes of ventricular tachycardia. Sinoatrial node conduction was normal. No atrioventricular block noted.   Study Impressions An abnormal monitor study. Preliminary Findings  Patient had a min HR of 40 bpm, max HR of 184 bpm, and avg HR of 68 bpm.  Predominant underlying rhythm was Sinus Rhythm. First Degree AV Block was  present. Bundle Branch Block/IVCD was present. 2 Ventricular Tachycardia runs  occurred, the run with the fastest interval lasting 12.2 secs with a max rate of 184  bpm (avg 163 bpm); the run with the fastest interval was also the longest. Possible  Junctional Rhythm was present. Difficulty discerning atrial activity making definitive  diagnosis difficult to ascertain. Isolated SVEs were rare (<1.0%), SVE Couplets were  rare (<1.0%), and SVE Triplets were rare (<1.0%). Isolated VEs were rare (<1.0%),  VE Couplets were rare (<1.0%), and no VE Triplets were present. Ventricular  Trigeminy was present.           Past Medical History:  Past Medical History:   Diagnosis Date   • Adenocarcinoma of lung (CMS/HCC)    • Asthma    • Carotid artery disease (CMS/HCC)    • Cough    • Deep vein thrombosis (CMS/HCC)    •  Emphysema of lung (CMS/HCC)    • Hyperlipidemia    • Hypertension    • Hypothyroidism    • Lung cancer (CMS/HCC)    • Osteoporosis    • Paroxysmal atrial fibrillation (CMS/HCC)    • Peripheral arterial disease (CMS/HCC)    • Pulmonary embolism (CMS/HCC)    • Thrombophilia (CMS/HCC)     Since age 25       Past Surgical History:   Procedure Laterality Date   • ANKLE SURGERY     • BREAST SURGERY  2015    removal of benign melanoma spot on nipple of left breast 7/2013   • COLONOSCOPY      Negative   • HERNIA REPAIR      Lumbar spine   • HYSTERECTOMY     • LUNG LOBECTOMY  2009   • OTHER SURGICAL HISTORY      Bypass of lower extremities   • OTHER SURGICAL HISTORY      IVC filter placed       Home Medications:   Medications Prior to Admission   Medication Sig Dispense Refill Last Dose   • Albuterol Sulfate (PROAIR HFA IN) ProAir HFA   2 puffs every 4 hours as needed   Taking   • ascorbic acid (EQL VITAMIN C) 1000 MG tablet Take 1,000 mg by mouth Daily.   Taking   • atorvastatin (LIPITOR) 20 MG tablet Take 10 mg by mouth Every Evening.   Taking   • carvedilol (COREG) 25 MG tablet Take 25 mg by mouth 2 (Two) Times a Day With Meals.      • cholecalciferol (VITAMIN D3) 1000 units tablet Take 1,000 Units by mouth Daily.      • cilostazol (PLETAL) 100 MG tablet Take 1 tablet by mouth 2 (two) times a day.   Taking   • Diphenhydramine-APAP, sleep, (TYLENOL PM EXTRA STRENGTH PO) Tylenol PM   Substitutions-   Taking   • esomeprazole (nexIUM) 40 MG capsule Take 20 mg by mouth Every Morning Before Breakfast.      • ferrous sulfate 325 (65 FE) MG tablet TAKE ONE TABLET BY MOUTH EVERY OTHER DAY 30 tablet 2    • furosemide (LASIX) 20 MG tablet Take 20 mg by mouth 2 (Two) Times a Day.      • O2 (OXYGEN) Inhale As Needed. 2 1/2 liter   Taking   • Omega-3 Fatty Acids (FISH OIL) 1000 MG capsule capsule Take  by mouth Daily With Breakfast.   Taking   • potassium chloride (MICRO-K) 10 MEQ CR capsule Take 20 mEq by mouth Daily.      •  propafenone (RYTHMOL) 150 MG tablet Take 150 mg by mouth Every 8 (Eight) Hours.   Taking   • rivaroxaban (XARELTO) 20 MG tablet Xarelto 20 mg tablet   1 tab(s) orally once a day #30 Tablet(s) Substitutions Allowed   9/3/2019   • sertraline (ZOLOFT) 50 MG tablet Take 50 mg by mouth Daily.      • SYNTHROID 25 MCG tablet Take 50 mcg by mouth Daily.   Taking   • traMADol (ULTRAM) 50 MG tablet 50 mg Every 6 (Six) Hours As Needed for Moderate Pain .   Taking   • vitamin B-12 (CYANOCOBALAMIN) 1000 MCG tablet Take 1,000 mcg by mouth Daily.   Taking   • vitamin E 1000 UNIT capsule Take 1,000 Units by mouth Daily.   Taking   • albuterol (PROVENTIL HFA;VENTOLIN HFA) 108 (90 Base) MCG/ACT inhaler Inhale 2 puffs Every 4 (Four) Hours As Needed for Wheezing.   Not Taking   • albuterol (PROVENTIL) (2.5 MG/3ML) 0.083% nebulizer solution Take 2.5 mg by nebulization Every 4 (Four) Hours As Needed for wheezing.   Taking   • Ascorbic Acid (VITAMIN C PO) Take  by mouth Daily.   Not Taking   • cilostazol (PLETAL) 100 MG tablet Pletal 100 mg tablet   1 tab(s) 2 times a day orally #60 Tablet(s) Substitutions Allowed   Not Taking   • magnesium oxide (MAGOX) 400 (241.3 Mg) MG tablet tablet Take 400 mg by mouth Daily.   Not Taking   • predniSONE (DELTASONE) 5 MG tablet prednisone 5 mg tablet   Take 1 tablet every day by oral route.   Not Taking   • Prenatal Vit-Fe Fumarate-FA (MULTI PRENATAL PO) Take  by mouth Daily.   Not Taking   • rivaroxaban (XARELTO) tablet Take 1 tablet by mouth daily.   Not Taking   • VAQTA 50 UNIT/ML injection    Taking   • vitamin E 1000 UNIT capsule Take 1,000 Units by mouth Daily.   Not Taking       Allergies:  Allergies   Allergen Reactions   • Adhesive Tape    • Augmentin [Amoxicillin-Pot Clavulanate]    • Cephalexin    • Metoclopramide Unknown (See Comments)   • Pentazocine    • Simvastatin Unknown (See Comments)   • Sucralfate Unknown (See Comments)       Past Social History:  Social History     Socioeconomic  History   • Marital status:      Spouse name: Not on file   • Number of children: Not on file   • Years of education: High School   • Highest education level: Not on file   Occupational History   • Occupation: , retired     Employer: [employer] [place], [place]    Tobacco Use   • Smoking status: Former Smoker     Packs/day: 1.00     Years: 50.00     Pack years: 50.00     Types: Cigarettes     Start date:      Last attempt to quit:      Years since quittin.6   • Smokeless tobacco: Former User   Substance and Sexual Activity   • Alcohol use: No   • Drug use: No   • Sexual activity: Defer        Past Family History:  Family History   Problem Relation Age of Onset   • Lung cancer Mother        Review of Systems: All systems reviewed. Pertinent positives identified in HPI. All other systems are negative.     REVIEW OF SYSTEMS:   CONSTITUTIONAL: No weight loss, fever, chills, weakness or fatigue.   HEENT: Eyes: No visual loss, blurred vision, double vision or yellow sclerae. Ears, Nose, Throat: No hearing loss, sneezing, congestion, runny nose or sore throat.   SKIN: No rash or itching.     RESPIRATORY: No shortness of breath, hemoptysis, cough or sputum.   GASTROINTESTINAL: No anorexia, nausea, vomiting or diarrhea. No abdominal pain, bright red blood per rectum or melena.  GENITOURINARY: No burning on urination, hematuria or increased frequency.  NEUROLOGICAL: No headache, dizziness, syncope, paralysis, ataxia, numbness or tingling in the extremities. No change in bowel or bladder control.   MUSCULOSKELETAL: No muscle, back pain, joint pain or stiffness.   HEMATOLOGIC: No anemia, bleeding or bruising.   LYMPHATICS: No enlarged nodes. No history of splenectomy.   PSYCHIATRIC: No history of depression, anxiety, hallucinations.   ENDOCRINOLOGIC: No reports of sweating, cold or heat intolerance. No polyuria or polydipsia.       Objective:     Objective:  Temp:  [97.3 °F (36.3 °C)-97.8  °F (36.6 °C)] 97.3 °F (36.3 °C)  Heart Rate:  [56-64] 57  Resp:  [18-24] 18  BP: (126-147)/(42-60) 136/42    Intake/Output Summary (Last 24 hours) at 9/6/2019 1156  Last data filed at 9/5/2019 1645  Gross per 24 hour   Intake --   Output 300 ml   Net -300 ml     Body mass index is 32.45 kg/m².  There were no vitals filed for this visit.        Physical Exam:   Physical Exam   Constitutional: She is oriented to person, place, and time. She appears well-developed and well-nourished.   HENT:   Head: Normocephalic.   Eyes: Pupils are equal, round, and reactive to light.   Neck: Normal range of motion. No JVD present. Carotid bruit is not present. No thyromegaly present.   Cardiovascular: Normal rate, regular rhythm, S1 normal, S2 normal, normal heart sounds and intact distal pulses. Exam reveals no gallop and no friction rub.   No murmur heard.  Pulmonary/Chest: Effort normal. She has decreased breath sounds.   Abdominal: Soft. Bowel sounds are normal.   Musculoskeletal: She exhibits no edema.   Neurological: She is alert and oriented to person, place, and time.   Skin: Skin is warm, dry and intact. No erythema.   Psychiatric: She has a normal mood and affect.   Vitals reviewed.        Lab Review:     Results from last 7 days   Lab Units 09/06/19  0351   SODIUM mmol/L 142   POTASSIUM mmol/L 3.8   CHLORIDE mmol/L 98   CO2 mmol/L 30.7*   BUN mg/dL 23   CREATININE mg/dL 0.88   GLUCOSE mg/dL 122*   CALCIUM mg/dL 8.2*           Results from last 7 days   Lab Units 09/06/19  0755 09/06/19  0351   WBC 10*3/mm3  --  10.32   HEMOGLOBIN g/dL 8.5* 8.0*   HEMATOCRIT % 28.6* 27.0*   PLATELETS 10*3/mm3  --  281     Results from last 7 days   Lab Units 09/06/19  0351   INR  1.08         Results from last 7 days   Lab Units 09/06/19  0351   MAGNESIUM mg/dL 2.0                       Imaging:      Imaging Results (most recent)     None          Telemetry      EKG:         Baseline:         I personally viewed and interpreted the  patient's EKG/Telemetry data.    Assessment:   Assessment/Plan         Anemia    Blood loss anemia    Rectal bleeding      1.  Blood loss anemia: For colonoscopy and endoscopy.  2.  Paroxysmal atrial fibrillation: The patient has been on anticoagulation which has been held at this point.  She also has been on Rythmol.  It does not appear to be effective.  The 2 electrocardiograms here indicate either a junctional rhythm with bradycardia or atrial fibrillation.  I am going to discontinue this medication at this time.   [id number]: 6.  This places her at a very high risk for thromboembolism.  We will need to see the results of her GI evaluation before deciding how best to proceed with anticoagulation.  3.  Cardiomyopathy: Dilated.  Last left ventricular ejection fraction 40%.  No signs of heart failure at this time  4.  COPD: Oxygen dependent  5.  History of adenocarcinoma of the lung.  6.  Pulmonary embolus: Status post IVC filter.  On chronic anticoagulation  7.  Peripheral vascular disease            Plan:   1.  Okay for GI evaluation  2.  Discontinue propafenone  3.  Reevaluate anticoagulation need post procedure.  Consider additional or different antiarrhythmic drug    Thank you for allowing me to participate in the care of Ale Narayan. Feel free to contact me directly with any further questions or concerns.    Some portions of the above history were entered by Deja Marie RN.  They have been verified by myself.  The history also has additions that have been personally made by me.  The remainder of the physical examination has been performed by myself.    Flavio Del Rosario MD  [facility]  09/06/19  11:56 AM

## 2019-09-07 LAB
ABO + RH BLD: NORMAL
ABO + RH BLD: NORMAL
BH BB BLOOD EXPIRATION DATE: NORMAL
BH BB BLOOD EXPIRATION DATE: NORMAL
BH BB BLOOD TYPE BARCODE: 6200
BH BB BLOOD TYPE BARCODE: 6200
BH BB DISPENSE STATUS: NORMAL
BH BB DISPENSE STATUS: NORMAL
BH BB PRODUCT CODE: NORMAL
BH BB PRODUCT CODE: NORMAL
BH BB UNIT NUMBER: NORMAL
BH BB UNIT NUMBER: NORMAL
HCT VFR BLD AUTO: 25.7 % (ref 34–46.6)
HCT VFR BLD AUTO: 32.7 % (ref 34–46.6)
HCT VFR BLD AUTO: 34.6 % (ref 34–46.6)
HGB BLD-MCNC: 10.1 G/DL (ref 12–15.9)
HGB BLD-MCNC: 10.3 G/DL (ref 12–15.9)
HGB BLD-MCNC: 7.7 G/DL (ref 12–15.9)
UNIT  ABO: NORMAL
UNIT  ABO: NORMAL
UNIT  RH: NORMAL
UNIT  RH: NORMAL

## 2019-09-07 PROCEDURE — 63710000001 DIPHENHYDRAMINE PER 50 MG: Performed by: INTERNAL MEDICINE

## 2019-09-07 PROCEDURE — 86900 BLOOD TYPING SEROLOGIC ABO: CPT

## 2019-09-07 PROCEDURE — 94799 UNLISTED PULMONARY SVC/PX: CPT

## 2019-09-07 PROCEDURE — P9016 RBC LEUKOCYTES REDUCED: HCPCS

## 2019-09-07 PROCEDURE — 99231 SBSQ HOSP IP/OBS SF/LOW 25: CPT | Performed by: NURSE PRACTITIONER

## 2019-09-07 PROCEDURE — 25010000002 FUROSEMIDE PER 20 MG: Performed by: INTERNAL MEDICINE

## 2019-09-07 PROCEDURE — 85014 HEMATOCRIT: CPT | Performed by: INTERNAL MEDICINE

## 2019-09-07 PROCEDURE — 25010000002 PROMETHAZINE PER 50 MG: Performed by: INTERNAL MEDICINE

## 2019-09-07 PROCEDURE — 85018 HEMOGLOBIN: CPT | Performed by: INTERNAL MEDICINE

## 2019-09-07 PROCEDURE — 36430 TRANSFUSION BLD/BLD COMPNT: CPT

## 2019-09-07 PROCEDURE — 25010000002 ONDANSETRON PER 1 MG: Performed by: INTERNAL MEDICINE

## 2019-09-07 PROCEDURE — 86901 BLOOD TYPING SEROLOGIC RH(D): CPT

## 2019-09-07 PROCEDURE — 99232 SBSQ HOSP IP/OBS MODERATE 35: CPT | Performed by: INTERNAL MEDICINE

## 2019-09-07 RX ORDER — FUROSEMIDE 10 MG/ML
40 INJECTION INTRAMUSCULAR; INTRAVENOUS ONCE
Status: COMPLETED | OUTPATIENT
Start: 2019-09-07 | End: 2019-09-07

## 2019-09-07 RX ORDER — PROMETHAZINE HYDROCHLORIDE 25 MG/ML
12.5 INJECTION, SOLUTION INTRAMUSCULAR; INTRAVENOUS ONCE
Status: COMPLETED | OUTPATIENT
Start: 2019-09-07 | End: 2019-09-07

## 2019-09-07 RX ORDER — PROMETHAZINE HYDROCHLORIDE 25 MG/ML
12.5 INJECTION, SOLUTION INTRAMUSCULAR; INTRAVENOUS ONCE AS NEEDED
Status: DISCONTINUED | OUTPATIENT
Start: 2019-09-07 | End: 2019-09-08

## 2019-09-07 RX ADMIN — BUDESONIDE 0.5 MG: 0.5 SUSPENSION RESPIRATORY (INHALATION) at 22:23

## 2019-09-07 RX ADMIN — ARFORMOTEROL TARTRATE 15 MCG: 15 SOLUTION RESPIRATORY (INHALATION) at 06:55

## 2019-09-07 RX ADMIN — BUDESONIDE 0.5 MG: 0.5 SUSPENSION RESPIRATORY (INHALATION) at 06:55

## 2019-09-07 RX ADMIN — ACETAMINOPHEN 650 MG: 325 TABLET, FILM COATED ORAL at 08:41

## 2019-09-07 RX ADMIN — IPRATROPIUM BROMIDE AND ALBUTEROL SULFATE 3 ML: 2.5; .5 SOLUTION RESPIRATORY (INHALATION) at 10:27

## 2019-09-07 RX ADMIN — ARFORMOTEROL TARTRATE 15 MCG: 15 SOLUTION RESPIRATORY (INHALATION) at 22:23

## 2019-09-07 RX ADMIN — ACETAMINOPHEN 1000 MG: 500 TABLET, FILM COATED ORAL at 21:02

## 2019-09-07 RX ADMIN — PROMETHAZINE HYDROCHLORIDE 12.5 MG: 25 INJECTION INTRAMUSCULAR; INTRAVENOUS at 10:09

## 2019-09-07 RX ADMIN — CARVEDILOL 25 MG: 25 TABLET, FILM COATED ORAL at 17:25

## 2019-09-07 RX ADMIN — IPRATROPIUM BROMIDE AND ALBUTEROL SULFATE 3 ML: 2.5; .5 SOLUTION RESPIRATORY (INHALATION) at 14:59

## 2019-09-07 RX ADMIN — CARVEDILOL 25 MG: 25 TABLET, FILM COATED ORAL at 08:29

## 2019-09-07 RX ADMIN — ATORVASTATIN CALCIUM 20 MG: 20 TABLET, FILM COATED ORAL at 21:02

## 2019-09-07 RX ADMIN — ONDANSETRON 4 MG: 2 INJECTION INTRAMUSCULAR; INTRAVENOUS at 08:41

## 2019-09-07 RX ADMIN — DIPHENHYDRAMINE HYDROCHLORIDE 50 MG: 25 CAPSULE ORAL at 21:02

## 2019-09-07 RX ADMIN — POTASSIUM CHLORIDE 20 MEQ: 1.5 POWDER, FOR SOLUTION ORAL at 08:29

## 2019-09-07 RX ADMIN — PANTOPRAZOLE SODIUM 40 MG: 40 TABLET, DELAYED RELEASE ORAL at 06:09

## 2019-09-07 RX ADMIN — LEVOTHYROXINE SODIUM 50 MCG: 50 TABLET ORAL at 06:09

## 2019-09-07 RX ADMIN — FUROSEMIDE 40 MG: 10 INJECTION, SOLUTION INTRAMUSCULAR; INTRAVENOUS at 04:47

## 2019-09-07 RX ADMIN — SERTRALINE 50 MG: 50 TABLET, FILM COATED ORAL at 08:29

## 2019-09-07 RX ADMIN — ONDANSETRON 4 MG: 2 INJECTION INTRAMUSCULAR; INTRAVENOUS at 18:33

## 2019-09-07 NOTE — NURSING NOTE
Attempt PIV x2 unsuccessful, attempted to LAC & RT posterior fa; pt particular of placement site requesting to left arm only, explained to pt due to limited IV access & need for unit of PRBC, site would be placed where nurse could obtain & would wrap with kerlex to secure; IV RN attempted prior to this attempt; IV RN notified on need for site, day shift IV RN able to use US machine; assigned RN notified of unsuccessful attempts

## 2019-09-07 NOTE — PROGRESS NOTES
History:  This 78-year-old lady is doing fairly well this morning.  She has not been sleeping well.  Her heme globin dropped to 7.7.  Subsequently she was given 2 units of packed red blood cells.  Now after the transfusion she complains of nausea.  She has no abdominal pain.  No shortness of air.    Allergies  Adhesive tape; Augmentin [amoxicillin-pot clavulanate]; Cephalexin; Metoclopramide; Pentazocine; Simvastatin; and Sucralfate      Current Facility-Administered Medications:   •  acetaminophen (TYLENOL) tablet 1,000 mg, 1,000 mg, Oral, Nightly PRN, 1,000 mg at 09/06/19 2128 **AND** diphenhydrAMINE (BENADRYL) capsule 50 mg, 50 mg, Oral, Nightly PRN, Harmeet Payne MD, 50 mg at 09/06/19 2129  •  acetaminophen (TYLENOL) tablet 650 mg, 650 mg, Oral, Q6H PRN, Harmeet Payne MD, 650 mg at 09/07/19 0841  •  arformoterol (BROVANA) nebulizer solution 15 mcg, 15 mcg, Nebulization, BID - RT, Alirio Alfaro MD, 15 mcg at 09/07/19 0655  •  atorvastatin (LIPITOR) tablet 20 mg, 20 mg, Oral, Nightly, Chet Payne Jr., MD, 20 mg at 09/06/19 2012  •  budesonide (PULMICORT) nebulizer solution 0.5 mg, 0.5 mg, Nebulization, BID - RT, Chet Payne Jr., MD, 0.5 mg at 09/07/19 0655  •  carvedilol (COREG) tablet 25 mg, 25 mg, Oral, BID With Meals, Chet Payne Jr., MD, 25 mg at 09/07/19 0829  •  dextrose 5 % and sodium chloride 0.45 % with KCl 20 mEq/L infusion, 50 mL/hr, Intravenous, Continuous, Harmeet Payne MD, Stopped at 09/06/19 1616  •  ipratropium-albuterol (DUO-NEB) nebulizer solution 3 mL, 3 mL, Nebulization, 4x Daily - RT, Chet Payne Jr., MD, 3 mL at 09/06/19 1154  •  lactated ringers infusion 1,000 mL, 1,000 mL, Intravenous, Continuous, Alexsandra Ruiz MD, Stopped at 09/06/19 1546  •  levothyroxine (SYNTHROID, LEVOTHROID) tablet 50 mcg, 50 mcg, Oral, Q AM, Chet Payne Jr., MD, 50 mcg at 09/07/19 0609  •  ondansetron (ZOFRAN) injection 4 mg, 4 mg, Intravenous, Q6H PRN, Kerry  "Harmeet PRUITT MD, 4 mg at 09/07/19 0841  •  pantoprazole (PROTONIX) EC tablet 40 mg, 40 mg, Oral, Q AM, Harmeet Payne MD, 40 mg at 09/07/19 0609  •  potassium chloride (KLOR-CON) packet 20 mEq, 20 mEq, Oral, Daily, Chet Payne Jr., MD, 20 mEq at 09/07/19 0829  •  promethazine (PHENERGAN) injection 12.5 mg, 12.5 mg, Intravenous, Once PRN, Harmeet Payne MD  •  sertraline (ZOLOFT) tablet 50 mg, 50 mg, Oral, Daily, Chet Payne Jr., MD, 50 mg at 09/07/19 0829    /52 (BP Location: Right arm, Patient Position: Sitting)   Pulse 71   Temp 97.9 °F (36.6 °C) (Oral)   Resp 18   Ht 157.5 cm (62\")   LMP  (LMP Unknown)   SpO2 96%   BMI 32.45 kg/m²     Physical Exam  Appearance: Elderly  lady with central obesity.  She is sitting in a chair in no distress.  HEENT: Pupils were round and equal.  Pharynx clear mucous membranes moist.  Neck: Without lymphadenopathy or JVD.  Lungs: Decreased breath sounds but no wheezes, rales or rhonchi  Heart: Very distant tones.  Regular.  No murmur gallop.  Abdomen: Protuberant.  Soft.  Normal bowel sounds.  No rebound, tenderness or guarding.  Extremities: Without edema clubbing or cyanosis    Lab Results (last 24 hours)     Procedure Component Value Units Date/Time    Hemoglobin & Hematocrit, Blood [[id number]]  (Abnormal) Collected:  09/06/19 2357    Specimen:  Blood Updated:  09/07/19 0014     Hemoglobin 7.7 g/dL      Hematocrit 25.7 %     Tissue Pathology Exam [[id number]] Collected:  09/06/19 1452    Specimen:  Tissue from Small Intestine; Tissue from Gastric, Antrum; Polyp from Stomach; Polyp from Large Intestine, Sigmoid Colon Updated:  09/06/19 1926    Hemoglobin & Hematocrit, Blood [[id number]]  (Abnormal) Collected:  09/06/19 1708    Specimen:  Blood Updated:  09/06/19 1753     Hemoglobin 8.4 g/dL      Hematocrit 28.8 %         EGD: Unremarkable except for hiatal hernia and gastritis    Colonoscopy: Diverticulosis, 4 angiodysplasias treated with APC, one " polyp in the rectal area removed    Imp:  1.  Anemia, multifactorial.  In large part related to lower GI bleed.  Patient received 2 units of packed red blood cells this morning.  2.  Lower GI bleed related to angiodysplasia.  Subsequently treated with APC  3.  Atrial fib.  Very high vijay vacs score.  High risk for embolic phenomena  4.  COPD      Plan:  We will watch this patient today.  I will make sure she is having no further blood loss.  I think we will need to restart her anticoagulation in the near future.  I will check CBC and BMP in the morning    Harmeet Payne MD  9/7/2019  9:37 AM

## 2019-09-07 NOTE — PROGRESS NOTES
Cardiology Weekend Coverage Progress Note    Patient Identification:  Name: Ale Narayan  Age: 78 y.o.  Sex: female  :  1940  MRN: [id number]                 Follow Up / Chief Complaint:     Interval History: Blood given this am. Once ok with all with continue a/c.  Stable post EGD/colonoscopy.       Subjective: Deines chest pain, shortness of breath      Objective:    Past Medical History:  Past Medical History:   Diagnosis Date   • Adenocarcinoma of lung (CMS/HCC)    • Asthma    • Carotid artery disease (CMS/HCC)    • Cough    • Deep vein thrombosis (CMS/HCC)    • Emphysema of lung (CMS/HCC)    • Hyperlipidemia    • Hypertension    • Hypothyroidism    • Lung cancer (CMS/HCC)    • Osteoporosis    • Paroxysmal atrial fibrillation (CMS/HCC)    • Peripheral arterial disease (CMS/HCC)    • Pulmonary embolism (CMS/HCC)    • Thrombophilia (CMS/HCC)     Since age 25     Past Surgical History:  Past Surgical History:   Procedure Laterality Date   • ANKLE SURGERY     • BREAST SURGERY      removal of benign melanoma spot on nipple of left breast 2013   • COLONOSCOPY      Negative   • COLONOSCOPY N/A 2019    Procedure: COLONOSCOPY to cecum with biopsy / polypectomy and  APC cautery;  Surgeon: Alexsandra Ruiz MD;  Location: [facility]COPY;  Service: Gastroenterology   • ENDOSCOPY N/A 2019    Procedure: ESOPHAGOGASTRODUODENOSCOPY with biopsies;  Surgeon: Alexsandra Ruiz MD;  Location: [facility] ENDOSCOPY;  Service: Gastroenterology   • HERNIA REPAIR      Lumbar spine   • HYSTERECTOMY     • LUNG LOBECTOMY     • OTHER SURGICAL HISTORY      Bypass of lower extremities   • OTHER SURGICAL HISTORY      IVC filter placed        Social History:   Social History     Tobacco Use   • Smoking status: Former Smoker     Packs/day: 1.00     Years: 50.00     Pack years: 50.00     Types: Cigarettes     Start date:      Last attempt to quit: 2008     Years since quittin.6   • Smokeless tobacco: Former  User   Substance Use Topics   • Alcohol use: No      Family History:  Family History   Problem Relation Age of Onset   • Lung cancer Mother           Allergies:  Allergies   Allergen Reactions   • Adhesive Tape    • Augmentin [Amoxicillin-Pot Clavulanate]    • Cephalexin    • Metoclopramide Unknown (See Comments)   • Pentazocine    • Simvastatin Unknown (See Comments)   • Sucralfate Unknown (See Comments)     Scheduled Meds:    arformoterol 15 mcg BID - RT   atorvastatin 20 mg Nightly   budesonide 0.5 mg BID - RT   carvedilol 25 mg BID With Meals   ipratropium-albuterol 3 mL 4x Daily - RT   levothyroxine 50 mcg Q AM   pantoprazole 40 mg Q AM   potassium chloride 20 mEq Daily   sertraline 50 mg Daily           INTAKE AND OUTPUT:    Intake/Output Summary (Last 24 hours) at 9/7/2019 1715  Last data filed at 9/7/2019 1616  Gross per 24 hour   Intake 2330 ml   Output 800 ml   Net 1530 ml       Review of Systems:   GI: Denies abdominal pain, nausea, vomiting  Cardiac: Denies chest pain and palpitations  Pulmonary: Denies shortness of breath and cough    Constitutional:  Temp:  [97.3 °F (36.3 °C)-98 °F (36.7 °C)] 97.8 °F (36.6 °C)  Heart Rate:  [56-74] 63  Resp:  [16-20] 18  BP: (126-173)/(52-81) 153/75    Physical Exam:  General: Appears in no acute distress, resting in bed   HEENT: No JVD.  Thyroid not visibly enlarged.  No mucosal pallor or cyanosis  Respiratory: CTA B.  No   No crackles, rubs or wheezes auscultated  Cardiovascular: S1-S2 irregular rhythm.  No murmur, rub or gallop. No carotid bruits. DP/PT pulses  2+   . No pretibial pitting edema  Gastrointestinal: Soft,  nontender.   Sounds present.  No hepatosplenomegaly. No ascites  Musculoskeletal: KELLY x4. No abnormal movements   Neuro: AAO x3 CN II-XII grossly intact  Psych: Mood and affect normal, pleasant, cooperative          Cardiographics  Telemetry:   afib    ECG:     afib      Echocardiogram:   Interpretation Summary     · Right ventricular cavity is  "mildly dilated.  · Mildly reduced right ventricular systolic function noted.  · Left ventricular wall thickness is consistent with borderline concentric hypertrophy.  · Left ventricular function is mildly decreased. Estimated EF = 40%.  · Mild mitral valve regurgitation is present  · Mild aortic valve stenosis is present       Lab Review       Results from last 7 days   Lab Units 09/06/19  0351   MAGNESIUM mg/dL 2.0     Results from last 7 days   Lab Units 09/06/19  0351   SODIUM mmol/L 142   POTASSIUM mmol/L 3.8   BUN mg/dL 23   CREATININE mg/dL 0.88   CALCIUM mg/dL 8.2*        Results from last 7 days   Lab Units 09/07/19  1551 09/07/19  0954 09/06/19  2357  09/06/19  0351  09/05/19  1545   WBC 10*3/mm3  --   --   --   --  10.32  --  10.35   HEMOGLOBIN g/dL 10.3* 10.1* 7.7*   < > 8.0*   < > 8.5*   HEMATOCRIT % 34.6 32.7* 25.7*   < > 27.0*   < > 28.7*   PLATELETS 10*3/mm3  --   --   --   --  281  --  302    < > = values in this interval not displayed.     Results from last 7 days   Lab Units 09/06/19  0351   INR  1.08         Assessment/Plan:  1.  COPD defer to [facility]  2.  Chronic hypoxic respiratory failure: Defer to pulmonary  3.  3 of lung cancer in 2009, in remission   4.  GI bleed with history: EGD/colonoscopy telangiectasia S/P cauterization on 9/6/2019  5.  Blood loss anemia: 2 units of blood given this a.m.  Defer to attending  6.  History of PE on outpatient anticoagulation currently on hold.  Resume AC when okay with all.  7. Atrial fibrillation: Rate controlled. a/c on hold due to anemia. At home on Xarelto.        When okay with all would resume AC.      )9/7/2019  CRISTHIAN Lomeli/Transcription:   \"Dictated utilizing Dragon dictation\".     "

## 2019-09-07 NOTE — PROGRESS NOTES
PROGRESS NOTE  Patient Name: Ale Narayan  Age/Sex: 78 y.o. female  : 1940  MRN: [id number]    Date of Admission: 2019  Date of Encounter Visit: 19   LOS: 2 days   Patient Care Team:  Harmeet Payne MD as PCP - General  Harmeet Payne MD as PCP - Claims Attributed  Yoan Cash MD as Consulting Physician (Hematology and Oncology)  Devon Lamb MD as Referring Physician (Thoracic Surgery)    Chief Complaint: Stage IV COPD status post colonoscopy and EGD for GI bleed work-up    Hospital course: Patient did better than expected post EGD/colonoscopy, they were able to find several telangiectasias that were cauterized, she got 2 units of packed red blood cells with significant improvement in the hemoglobin afterwards.  She is feeling pretty well and denies any significant respiratory decline after the procedure.  Patient is very well-known to me and she seems to be at her baseline from the pulmonary standpoint    Interval History: Had a colonoscopy and EGD yesterday with some tell angiectasia that were cauterized    REVIEW OF SYSTEMS:   CONSTITUTIONAL: no fever or chills  CARDIOVASCULAR: No chest pain, chest pressure or chest discomfort. No palpitations or edema.   RESPIRATORY: No changes in her chronic shortness of breath, she feels well compensated on oxygen supplement, does have dyspnea with exertion, positive cough with a nebulizer therapy.   GASTROINTESTINAL: No anorexia, nausea, vomiting or diarrhea. No abdominal pain or blood.   HEMATOLOGIC: No evidence of any more active GI bleed or bruising.     Ventilator/Non-Invasive Ventilation Settings (From admission, onward)    Nasal cannula oxygen at home regimen of 3 L/min            Vital Signs  Temp:  [97.3 °F (36.3 °C)-98 °F (36.7 °C)] 98 °F (36.7 °C)  Heart Rate:  [56-74] 69  Resp:  [16-20] 18  BP: (111-173)/(52-90) 146/57  SpO2:  [92 %-100 %] 94 %  on  Flow (L/min):  [3-8] 3 Device (Oxygen Therapy): nasal cannula    Intake/Output  "Summary (Last 24 hours) at 9/7/2019 1319  Last data filed at 9/7/2019 1200  Gross per 24 hour   Intake 3230 ml   Output 800 ml   Net 2430 ml     Flowsheet Rows      First Filed Value   Admission Height  157.5 cm (62\") Documented at 09/05/2019 1500   Admission Weight  --        Body mass index is 32.45 kg/m².  There were no vitals filed for this visit.    Physical Exam:  GEN:  No acute distress, alert, cooperative, well developed on nasal cannula oxygen  EYES:   Sclera clear. No icterus. PERRL. Normal EOM  ENT:   External ears/nose normal, no oral lesions, no thrush, mucous membranes moist  NECK:  Supple, midline trachea, no JVD  LUNGS: Normal chest on inspection, patient has positive expiratory wheezes and rhonchi with diminished breath sounds but no prolongation of the expiratory phase, she does sound better anteriorly.   CV:  Regular rhythm and rate. Normal S1/S2. No murmurs, gallops, or rubs noted.  ABD:  Soft, non-tender and non-distended. Normal bowel sounds. No guarding  EXT:  Moves all extremities well. No cyanosis. No redness.  Trace edema,  Skin: dry, intact, no bleeding    Results Review:      Results from last 7 days   Lab Units 09/06/19  0351   SODIUM mmol/L 142   POTASSIUM mmol/L 3.8   CHLORIDE mmol/L 98   CO2 mmol/L 30.7*   BUN mg/dL 23   CREATININE mg/dL 0.88   CALCIUM mg/dL 8.2*   ANION GAP mmol/L 13.3                 Results from last 7 days   Lab Units 09/07/19  0954 09/06/19  2357 09/06/19  1708 09/06/19  0755 09/06/19  0351 09/06/19  0014 09/05/19  1545   WBC 10*3/mm3  --   --   --   --  10.32  --  10.35   HEMOGLOBIN g/dL 10.1* 7.7* 8.4* 8.5* 8.0* 8.4* 8.5*   HEMATOCRIT % 32.7* 25.7* 28.8* 28.6* 27.0* 27.6* 28.7*   PLATELETS 10*3/mm3  --   --   --   --  281  --  302   MCV fL  --   --   --   --  96.8  --  97.3*   NEUTROPHIL % %  --   --   --   --  66.3  --  73.7   LYMPHOCYTE % %  --   --   --   --  21.2  --  16.5*   MONOCYTES % %  --   --   --   --  8.2  --  6.4   EOSINOPHIL % %  --   --   --   -- "  2.9  --  2.0   BASOPHIL % %  --   --   --   --  0.9  --  1.0   IMM GRAN % %  --   --   --   --  0.5  --  0.4     Results from last 7 days   Lab Units 09/06/19  0351   INR  1.08     Results from last 7 days   Lab Units 09/06/19  0351 09/05/19  1545   MAGNESIUM mg/dL 2.0 1.9           Invalid input(s): LDLCALC          No results found for: POCGLU          Results from last 7 days   Lab Units 09/05/19  1615   NITRITE UA  Negative               Imaging:   Imaging Results (all)     None                   Medication Review:     arformoterol 15 mcg Nebulization BID - RT   atorvastatin 20 mg Oral Nightly   budesonide 0.5 mg Nebulization BID - RT   carvedilol 25 mg Oral BID With Meals   ipratropium-albuterol 3 mL Nebulization 4x Daily - RT   levothyroxine 50 mcg Oral Q AM   pantoprazole 40 mg Oral Q AM   potassium chloride 20 mEq Oral Daily   sertraline 50 mg Oral Daily            ASSESSMENT:   Very few severe COPD, FEV1 36%  Chronic hypoxemic respiratory failure  History of lung cancer in 2009, in remission next number GI bleed with history of telangiectasia status post cauterization on 9/6/2019  Acute blood loss anemia, improved with transfusion  History of PE on outpatient anti-coag elation that is currently on hold    PLAN:  Patient is very well postop, resume antic regulation once okay with the GI team  Continue with the oxygen titration  Continue with the nebulizer therapy  She had no significant setback from the procedure from the pulmonary standpoint.  Discussed with patient in details    Disposition: Per GI and primary team, will follow up on her respiratory status while in the hospital    Nelson Fernandes MD  09/07/19  1:19 PM           Dictated utilizing Dragon dictation

## 2019-09-07 NOTE — PROGRESS NOTES
[facility]  Inpatient Progress Note    Reason for Follow Up: Anemia, GI bleed    Subjective     Interval History:   Discussed endoscopic findings with patient.  She denies any further evidence of active GI bleeding.  Drop in H&H noted.  A.m. CBC is pending.  Presume that her bleeding is from angiodysplastic lesions in the right colon, cannot discount the possibility of other sources.    Current Facility-Administered Medications:   •  acetaminophen (TYLENOL) tablet 1,000 mg, 1,000 mg, Oral, Nightly PRN, 1,000 mg at 09/06/19 2128 **AND** diphenhydrAMINE (BENADRYL) capsule 50 mg, 50 mg, Oral, Nightly PRN, Harmeet Payne MD, 50 mg at 09/06/19 2129  •  acetaminophen (TYLENOL) tablet 650 mg, 650 mg, Oral, Q6H PRN, Harmeet Payne MD, 650 mg at 09/07/19 0841  •  arformoterol (BROVANA) nebulizer solution 15 mcg, 15 mcg, Nebulization, BID - RT, Alirio Alfaro MD, 15 mcg at 09/07/19 0655  •  atorvastatin (LIPITOR) tablet 20 mg, 20 mg, Oral, Nightly, Chet Payne Jr., MD, 20 mg at 09/06/19 2012  •  budesonide (PULMICORT) nebulizer solution 0.5 mg, 0.5 mg, Nebulization, BID - RT, Chet Payne Jr., MD, 0.5 mg at 09/07/19 0655  •  carvedilol (COREG) tablet 25 mg, 25 mg, Oral, BID With Meals, Chet Payne Jr., MD, 25 mg at 09/07/19 0829  •  ipratropium-albuterol (DUO-NEB) nebulizer solution 3 mL, 3 mL, Nebulization, 4x Daily - RT, Chet Payne Jr., MD, 3 mL at 09/06/19 1154  •  levothyroxine (SYNTHROID, LEVOTHROID) tablet 50 mcg, 50 mcg, Oral, Q AM, Chet Payne Jr., MD, 50 mcg at 09/07/19 0609  •  ondansetron (ZOFRAN) injection 4 mg, 4 mg, Intravenous, Q6H PRN, Harmeet Payne MD, 4 mg at 09/07/19 0841  •  pantoprazole (PROTONIX) EC tablet 40 mg, 40 mg, Oral, Q AM, Harmeet Payne MD, 40 mg at 09/07/19 0609  •  potassium chloride (KLOR-CON) packet 20 mEq, 20 mEq, Oral, Daily, Chet Payne Jr., MD, 20 mEq at 09/07/19 0829  •  promethazine (PHENERGAN) injection  12.5 mg, 12.5 mg, Intravenous, Once PRN, Harmeet Payne MD  •  promethazine (PHENERGAN) injection 12.5 mg, 12.5 mg, Intravenous, Once, Harmeet Payne MD  •  sertraline (ZOLOFT) tablet 50 mg, 50 mg, Oral, Daily, Chet Payne Jr., MD, 50 mg at 09/07/19 0829  Review of Systems:    All systems were reviewed and negative except for:  Gastrointestinal: positive for  See HPI    Objective     Vital Signs  Temp:  [97.3 °F (36.3 °C)-98 °F (36.7 °C)] 98 °F (36.7 °C)  Heart Rate:  [56-74] 58  Resp:  [16-21] 18  BP: (111-201)/(52-90) 146/57  Body mass index is 32.45 kg/m².    Intake/Output Summary (Last 24 hours) at 9/7/2019 0949  Last data filed at 9/7/2019 0915  Gross per 24 hour   Intake 2910 ml   Output 400 ml   Net 2510 ml     I/O this shift:  In: 770 [P.O.:300; I.V.:20; Blood:450]  Out: 400 [Urine:400]     Physical Exam:   General: patient awake, alert and cooperative   Eyes: Normal lids and lashes, no scleral icterus   Neck: supple, normal ROM   Skin: warm and dry, not jaundiced   Cardiovascular: regular rhythm and rate, no murmurs auscultated   Pulm: clear to auscultation bilaterally, regular and unlabored   Abdomen: soft, nontender, nondistended; normal bowel sounds   Rectal: deferred   Extremities: no rash or edema   Psychiatric: Normal mood and behavior; memory intact     Results Review:     I reviewed the patient's new clinical results.    Results from last 7 days   Lab Units 09/06/19  2357 09/06/19  1708 09/06/19  0755 09/06/19  0351  09/05/19  1545   WBC 10*3/mm3  --   --   --  10.32  --  10.35   HEMOGLOBIN g/dL 7.7* 8.4* 8.5* 8.0*   < > 8.5*   HEMATOCRIT % 25.7* 28.8* 28.6* 27.0*   < > 28.7*   PLATELETS 10*3/mm3  --   --   --  281  --  302    < > = values in this interval not displayed.     Results from last 7 days   Lab Units 09/06/19  0351   SODIUM mmol/L 142   POTASSIUM mmol/L 3.8   CHLORIDE mmol/L 98   CO2 mmol/L 30.7*   BUN mg/dL 23   CREATININE mg/dL 0.88   CALCIUM mg/dL 8.2*   GLUCOSE mg/dL 122*      Results from last 7 days   Lab Units 09/06/19  0351   INR  1.08     Lab Results   Lab Value Date/Time    LIPASE 12 (L) 11/17/2015 1401       Radiology:  No orders to display       Assessment/Plan     Patient Active Problem List   Diagnosis   • Adenocarcinoma of right lung (CMS/HCC)   • Decreased blood pressure, not hypotension   • Cardiomyopathy (CMS/HCC)   • Cardiovascular disease   • Dizziness   • Dyspnea on exertion   • Nodule of left lung   • Peripheral arterial occlusive disease (CMS/HCC)   • Iron deficiency anemia   • Pulmonary emphysema (CMS/HCC)   • Pneumonitis, radiation (CMS/HCC)   • Palpitations   • Localized edema   • Weight gain   • Anemia   • Blood loss anemia   • Rectal bleeding     Impression  #1 anemia: Drop in H&H, parameters being monitored.  #2 rectal bleeding: Evidence of AVMs on endoscopy  #3 thromboembolic disease: Previous anticoagulant therapy  #4 history of polyps  #5 history of lung cancer  #6 COPD      Recommendation  Monitor H&H  Anticipate eventual anticoagulant therapy as needed  If bleeding persist, consider tagged red blood cell scan to localize.    I discussed the patients findings and my recommendations with patient.    Kamlesh Osman MD

## 2019-09-07 NOTE — NURSING NOTE
Notified Dr. Harmeet Payne of pt's hgb of 7.7. New orders noted to give 2 units of PRBC's and give lasix 40mg in between each unit x1.

## 2019-09-07 NOTE — PLAN OF CARE
Problem: Patient Care Overview  Goal: Plan of Care Review  Outcome: Ongoing (interventions implemented as appropriate)   09/07/19 5616   Coping/Psychosocial   Plan of Care Reviewed With patient   Plan of Care Review   Progress no change   OTHER   Outcome Summary Pt hgb 7.7. Called Dr. CAMILO Payne and orders noted for 2 units of PRBC's. VSS. c/o nausea x1 and medicated with zofran with relief.       Problem: Fall Risk (Adult)  Goal: Absence of Fall  Outcome: Ongoing (interventions implemented as appropriate)      Problem: Anemia (Adult)  Goal: Symptom Improvement  Outcome: Ongoing (interventions implemented as appropriate)      Problem: Gastrointestinal Bleeding (Adult)  Goal: Signs and Symptoms of Listed Potential Problems Will be Absent, Minimized or Managed (Gastrointestinal Bleeding)  Outcome: Ongoing (interventions implemented as appropriate)      Problem: Skin Injury Risk (Adult)  Goal: Skin Health and Integrity  Outcome: Ongoing (interventions implemented as appropriate)

## 2019-09-07 NOTE — PLAN OF CARE
Problem: Patient Care Overview  Goal: Plan of Care Review  Outcome: Ongoing (interventions implemented as appropriate)   09/07/19 6977   Coping/Psychosocial   Plan of Care Reviewed With patient   Plan of Care Review   Progress improving   OTHER   Outcome Summary 2 unit of blood today, await H&H in AM for stabalization. Mild nausea this AM, most likely related to blood. No other complaints from patient.        Problem: Fall Risk (Adult)  Goal: Absence of Fall  Outcome: Ongoing (interventions implemented as appropriate)      Problem: Anemia (Adult)  Goal: Symptom Improvement  Outcome: Ongoing (interventions implemented as appropriate)      Problem: Gastrointestinal Bleeding (Adult)  Goal: Signs and Symptoms of Listed Potential Problems Will be Absent, Minimized or Managed (Gastrointestinal Bleeding)  Outcome: Ongoing (interventions implemented as appropriate)      Problem: Skin Injury Risk (Adult)  Goal: Skin Health and Integrity  Outcome: Ongoing (interventions implemented as appropriate)

## 2019-09-07 NOTE — NURSING NOTE
Pt's 2nd unit of PRBC's infused for the first 10minutes and then IV started to leak.  Called IV therapist stat and IV therapist attempted to stick patient x1 and was unsuccessful.  Will try to get another RN to attempt to get new IV site so the blood can be restarted.  IV Nurse stated that she would tell the oncoming dayshift IV therapist to bring US machine up to attempt to place an IV via ultrasound.  Will pass on to dayshift RN.

## 2019-09-08 ENCOUNTER — APPOINTMENT (OUTPATIENT)
Dept: GENERAL RADIOLOGY | Facility: HOSPITAL | Age: 79
End: 2019-09-08

## 2019-09-08 LAB
ALBUMIN SERPL-MCNC: 3.4 G/DL (ref 3.5–5.2)
ALBUMIN/GLOB SERPL: 1.2 G/DL
ALP SERPL-CCNC: 58 U/L (ref 39–117)
ALT SERPL W P-5'-P-CCNC: 9 U/L (ref 1–33)
ANION GAP SERPL CALCULATED.3IONS-SCNC: 12 MMOL/L (ref 5–15)
ANION GAP SERPL CALCULATED.3IONS-SCNC: 8.8 MMOL/L (ref 5–15)
AST SERPL-CCNC: 11 U/L (ref 1–32)
BASOPHILS # BLD AUTO: 0.07 10*3/MM3 (ref 0–0.2)
BASOPHILS NFR BLD AUTO: 0.7 % (ref 0–1.5)
BILIRUB SERPL-MCNC: <0.2 MG/DL (ref 0.2–1.2)
BUN BLD-MCNC: 17 MG/DL (ref 8–23)
BUN BLD-MCNC: 17 MG/DL (ref 8–23)
BUN/CREAT SERPL: 18.9 (ref 7–25)
BUN/CREAT SERPL: 19.5 (ref 7–25)
CALCIUM SPEC-SCNC: 8.8 MG/DL (ref 8.6–10.5)
CALCIUM SPEC-SCNC: 8.8 MG/DL (ref 8.6–10.5)
CHLORIDE SERPL-SCNC: 103 MMOL/L (ref 98–107)
CHLORIDE SERPL-SCNC: 103 MMOL/L (ref 98–107)
CO2 SERPL-SCNC: 31 MMOL/L (ref 22–29)
CO2 SERPL-SCNC: 31.2 MMOL/L (ref 22–29)
CREAT BLD-MCNC: 0.87 MG/DL (ref 0.57–1)
CREAT BLD-MCNC: 0.9 MG/DL (ref 0.57–1)
DEPRECATED RDW RBC AUTO: 55.6 FL (ref 37–54)
EOSINOPHIL # BLD AUTO: 0.24 10*3/MM3 (ref 0–0.4)
EOSINOPHIL NFR BLD AUTO: 2.3 % (ref 0.3–6.2)
ERYTHROCYTE [DISTWIDTH] IN BLOOD BY AUTOMATED COUNT: 15.6 % (ref 12.3–15.4)
GFR SERPL CREATININE-BSD FRML MDRD: 61 ML/MIN/1.73
GFR SERPL CREATININE-BSD FRML MDRD: 63 ML/MIN/1.73
GLOBULIN UR ELPH-MCNC: 2.9 GM/DL
GLUCOSE BLD-MCNC: 111 MG/DL (ref 65–99)
GLUCOSE BLD-MCNC: 112 MG/DL (ref 65–99)
HCT VFR BLD AUTO: 32.3 % (ref 34–46.6)
HCT VFR BLD AUTO: 33 % (ref 34–46.6)
HGB BLD-MCNC: 9.7 G/DL (ref 12–15.9)
HGB BLD-MCNC: 9.7 G/DL (ref 12–15.9)
IMM GRANULOCYTES # BLD AUTO: 0.04 10*3/MM3 (ref 0–0.05)
IMM GRANULOCYTES NFR BLD AUTO: 0.4 % (ref 0–0.5)
LIPASE SERPL-CCNC: 49 U/L (ref 13–60)
LYMPHOCYTES # BLD AUTO: 1.48 10*3/MM3 (ref 0.7–3.1)
LYMPHOCYTES NFR BLD AUTO: 14 % (ref 19.6–45.3)
MCH RBC QN AUTO: 28.6 PG (ref 26.6–33)
MCHC RBC AUTO-ENTMCNC: 29.4 G/DL (ref 31.5–35.7)
MCV RBC AUTO: 97.3 FL (ref 79–97)
MONOCYTES # BLD AUTO: 1.02 10*3/MM3 (ref 0.1–0.9)
MONOCYTES NFR BLD AUTO: 9.6 % (ref 5–12)
NEUTROPHILS # BLD AUTO: 7.73 10*3/MM3 (ref 1.7–7)
NEUTROPHILS NFR BLD AUTO: 73 % (ref 42.7–76)
NRBC BLD AUTO-RTO: 0 /100 WBC (ref 0–0.2)
PLATELET # BLD AUTO: 246 10*3/MM3 (ref 140–450)
PMV BLD AUTO: 9.9 FL (ref 6–12)
POTASSIUM BLD-SCNC: 4.5 MMOL/L (ref 3.5–5.2)
POTASSIUM BLD-SCNC: 4.6 MMOL/L (ref 3.5–5.2)
PROT SERPL-MCNC: 6.3 G/DL (ref 6–8.5)
RBC # BLD AUTO: 3.39 10*6/MM3 (ref 3.77–5.28)
SODIUM BLD-SCNC: 143 MMOL/L (ref 136–145)
SODIUM BLD-SCNC: 146 MMOL/L (ref 136–145)
WBC NRBC COR # BLD: 10.58 10*3/MM3 (ref 3.4–10.8)

## 2019-09-08 PROCEDURE — 85014 HEMATOCRIT: CPT | Performed by: INTERNAL MEDICINE

## 2019-09-08 PROCEDURE — 25010000002 ONDANSETRON PER 1 MG: Performed by: INTERNAL MEDICINE

## 2019-09-08 PROCEDURE — 94799 UNLISTED PULMONARY SVC/PX: CPT

## 2019-09-08 PROCEDURE — 63710000001 PROMETHAZINE PER 12.5 MG: Performed by: INTERNAL MEDICINE

## 2019-09-08 PROCEDURE — 25010000002 ENOXAPARIN PER 10 MG: Performed by: INTERNAL MEDICINE

## 2019-09-08 PROCEDURE — 99231 SBSQ HOSP IP/OBS SF/LOW 25: CPT | Performed by: NURSE PRACTITIONER

## 2019-09-08 PROCEDURE — 83690 ASSAY OF LIPASE: CPT | Performed by: INTERNAL MEDICINE

## 2019-09-08 PROCEDURE — 99232 SBSQ HOSP IP/OBS MODERATE 35: CPT | Performed by: INTERNAL MEDICINE

## 2019-09-08 PROCEDURE — 80053 COMPREHEN METABOLIC PANEL: CPT | Performed by: INTERNAL MEDICINE

## 2019-09-08 PROCEDURE — 85025 COMPLETE CBC W/AUTO DIFF WBC: CPT | Performed by: INTERNAL MEDICINE

## 2019-09-08 PROCEDURE — 63710000001 DIPHENHYDRAMINE PER 50 MG: Performed by: INTERNAL MEDICINE

## 2019-09-08 PROCEDURE — 85018 HEMOGLOBIN: CPT | Performed by: INTERNAL MEDICINE

## 2019-09-08 PROCEDURE — 25010000002 PROMETHAZINE PER 50 MG: Performed by: INTERNAL MEDICINE

## 2019-09-08 PROCEDURE — 74022 RADEX COMPL AQT ABD SERIES: CPT

## 2019-09-08 RX ORDER — SENNA AND DOCUSATE SODIUM 50; 8.6 MG/1; MG/1
2 TABLET, FILM COATED ORAL NIGHTLY
Status: DISCONTINUED | OUTPATIENT
Start: 2019-09-08 | End: 2019-09-17 | Stop reason: HOSPADM

## 2019-09-08 RX ORDER — PROMETHAZINE HYDROCHLORIDE 25 MG/ML
12.5 INJECTION, SOLUTION INTRAMUSCULAR; INTRAVENOUS EVERY 6 HOURS PRN
Status: DISCONTINUED | OUTPATIENT
Start: 2019-09-08 | End: 2019-09-08

## 2019-09-08 RX ORDER — PROMETHAZINE HYDROCHLORIDE 25 MG/ML
12.5 INJECTION, SOLUTION INTRAMUSCULAR; INTRAVENOUS EVERY 6 HOURS PRN
Status: DISCONTINUED | OUTPATIENT
Start: 2019-09-08 | End: 2019-09-17 | Stop reason: HOSPADM

## 2019-09-08 RX ORDER — PROMETHAZINE HYDROCHLORIDE 12.5 MG/1
12.5 TABLET ORAL EVERY 6 HOURS PRN
Status: DISCONTINUED | OUTPATIENT
Start: 2019-09-08 | End: 2019-09-17 | Stop reason: HOSPADM

## 2019-09-08 RX ADMIN — POLYETHYLENE GLYCOL 3350 17 G: 17 POWDER, FOR SOLUTION ORAL at 14:29

## 2019-09-08 RX ADMIN — BUDESONIDE 0.5 MG: 0.5 SUSPENSION RESPIRATORY (INHALATION) at 10:52

## 2019-09-08 RX ADMIN — ARFORMOTEROL TARTRATE 15 MCG: 15 SOLUTION RESPIRATORY (INHALATION) at 19:24

## 2019-09-08 RX ADMIN — SENNOSIDES AND DOCUSATE SODIUM 2 TABLET: 8.6; 5 TABLET ORAL at 20:11

## 2019-09-08 RX ADMIN — PROMETHAZINE HYDROCHLORIDE 12.5 MG: 12.5 TABLET ORAL at 10:25

## 2019-09-08 RX ADMIN — BUDESONIDE 0.5 MG: 0.5 SUSPENSION RESPIRATORY (INHALATION) at 19:24

## 2019-09-08 RX ADMIN — LEVOTHYROXINE SODIUM 50 MCG: 50 TABLET ORAL at 05:40

## 2019-09-08 RX ADMIN — POTASSIUM CHLORIDE 20 MEQ: 1.5 POWDER, FOR SOLUTION ORAL at 09:36

## 2019-09-08 RX ADMIN — CARVEDILOL 25 MG: 25 TABLET, FILM COATED ORAL at 18:15

## 2019-09-08 RX ADMIN — IPRATROPIUM BROMIDE AND ALBUTEROL SULFATE 3 ML: 2.5; .5 SOLUTION RESPIRATORY (INHALATION) at 15:27

## 2019-09-08 RX ADMIN — SERTRALINE 50 MG: 50 TABLET, FILM COATED ORAL at 09:36

## 2019-09-08 RX ADMIN — PANTOPRAZOLE SODIUM 40 MG: 40 TABLET, DELAYED RELEASE ORAL at 05:40

## 2019-09-08 RX ADMIN — ACETAMINOPHEN 1000 MG: 500 TABLET, FILM COATED ORAL at 21:20

## 2019-09-08 RX ADMIN — ARFORMOTEROL TARTRATE 15 MCG: 15 SOLUTION RESPIRATORY (INHALATION) at 10:52

## 2019-09-08 RX ADMIN — DIPHENHYDRAMINE HYDROCHLORIDE 50 MG: 25 CAPSULE ORAL at 21:20

## 2019-09-08 RX ADMIN — ATORVASTATIN CALCIUM 20 MG: 20 TABLET, FILM COATED ORAL at 20:11

## 2019-09-08 RX ADMIN — ONDANSETRON 4 MG: 2 INJECTION INTRAMUSCULAR; INTRAVENOUS at 05:40

## 2019-09-08 RX ADMIN — ENOXAPARIN SODIUM 80 MG: 80 INJECTION SUBCUTANEOUS at 15:42

## 2019-09-08 NOTE — PROGRESS NOTES
PROGRESS NOTE  Patient Name: Ale Narayan  Age/Sex: 78 y.o. female  : 1940  MRN: [id number]    Date of Admission: 2019  Date of Encounter Visit: 19   LOS: 3 days   Patient Care Team:  Harmeet Payne MD as PCP - General  Harmeet Payne MD as PCP - Claims Attributed  Yoan Cash MD as Consulting Physician (Hematology and Oncology)  Devon Lamb MD as Referring Physician (Thoracic Surgery)    Chief Complaint: Stage IV COPD status post colonoscopy and EGD for GI bleed work-up, no active respiratory issues, currently complaining of postprandial nausea and abdominal discomfort    Hospital course: Patient did better than expected post EGD/colonoscopy, they were able to find several telangiectasias that were cauterized, she got 2 units of packed red blood cells with significant improvement in the hemoglobin afterwards.  Patient denies any respiratory complaints, however she has been complaining of postprandial abdominal discomfort and nausea.  She was seen earlier by GI, notes were reviewed, considering possible emptying study, the concern is that she did not have those symptoms before the procedure and might be post anesthesia related side effect.  Patient is afebrile, no hemoptysis, no significant chest tightness no productive cough.    Interval History: Had a colonoscopy and EGD yesterday with some tell angiectasia that were cauterized, now with postprandial abdominal discomfort and nausea with no respiratory complaints    REVIEW OF SYSTEMS:   CONSTITUTIONAL: no fever or chills  CARDIOVASCULAR: No chest pain, chest pressure or chest discomfort. No palpitations or edema.   RESPIRATORY: No changes in her chronic shortness of breath, she feels well compensated on oxygen supplement, does have dyspnea with exertion, positive cough with a nebulizer therapy, nothing different from her baseline.   GASTROINTESTINAL: Postprandial nausea and abdominal discomfort . abdominal pain or blood.  "  HEMATOLOGIC: No evidence of any more active GI bleed or bruising.     Ventilator/Non-Invasive Ventilation Settings (From admission, onward)    Nasal cannula oxygen at home regimen of 3 L/min            Vital Signs  Temp:  [97.7 °F (36.5 °C)-97.9 °F (36.6 °C)] 97.9 °F (36.6 °C)  Heart Rate:  [58-69] 62  Resp:  [16-24] 16  BP: (133-158)/(52-75) 158/59  SpO2:  [90 %-98 %] 95 %  on  Flow (L/min):  [2-3] 2 Device (Oxygen Therapy): nasal cannula    Intake/Output Summary (Last 24 hours) at 9/8/2019 1110  Last data filed at 9/8/2019 0445  Gross per 24 hour   Intake 600 ml   Output 850 ml   Net -250 ml     Flowsheet Rows      First Filed Value   Admission Height  157.5 cm (62\") Documented at 09/05/2019 1500   Admission Weight  --        Body mass index is 32.78 kg/m².      09/08/19  0654   Weight: 81.3 kg (179 lb 3.2 oz)       Physical Exam:  GEN:  No acute distress, alert, cooperative, well developed on nasal cannula oxygen  EYES:   Sclera clear. No icterus. PERRL. Normal EOM  ENT:   External ears/nose normal, no oral lesions, no thrush, mucous membranes moist  NECK:  Supple, midline trachea, no JVD  LUNGS: Normal chest on inspection, patient has positive expiratory wheezes and rhonchi with diminished breath sounds but no prolongation of the expiratory phase, she does sound better anteriorly.  Compared to yesterday, there is slightly improved and breath sounds posteriorly.  CV:  Regular rhythm and rate. Normal S1/S2. No murmurs, gallops, or rubs noted.  ABD:  Soft, tender only to palpation, no rebound, and non-distended. Normal bowel sounds. No guarding  EXT:  Moves all extremities well. No cyanosis. No redness.  Trace edema,  Skin: dry, intact, no bleeding    Results Review:      Results from last 7 days   Lab Units 09/08/19  0839 09/06/19  0351   SODIUM mmol/L 146*  143 142   POTASSIUM mmol/L 4.6  4.5 3.8   CHLORIDE mmol/L 103  103 98   CO2 mmol/L 31.0*  31.2* 30.7*   BUN mg/dL 17  17 23   CREATININE mg/dL 0.90  " 0.87 0.88   CALCIUM mg/dL 8.8  8.8 8.2*   AST (SGOT) U/L 11  --    ALT (SGPT) U/L 9  --    ANION GAP mmol/L 12.0  8.8 13.3   ALBUMIN g/dL 3.40*  --                  Results from last 7 days   Lab Units 09/08/19  0839 09/08/19  0003 09/07/19  1551 09/07/19  0954 09/06/19  2357 09/06/19  1708 09/06/19  0755 09/06/19  0351  09/05/19  1545   WBC 10*3/mm3 10.58  --   --   --   --   --   --  10.32  --  10.35   HEMOGLOBIN g/dL 9.7* 9.7* 10.3* 10.1* 7.7* 8.4* 8.5* 8.0*   < > 8.5*   HEMATOCRIT % 33.0* 32.3* 34.6 32.7* 25.7* 28.8* 28.6* 27.0*   < > 28.7*   PLATELETS 10*3/mm3 246  --   --   --   --   --   --  281  --  302   MCV fL 97.3*  --   --   --   --   --   --  96.8  --  97.3*   NEUTROPHIL % % 73.0  --   --   --   --   --   --  66.3  --  73.7   LYMPHOCYTE % % 14.0*  --   --   --   --   --   --  21.2  --  16.5*   MONOCYTES % % 9.6  --   --   --   --   --   --  8.2  --  6.4   EOSINOPHIL % % 2.3  --   --   --   --   --   --  2.9  --  2.0   BASOPHIL % % 0.7  --   --   --   --   --   --  0.9  --  1.0   IMM GRAN % % 0.4  --   --   --   --   --   --  0.5  --  0.4    < > = values in this interval not displayed.     Results from last 7 days   Lab Units 09/06/19  0351   INR  1.08     Results from last 7 days   Lab Units 09/06/19  0351 09/05/19  1545   MAGNESIUM mg/dL 2.0 1.9           Invalid input(s): LDLCALC          No results found for: POCGLU          Results from last 7 days   Lab Units 09/05/19  1615   NITRITE UA  Negative               Imaging:   Imaging Results (all)     None                   Medication Review:     arformoterol 15 mcg Nebulization BID - RT   atorvastatin 20 mg Oral Nightly   budesonide 0.5 mg Nebulization BID - RT   carvedilol 25 mg Oral BID With Meals   ipratropium-albuterol 3 mL Nebulization 4x Daily - RT   levothyroxine 50 mcg Oral Q AM   pantoprazole 40 mg Oral Q AM   potassium chloride 20 mEq Oral Daily   sertraline 50 mg Oral Daily            ASSESSMENT:   1. Very few severe COPD, FEV1  36%  2. Chronic hypoxemic respiratory failure  3. History of lung cancer in 2009, in remission next number GI bleed with history of telangiectasia status post cauterization on 9/6/2019  4. Acute blood loss anemia, improved with transfusion  5. History of PE on outpatient anti-coag elation that is currently on hold   6. Postprandial  abdominal pain    PLAN:  Discussed with Dr. Larson, notes reviewed  Discussed with the patient  Agree with EGD and consider gastric emptying study  Continue with the oxygen, I would be concerned in case of any significant abdominal distention that can compromise her already compromised pulmonary reserve but no evidence of that at this point.  Lung exam is slightly better if any on today's visit, but overall no significant changes.  Patient was cleared for anticoagulation by the GI team, discussed with Dr. Larson, plan is to start Xarelto.  We will continue to follow-up on the respiratory issues    Disposition: Per GI and primary team, will follow up on her respiratory status while in the hospital    Nelson Fernandes MD  09/08/19  11:10 AM           Dictated utilizing Dragon dictation

## 2019-09-08 NOTE — PLAN OF CARE
Problem: Patient Care Overview  Goal: Plan of Care Review  Outcome: Ongoing (interventions implemented as appropriate)   09/08/19 8617   Coping/Psychosocial   Plan of Care Reviewed With patient   Plan of Care Review   Progress improving   OTHER   Outcome Summary H & H better today. Still c/o some mild nausea which subsided as the evening went on. No c/o pain. Will continue to monitor. VSS.       Problem: Fall Risk (Adult)  Goal: Absence of Fall  Outcome: Ongoing (interventions implemented as appropriate)      Problem: Anemia (Adult)  Goal: Symptom Improvement  Outcome: Ongoing (interventions implemented as appropriate)      Problem: Gastrointestinal Bleeding (Adult)  Goal: Signs and Symptoms of Listed Potential Problems Will be Absent, Minimized or Managed (Gastrointestinal Bleeding)  Outcome: Ongoing (interventions implemented as appropriate)      Problem: Skin Injury Risk (Adult)  Goal: Skin Health and Integrity  Outcome: Ongoing (interventions implemented as appropriate)

## 2019-09-08 NOTE — PROGRESS NOTES
History:  This 78-year-old lady is doing really good today except she is having postprandial nausea.  This comes about 1/2-hour after any meal.  She has had no vomiting.  She is required Phenergan to control this nausea.  She had very rare nausea at home and it was never postprandial.  Patient has not had a bowel movement since the procedure.  She has had very little gas.  She denies shortness of breath or chest pain.  No palpitations.  No leg edema    Allergies  Adhesive tape; Augmentin [amoxicillin-pot clavulanate]; Cephalexin; Metoclopramide; Pentazocine; Simvastatin; and Sucralfate      Current Facility-Administered Medications:   •  acetaminophen (TYLENOL) tablet 1,000 mg, 1,000 mg, Oral, Nightly PRN, 1,000 mg at 09/07/19 2102 **AND** diphenhydrAMINE (BENADRYL) capsule 50 mg, 50 mg, Oral, Nightly PRN, Harmeet Payne MD, 50 mg at 09/07/19 2102  •  acetaminophen (TYLENOL) tablet 650 mg, 650 mg, Oral, Q6H PRN, Harmeet Payne MD, 650 mg at 09/07/19 0841  •  arformoterol (BROVANA) nebulizer solution 15 mcg, 15 mcg, Nebulization, BID - RT, Alirio Alfaro MD, 15 mcg at 09/08/19 1052  •  atorvastatin (LIPITOR) tablet 20 mg, 20 mg, Oral, Nightly, Chet Payne Jr., MD, 20 mg at 09/07/19 2102  •  budesonide (PULMICORT) nebulizer solution 0.5 mg, 0.5 mg, Nebulization, BID - RT, Chet Payne Jr., MD, 0.5 mg at 09/08/19 1052  •  carvedilol (COREG) tablet 25 mg, 25 mg, Oral, BID With Meals, Chet Payne Jr., MD, 25 mg at 09/07/19 1725  •  ipratropium-albuterol (DUO-NEB) nebulizer solution 3 mL, 3 mL, Nebulization, 4x Daily - RT, Chet Payne Jr., MD, 3 mL at 09/07/19 1459  •  levothyroxine (SYNTHROID, LEVOTHROID) tablet 50 mcg, 50 mcg, Oral, Q AM, Chet Payne Jr., MD, 50 mcg at 09/08/19 0540  •  ondansetron (ZOFRAN) injection 4 mg, 4 mg, Intravenous, Q6H PRN, Harmeet Payne MD, 4 mg at 09/08/19 0540  •  pantoprazole (PROTONIX) EC tablet 40 mg, 40 mg, Oral, Q AM, Harmeet Payne MD, 40  "mg at 09/08/19 0540  •  potassium chloride (KLOR-CON) packet 20 mEq, 20 mEq, Oral, Daily, Chet Payne Jr., MD, 20 mEq at 09/08/19 0936  •  promethazine (PHENERGAN) tablet 12.5 mg, 12.5 mg, Oral, Q6H PRN, 12.5 mg at 09/08/19 1025 **OR** promethazine (PHENERGAN) injection 12.5 mg, 12.5 mg, Intravenous, Q6H PRN, Harmeet Payne MD  •  sertraline (ZOLOFT) tablet 50 mg, 50 mg, Oral, Daily, Chet Payne Jr., MD, 50 mg at 09/08/19 0936    /59 (BP Location: Right arm, Patient Position: Sitting)   Pulse 62   Temp 97.9 °F (36.6 °C) (Oral)   Resp 16   Ht 157.5 cm (62\")   Wt 81.3 kg (179 lb 3.2 oz)   LMP  (LMP Unknown)   SpO2 95%   BMI 32.78 kg/m²     Physical Exam   Appearance: Elderly  lady with central obesity.  She is sitting in a chair in no distress.  HEENT: Pupils were round and equal.  Pharynx clear mucous membranes moist.  Neck: Without lymphadenopathy or JVD.  Lungs: Decreased breath sounds but no wheezes, rales or rhonchi  Heart: Very distant tones.  Regular.  No murmur gallop.  Abdomen: Distended.  She has some mild epigastric tenderness to deep palpation.  No rebound or guarding.  Bowel sounds were very hypoactive.  Extremities: Without edema clubbing or cyanosis      Lab Results (last 24 hours)     Procedure Component Value Units Date/Time    Comprehensive Metabolic Panel [[id number]]  (Abnormal) Collected:  09/08/19 0839    Specimen:  Blood Updated:  09/08/19 0950     Glucose 111 mg/dL      BUN 17 mg/dL      Creatinine 0.90 mg/dL      Sodium 146 mmol/L      Potassium 4.6 mmol/L      Chloride 103 mmol/L      CO2 31.0 mmol/L      Calcium 8.8 mg/dL      Total Protein 6.3 g/dL      Albumin 3.40 g/dL      ALT (SGPT) 9 U/L      AST (SGOT) 11 U/L      Alkaline Phosphatase 58 U/L      Total Bilirubin <0.2 mg/dL      eGFR Non African Amer 61 mL/min/1.73      Globulin 2.9 gm/dL      A/G Ratio 1.2 g/dL      BUN/Creatinine Ratio 18.9     Anion Gap 12.0 mmol/L     Narrative:       GFR " Normal >60  Chronic Kidney Disease <60  Kidney Failure <15    Lipase [[id number]]  (Normal) Collected:  09/08/19 0839    Specimen:  Blood Updated:  09/08/19 0950     Lipase 49 U/L     Basic Metabolic Panel [[id number]]  (Abnormal) Collected:  09/08/19 0839    Specimen:  Blood Updated:  09/08/19 0926     Glucose 112 mg/dL      BUN 17 mg/dL      Creatinine 0.87 mg/dL      Sodium 143 mmol/L      Potassium 4.5 mmol/L      Chloride 103 mmol/L      CO2 31.2 mmol/L      Calcium 8.8 mg/dL      eGFR Non African Amer 63 mL/min/1.73      BUN/Creatinine Ratio 19.5     Anion Gap 8.8 mmol/L     Narrative:       GFR Normal >60  Chronic Kidney Disease <60  Kidney Failure <15    CBC & Differential [[id number]] Collected:  09/08/19 0839    Specimen:  Blood Updated:  09/08/19 0859    Narrative:       The following orders were created for panel order CBC & Differential.  Procedure                               Abnormality         Status                     ---------                               -----------         ------                     CBC Auto Differential[[id number]]        Abnormal            Final result                 Please view results for these tests on the individual orders.    CBC Auto Differential [[id number]]  (Abnormal) Collected:  09/08/19 0839    Specimen:  Blood Updated:  09/08/19 0859     WBC 10.58 10*3/mm3      RBC 3.39 10*6/mm3      Hemoglobin 9.7 g/dL      Hematocrit 33.0 %      MCV 97.3 fL      MCH 28.6 pg      MCHC 29.4 g/dL      RDW 15.6 %      RDW-SD 55.6 fl      MPV 9.9 fL      Platelets 246 10*3/mm3      Neutrophil % 73.0 %      Lymphocyte % 14.0 %      Monocyte % 9.6 %      Eosinophil % 2.3 %      Basophil % 0.7 %      Immature Grans % 0.4 %      Neutrophils, Absolute 7.73 10*3/mm3      Lymphocytes, Absolute 1.48 10*3/mm3      Monocytes, Absolute 1.02 10*3/mm3      Eosinophils, Absolute 0.24 10*3/mm3      Basophils, Absolute 0.07 10*3/mm3      Immature Grans, Absolute 0.04 10*3/mm3      nRBC 0.0 /100 WBC      Hemoglobin & Hematocrit, Blood [[id number]]  (Abnormal) Collected:  09/08/19 0003    Specimen:  Blood Updated:  09/08/19 0050     Hemoglobin 9.7 g/dL      Hematocrit 32.3 %     Hemoglobin & Hematocrit, Blood [[id number]]  (Abnormal) Collected:  09/07/19 1551    Specimen:  Blood Updated:  09/07/19 1637     Hemoglobin 10.3 g/dL      Hematocrit 34.6 %           Imp:  1.  Postprandial nausea.  The patient may be developing an ileus.  I cannot rule out transient gastroparesis or vascular problem (considering the patient's history)  2.  Anemia, multifactorial.  In large part related to lower GI bleed.    No evidence of continued bleeding.  H&H is been stable.  3.  Lower GI bleed related to angiodysplasia.  Subsequently treated with APC  4.  Atrial fib.  Very high vijay vacs score.  High risk for embolic phenomena  5.  COPD        Plan:  I am going to change the patient to clear liquids.  I am going to check a flatplate and upright of the abdomen.  I will restart her anticoagulation.  I will check CBC and BMP in the morning.  I will discontinue the round-the-clock H&H's    Haremet Payne MD  9/8/2019  11:21 AM

## 2019-09-08 NOTE — PROGRESS NOTES
[facility]  Inpatient Progress Note    Reason for Follow Up: Anemia, GI bleed    Subjective     Interval History: Patient up in chair, complains of postprandial nausea.  No vomiting noted.  H&H relatively stable.  Recent upper endoscopy relatively normal.  She notes his nausea is of a relatively recent onset.  Advised if symptoms persist consider a gastric emptying study to assess function.    Current Facility-Administered Medications:   •  acetaminophen (TYLENOL) tablet 1,000 mg, 1,000 mg, Oral, Nightly PRN, 1,000 mg at 09/07/19 2102 **AND** diphenhydrAMINE (BENADRYL) capsule 50 mg, 50 mg, Oral, Nightly PRN, Harmeet Payne MD, 50 mg at 09/07/19 2102  •  acetaminophen (TYLENOL) tablet 650 mg, 650 mg, Oral, Q6H PRN, Harmeet Payne MD, 650 mg at 09/07/19 0841  •  arformoterol (BROVANA) nebulizer solution 15 mcg, 15 mcg, Nebulization, BID - RT, Alirio Alfaro MD, 15 mcg at 09/07/19 2223  •  atorvastatin (LIPITOR) tablet 20 mg, 20 mg, Oral, Nightly, Chet Payne Jr., MD, 20 mg at 09/07/19 2102  •  budesonide (PULMICORT) nebulizer solution 0.5 mg, 0.5 mg, Nebulization, BID - RT, Chet Payne Jr., MD, 0.5 mg at 09/07/19 2223  •  carvedilol (COREG) tablet 25 mg, 25 mg, Oral, BID With Meals, Chet Payne Jr., MD, 25 mg at 09/07/19 1725  •  ipratropium-albuterol (DUO-NEB) nebulizer solution 3 mL, 3 mL, Nebulization, 4x Daily - RT, Chet Payne Jr., MD, 3 mL at 09/07/19 1459  •  levothyroxine (SYNTHROID, LEVOTHROID) tablet 50 mcg, 50 mcg, Oral, Q AM, Chet Payne Jr., MD, 50 mcg at 09/08/19 0540  •  ondansetron (ZOFRAN) injection 4 mg, 4 mg, Intravenous, Q6H PRN, Harmeet Payne MD, 4 mg at 09/08/19 0540  •  pantoprazole (PROTONIX) EC tablet 40 mg, 40 mg, Oral, Q AM, Harmeet Payne MD, 40 mg at 09/08/19 0540  •  potassium chloride (KLOR-CON) packet 20 mEq, 20 mEq, Oral, Daily, Chet Payne Jr., MD, 20 mEq at 09/08/19 0936  •  promethazine (PHENERGAN)  tablet 12.5 mg, 12.5 mg, Oral, Q6H PRN **OR** promethazine (PHENERGAN) injection 12.5 mg, 12.5 mg, Intravenous, Q6H PRN, Harmeet aPyne MD  •  sertraline (ZOLOFT) tablet 50 mg, 50 mg, Oral, Daily, Chet Payne Jr., MD, 50 mg at 09/08/19 0936  Review of Systems:    All systems were reviewed and negative except for:  Gastrointestinal: positive for  See HPI    Objective     Vital Signs  Temp:  [97.7 °F (36.5 °C)-97.9 °F (36.6 °C)] 97.9 °F (36.6 °C)  Heart Rate:  [58-69] 64  Resp:  [16-24] 16  BP: (133-158)/(52-75) 158/59  Body mass index is 32.78 kg/m².    Intake/Output Summary (Last 24 hours) at 9/8/2019 0951  Last data filed at 9/8/2019 0445  Gross per 24 hour   Intake 670 ml   Output 850 ml   Net -180 ml     No intake/output data recorded.     Physical Exam:   General: patient awake, alert and cooperative   Eyes: Normal lids and lashes, no scleral icterus   Neck: supple, normal ROM   Skin: warm and dry, not jaundiced   Cardiovascular: regular rhythm and rate, no murmurs auscultated   Pulm: clear to auscultation bilaterally, regular and unlabored   Abdomen: soft, nontender, nondistended; normal bowel sounds   Rectal: deferred   Extremities: no rash or edema   Psychiatric: Normal mood and behavior; memory intact     Results Review:     I reviewed the patient's new clinical results.    Results from last 7 days   Lab Units 09/08/19  0839 09/08/19  0003 09/07/19  1551  09/06/19  0351  09/05/19  1545   WBC 10*3/mm3 10.58  --   --   --  10.32  --  10.35   HEMOGLOBIN g/dL 9.7* 9.7* 10.3*   < > 8.0*   < > 8.5*   HEMATOCRIT % 33.0* 32.3* 34.6   < > 27.0*   < > 28.7*   PLATELETS 10*3/mm3 246  --   --   --  281  --  302    < > = values in this interval not displayed.     Results from last 7 days   Lab Units 09/08/19  0839 09/06/19  0351   SODIUM mmol/L 146*  143 142   POTASSIUM mmol/L 4.6  4.5 3.8   CHLORIDE mmol/L 103  103 98   CO2 mmol/L 31.0*  31.2* 30.7*   BUN mg/dL 17  17 23   CREATININE mg/dL 0.90  0.87 0.88    CALCIUM mg/dL 8.8  8.8 8.2*   BILIRUBIN mg/dL <0.2*  --    ALK PHOS U/L 58  --    ALT (SGPT) U/L 9  --    AST (SGOT) U/L 11  --    GLUCOSE mg/dL 111*  112* 122*     Results from last 7 days   Lab Units 09/06/19  0351   INR  1.08     Lab Results   Lab Value Date/Time    LIPASE 49 09/08/2019 0839    LIPASE 12 (L) 11/17/2015 1401       Radiology:  No orders to display       Assessment/Plan     Patient Active Problem List   Diagnosis   • Adenocarcinoma of right lung (CMS/HCC)   • Decreased blood pressure, not hypotension   • Cardiomyopathy (CMS/HCC)   • Cardiovascular disease   • Dizziness   • Dyspnea on exertion   • Nodule of left lung   • Peripheral arterial occlusive disease (CMS/HCC)   • Iron deficiency anemia   • Pulmonary emphysema (CMS/HCC)   • Pneumonitis, radiation (CMS/HCC)   • Palpitations   • Localized edema   • Weight gain   • Anemia   • Blood loss anemia   • Rectal bleeding     Impression  #1 anemia: H&H relatively static  #2 rectal bleeding: Evidence of AVMs on endoscopy with coagulation therapy  #3 thrombotic disease: Requiring anticoagulant therapy  #4 history of polyps  #5 history of lung cancer  #6 COPD      Recommendation  Resume anticoagulant judiciously with low therapeutic goal.  If nausea persists can offer gastric emptying study.    I discussed the patients findings and my recommendations with patient and nursing staff.    Kamlesh Osman MD

## 2019-09-08 NOTE — PROGRESS NOTES
Cardiology Weekend Coverage Progress Note    Patient Identification:  Name: Ale Narayan  Age: 78 y.o.  Sex: female  :  1940  MRN: [id number]                 Follow Up / Chief Complaint: atrial fibrillation    Interval History: Blood given yesterday.  Globin today 9.7.  Able postop EGD/colonoscopy.      Subjective: Denies chest pain, shortness of breath and pounds.        Objective:    Past Medical History:  Past Medical History:   Diagnosis Date   • Adenocarcinoma of lung (CMS/HCC)    • Asthma    • Carotid artery disease (CMS/HCC)    • Cough    • Deep vein thrombosis (CMS/HCC)    • Emphysema of lung (CMS/HCC)    • Hyperlipidemia    • Hypertension    • Hypothyroidism    • Lung cancer (CMS/HCC)    • Osteoporosis    • Paroxysmal atrial fibrillation (CMS/HCC)    • Peripheral arterial disease (CMS/HCC)    • Pulmonary embolism (CMS/HCC)    • Thrombophilia (CMS/HCC)     Since age 25     Past Surgical History:  Past Surgical History:   Procedure Laterality Date   • ANKLE SURGERY     • BREAST SURGERY      removal of benign melanoma spot on nipple of left breast 2013   • COLONOSCOPY      Negative   • COLONOSCOPY N/A 2019    Procedure: COLONOSCOPY to cecum with biopsy / polypectomy and  APC cautery;  Surgeon: Alexsandra Ruiz MD;  Location: [facility] ENDOSCOPY;  Service: Gastroenterology   • ENDOSCOPY N/A 2019    Procedure: ESOPHAGOGASTRODUODENOSCOPY with biopsies;  Surgeon: Alexsandra Ruiz MD;  Location: [facility] ENDOSCOPY;  Service: Gastroenterology   • HERNIA REPAIR      Lumbar spine   • HYSTERECTOMY     • LUNG LOBECTOMY     • OTHER SURGICAL HISTORY      Bypass of lower extremities   • OTHER SURGICAL HISTORY      IVC filter placed        Social History:   Social History     Tobacco Use   • Smoking status: Former Smoker     Packs/day: 1.00     Years: 50.00     Pack years: 50.00     Types: Cigarettes     Start date:      Last attempt to quit: 2008     Years since quittin.6   • Smokeless  tobacco: Former User   Substance Use Topics   • Alcohol use: No      Family History:  Family History   Problem Relation Age of Onset   • Lung cancer Mother           Allergies:  Allergies   Allergen Reactions   • Adhesive Tape    • Augmentin [Amoxicillin-Pot Clavulanate]    • Cephalexin    • Metoclopramide Unknown (See Comments)   • Pentazocine    • Simvastatin Unknown (See Comments)   • Sucralfate Unknown (See Comments)     Scheduled Meds:    arformoterol 15 mcg BID - RT   atorvastatin 20 mg Nightly   budesonide 0.5 mg BID - RT   carvedilol 25 mg BID With Meals   enoxaparin 1 mg/kg Q12H   ipratropium-albuterol 3 mL 4x Daily - RT   levothyroxine 50 mcg Q AM   pantoprazole 40 mg Q AM   potassium chloride 20 mEq Daily   sertraline 50 mg Daily           INTAKE AND OUTPUT:    Intake/Output Summary (Last 24 hours) at 9/8/2019 1319  Last data filed at 9/8/2019 0445  Gross per 24 hour   Intake 350 ml   Output 450 ml   Net -100 ml       Review of Systems: No change  GI: Denies abdominal pain, nausea, vomiting  Cardiac: Denies chest pain and palpitations  Pulmonary: Denies shortness of breath and cough    Constitutional:  Temp:  [97.7 °F (36.5 °C)-97.9 °F (36.6 °C)] 97.9 °F (36.6 °C)  Heart Rate:  [58-64] 62  Resp:  [16-24] 16  BP: (133-158)/(52-75) 158/59    Physical Exam:    General: Appears in no acute distress, sitting in chair   HEENT: No JVD.  Thyroid not visibly enlarged.  No mucosal pallor or cyanosis  Respiratory: CTAB.  No   No crackles, rubs or wheezes auscultated  Cardiovascular: S1-S2 irregular rhythm.  No murmur, rub or gallop. No carotid bruits. DP/PT pulses  2+   . No pretibial pitting edema  Gastrointestinal: Soft,  nontender.  bowel Sounds present.  No hepatosplenomegaly. No ascites  Musculoskeletal: KELLY x4. No abnormal movements   Neuro: AAO x3 CN II-XII grossly intact  Psych: Mood and affect normal, pleasant, cooperative          Cardiographics  Telemetry:   afib       afib    ECG:      afib      Echocardiogram:   Interpretation Summary     · Right ventricular cavity is mildly dilated.  · Mildly reduced right ventricular systolic function noted.  · Left ventricular wall thickness is consistent with borderline concentric hypertrophy.  · Left ventricular function is mildly decreased. Estimated EF = 40%.  · Mild mitral valve regurgitation is present  · Mild aortic valve stenosis is present          Lab Review       Results from last 7 days   Lab Units 09/06/19  0351   MAGNESIUM mg/dL 2.0     Results from last 7 days   Lab Units 09/08/19  0839   SODIUM mmol/L 146*  143   POTASSIUM mmol/L 4.6  4.5   BUN mg/dL 17  17   CREATININE mg/dL 0.90  0.87   CALCIUM mg/dL 8.8  8.8                 Results from last 7 days   Lab Units 09/08/19  0839 09/08/19  0003 09/07/19  1551  09/06/19  0351  09/05/19  1545   WBC 10*3/mm3 10.58  --   --   --  10.32  --  10.35   HEMOGLOBIN g/dL 9.7* 9.7* 10.3*   < > 8.0*   < > 8.5*   HEMATOCRIT % 33.0* 32.3* 34.6   < > 27.0*   < > 28.7*   PLATELETS 10*3/mm3 246  --   --   --  281  --  302    < > = values in this interval not displayed.     Results from last 7 days   Lab Units 09/06/19  0351   INR  1.08                I have reviewed ecg/tele as well as labs and current medications.    Assessment/Plan:  1.  COPD: Defer to IM    2.  Chronic hypoxic respiratory failure: defer to pulm.      3.  History of lung cancer in 2009:   In remission    4.  GI bleed with history: EGD/colonoscopy: telangiectasia s/p cauterization on 9/6/19.     5.  Blood loss anemia: she 2 units of blood yesterday.  Defer to attending.       6. History of PE on a/c as outpatient: Rate controlled.  IM going to restart anticoagulation, Xarelto.       7.  Paroxymal atrial fibrillation: rate controlled. Rythmol discontinued on 9/6, possible junctional on previous ekg's.  CHADS-VASc 6. Im restarting Xarelto.       8. Cardiomyopathy: EF 40%, see full report as above. On coreg no hypertension breath sounds or  "BLE swelling.      9/8/2019  Carolee Hunter, CRISTHIAN Sanders/Transcription:   \"Dictated utilizing Dragon dictation\".     "

## 2019-09-09 ENCOUNTER — APPOINTMENT (OUTPATIENT)
Dept: ULTRASOUND IMAGING | Facility: HOSPITAL | Age: 79
End: 2019-09-09

## 2019-09-09 LAB
ANION GAP SERPL CALCULATED.3IONS-SCNC: 8.3 MMOL/L (ref 5–15)
BASOPHILS # BLD AUTO: 0.08 10*3/MM3 (ref 0–0.2)
BASOPHILS NFR BLD AUTO: 0.7 % (ref 0–1.5)
BUN BLD-MCNC: 15 MG/DL (ref 8–23)
BUN/CREAT SERPL: 21.7 (ref 7–25)
CALCIUM SPEC-SCNC: 9.1 MG/DL (ref 8.6–10.5)
CHLORIDE SERPL-SCNC: 102 MMOL/L (ref 98–107)
CO2 SERPL-SCNC: 32.7 MMOL/L (ref 22–29)
CREAT BLD-MCNC: 0.69 MG/DL (ref 0.57–1)
CYTO UR: NORMAL
DEPRECATED RDW RBC AUTO: 53.3 FL (ref 37–54)
EOSINOPHIL # BLD AUTO: 0.28 10*3/MM3 (ref 0–0.4)
EOSINOPHIL NFR BLD AUTO: 2.5 % (ref 0.3–6.2)
ERYTHROCYTE [DISTWIDTH] IN BLOOD BY AUTOMATED COUNT: 14.8 % (ref 12.3–15.4)
GFR SERPL CREATININE-BSD FRML MDRD: 83 ML/MIN/1.73
GLUCOSE BLD-MCNC: 107 MG/DL (ref 65–99)
HCT VFR BLD AUTO: 33.8 % (ref 34–46.6)
HGB BLD-MCNC: 9.9 G/DL (ref 12–15.9)
IMM GRANULOCYTES # BLD AUTO: 0.02 10*3/MM3 (ref 0–0.05)
IMM GRANULOCYTES NFR BLD AUTO: 0.2 % (ref 0–0.5)
LAB AP CASE REPORT: NORMAL
LYMPHOCYTES # BLD AUTO: 1.69 10*3/MM3 (ref 0.7–3.1)
LYMPHOCYTES NFR BLD AUTO: 15 % (ref 19.6–45.3)
MCH RBC QN AUTO: 29 PG (ref 26.6–33)
MCHC RBC AUTO-ENTMCNC: 29.3 G/DL (ref 31.5–35.7)
MCV RBC AUTO: 99.1 FL (ref 79–97)
MONOCYTES # BLD AUTO: 1.23 10*3/MM3 (ref 0.1–0.9)
MONOCYTES NFR BLD AUTO: 11 % (ref 5–12)
NEUTROPHILS # BLD AUTO: 7.93 10*3/MM3 (ref 1.7–7)
NEUTROPHILS NFR BLD AUTO: 70.6 % (ref 42.7–76)
NRBC BLD AUTO-RTO: 0 /100 WBC (ref 0–0.2)
PATH REPORT.FINAL DX SPEC: NORMAL
PATH REPORT.GROSS SPEC: NORMAL
PLATELET # BLD AUTO: 260 10*3/MM3 (ref 140–450)
PMV BLD AUTO: 10.1 FL (ref 6–12)
POTASSIUM BLD-SCNC: 4.7 MMOL/L (ref 3.5–5.2)
RBC # BLD AUTO: 3.41 10*6/MM3 (ref 3.77–5.28)
SODIUM BLD-SCNC: 143 MMOL/L (ref 136–145)
WBC NRBC COR # BLD: 11.23 10*3/MM3 (ref 3.4–10.8)

## 2019-09-09 PROCEDURE — 94799 UNLISTED PULMONARY SVC/PX: CPT

## 2019-09-09 PROCEDURE — 63710000001 DIPHENHYDRAMINE PER 50 MG: Performed by: INTERNAL MEDICINE

## 2019-09-09 PROCEDURE — 63710000001 PROMETHAZINE PER 12.5 MG: Performed by: INTERNAL MEDICINE

## 2019-09-09 PROCEDURE — 25010000002 ONDANSETRON PER 1 MG: Performed by: INTERNAL MEDICINE

## 2019-09-09 PROCEDURE — 99233 SBSQ HOSP IP/OBS HIGH 50: CPT | Performed by: INTERNAL MEDICINE

## 2019-09-09 PROCEDURE — 97162 PT EVAL MOD COMPLEX 30 MIN: CPT

## 2019-09-09 PROCEDURE — 85025 COMPLETE CBC W/AUTO DIFF WBC: CPT | Performed by: INTERNAL MEDICINE

## 2019-09-09 PROCEDURE — 99232 SBSQ HOSP IP/OBS MODERATE 35: CPT | Performed by: INTERNAL MEDICINE

## 2019-09-09 PROCEDURE — 80048 BASIC METABOLIC PNL TOTAL CA: CPT | Performed by: INTERNAL MEDICINE

## 2019-09-09 PROCEDURE — 25010000002 ENOXAPARIN PER 10 MG: Performed by: INTERNAL MEDICINE

## 2019-09-09 PROCEDURE — 76705 ECHO EXAM OF ABDOMEN: CPT

## 2019-09-09 PROCEDURE — 97110 THERAPEUTIC EXERCISES: CPT

## 2019-09-09 RX ORDER — DEXTROSE AND SODIUM CHLORIDE 5; .45 G/100ML; G/100ML
75 INJECTION, SOLUTION INTRAVENOUS CONTINUOUS
Status: DISCONTINUED | OUTPATIENT
Start: 2019-09-09 | End: 2019-09-11

## 2019-09-09 RX ORDER — BISACODYL 10 MG
10 SUPPOSITORY, RECTAL RECTAL DAILY
Status: DISCONTINUED | OUTPATIENT
Start: 2019-09-09 | End: 2019-09-17 | Stop reason: HOSPADM

## 2019-09-09 RX ORDER — ONDANSETRON 2 MG/ML
4 INJECTION INTRAMUSCULAR; INTRAVENOUS EVERY 6 HOURS SCHEDULED
Status: DISCONTINUED | OUTPATIENT
Start: 2019-09-09 | End: 2019-09-12

## 2019-09-09 RX ADMIN — DIPHENHYDRAMINE HYDROCHLORIDE 50 MG: 25 CAPSULE ORAL at 22:02

## 2019-09-09 RX ADMIN — SENNOSIDES AND DOCUSATE SODIUM 2 TABLET: 8.6; 5 TABLET ORAL at 21:59

## 2019-09-09 RX ADMIN — BUDESONIDE 0.5 MG: 0.5 SUSPENSION RESPIRATORY (INHALATION) at 21:03

## 2019-09-09 RX ADMIN — ATORVASTATIN CALCIUM 20 MG: 20 TABLET, FILM COATED ORAL at 21:59

## 2019-09-09 RX ADMIN — IPRATROPIUM BROMIDE AND ALBUTEROL SULFATE 3 ML: 2.5; .5 SOLUTION RESPIRATORY (INHALATION) at 14:24

## 2019-09-09 RX ADMIN — DEXTROSE AND SODIUM CHLORIDE 75 ML/HR: 5; 450 INJECTION, SOLUTION INTRAVENOUS at 10:19

## 2019-09-09 RX ADMIN — PROMETHAZINE HYDROCHLORIDE 12.5 MG: 12.5 TABLET ORAL at 08:56

## 2019-09-09 RX ADMIN — ACETAMINOPHEN 1000 MG: 500 TABLET, FILM COATED ORAL at 22:02

## 2019-09-09 RX ADMIN — ARFORMOTEROL TARTRATE 15 MCG: 15 SOLUTION RESPIRATORY (INHALATION) at 21:03

## 2019-09-09 RX ADMIN — CARVEDILOL 25 MG: 25 TABLET, FILM COATED ORAL at 08:56

## 2019-09-09 RX ADMIN — Medication 10 MG: at 08:56

## 2019-09-09 RX ADMIN — RIVAROXABAN 20 MG: 20 TABLET, FILM COATED ORAL at 17:54

## 2019-09-09 RX ADMIN — ONDANSETRON 4 MG: 2 INJECTION INTRAMUSCULAR; INTRAVENOUS at 17:54

## 2019-09-09 RX ADMIN — POTASSIUM CHLORIDE 20 MEQ: 1.5 POWDER, FOR SOLUTION ORAL at 08:55

## 2019-09-09 RX ADMIN — IPRATROPIUM BROMIDE AND ALBUTEROL SULFATE 3 ML: 2.5; .5 SOLUTION RESPIRATORY (INHALATION) at 16:09

## 2019-09-09 RX ADMIN — SERTRALINE 50 MG: 50 TABLET, FILM COATED ORAL at 08:56

## 2019-09-09 RX ADMIN — ONDANSETRON 4 MG: 2 INJECTION INTRAMUSCULAR; INTRAVENOUS at 04:53

## 2019-09-09 RX ADMIN — ENOXAPARIN SODIUM 80 MG: 80 INJECTION SUBCUTANEOUS at 01:03

## 2019-09-09 RX ADMIN — POLYETHYLENE GLYCOL 3350 17 G: 17 POWDER, FOR SOLUTION ORAL at 08:56

## 2019-09-09 RX ADMIN — ACETAMINOPHEN 650 MG: 325 TABLET, FILM COATED ORAL at 08:55

## 2019-09-09 RX ADMIN — CARVEDILOL 25 MG: 25 TABLET, FILM COATED ORAL at 17:55

## 2019-09-09 NOTE — PLAN OF CARE
Problem: Patient Care Overview  Goal: Plan of Care Review   09/09/19 1613   OTHER   Outcome Summary Continue care.

## 2019-09-09 NOTE — PROGRESS NOTES
Patient Name: Ale Narayan  Patient : 1940        Date of Service:19  Provider of Service: Flavio Del Rosario MD  Place of Service: [facility]  Referral Provider: Harmeet Payne MD          Follow Up: Atrial fibrillation  Interval Hx: I have not seen any evidence of atrial fibrillation but have seen periods of heart block.  There have not been any pauses that have been extreme.  The longest is approximately 3 seconds.        OBJECTIVE  Temp:  [98 °F (36.7 °C)-98.4 °F (36.9 °C)] 98.1 °F (36.7 °C)  Heart Rate:  [50-89] 50  Resp:  [16-20] 16  BP: (155-174)/(53-64) 169/62     Intake/Output Summary (Last 24 hours) at 2019 1115  Last data filed at 2019 1019  Gross per 24 hour   Intake 690 ml   Output 950 ml   Net -260 ml     Body mass index is 32.78 kg/m².      19  0654   Weight: 81.3 kg (179 lb 3.2 oz)         Physical Exam:   Physical Exam   Constitutional: She is oriented to person, place, and time. She appears well-developed and well-nourished.   HENT:   Head: Normocephalic.   Eyes: Pupils are equal, round, and reactive to light.   Neck: Normal range of motion. No JVD present. Carotid bruit is not present. No thyromegaly present.   Cardiovascular: Normal rate, regular rhythm, S1 normal, S2 normal, normal heart sounds and intact distal pulses. Exam reveals no gallop and no friction rub.   No murmur heard.  Pulmonary/Chest: Effort normal and breath sounds normal.   Musculoskeletal: She exhibits no edema.   Neurological: She is alert and oriented to person, place, and time.   Skin: Skin is warm, dry and intact. No erythema.   Psychiatric: She has a normal mood and affect.   Vitals reviewed.        CURRENT MEDS    Scheduled Meds:  arformoterol 15 mcg Nebulization BID - RT   atorvastatin 20 mg Oral Nightly   bisacodyl 10 mg Rectal Daily   budesonide 0.5 mg Nebulization BID - RT   carvedilol 25 mg Oral BID With Meals   ipratropium-albuterol 3 mL Nebulization 4x Daily  - RT   levothyroxine 50 mcg Oral Q AM   pantoprazole 40 mg Oral Q AM   potassium chloride 20 mEq Oral Daily   rivaroxaban 20 mg Oral Daily With Dinner   sennosides-docusate sodium 2 tablet Oral Nightly   sertraline 50 mg Oral Daily     Continuous Infusions:  dextrose 5 % and sodium chloride 0.45 % 75 mL/hr Last Rate: 75 mL/hr (09/09/19 1019)         Lab Review:   Results from last 7 days   Lab Units 09/09/19  0315 09/08/19  0839   SODIUM mmol/L 143 146*  143   POTASSIUM mmol/L 4.7 4.6  4.5   CHLORIDE mmol/L 102 103  103   CO2 mmol/L 32.7* 31.0*  31.2*   BUN mg/dL 15 17  17   CREATININE mg/dL 0.69 0.90  0.87   GLUCOSE mg/dL 107* 111*  112*   CALCIUM mg/dL 9.1 8.8  8.8   AST (SGOT) U/L  --  11   ALT (SGPT) U/L  --  9         Results from last 7 days   Lab Units 09/09/19  0315 09/08/19  0839   WBC 10*3/mm3 11.23* 10.58   HEMOGLOBIN g/dL 9.9* 9.7*   HEMATOCRIT % 33.8* 33.0*   PLATELETS 10*3/mm3 260 246     Results from last 7 days   Lab Units 09/06/19  0351   INR  1.08     Results from last 7 days   Lab Units 09/06/19  0351 09/05/19  1545   MAGNESIUM mg/dL 2.0 1.9                   I personally reviewed the patient's ECG and telemetry data    ASSESSMENT & PLAN    Anemia    Blood loss anemia    Rectal bleeding    1.  Blood loss anemia:   2.  Paroxysmal atrial fibrillation: The patient has been on anticoagulation which has been held at this point.  She also has been on Rythmol.  It does not appear to be effective.  The 2 electrocardiograms here indicate either a junctional rhythm with bradycardia intermittent heart block.    Rythmol has been discontinued [id number]: 6.  This places her at a very high risk for thromboembolism.  At this point there is no evidence that we have seen any further since admission.  She is having periods of heart block but not prolonged at this point.  I think we should send her home with a ZIO patch for an initial evaluation.  She might need an internal loop recorder to really  assess her rhythm if the CO patch is not revealing.  Xarelto has been restarted at this time.    3.  Cardiomyopathy: Dilated.  Last left ventricular ejection fraction 40%.  No signs of heart failure at this time  4.  COPD: Oxygen dependent  5.  History of adenocarcinoma of the lung.  6.  Pulmonary embolus: Status post IVC filter.  On chronic anticoagulation  7.  Peripheral vascular disease      Flavio Del Rosario MD  09/09/19

## 2019-09-09 NOTE — PROGRESS NOTES
"  PROGRESS NOTE  Patient Name: Ale Narayan  Age/Sex: 78 y.o. female  : 1940  MRN: [id number]    Date of Admission: 2019  Date of Encounter Visit: 19   LOS: 4 days   Patient Care Team:  Harmeet Payne MD as PCP - General  Harmeet Payne MD as PCP - Claims Attributed  Yoan Cash MD as Consulting Physician (Hematology and Oncology)  Devon Lamb MD as Referring Physician (Thoracic Surgery)    Chief Complaint: Stage IV COPD status post colonoscopy and EGD for GI bleed work-up, no active respiratory issues, currently complaining of postprandial nausea and abdominal discomfort    Hospital course: Still complaining of abdominal pain with postprandial nausea, there is no active respiratory issues    REVIEW OF SYSTEMS:   On home study nasal cannula oxygen with no acute respiratory complaints mainly GI issues as discussed per primary team    Ventilator/Non-Invasive Ventilation Settings (From admission, onward)    Nasal cannula oxygen at home regimen of 3 L/min            Vital Signs  Temp:  [98 °F (36.7 °C)-98.2 °F (36.8 °C)] 98.2 °F (36.8 °C)  Heart Rate:  [50-89] 62  Resp:  [16-20] 20  BP: (155-174)/(53-69) 165/69  SpO2:  [72 %-97 %] 94 %  on  Flow (L/min):  [3] 3 Device (Oxygen Therapy): nasal cannula    Intake/Output Summary (Last 24 hours) at 2019 1505  Last data filed at 2019 1200  Gross per 24 hour   Intake 856.25 ml   Output 1550 ml   Net -693.75 ml     Flowsheet Rows      First Filed Value   Admission Height  157.5 cm (62\") Documented at 2019 1500   Admission Weight  --        Body mass index is 32.78 kg/m².      19  0654   Weight: 81.3 kg (179 lb 3.2 oz)       Physical Exam:  GEN:  No acute distress, alert, cooperative, well developed on nasal cannula oxygen  LUNGS: Normal chest on inspection, patient has positive expiratory wheezes and rhonchi with diminished breath sounds but no prolongation of the expiratory phase, she sounds similar to her baseline " outpatient lung exam finding.     Results Review:      Results from last 7 days   Lab Units 09/09/19 0315 09/08/19  0839 09/06/19  0351   SODIUM mmol/L 143 146*  143 142   POTASSIUM mmol/L 4.7 4.6  4.5 3.8   CHLORIDE mmol/L 102 103  103 98   CO2 mmol/L 32.7* 31.0*  31.2* 30.7*   BUN mg/dL 15 17  17 23   CREATININE mg/dL 0.69 0.90  0.87 0.88   CALCIUM mg/dL 9.1 8.8  8.8 8.2*   AST (SGOT) U/L  --  11  --    ALT (SGPT) U/L  --  9  --    ANION GAP mmol/L 8.3 12.0  8.8 13.3   ALBUMIN g/dL  --  3.40*  --                  Results from last 7 days   Lab Units 09/09/19 0315 09/08/19  0839 09/08/19  0003 09/07/19  1551 09/07/19  0954 09/06/19  2357 09/06/19  1708  09/06/19  0351  09/05/19  1545   WBC 10*3/mm3 11.23* 10.58  --   --   --   --   --   --  10.32  --  10.35   HEMOGLOBIN g/dL 9.9* 9.7* 9.7* 10.3* 10.1* 7.7* 8.4*   < > 8.0*   < > 8.5*   HEMATOCRIT % 33.8* 33.0* 32.3* 34.6 32.7* 25.7* 28.8*   < > 27.0*   < > 28.7*   PLATELETS 10*3/mm3 260 246  --   --   --   --   --   --  281  --  302   MCV fL 99.1* 97.3*  --   --   --   --   --   --  96.8  --  97.3*   NEUTROPHIL % % 70.6 73.0  --   --   --   --   --   --  66.3  --  73.7   LYMPHOCYTE % % 15.0* 14.0*  --   --   --   --   --   --  21.2  --  16.5*   MONOCYTES % % 11.0 9.6  --   --   --   --   --   --  8.2  --  6.4   EOSINOPHIL % % 2.5 2.3  --   --   --   --   --   --  2.9  --  2.0   BASOPHIL % % 0.7 0.7  --   --   --   --   --   --  0.9  --  1.0   IMM GRAN % % 0.2 0.4  --   --   --   --   --   --  0.5  --  0.4    < > = values in this interval not displayed.     Results from last 7 days   Lab Units 09/06/19  0351   INR  1.08     Results from last 7 days   Lab Units 09/06/19  0351 09/05/19  1545   MAGNESIUM mg/dL 2.0 1.9           Invalid input(s): LDLCALC          No results found for: POCGLU          Results from last 7 days   Lab Units 09/05/19  1615   NITRITE UA  Negative               Imaging:   Imaging Results (all)     None                   Medication  Review:     arformoterol 15 mcg Nebulization BID - RT   atorvastatin 20 mg Oral Nightly   bisacodyl 10 mg Rectal Daily   budesonide 0.5 mg Nebulization BID - RT   carvedilol 25 mg Oral BID With Meals   ipratropium-albuterol 3 mL Nebulization 4x Daily - RT   levothyroxine 50 mcg Oral Q AM   ondansetron 4 mg Intravenous Q6H   pantoprazole 40 mg Oral Q AM   potassium chloride 20 mEq Oral Daily   rivaroxaban 20 mg Oral Daily With Dinner   sennosides-docusate sodium 2 tablet Oral Nightly   sertraline 50 mg Oral Daily         dextrose 5 % and sodium chloride 0.45 % 75 mL/hr Last Rate: 75 mL/hr (09/09/19 1019)       ASSESSMENT:   1. Very few severe COPD, FEV1 36%  2. Chronic hypoxemic respiratory failure  3. History of lung cancer in 2009, in remission next number GI bleed with history of telangiectasia status post cauterization on 9/6/2019  4. Acute blood loss anemia, improved with transfusion  5. History of PE on outpatient anti-coag elation that is currently on hold   6. Postprandial  abdominal pain    PLAN:  GI residual issues per the primary team and per the GI consultant  We will follow-up mainly on the respiratory issues, she is compromised by baseline but she is compensated and is at baseline.    Disposition: Per GI and primary team, will follow up on her respiratory status while in the hospital    Nelson Fernandes MD  09/09/19  3:05 PM           Dictated utilizing Dragon dictation

## 2019-09-09 NOTE — PROGRESS NOTES
[facility]  Inpatient Progress Note    Reason for Follow Up: Anemia, GI bleed    Subjective     Interval History:   Patient continues with post-prandial nausea.  Better with antiemetics.      Current Facility-Administered Medications:   •  acetaminophen (TYLENOL) tablet 1,000 mg, 1,000 mg, Oral, Nightly PRN, 1,000 mg at 09/08/19 2120 **AND** diphenhydrAMINE (BENADRYL) capsule 50 mg, 50 mg, Oral, Nightly PRN, Harmeet Payne MD, 50 mg at 09/08/19 2120  •  acetaminophen (TYLENOL) tablet 650 mg, 650 mg, Oral, Q6H PRN, Harmeet Payne MD, 650 mg at 09/09/19 0855  •  arformoterol (BROVANA) nebulizer solution 15 mcg, 15 mcg, Nebulization, BID - RT, Alirio Alfaro MD, 15 mcg at 09/08/19 1924  •  atorvastatin (LIPITOR) tablet 20 mg, 20 mg, Oral, Nightly, Chet Payne Jr., MD, 20 mg at 09/08/19 2011  •  bisacodyl (DULCOLAX) suppository 10 mg, 10 mg, Rectal, Daily, Harmeet Payne MD, 10 mg at 09/09/19 0856  •  budesonide (PULMICORT) nebulizer solution 0.5 mg, 0.5 mg, Nebulization, BID - RT, Chet Payne Jr., MD, 0.5 mg at 09/08/19 1924  •  carvedilol (COREG) tablet 25 mg, 25 mg, Oral, BID With Meals, Chet Payne Jr., MD, 25 mg at 09/09/19 0856  •  dextrose 5 % and sodium chloride 0.45 % infusion, 75 mL/hr, Intravenous, Continuous, Harmeet Payne MD, Last Rate: 75 mL/hr at 09/09/19 1019, 75 mL/hr at 09/09/19 1019  •  ipratropium-albuterol (DUO-NEB) nebulizer solution 3 mL, 3 mL, Nebulization, 4x Daily - RT, Chet Payne Jr., MD, 3 mL at 09/08/19 1527  •  levothyroxine (SYNTHROID, LEVOTHROID) tablet 50 mcg, 50 mcg, Oral, Q AM, Chet Payne Jr., MD, Stopped at 09/09/19 0702  •  ondansetron (ZOFRAN) injection 4 mg, 4 mg, Intravenous, Q6H PRN, Harmeet Payne MD, 4 mg at 09/09/19 0453  •  pantoprazole (PROTONIX) EC tablet 40 mg, 40 mg, Oral, Q AM, Harmeet Payne MD, Stopped at 09/09/19 0702  •  polyethylene glycol 3350 powder (packet), 17 g, Oral, Daily PRN, Kerry,  Harmeet PRUITT MD, 17 g at 09/09/19 0856  •  potassium chloride (KLOR-CON) packet 20 mEq, 20 mEq, Oral, Daily, Chet Payne Jr., MD, 20 mEq at 09/09/19 0855  •  promethazine (PHENERGAN) tablet 12.5 mg, 12.5 mg, Oral, Q6H PRN, 12.5 mg at 09/09/19 0856 **OR** promethazine (PHENERGAN) injection 12.5 mg, 12.5 mg, Intravenous, Q6H PRN, Harmeet Payne MD  •  rivaroxaban (XARELTO) tablet 20 mg, 20 mg, Oral, Daily With Dinner, Harmeet Payne MD  •  sennosides-docusate sodium (SENOKOT-S) 8.6-50 MG tablet 2 tablet, 2 tablet, Oral, Nightly, Harmeet Payne MD, 2 tablet at 09/08/19 2011  •  sertraline (ZOLOFT) tablet 50 mg, 50 mg, Oral, Daily, Chet Payne Jr., MD, 50 mg at 09/09/19 0856  Review of Systems:   GI: nausea    Objective     Vital Signs  Temp:  [98 °F (36.7 °C)-98.4 °F (36.9 °C)] 98.1 °F (36.7 °C)  Heart Rate:  [50-89] 50  Resp:  [16-20] 16  BP: (155-174)/(53-64) 169/62  Body mass index is 32.78 kg/m².    Intake/Output Summary (Last 24 hours) at 9/9/2019 1252  Last data filed at 9/9/2019 1019  Gross per 24 hour   Intake 690 ml   Output 950 ml   Net -260 ml     I/O this shift:  In: 250 [P.O.:250]  Out: 350 [Urine:350]     Physical Exam:   General: patient awake, alert and cooperative   Abdomen: soft, nontender, nondistended; normal bowel sounds   Rectal: deferred   Extremities: no rash or edema   Psychiatric: Normal mood and behavior; memory intact     Results Review:     I reviewed the patient's new clinical results.    Results from last 7 days   Lab Units 09/09/19  0315 09/08/19  0839 09/08/19  0003  09/06/19  0351   WBC 10*3/mm3 11.23* 10.58  --   --  10.32   HEMOGLOBIN g/dL 9.9* 9.7* 9.7*   < > 8.0*   HEMATOCRIT % 33.8* 33.0* 32.3*   < > 27.0*   PLATELETS 10*3/mm3 260 246  --   --  281    < > = values in this interval not displayed.     Results from last 7 days   Lab Units 09/09/19  0315 09/08/19  0839 09/06/19  0351   SODIUM mmol/L 143 146*  143 142   POTASSIUM mmol/L 4.7 4.6  4.5 3.8   CHLORIDE mmol/L  102 103  103 98   CO2 mmol/L 32.7* 31.0*  31.2* 30.7*   BUN mg/dL 15 17  17 23   CREATININE mg/dL 0.69 0.90  0.87 0.88   CALCIUM mg/dL 9.1 8.8  8.8 8.2*   BILIRUBIN mg/dL  --  <0.2*  --    ALK PHOS U/L  --  58  --    ALT (SGPT) U/L  --  9  --    AST (SGOT) U/L  --  11  --    GLUCOSE mg/dL 107* 111*  112* 122*     Results from last 7 days   Lab Units 09/06/19  0351   INR  1.08     Lab Results   Lab Value Date/Time    LIPASE 49 09/08/2019 0839    LIPASE 12 (L) 11/17/2015 1401       Radiology:  XR Abdomen 2 View With Chest 1 View   Final Result   1. There is moderate retention of stool throughout the colon. No   evidence for bowel obstruction or ileus is appreciated.   2. There is scattered interstitial scarring throughout both lungs with   bilateral pleural-based scarring at the lung bases.       This report was finalized on 9/8/2019 2:42 PM by Dr. Wallace Michaud M.D.              Assessment/Plan     Patient Active Problem List   Diagnosis   • Adenocarcinoma of right lung (CMS/HCC)   • Decreased blood pressure, not hypotension   • Cardiomyopathy (CMS/HCC)   • Cardiovascular disease   • Dizziness   • Dyspnea on exertion   • Nodule of left lung   • Peripheral arterial occlusive disease (CMS/HCC)   • Iron deficiency anemia   • Pulmonary emphysema (CMS/HCC)   • Pneumonitis, radiation (CMS/HCC)   • Palpitations   • Localized edema   • Weight gain   • Anemia   • Blood loss anemia   • Rectal bleeding     Impression  #1 anemia: H&H relatively static  #2 rectal bleeding: Evidence of AVMs on endoscopy with coagulation therapy  #3 thrombotic disease: Requiring anticoagulant therapy  #4 post-prandial nausea    Recommendation  Resume anticoagulant judiciously with low therapeutic goal.  Schedule zofran - she only received two doses yesterday  Check RUQ u/s    I discussed the patients findings and my recommendations with patient and nursing staff.          Yann Simon M.D.  [facility]  [id number]  [place] [address]  Office: [phone]

## 2019-09-09 NOTE — PROGRESS NOTES
History:  Patient still having nausea today.  No vomiting.  She is not eating very much.  She is able to take liquids.  She has not had a bowel movement since the colonoscopy.  She has passed a little gas.  She has no shortness of breath, palpitations or cough.    Allergies  Adhesive tape; Augmentin [amoxicillin-pot clavulanate]; Cephalexin; Metoclopramide; Pentazocine; Simvastatin; and Sucralfate      Current Facility-Administered Medications:   •  acetaminophen (TYLENOL) tablet 1,000 mg, 1,000 mg, Oral, Nightly PRN, 1,000 mg at 09/08/19 2120 **AND** diphenhydrAMINE (BENADRYL) capsule 50 mg, 50 mg, Oral, Nightly PRN, Harmeet Payne MD, 50 mg at 09/08/19 2120  •  acetaminophen (TYLENOL) tablet 650 mg, 650 mg, Oral, Q6H PRN, Harmeet Payne MD, 650 mg at 09/07/19 0841  •  arformoterol (BROVANA) nebulizer solution 15 mcg, 15 mcg, Nebulization, BID - RT, Alirio Alfaro MD, 15 mcg at 09/08/19 1924  •  atorvastatin (LIPITOR) tablet 20 mg, 20 mg, Oral, Nightly, Chet Payne Jr., MD, 20 mg at 09/08/19 2011  •  bisacodyl (DULCOLAX) suppository 10 mg, 10 mg, Rectal, Daily, Harmeet Payne MD  •  budesonide (PULMICORT) nebulizer solution 0.5 mg, 0.5 mg, Nebulization, BID - RT, Chet Payne Jr., MD, 0.5 mg at 09/08/19 1924  •  carvedilol (COREG) tablet 25 mg, 25 mg, Oral, BID With Meals, Chet Payne Jr., MD, 25 mg at 09/08/19 1815  •  dextrose 5 % and sodium chloride 0.45 % infusion, 75 mL/hr, Intravenous, Continuous, Harmeet Payne MD  •  ipratropium-albuterol (DUO-NEB) nebulizer solution 3 mL, 3 mL, Nebulization, 4x Daily - RT, Chet Payne Jr., MD, 3 mL at 09/08/19 1527  •  levothyroxine (SYNTHROID, LEVOTHROID) tablet 50 mcg, 50 mcg, Oral, Q AM, Chet Payne Jr., MD, Stopped at 09/09/19 0702  •  ondansetron (ZOFRAN) injection 4 mg, 4 mg, Intravenous, Q6H PRN, Harmeet Payne MD, 4 mg at 09/09/19 0453  •  pantoprazole (PROTONIX) EC tablet 40 mg, 40 mg, Oral, Q AM, Harmeet Payne MD,  "Stopped at 09/09/19 0702  •  polyethylene glycol 3350 powder (packet), 17 g, Oral, Daily PRN, Harmeet Payne MD, 17 g at 09/08/19 1429  •  potassium chloride (KLOR-CON) packet 20 mEq, 20 mEq, Oral, Daily, Chet Payne Jr., MD, 20 mEq at 09/08/19 0936  •  promethazine (PHENERGAN) tablet 12.5 mg, 12.5 mg, Oral, Q6H PRN, 12.5 mg at 09/08/19 1025 **OR** promethazine (PHENERGAN) injection 12.5 mg, 12.5 mg, Intravenous, Q6H PRN, Harmeet Payne MD  •  rivaroxaban (XARELTO) tablet 20 mg, 20 mg, Oral, Daily With Dinner, Harmeet Payne MD  •  sennosides-docusate sodium (SENOKOT-S) 8.6-50 MG tablet 2 tablet, 2 tablet, Oral, Nightly, Harmeet Payne MD, 2 tablet at 09/08/19 2011  •  sertraline (ZOLOFT) tablet 50 mg, 50 mg, Oral, Daily, Chet Payne Jr., MD, 50 mg at 09/08/19 0936    /53 (BP Location: Right arm, Patient Position: Sitting)   Pulse 64   Temp 98 °F (36.7 °C) (Oral)   Resp 16   Ht 157.5 cm (62\")   Wt 81.3 kg (179 lb 3.2 oz)   LMP  (LMP Unknown)   SpO2 97%   BMI 32.78 kg/m²     Physical Exam   Appearance: Elderly  lady with central obesity.  She is sitting in a chair in no distress.  HEENT: Pupils were round and equal.  Pharynx clear mucous membranes slightly dry  Neck: Without lymphadenopathy or JVD.  Lungs: Decreased breath sounds but no wheezes, rales or rhonchi  Heart: Very distant tones.  Regular.  No murmur gallop.  Abdomen: Distended.    Slight tenderness in the epigastric area.  Hypoactive bowel sounds.  No rebound or guarding  Extremities without clubbing, cyanosis or edema      Lab Results (last 24 hours)     Procedure Component Value Units Date/Time    Basic Metabolic Panel [[id number]]  (Abnormal) Collected:  09/09/19 0315    Specimen:  Blood Updated:  09/09/19 0356     Glucose 107 mg/dL      BUN 15 mg/dL      Creatinine 0.69 mg/dL      Sodium 143 mmol/L      Potassium 4.7 mmol/L      Chloride 102 mmol/L      CO2 32.7 mmol/L      Calcium 9.1 mg/dL      eGFR Non African " Amer 83 mL/min/1.73      BUN/Creatinine Ratio 21.7     Anion Gap 8.3 mmol/L     Narrative:       GFR Normal >60  Chronic Kidney Disease <60  Kidney Failure <15    CBC & Differential [[id number]] Collected:  09/09/19 0315    Specimen:  Blood Updated:  09/09/19 0344    Narrative:       The following orders were created for panel order CBC & Differential.  Procedure                               Abnormality         Status                     ---------                               -----------         ------                     CBC Auto Differential[[id number]]        Abnormal            Final result                 Please view results for these tests on the individual orders.    CBC Auto Differential [[id number]]  (Abnormal) Collected:  09/09/19 0315    Specimen:  Blood Updated:  09/09/19 0344     WBC 11.23 10*3/mm3      RBC 3.41 10*6/mm3      Hemoglobin 9.9 g/dL      Hematocrit 33.8 %      MCV 99.1 fL      MCH 29.0 pg      MCHC 29.3 g/dL      RDW 14.8 %      RDW-SD 53.3 fl      MPV 10.1 fL      Platelets 260 10*3/mm3      Neutrophil % 70.6 %      Lymphocyte % 15.0 %      Monocyte % 11.0 %      Eosinophil % 2.5 %      Basophil % 0.7 %      Immature Grans % 0.2 %      Neutrophils, Absolute 7.93 10*3/mm3      Lymphocytes, Absolute 1.69 10*3/mm3      Monocytes, Absolute 1.23 10*3/mm3      Eosinophils, Absolute 0.28 10*3/mm3      Basophils, Absolute 0.08 10*3/mm3      Immature Grans, Absolute 0.02 10*3/mm3      nRBC 0.0 /100 WBC     Comprehensive Metabolic Panel [[id number]]  (Abnormal) Collected:  09/08/19 0839    Specimen:  Blood Updated:  09/08/19 0950     Glucose 111 mg/dL      BUN 17 mg/dL      Creatinine 0.90 mg/dL      Sodium 146 mmol/L      Potassium 4.6 mmol/L      Chloride 103 mmol/L      CO2 31.0 mmol/L      Calcium 8.8 mg/dL      Total Protein 6.3 g/dL      Albumin 3.40 g/dL      ALT (SGPT) 9 U/L      AST (SGOT) 11 U/L      Alkaline Phosphatase 58 U/L      Total Bilirubin <0.2 mg/dL      eGFR Non  Amer 61  mL/min/1.73      Globulin 2.9 gm/dL      A/G Ratio 1.2 g/dL      BUN/Creatinine Ratio 18.9     Anion Gap 12.0 mmol/L     Narrative:       GFR Normal >60  Chronic Kidney Disease <60  Kidney Failure <15    Lipase [[id number]]  (Normal) Collected:  09/08/19 0839    Specimen:  Blood Updated:  09/08/19 0950     Lipase 49 U/L     Basic Metabolic Panel [[id number]]  (Abnormal) Collected:  09/08/19 0839    Specimen:  Blood Updated:  09/08/19 0926     Glucose 112 mg/dL      BUN 17 mg/dL      Creatinine 0.87 mg/dL      Sodium 143 mmol/L      Potassium 4.5 mmol/L      Chloride 103 mmol/L      CO2 31.2 mmol/L      Calcium 8.8 mg/dL      eGFR Non African Amer 63 mL/min/1.73      BUN/Creatinine Ratio 19.5     Anion Gap 8.8 mmol/L     Narrative:       GFR Normal >60  Chronic Kidney Disease <60  Kidney Failure <15    CBC & Differential [[id number]] Collected:  09/08/19 0839    Specimen:  Blood Updated:  09/08/19 0859    Narrative:       The following orders were created for panel order CBC & Differential.  Procedure                               Abnormality         Status                     ---------                               -----------         ------                     CBC Auto Differential[[id number]]        Abnormal            Final result                 Please view results for these tests on the individual orders.    CBC Auto Differential [[id number]]  (Abnormal) Collected:  09/08/19 0839    Specimen:  Blood Updated:  09/08/19 0859     WBC 10.58 10*3/mm3      RBC 3.39 10*6/mm3      Hemoglobin 9.7 g/dL      Hematocrit 33.0 %      MCV 97.3 fL      MCH 28.6 pg      MCHC 29.4 g/dL      RDW 15.6 %      RDW-SD 55.6 fl      MPV 9.9 fL      Platelets 246 10*3/mm3      Neutrophil % 73.0 %      Lymphocyte % 14.0 %      Monocyte % 9.6 %      Eosinophil % 2.3 %      Basophil % 0.7 %      Immature Grans % 0.4 %      Neutrophils, Absolute 7.73 10*3/mm3      Lymphocytes, Absolute 1.48 10*3/mm3      Monocytes, Absolute 1.02 10*3/mm3       Eosinophils, Absolute 0.24 10*3/mm3      Basophils, Absolute 0.07 10*3/mm3      Immature Grans, Absolute 0.04 10*3/mm3      nRBC 0.0 /100 WBC         Flatplate and upright of the abdomen:  1. There is moderate retention of stool throughout the colon. No  evidence for bowel obstruction or ileus is appreciated.  2. There is scattered interstitial scarring throughout both lungs with  bilateral pleural-based scarring at the lung bases.      Imp:  1.  Nausea.    2.  Anemia, multifactorial.  In large part related to lower GI bleed.    No evidence of continued bleeding.  H&H is been stable.  3.  Lower GI bleed related to angiodysplasia.  Subsequently treated with APC  4.  Atrial fib.  Very high vijay vacs score.  High risk for embolic phenomena  5.  COPD        Plan:  Dulcolax suppository today.  Restart IV fluids.  PT to increase mobilization.  Discontinue Lovenox.  Restart Xarelto.  May need to consider gastric emptying study, MRA of abdomen    Harmeet Payne MD  9/9/2019  7:55 AM

## 2019-09-09 NOTE — PROGRESS NOTES
Discharge Planning Assessment  [facility]     Patient Name: Ale Narayan  MRN: [id number]  Today's Date: 9/9/2019    Admit Date: 9/5/2019    Discharge Needs Assessment     Row Name 09/09/19 9878       Living Environment    Lives With  alone    Current Living Arrangements  home/apartment/condo    Primary Care Provided by  self    Provides Primary Care For  no one    Family Caregiver if Needed  child(ramon), adult    Family Caregiver Names  Dtr, Michelle Gallegos [phone]    Able to Return to Prior Arrangements  yes       Resource/Environmental Concerns    Resource/Environmental Concerns  none    Transportation Concerns  car, none       Transition Planning    Patient/Family Anticipates Transition to  home    Patient/Family Anticipated Services at Transition  none    Transportation Anticipated  family or friend will provide       Discharge Needs Assessment    Readmission Within the Last 30 Days  no previous admission in last 30 days    Equipment Currently Used at Home  respiratory supplies;oxygen [place]    Anticipated Changes Related to Illness  none    Equipment Needed After Discharge  none    Discharge Facility/Level of Care Needs  nursing facility, skilled        Discharge Plan     Row Name 09/09/19 3209       Plan    Plan  Skilled rehab    Patient/Family in Agreement with Plan  yes    Plan Comments  Facesheet information was verified with patient at bedside.  Her IMM letter is documented.  She lives in  a house alone and uses home oxygen via [place].  She is typically IADLs.  Her Dtr, Michelle Gallegos [phone] assist her if needed.  She has been to [facility] for rehab in the past.  She would prefer subacute rehab at another facility at [place].  Area list was provided.  She will provide choices for referrals after she looks over the list and speaks with her dtr.  She states her dtr can transport at [place].  Her PCP and pharmacy are verified in [facility]. Denies other needs...........................Kristina Shelley RN         Destination      No service coordination in this encounter.      Durable Medical Equipment      No service coordination in this encounter.      Dialysis/Infusion      No service coordination in this encounter.      Home Medical Care      No service coordination in this encounter.      Therapy      No service coordination in this encounter.      Community Resources      No service coordination in this encounter.          Demographic Summary     Row Name 09/09/19 133       General Information    Admission Type  inpatient    Arrived From  home    Required Notices Provided  Important Message from Medicare    Referral Source  admission list    Preferred Language  English       Contact Information    Permission Granted to Share Info With  family/designee;    Contact Information Comments  Dtr Michelle [place] [phone]        Functional Status     Row Name 09/09/19 3516       Functional Status    Usual Activity Tolerance  moderate    Current Activity Tolerance  fair       Functional Status, IADL    Medications  independent    Meal Preparation  assistive equipment    Housekeeping  assistive equipment;assistive equipment and person    Laundry  assistive equipment    Shopping  assistive equipment and person       Mental Status    General Appearance WDL  WDL       Mental Status Summary    Recent Changes in Mental Status/Cognitive Functioning  no changes       Employment/    Employment Status  retired        Psychosocial    No documentation.       Abuse/Neglect    No documentation.       Legal    No documentation.       Substance Abuse    No documentation.       Patient Forms    No documentation.           Kristina Shelley RN

## 2019-09-09 NOTE — PLAN OF CARE
Problem: Patient Care Overview  Goal: Plan of Care Review  Outcome: Ongoing (interventions implemented as appropriate)   09/09/19 0458   Coping/Psychosocial   Plan of Care Reviewed With patient   Plan of Care Review   Progress improving   OTHER   Outcome Summary h/h stable today; c/o nausea x 1 thus far; npo for gastric emptying study; awaiting iv therapy to place new iv; will continue to monitor     Goal: Individualization and Mutuality  Outcome: Ongoing (interventions implemented as appropriate)    Goal: Discharge Needs Assessment  Outcome: Ongoing (interventions implemented as appropriate)      Problem: Fall Risk (Adult)  Goal: Absence of Fall  Outcome: Ongoing (interventions implemented as appropriate)      Problem: Anemia (Adult)  Goal: Symptom Improvement  Outcome: Ongoing (interventions implemented as appropriate)      Problem: Gastrointestinal Bleeding (Adult)  Goal: Signs and Symptoms of Listed Potential Problems Will be Absent, Minimized or Managed (Gastrointestinal Bleeding)  Outcome: Ongoing (interventions implemented as appropriate)      Problem: Skin Injury Risk (Adult)  Goal: Skin Health and Integrity  Outcome: Ongoing (interventions implemented as appropriate)

## 2019-09-09 NOTE — PLAN OF CARE
Problem: Patient Care Overview  Goal: Plan of Care Review  Outcome: Ongoing (interventions implemented as appropriate)   09/09/19 1049   Coping/Psychosocial   Plan of Care Reviewed With patient   Plan of Care Review   Progress no change   OTHER   Outcome Summary Patient still on 3L, now change respiratory wise, and stable. H&H stable, no S/S of bleeing, and coag restarrted. Still having postprandal nausea so RUQ US ordered and medicated.        Problem: Fall Risk (Adult)  Goal: Absence of Fall  Outcome: Ongoing (interventions implemented as appropriate)      Problem: Anemia (Adult)  Goal: Symptom Improvement  Outcome: Ongoing (interventions implemented as appropriate)      Problem: Gastrointestinal Bleeding (Adult)  Goal: Signs and Symptoms of Listed Potential Problems Will be Absent, Minimized or Managed (Gastrointestinal Bleeding)  Outcome: Ongoing (interventions implemented as appropriate)      Problem: Skin Injury Risk (Adult)  Goal: Skin Health and Integrity  Outcome: Ongoing (interventions implemented as appropriate)

## 2019-09-09 NOTE — THERAPY EVALUATION
Patient Name: Ale Narayan  : 1940    MRN: [id number]                              Today's Date: 2019       Admit Date: 2019    Visit Dx:     ICD-10-CM ICD-9-CM   1. Blood loss anemia D50.0 280.0   2. Rectal bleeding K62.5 569.3     Patient Active Problem List   Diagnosis   • Adenocarcinoma of right lung (CMS/HCC)   • Decreased blood pressure, not hypotension   • Cardiomyopathy (CMS/HCC)   • Cardiovascular disease   • Dizziness   • Dyspnea on exertion   • Nodule of left lung   • Peripheral arterial occlusive disease (CMS/HCC)   • Iron deficiency anemia   • Pulmonary emphysema (CMS/HCC)   • Pneumonitis, radiation (CMS/HCC)   • Palpitations   • Localized edema   • Weight gain   • Anemia   • Blood loss anemia   • Rectal bleeding     Past Medical History:   Diagnosis Date   • Adenocarcinoma of lung (CMS/HCC)    • Asthma    • Carotid artery disease (CMS/HCC)    • Cough    • Deep vein thrombosis (CMS/HCC)    • Emphysema of lung (CMS/HCC)    • Hyperlipidemia    • Hypertension    • Hypothyroidism    • Lung cancer (CMS/HCC)    • Osteoporosis    • Paroxysmal atrial fibrillation (CMS/HCC)    • Peripheral arterial disease (CMS/HCC)    • Pulmonary embolism (CMS/HCC)    • Thrombophilia (CMS/HCC)     Since age 25     Past Surgical History:   Procedure Laterality Date   • ANKLE SURGERY     • BREAST SURGERY      removal of benign melanoma spot on nipple of left breast 2013   • COLONOSCOPY      Negative   • COLONOSCOPY N/A 2019    Procedure: COLONOSCOPY to cecum with biopsy / polypectomy and  APC cautery;  Surgeon: Alexsandra Ruiz MD;  Location: [facility] ENDOSCOPY;  Service: Gastroenterology   • ENDOSCOPY N/A 2019    Procedure: ESOPHAGOGASTRODUODENOSCOPY with biopsies;  Surgeon: Alexsandra Ruiz MD;  Location: [facility] ENDOSCOPY;  Service: Gastroenterology   • HERNIA REPAIR      Lumbar spine   • HYSTERECTOMY     • LUNG LOBECTOMY     • OTHER SURGICAL HISTORY      Bypass of lower extremities   • OTHER  SURGICAL HISTORY      IVC filter placed     General Information     Row Name 09/09/19 1150          PT Evaluation Time/Intention    Document Type  evaluation  -EJ     Mode of Treatment  physical therapy  -EJ     Row Name 09/09/19 1150          General Information    Patient Profile Reviewed?  yes  -EJ     Prior Level of Function  independent:;all household mobility;ADL's  -EJ     Existing Precautions/Restrictions  oxygen therapy device and L/min states her knees can give out spontaneously, has multiple chairs around her home to sit in just in case  -EJ     Barriers to Rehab  previous functional deficit  -EJ     Row Name 09/09/19 1150          Relationship/Environment    Lives With  alone  -EJ     Row Name 09/09/19 1150          Resource/Environmental Concerns    Current Living Arrangements  home/apartment/condo  -EJ     Row Name 09/09/19 1150          Safety Issues, Functional Mobility    Impairments Affecting Function (Mobility)  endurance/activity tolerance;strength  -EJ       User Key  (r) = Recorded By, (t) = Taken By, (c) = Cosigned By    Initials Name Provider Type    EJ Lacey Burruoghs, PT Physical Therapist        Mobility     Row Name 09/09/19 1155          Bed Mobility Assessment/Treatment    Bed Mobility Assessment/Treatment  bed mobility (all) activities  -EJ     [place] Level (Bed Mobility)  not tested  -EJ     Comment (Bed Mobility)  up in chair  -EJ     Row Name 09/09/19 1155          Sit-Stand Transfer    Sit-Stand [place] (Transfers)  verbal cues;stand by assist  -EJ     Assistive Device (Sit-Stand Transfers)  walker, front-wheeled  -EJ     Row Name 09/09/19 1155          Gait/Stairs Assessment/Training    Gait/Stairs Assessment/Training  gait/ambulation independence  -EJ     [place] Level (Gait)  verbal cues;contact guard;1 person to manage equipment  -EJ     Assistive Device (Gait)  walker, front-wheeled  -EJ     Distance in Feet (Gait)  40  -EJ     Deviations/Abnormal Patterns  (Gait)  midgalia decreased;stride length decreased  -EJ     Bilateral Gait Deviations  forward flexed posture  -EJ     Comment (Gait/Stairs)  no LOB or signs of knees buckling  -EJ       User Key  (r) = Recorded By, (t) = Taken By, (c) = Cosigned By    Initials Name Provider Type     Lacey Burroughs, PT Physical Therapist        Obj/Interventions     Row Name 09/09/19 1156          General ROM    GENERAL ROM COMMENTS  WFL  -EJ     Row Name 09/09/19 1156          MMT (Manual Muscle Testing)    General MMT Comments  generalized weakness  -EJ     [facility] Name 09/09/19 1156          Static Sitting Balance    Level of [place] (Unsupported Sitting, Static Balance)  independent  -EJ     [facility] Name 09/09/19 1156          Dynamic Sitting Balance    Level of [place], Reaches Outside Midline (Sitting, Dynamic Balance)  independent  -EJ     [facility] Name 09/09/19 1156          Static Standing Balance    Level of [place] (Supported Standing, Static Balance)  supervision  -EJ     Assistive Device Utilized (Supported Standing, Static Balance)  walker, rolling  -EJ     [facility] Name 09/09/19 1156          Dynamic Standing Balance    Level of [place], Reaches Outside Midline (Standing, Dynamic Balance)  contact guard assist  -EJ     Assistive Device Utilized (Supported Standing, Dynamic Balance)  walker, rolling  -EJ       User Key  (r) = Recorded By, (t) = Taken By, (c) = Cosigned By    Initials Name Provider Type    Lacey Vazquez, PT Physical Therapist        Goals/Plan     [facility] Name 09/09/19 1158          Bed Mobility Goal 1 (PT)    Activity/Assistive Device (Bed Mobility Goal 1, PT)  bed mobility activities, all  -EJ     [place] Level/Cues Needed (Bed Mobility Goal 1, PT)  independent  -EJ     Time Frame (Bed Mobility Goal 1, PT)  10 days  -     Row Name 09/09/19 1158          Transfer Goal 1 (PT)    Activity/Assistive Device (Transfer Goal 1, PT)  transfers, all;walker, rolling  -EJ     [place]  Level/Cues Needed (Transfer Goal 1, PT)  independent  -EJ     Time Frame (Transfer Goal 1, PT)  10 days  -EJ     Row Name 09/09/19 1158          Gait Training Goal 1 (PT)    Activity/Assistive Device (Gait Training Goal 1, PT)  gait (walking locomotion);walker, rolling  -EJ     [place] Level (Gait Training Goal 1, PT)  standby assist  -EJ     Distance (Gait Goal 1, PT)  150  -EJ     Time Frame (Gait Training Goal 1, PT)  10 days  -EJ       User Key  (r) = Recorded By, (t) = Taken By, (c) = Cosigned By    Initials Name Provider Type    EJ Lacey Burroughs, PT Physical Therapist        Clinical Impression     [facility] Name 09/09/19 1156          Pain Assessment    Additional Documentation  Pain Scale: Numbers Pre/Post-Treatment (Group)  -EJ     [facility] Name 09/09/19 1156          Pain Scale: Numbers Pre/Post-Treatment    Pain Scale: Numbers, Pretreatment  0/10 - no pain  -EJ     Pain Scale: Numbers, Post-Treatment  0/10 - no pain  -EJ     Row Name 09/09/19 1156          Plan of Care Review    Plan of Care Reviewed With  patient  -EJ     [facility] Name 09/09/19 1156          Physical Therapy Clinical Impression    Patient/Family Goals Statement (PT Clinical Impression)  [place]  -EJ     Criteria for Skilled Interventions Met (PT Clinical Impression)  yes  -EJ     Rehab Potential (PT Clinical Summary)  good, to achieve stated therapy goals  -EJ     Row Name 09/09/19 1156          Vital Signs    Pre SpO2 (%)  91  -EJ     O2 Delivery Pre Treatment  supplemental O2  -EJ     O2 Delivery Intra Treatment  supplemental O2  -EJ     Post SpO2 (%)  75 increased to 90% after resting for several minutes with focus on pursed lip breathing  -EJ     O2 Delivery Post Treatment  supplemental O2  -EJ     Pre Patient Position  Sitting  -EJ     Intra Patient Position  Standing  -EJ     Post Patient Position  Sitting  -EJ     Row Name 09/09/19 1156          Positioning and Restraints    Pre-Treatment Position  sitting in  chair/recliner  -EJ     Post Treatment Position  chair  -EJ     In Chair  notified nsg;reclined;call light within reach;encouraged to call for assist  -EJ       User Key  (r) = Recorded By, (t) = Taken By, (c) = Cosigned By    Initials Name Provider Type    Lacey Vazquez, PT Physical Therapist        Outcome Measures     Row Name 09/09/19 1201          How much help from another person do you currently need...    Turning from your back to your side while in flat bed without using bedrails?  4  -EJ     Moving from lying on back to sitting on the side of a flat bed without bedrails?  3  -EJ     Moving to and from a bed to a chair (including a wheelchair)?  3  -EJ     Standing up from a chair using your arms (e.g., wheelchair, bedside chair)?  3  -EJ     Climbing 3-5 steps with a railing?  3  -EJ     To walk in hospital room?  3  -EJ     AM-PAC 6 Clicks Score (PT)  19  -EJ     Row Name 09/09/19 1201          Functional Assessment    Outcome Measure Options  AM-PAC 6 Clicks Basic Mobility (PT)  -       User Key  (r) = Recorded By, (t) = Taken By, (c) = Cosigned By    Initials Name Provider Type    Lacey Vazquez, PT Physical Therapist        Physical Therapy Education     Title: PT OT SLP Therapies (In Progress)     Topic: Physical Therapy (In Progress)     Point: Mobility training (Done)     Learning Progress Summary           Patient Acceptance, E,TB,D, VU,NR by JULIEN at 9/9/2019 11:59 AM                               User Key     Initials Effective Dates Name Provider Type [facility] 04/03/18 -  Lacey Burroughs, PT Physical Therapist PT              PT Recommendation and Plan  Planned Therapy Interventions (PT Eval): balance training, bed mobility training, gait training, home exercise program, patient/family education, ROM (range of motion), strengthening, transfer training  Outcome Summary/Treatment Plan (PT)  Anticipated Discharge Disposition (PT): home with home health, home  Plan of Care  Reviewed With: patient  Progress: improving  Outcome Summary: Pt agreeable to PT this am. She was admitted with anemia. She now presents with increased SOA, weakness, decreased activity tolerance, and decreased functional mobility. Pt does live at home alone and is on oxygen at home. SHe reports independence with mobility at baseline using Rwx, but does state her knees can give out without warning. Today, pt able to ambulate approx 40 ft with CGA. She states she would feel more comfortable if therapy followed with a chair next session. SHe will continue to benefit from skilled PT to maximize safety, endurance, and independece with functional mobility.     Time Calculation:   PT Charges     Row Name 09/09/19 1202             Time Calculation    Start Time  1136  -EJ      Stop Time  1157  -EJ      Time Calculation (min)  21 min  -EJ      PT Received On  09/09/19  -EJ      PT - Next Appointment  09/10/19  -EJ      PT Goal Re-Cert Due Date  09/19/19  -EJ         Timed Charges    24509 - PT Therapeutic Exercise Minutes  12  -EJ        User Key  (r) = Recorded By, (t) = Taken By, (c) = Cosigned By    Initials Name Provider Type    EJ Lacey Burroughs, PT Physical Therapist        Therapy Charges for Today     Code Description Service Date Service Provider Modifiers Qty    27611277811 HC PT EVAL MOD COMPLEXITY 2 9/9/2019 Lacey Burroughs, PT GP 1    64489202366 HC PT THER PROC EA 15 MIN 9/9/2019 Lacey Burroughs, PT GP 1    80906532847 HC PT THER SUPP EA 15 MIN 9/9/2019 Lacey Burroughs, PT GP 1          PT G-Codes  Outcome Measure Options: AM-PAC 6 Clicks Basic Mobility (PT)  AM-PAC 6 Clicks Score (PT): 19    Lacey Burroughs PT  9/9/2019

## 2019-09-09 NOTE — PLAN OF CARE
Problem: Patient Care Overview  Goal: Plan of Care Review  Outcome: Ongoing (interventions implemented as appropriate)   09/09/19 1090   Coping/Psychosocial   Plan of Care Reviewed With patient   Plan of Care Review   Progress improving   OTHER   Outcome Summary Pt agreeable to PT this am. She was admitted with anemia. She now presents with increased SOA, weakness, decreased activity tolerance, and decreased functional mobility. Pt does live at home alone and is on oxygen at home. SHe reports independence with mobility at baseline using Rwx, but does state her knees can give out without warning. Today, pt able to ambulate approx 40 ft with CGA. She states she would feel more comfortable if therapy followed with a chair next session. SHe will continue to benefit from skilled PT to maximize safety, endurance, and independece with functional mobility.

## 2019-09-09 NOTE — PLAN OF CARE
Problem: Patient Care Overview  Goal: Plan of Care Review  Outcome: Ongoing (interventions implemented as appropriate)  Pt continues with her qid duonebs and brovana and pulmicort.

## 2019-09-10 ENCOUNTER — APPOINTMENT (OUTPATIENT)
Dept: CT IMAGING | Facility: HOSPITAL | Age: 79
End: 2019-09-10

## 2019-09-10 LAB
ALBUMIN SERPL-MCNC: 3.5 G/DL (ref 3.5–5.2)
ALBUMIN/GLOB SERPL: 1.1 G/DL
ALP SERPL-CCNC: 72 U/L (ref 39–117)
ALT SERPL W P-5'-P-CCNC: 24 U/L (ref 1–33)
ANION GAP SERPL CALCULATED.3IONS-SCNC: 7.6 MMOL/L (ref 5–15)
AST SERPL-CCNC: 23 U/L (ref 1–32)
BASOPHILS # BLD AUTO: 0.05 10*3/MM3 (ref 0–0.2)
BASOPHILS NFR BLD AUTO: 0.4 % (ref 0–1.5)
BILIRUB SERPL-MCNC: 0.3 MG/DL (ref 0.2–1.2)
BUN BLD-MCNC: 13 MG/DL (ref 8–23)
BUN/CREAT SERPL: 22 (ref 7–25)
CALCIUM SPEC-SCNC: 8.7 MG/DL (ref 8.6–10.5)
CHLORIDE SERPL-SCNC: 100 MMOL/L (ref 98–107)
CO2 SERPL-SCNC: 34.4 MMOL/L (ref 22–29)
CREAT BLD-MCNC: 0.59 MG/DL (ref 0.57–1)
DEPRECATED RDW RBC AUTO: 53.1 FL (ref 37–54)
EOSINOPHIL # BLD AUTO: 0.1 10*3/MM3 (ref 0–0.4)
EOSINOPHIL NFR BLD AUTO: 0.9 % (ref 0.3–6.2)
ERYTHROCYTE [DISTWIDTH] IN BLOOD BY AUTOMATED COUNT: 14.5 % (ref 12.3–15.4)
ERYTHROCYTE [SEDIMENTATION RATE] IN BLOOD: 63 MM/HR (ref 0–30)
GFR SERPL CREATININE-BSD FRML MDRD: 99 ML/MIN/1.73
GLOBULIN UR ELPH-MCNC: 3.1 GM/DL
GLUCOSE BLD-MCNC: 110 MG/DL (ref 65–99)
HCT VFR BLD AUTO: 31.7 % (ref 34–46.6)
HGB BLD-MCNC: 9 G/DL (ref 12–15.9)
IMM GRANULOCYTES # BLD AUTO: 0.05 10*3/MM3 (ref 0–0.05)
IMM GRANULOCYTES NFR BLD AUTO: 0.4 % (ref 0–0.5)
LYMPHOCYTES # BLD AUTO: 1.05 10*3/MM3 (ref 0.7–3.1)
LYMPHOCYTES NFR BLD AUTO: 9.4 % (ref 19.6–45.3)
MCH RBC QN AUTO: 28.5 PG (ref 26.6–33)
MCHC RBC AUTO-ENTMCNC: 28.4 G/DL (ref 31.5–35.7)
MCV RBC AUTO: 100.3 FL (ref 79–97)
MONOCYTES # BLD AUTO: 0.98 10*3/MM3 (ref 0.1–0.9)
MONOCYTES NFR BLD AUTO: 8.8 % (ref 5–12)
NEUTROPHILS # BLD AUTO: 8.89 10*3/MM3 (ref 1.7–7)
NEUTROPHILS NFR BLD AUTO: 80.1 % (ref 42.7–76)
NRBC BLD AUTO-RTO: 0 /100 WBC (ref 0–0.2)
PLATELET # BLD AUTO: 255 10*3/MM3 (ref 140–450)
PMV BLD AUTO: 9.9 FL (ref 6–12)
POTASSIUM BLD-SCNC: 4.5 MMOL/L (ref 3.5–5.2)
PROT SERPL-MCNC: 6.6 G/DL (ref 6–8.5)
RBC # BLD AUTO: 3.16 10*6/MM3 (ref 3.77–5.28)
SODIUM BLD-SCNC: 142 MMOL/L (ref 136–145)
WBC NRBC COR # BLD: 11.12 10*3/MM3 (ref 3.4–10.8)

## 2019-09-10 PROCEDURE — 70450 CT HEAD/BRAIN W/O DYE: CPT

## 2019-09-10 PROCEDURE — 85652 RBC SED RATE AUTOMATED: CPT | Performed by: INTERNAL MEDICINE

## 2019-09-10 PROCEDURE — 63710000001 PROMETHAZINE PER 12.5 MG: Performed by: INTERNAL MEDICINE

## 2019-09-10 PROCEDURE — 94799 UNLISTED PULMONARY SVC/PX: CPT

## 2019-09-10 PROCEDURE — 99232 SBSQ HOSP IP/OBS MODERATE 35: CPT | Performed by: INTERNAL MEDICINE

## 2019-09-10 PROCEDURE — 85025 COMPLETE CBC W/AUTO DIFF WBC: CPT | Performed by: INTERNAL MEDICINE

## 2019-09-10 PROCEDURE — 80053 COMPREHEN METABOLIC PANEL: CPT | Performed by: INTERNAL MEDICINE

## 2019-09-10 PROCEDURE — 63710000001 DIPHENHYDRAMINE PER 50 MG: Performed by: INTERNAL MEDICINE

## 2019-09-10 PROCEDURE — 25010000002 ONDANSETRON PER 1 MG: Performed by: INTERNAL MEDICINE

## 2019-09-10 RX ORDER — TRAMADOL HYDROCHLORIDE 50 MG/1
50 TABLET ORAL ONCE
Status: COMPLETED | OUTPATIENT
Start: 2019-09-10 | End: 2019-09-10

## 2019-09-10 RX ORDER — TRAMADOL HYDROCHLORIDE 50 MG/1
50 TABLET ORAL EVERY 6 HOURS PRN
Status: DISCONTINUED | OUTPATIENT
Start: 2019-09-10 | End: 2019-09-17 | Stop reason: HOSPADM

## 2019-09-10 RX ADMIN — DIPHENHYDRAMINE HYDROCHLORIDE 50 MG: 25 CAPSULE ORAL at 21:16

## 2019-09-10 RX ADMIN — TRAMADOL HYDROCHLORIDE 50 MG: 50 TABLET ORAL at 18:47

## 2019-09-10 RX ADMIN — IPRATROPIUM BROMIDE AND ALBUTEROL SULFATE 3 ML: 2.5; .5 SOLUTION RESPIRATORY (INHALATION) at 13:17

## 2019-09-10 RX ADMIN — CARVEDILOL 25 MG: 25 TABLET, FILM COATED ORAL at 18:41

## 2019-09-10 RX ADMIN — ONDANSETRON 4 MG: 2 INJECTION INTRAMUSCULAR; INTRAVENOUS at 18:41

## 2019-09-10 RX ADMIN — SERTRALINE 50 MG: 50 TABLET, FILM COATED ORAL at 10:56

## 2019-09-10 RX ADMIN — DEXTROSE AND SODIUM CHLORIDE 75 ML/HR: 5; 450 INJECTION, SOLUTION INTRAVENOUS at 04:14

## 2019-09-10 RX ADMIN — ONDANSETRON 4 MG: 2 INJECTION INTRAMUSCULAR; INTRAVENOUS at 05:48

## 2019-09-10 RX ADMIN — ACETAMINOPHEN 650 MG: 325 TABLET, FILM COATED ORAL at 05:51

## 2019-09-10 RX ADMIN — PANTOPRAZOLE SODIUM 40 MG: 40 TABLET, DELAYED RELEASE ORAL at 05:48

## 2019-09-10 RX ADMIN — ONDANSETRON 4 MG: 2 INJECTION INTRAMUSCULAR; INTRAVENOUS at 12:26

## 2019-09-10 RX ADMIN — ARFORMOTEROL TARTRATE 15 MCG: 15 SOLUTION RESPIRATORY (INHALATION) at 20:13

## 2019-09-10 RX ADMIN — CARVEDILOL 25 MG: 25 TABLET, FILM COATED ORAL at 10:56

## 2019-09-10 RX ADMIN — POTASSIUM CHLORIDE 20 MEQ: 1.5 POWDER, FOR SOLUTION ORAL at 10:56

## 2019-09-10 RX ADMIN — ACETAMINOPHEN 1000 MG: 500 TABLET, FILM COATED ORAL at 21:16

## 2019-09-10 RX ADMIN — RIVAROXABAN 20 MG: 20 TABLET, FILM COATED ORAL at 18:41

## 2019-09-10 RX ADMIN — ONDANSETRON 4 MG: 2 INJECTION INTRAMUSCULAR; INTRAVENOUS at 00:45

## 2019-09-10 RX ADMIN — TRAMADOL HYDROCHLORIDE 50 MG: 50 TABLET, FILM COATED ORAL at 06:48

## 2019-09-10 RX ADMIN — SENNOSIDES AND DOCUSATE SODIUM 2 TABLET: 8.6; 5 TABLET ORAL at 21:16

## 2019-09-10 RX ADMIN — LEVOTHYROXINE SODIUM 50 MCG: 50 TABLET ORAL at 05:48

## 2019-09-10 RX ADMIN — IPRATROPIUM BROMIDE AND ALBUTEROL SULFATE 3 ML: 2.5; .5 SOLUTION RESPIRATORY (INHALATION) at 16:38

## 2019-09-10 RX ADMIN — ATORVASTATIN CALCIUM 20 MG: 20 TABLET, FILM COATED ORAL at 21:16

## 2019-09-10 RX ADMIN — PROMETHAZINE HYDROCHLORIDE 12.5 MG: 12.5 TABLET ORAL at 06:26

## 2019-09-10 RX ADMIN — BUDESONIDE 0.5 MG: 0.5 SUSPENSION RESPIRATORY (INHALATION) at 20:13

## 2019-09-10 RX ADMIN — BUDESONIDE 0.5 MG: 0.5 SUSPENSION RESPIRATORY (INHALATION) at 07:38

## 2019-09-10 RX ADMIN — ARFORMOTEROL TARTRATE 15 MCG: 15 SOLUTION RESPIRATORY (INHALATION) at 07:38

## 2019-09-10 NOTE — PROGRESS NOTES
Patient Name: Ale Narayan  Patient : 1940        Date of Service:09/10/19  Provider of Service: Flavio Del Rosario MD  Place of Service: [facility]  Referral Provider: Harmeet Payne MD          Follow Up: Arrhythmia    Interval Hx: Patient remains nauseated.  Review of rhythm strips does not indicate any additional atrial fibrillation or bradycardia or pauses.        OBJECTIVE  Temp:  [98.2 °F (36.8 °C)-100 °F (37.8 °C)] 99.2 °F (37.3 °C)  Heart Rate:  [54-80] 56  Resp:  [18-28] 22  BP: (130-165)/(53-69) 144/63     Intake/Output Summary (Last 24 hours) at 9/10/2019 1146  Last data filed at 9/10/2019 0909  Gross per 24 hour   Intake 1382.5 ml   Output 1350 ml   Net 32.5 ml     Body mass index is 32.78 kg/m².      19  0654   Weight: 81.3 kg (179 lb 3.2 oz)         Physical Exam:   Physical Exam   Constitutional: She is oriented to person, place, and time. She appears well-developed and well-nourished.   HENT:   Head: Normocephalic.   Eyes: Pupils are equal, round, and reactive to light.   Neck: Normal range of motion. No JVD present. Carotid bruit is not present. No thyromegaly present.   Cardiovascular: Normal rate, regular rhythm, S1 normal, S2 normal, normal heart sounds and intact distal pulses. Exam reveals no gallop and no friction rub.   No murmur heard.  Pulmonary/Chest: Effort normal and breath sounds normal.   Musculoskeletal: She exhibits no edema.   Neurological: She is alert and oriented to person, place, and time.   Skin: Skin is warm, dry and intact. No erythema.   Psychiatric: She has a normal mood and affect.   Vitals reviewed.        CURRENT MEDS    Scheduled Meds:  arformoterol 15 mcg Nebulization BID - RT   atorvastatin 20 mg Oral Nightly   bisacodyl 10 mg Rectal Daily   budesonide 0.5 mg Nebulization BID - RT   carvedilol 25 mg Oral BID With Meals   ipratropium-albuterol 3 mL Nebulization 4x Daily - RT   levothyroxine 50 mcg Oral Q AM   ondansetron 4 mg  Intravenous Q6H   pantoprazole 40 mg Oral Q AM   potassium chloride 20 mEq Oral Daily   rivaroxaban 20 mg Oral Daily With Dinner   sennosides-docusate sodium 2 tablet Oral Nightly   sertraline 50 mg Oral Daily     Continuous Infusions:  dextrose 5 % and sodium chloride 0.45 % 75 mL/hr Last Rate: 75 mL/hr (09/10/19 0414)         Lab Review:   Results from last 7 days   Lab Units 09/10/19  0810 09/09/19  0315 09/08/19  0839   SODIUM mmol/L 142 143 146*  143   POTASSIUM mmol/L 4.5 4.7 4.6  4.5   CHLORIDE mmol/L 100 102 103  103   CO2 mmol/L 34.4* 32.7* 31.0*  31.2*   BUN mg/dL 13 15 17  17   CREATININE mg/dL 0.59 0.69 0.90  0.87   GLUCOSE mg/dL 110* 107* 111*  112*   CALCIUM mg/dL 8.7 9.1 8.8  8.8   AST (SGOT) U/L 23  --  11   ALT (SGPT) U/L 24  --  9         Results from last 7 days   Lab Units 09/10/19  0810 09/09/19  0315   WBC 10*3/mm3 11.12* 11.23*   HEMOGLOBIN g/dL 9.0* 9.9*   HEMATOCRIT % 31.7* 33.8*   PLATELETS 10*3/mm3 255 260     Results from last 7 days   Lab Units 09/06/19  0351   INR  1.08     Results from last 7 days   Lab Units 09/06/19  0351 09/05/19  1545   MAGNESIUM mg/dL 2.0 1.9                   I personally reviewed the patient's ECG and telemetry data    ASSESSMENT & PLAN    Anemia    Blood loss anemia    Rectal bleeding    1.  Blood loss anemia:   2.  Paroxysmal atrial fibrillation:    Rythmol has been discontinued [id number]: 6.  This places her at a very high risk for thromboembolism.  At this point there is no evidence that we have seen any further since admission.  She is having periods of heart block but not prolonged at this point.  I think we should send her home with a ZIO patch for an initial evaluation.  She might need an internal loop recorder to really assess her rhythm if the CO patch is not revealing.  Xarelto has been restarted at this time.     3.  Cardiomyopathy: Dilated.  Last left ventricular ejection fraction 40%.  No signs of heart failure at this time  4.  COPD:  Oxygen dependent  5.  History of adenocarcinoma of the lung.  6.  Pulmonary embolus: Status post IVC filter.  On chronic anticoagulation  7.  Peripheral vascular disease        Flavio Del Rosario MD  09/10/19

## 2019-09-10 NOTE — PROGRESS NOTES
This 78-year-old lady complains of nausea without vomiting.  She has had the symptoms for the past few days.  Her p.o. intake has been poor although she is been able to take liquids.  She also complains of a global headache.  She has no scalp tenderness.  No visual problems.  No sinus congestion or drainage.  She has not been grinding her teeth.  She has no history of any head trauma.  Phenergan helps with her nausea.  A tramadol helped with her headache.    Physical examination:  Vital signs:  668, pulse 61, respirate 22, temperature 99.2, oxygen saturation 98% on 4 L nasal cannula  Appearance: Elderly  lady who is lying comfortably in bed.  She has central obesity.  HEENT: Normocephalic and atraumatic.  No scalp tenderness.  No sinus tenderness.  Pupils round and equal.  Pharynx clear mucous membranes moist.  Lungs: Decreased breath sounds bilaterally.  No wheezes, rales or rhonchi.  Heart: Distant tones.  Bradycardic.  Regular.  Abdomen: Protuberant.  Large fat pad.  Hypoactive bowel sounds.  Mild epigastric discomfort.  No rebound or guarding.  Extremities: Without clubbing, cyanosis or edema.  Neurologic: Nonfocal.    Laboratory data:   Ultrasound of the gallbladder was within normal limits.    Impression:  1.  Nausea etiology unknown.  Consider gastroparesis.  Consider mesenteric ischemia.  2.  Headache  3.  Lower GI bleed related to angiodysplasia, resolved  4.  Dilated cardiomyopathy  5.  Junctional rhythm/atrial fib.  Back on Xarelto.  6.  Severe COPD  7.  Anemia related to both blood loss and chronic inflammation, stable    Plan:  I will check a nonenhanced CT of the head.  We will order a gastric emptying study.  I will check a CBC, CMP and sed rate this morning.  I will continue the Phenergan for the nausea.  We will use a little tramadol for her headache.

## 2019-09-10 NOTE — PROGRESS NOTES
"  PROGRESS NOTE  Patient Name: Ale Narayan  Age/Sex: 78 y.o. female  : 1940  MRN: [id number]    Date of Admission: 2019  Date of Encounter Visit: 09/10/19   LOS: 5 days   Patient Care Team:  Harmeet Payne MD as PCP - General  Harmeet Payne MD as PCP - Claims Attributed  Yoan Cash MD as Consulting Physician (Hematology and Oncology)  Devon Lamb MD as Referring Physician (Thoracic Surgery)    Chief Complaint: Stage IV COPD status post colonoscopy and EGD for GI bleed work-up, no active respiratory issues, currently complaining of postprandial nausea and abdominal discomfort    Hospital course: Still complaining of abdominal pain with postprandial nausea, there is no active respiratory issues    REVIEW OF SYSTEMS:   On home study nasal cannula oxygen with no acute respiratory complaints, mainly GI issues which even seem to be getting better day after day    Ventilator/Non-Invasive Ventilation Settings (From admission, onward)    Nasal cannula oxygen at home regimen of 3 L/min            Vital Signs  Temp:  [98.2 °F (36.8 °C)-100 °F (37.8 °C)] 99.2 °F (37.3 °C)  Heart Rate:  [54-80] 56  Resp:  [18-28] 22  BP: (130-165)/(53-69) 144/63  SpO2:  [90 %-100 %] 100 %  on  Flow (L/min):  [3-4] 3 Device (Oxygen Therapy): nasal cannula    Intake/Output Summary (Last 24 hours) at 9/10/2019 1247  Last data filed at 9/10/2019 0909  Gross per 24 hour   Intake 1096.25 ml   Output 750 ml   Net 346.25 ml     Flowsheet Rows      First Filed Value   Admission Height  157.5 cm (62\") Documented at 2019 1500   Admission Weight  --        Body mass index is 32.78 kg/m².      19  0654   Weight: 81.3 kg (179 lb 3.2 oz)       Physical Exam:  GEN:  No acute distress, alert, cooperative, well developed on nasal cannula oxygen  LUNGS: Normal chest on inspection, patient has positive expiratory wheezes and rhonchi with diminished breath sounds but no prolongation of the expiratory phase, she sounds " similar to her baseline outpatient lung exam finding.     Results Review:      Results from last 7 days   Lab Units 09/10/19  0810 09/09/19  0315 09/08/19  0839 09/06/19  0351   SODIUM mmol/L 142 143 146*  143 142   POTASSIUM mmol/L 4.5 4.7 4.6  4.5 3.8   CHLORIDE mmol/L 100 102 103  103 98   CO2 mmol/L 34.4* 32.7* 31.0*  31.2* 30.7*   BUN mg/dL 13 15 17  17 23   CREATININE mg/dL 0.59 0.69 0.90  0.87 0.88   CALCIUM mg/dL 8.7 9.1 8.8  8.8 8.2*   AST (SGOT) U/L 23  --  11  --    ALT (SGPT) U/L 24  --  9  --    ANION GAP mmol/L 7.6 8.3 12.0  8.8 13.3   ALBUMIN g/dL 3.50  --  3.40*  --                  Results from last 7 days   Lab Units 09/10/19  0810 09/09/19  0315 09/08/19  0839 09/08/19  0003 09/07/19  1551 09/07/19  0954 09/06/19  2357  09/06/19  0351  09/05/19  1545   WBC 10*3/mm3 11.12* 11.23* 10.58  --   --   --   --   --  10.32  --  10.35   HEMOGLOBIN g/dL 9.0* 9.9* 9.7* 9.7* 10.3* 10.1* 7.7*   < > 8.0*   < > 8.5*   HEMATOCRIT % 31.7* 33.8* 33.0* 32.3* 34.6 32.7* 25.7*   < > 27.0*   < > 28.7*   PLATELETS 10*3/mm3 255 260 246  --   --   --   --   --  281  --  302   MCV fL 100.3* 99.1* 97.3*  --   --   --   --   --  96.8  --  97.3*   NEUTROPHIL % % 80.1* 70.6 73.0  --   --   --   --   --  66.3  --  73.7   LYMPHOCYTE % % 9.4* 15.0* 14.0*  --   --   --   --   --  21.2  --  16.5*   MONOCYTES % % 8.8 11.0 9.6  --   --   --   --   --  8.2  --  6.4   EOSINOPHIL % % 0.9 2.5 2.3  --   --   --   --   --  2.9  --  2.0   BASOPHIL % % 0.4 0.7 0.7  --   --   --   --   --  0.9  --  1.0   IMM GRAN % % 0.4 0.2 0.4  --   --   --   --   --  0.5  --  0.4    < > = values in this interval not displayed.     Results from last 7 days   Lab Units 09/06/19  0351   INR  1.08     Results from last 7 days   Lab Units 09/06/19  0351 09/05/19  1545   MAGNESIUM mg/dL 2.0 1.9           Invalid input(s): LDLCALC          No results found for: POCGLU          Results from last 7 days   Lab Units 09/05/19  1615   NITRITE UA  Negative                Imaging:   Imaging Results (all)     None                   Medication Review:     arformoterol 15 mcg Nebulization BID - RT   atorvastatin 20 mg Oral Nightly   bisacodyl 10 mg Rectal Daily   budesonide 0.5 mg Nebulization BID - RT   carvedilol 25 mg Oral BID With Meals   ipratropium-albuterol 3 mL Nebulization 4x Daily - RT   levothyroxine 50 mcg Oral Q AM   ondansetron 4 mg Intravenous Q6H   pantoprazole 40 mg Oral Q AM   potassium chloride 20 mEq Oral Daily   rivaroxaban 20 mg Oral Daily With Dinner   sennosides-docusate sodium 2 tablet Oral Nightly   sertraline 50 mg Oral Daily         dextrose 5 % and sodium chloride 0.45 % 75 mL/hr Last Rate: 75 mL/hr (09/10/19 0417)       ASSESSMENT:   1. Very few severe COPD, FEV1 36%  2. Chronic hypoxemic respiratory failure  3. History of lung cancer in 2009, in remission next number GI bleed with history of telangiectasia status post cauterization on 9/6/2019  4. Acute blood loss anemia, improved with transfusion  5. History of PE on outpatient anti-coag elation that is currently on hold   6. Postprandial  abdominal pain    PLAN:  GI residual issues per the primary team and per the GI consultant  Patient has been stable from the respiratory standpoint and she is doing better from the other aspect, we will continue to follow-up as an outpatient and I will see her as needed during the remainder of the hospital stay in case of any respiratory issues      Please call me with any resting complaint    Disposition: Per GI and primary team, will sign off      Nelson Fernandes MD  09/10/19  12:47 PM           Dictated utilizing Dragon dictation

## 2019-09-10 NOTE — PLAN OF CARE
Problem: Patient Care Overview  Goal: Plan of Care Review  Outcome: Ongoing (interventions implemented as appropriate)   09/10/19 0459   Coping/Psychosocial   Plan of Care Reviewed With patient   Plan of Care Review   Progress no change   OTHER   Outcome Summary vss; scheduled zofran as aided in symptom relief to an extent but pt still reports feeling poorly; subjectively, pt seems much weaker than day prior; h/h stable; 02 at home requirements/baseline; awaiting am labs; will continue to closely monitor      Goal: Individualization and Mutuality  Outcome: Ongoing (interventions implemented as appropriate)    Goal: Discharge Needs Assessment  Outcome: Ongoing (interventions implemented as appropriate)      Problem: Fall Risk (Adult)  Goal: Absence of Fall  Outcome: Ongoing (interventions implemented as appropriate)      Problem: Anemia (Adult)  Goal: Symptom Improvement  Outcome: Ongoing (interventions implemented as appropriate)      Problem: Gastrointestinal Bleeding (Adult)  Goal: Signs and Symptoms of Listed Potential Problems Will be Absent, Minimized or Managed (Gastrointestinal Bleeding)  Outcome: Outcome(s) achieved Date Met: 09/10/19      Problem: Skin Injury Risk (Adult)  Goal: Skin Health and Integrity  Outcome: Ongoing (interventions implemented as appropriate)

## 2019-09-10 NOTE — PLAN OF CARE
Problem: Patient Care Overview  Goal: Plan of Care Review  Outcome: Ongoing (interventions implemented as appropriate)   09/10/19 5760   Coping/Psychosocial   Plan of Care Reviewed With patient   Plan of Care Review   Progress no change   OTHER   Outcome Summary Pt went for CT Head today r/t H/A (has not had any H/A reported so far this shift). To have gastric emptying study tomorrow r/t post-prandial nausea. Zofran given as scheduled. Worked w/PT. O2 at 3-3.5 L. Up in chair, wanting to rest. Yeast noted to skin folds under breasts and lower abdominal folds, antifungal powder ordered. Will closely monitor.       Problem: Fall Risk (Adult)  Goal: Absence of Fall  Outcome: Ongoing (interventions implemented as appropriate)      Problem: Anemia (Adult)  Goal: Symptom Improvement  Outcome: Ongoing (interventions implemented as appropriate)      Problem: Skin Injury Risk (Adult)  Goal: Skin Health and Integrity  Outcome: Ongoing (interventions implemented as appropriate)

## 2019-09-10 NOTE — DISCHARGE PLACEMENT REQUEST
"Ale Bob (78 y.o. Female)     Date of Birth Social Security Number Address [address] Phone MRN    1940  [id number] [facility] [phone] [id number]    Sabianist Marital Status          Confucianism        Admission Date Admission Type Admitting Provider Attending Provider Department, Room/Bed    9/5/19 Urgent Harmeet Payne MD Olash, Bart M, MD [address], [address]    Discharge Date Discharge Disposition Discharge Destination                       Attending Provider:  Harmeet Payne MD    Allergies:  Adhesive Tape, Augmentin [Amoxicillin-pot Clavulanate], Cephalexin, Metoclopramide, Pentazocine, Simvastatin, Sucralfate    Isolation:  None   Infection:  None   Code Status:  No CPR    Ht:  157.5 cm (62\")   Wt:  81.3 kg (179 lb 3.2 oz)    Admission Cmt:  None   Principal Problem:  None                Active Insurance as of 9/5/2019     Primary Coverage     Payor Plan Insurance Group Employer/Plan Group    MEDICARE MEDICARE A & B      Payor Plan Address Payor Plan Phone Number Payor Plan Fax Number Effective Dates    [address] [phone]  9/1/2007 - None Entered    [facility] [id number]       Subscriber Name Subscriber Birth Date Member ID       ALE BOB 1940 [id number]           Secondary Coverage     Payor Plan Insurance Group Employer/Plan Group    ANTHEM BLUE CROSS [facility] SUPP [id number]     Payor Plan Address Payor Plan Phone Number Payor Plan Fax Number Effective Dates    [address]   12/1/2016 - None Entered    [facility] [id number]       Subscriber Name Subscriber Birth Date Member ID       ALE BOB 1940 [id number]                 Emergency Contacts      (Rel.) Home Phone Work Phone Mobile Phone    Michelle Gallegos (Daughter) [phone] -- --              "

## 2019-09-10 NOTE — PROGRESS NOTES
[facility], [place], operated by [facility]  Inpatient Progress Note    Reason for Follow Up: Anemia, GI bleed    Subjective     Interval History:   Patient continues with post-prandial nausea.  Not much better today.      Current Facility-Administered Medications:   •  acetaminophen (TYLENOL) tablet 1,000 mg, 1,000 mg, Oral, Nightly PRN, 1,000 mg at 09/09/19 2202 **AND** diphenhydrAMINE (BENADRYL) capsule 50 mg, 50 mg, Oral, Nightly PRN, Harmeet Payne MD, 50 mg at 09/09/19 2202  •  acetaminophen (TYLENOL) tablet 650 mg, 650 mg, Oral, Q6H PRN, Harmeet Payne MD, 650 mg at 09/10/19 0551  •  arformoterol (BROVANA) nebulizer solution 15 mcg, 15 mcg, Nebulization, BID - RT, Alirio Alfaro MD, 15 mcg at 09/10/19 0738  •  atorvastatin (LIPITOR) tablet 20 mg, 20 mg, Oral, Nightly, Chet Payne Jr., MD, 20 mg at 09/09/19 2159  •  bisacodyl (DULCOLAX) suppository 10 mg, 10 mg, Rectal, Daily, Harmeet Payne MD, 10 mg at 09/09/19 0856  •  budesonide (PULMICORT) nebulizer solution 0.5 mg, 0.5 mg, Nebulization, BID - RT, Chet Payne Jr., MD, 0.5 mg at 09/10/19 0738  •  carvedilol (COREG) tablet 25 mg, 25 mg, Oral, BID With Meals, Chet Payne Jr., MD, 25 mg at 09/10/19 1056  •  dextrose 5 % and sodium chloride 0.45 % infusion, 75 mL/hr, Intravenous, Continuous, Harmeet Payne MD, Last Rate: 75 mL/hr at 09/10/19 0414, 75 mL/hr at 09/10/19 0414  •  ipratropium-albuterol (DUO-NEB) nebulizer solution 3 mL, 3 mL, Nebulization, 4x Daily - RT, Chet Payne Jr., MD, 3 mL at 09/10/19 1317  •  levothyroxine (SYNTHROID, LEVOTHROID) tablet 50 mcg, 50 mcg, Oral, Q AM, Chet Payne Jr., MD, 50 mcg at 09/10/19 0548  •  ondansetron (ZOFRAN) injection 4 mg, 4 mg, Intravenous, Q6H, Yann Simon MD, 4 mg at 09/10/19 1226  •  pantoprazole (PROTONIX) EC tablet 40 mg, 40 mg, Oral, Q AM, Harmeet Payne MD, 40 mg at 09/10/19 0548  •  polyethylene glycol 3350 powder (packet), 17 g, Oral, Daily PRN, Kerry,  Harmeet PRUITT MD, 17 g at 09/09/19 0856  •  potassium chloride (KLOR-CON) packet 20 mEq, 20 mEq, Oral, Daily, Chet Payne Jr., MD, 20 mEq at 09/10/19 1056  •  promethazine (PHENERGAN) tablet 12.5 mg, 12.5 mg, Oral, Q6H PRN, 12.5 mg at 09/10/19 0626 **OR** promethazine (PHENERGAN) injection 12.5 mg, 12.5 mg, Intravenous, Q6H PRN, Harmeet Payne MD  •  rivaroxaban (XARELTO) tablet 20 mg, 20 mg, Oral, Daily With Dinner, Harmeet Payne MD, 20 mg at 09/09/19 1754  •  sennosides-docusate sodium (SENOKOT-S) 8.6-50 MG tablet 2 tablet, 2 tablet, Oral, Nightly, Harmeet Payne MD, 2 tablet at 09/09/19 1719  •  sertraline (ZOLOFT) tablet 50 mg, 50 mg, Oral, Daily, Chet Payne Jr., MD, 50 mg at 09/10/19 1056  •  traMADol (ULTRAM) tablet 50 mg, 50 mg, Oral, Q6H PRN, Harmeet Payne MD  Review of Systems:   GI: nausea    Objective     Vital Signs  Temp:  [99.2 °F (37.3 °C)-100 °F (37.8 °C)] 99.2 °F (37.3 °C)  Heart Rate:  [54-80] 65  Resp:  [20-28] 20  BP: (130-153)/(53-68) 144/63  Body mass index is 32.78 kg/m².    Intake/Output Summary (Last 24 hours) at 9/10/2019 1343  Last data filed at 9/10/2019 0909  Gross per 24 hour   Intake 1096.25 ml   Output 750 ml   Net 346.25 ml     I/O this shift:  In: 240 [P.O.:240]  Out: -      Physical Exam:   General: patient awake, alert and cooperative   Abdomen: soft, nontender, nondistended; normal bowel sounds   Rectal: deferred   Extremities: no rash or edema   Psychiatric: Normal mood and behavior; memory intact     Results Review:     I reviewed the patient's new clinical results.               I reviewed patient's RUQ u/s - negative    Results from last 7 days   Lab Units 09/10/19  0810 09/09/19  0315 09/08/19  0839   WBC 10*3/mm3 11.12* 11.23* 10.58   HEMOGLOBIN g/dL 9.0* 9.9* 9.7*   HEMATOCRIT % 31.7* 33.8* 33.0*   PLATELETS 10*3/mm3 255 260 246     Results from last 7 days   Lab Units 09/10/19  0810 09/09/19 0315 09/08/19  0839   SODIUM mmol/L 142 143 146*  143   POTASSIUM  mmol/L 4.5 4.7 4.6  4.5   CHLORIDE mmol/L 100 102 103  103   CO2 mmol/L 34.4* 32.7* 31.0*  31.2*   BUN mg/dL 13 15 17  17   CREATININE mg/dL 0.59 0.69 0.90  0.87   CALCIUM mg/dL 8.7 9.1 8.8  8.8   BILIRUBIN mg/dL 0.3  --  <0.2*   ALK PHOS U/L 72  --  58   ALT (SGPT) U/L 24  --  9   AST (SGOT) U/L 23  --  11   GLUCOSE mg/dL 110* 107* 111*  112*     Results from last 7 days   Lab Units 09/06/19  0351   INR  1.08     Lab Results   Lab Value Date/Time    LIPASE 49 09/08/2019 0839    LIPASE 12 (L) 11/17/2015 1401       Radiology:  CT Head Without Contrast   Final Result   No evidence for acute intracranial pathology.       Radiation dose reduction techniques were utilized, including automated   exposure control and exposure modulation based on body size.       This report was finalized on 9/10/2019 11:42 AM by Dr. Geovani Nunes M.D.          US Gallbladder   Preliminary Result   No abnormalities demonstrated on this gallbladder ultrasound.              XR Abdomen 2 View With Chest 1 View   Final Result   1. There is moderate retention of stool throughout the colon. No   evidence for bowel obstruction or ileus is appreciated.   2. There is scattered interstitial scarring throughout both lungs with   bilateral pleural-based scarring at the lung bases.       This report was finalized on 9/8/2019 2:42 PM by Dr. Wallace Michaud M.D.          NM Gastric Emptying    (Results Pending)       Assessment/Plan     Patient Active Problem List   Diagnosis   • Adenocarcinoma of right lung (CMS/HCC)   • Decreased blood pressure, not hypotension   • Cardiomyopathy (CMS/HCC)   • Cardiovascular disease   • Dizziness   • Dyspnea on exertion   • Nodule of left lung   • Peripheral arterial occlusive disease (CMS/HCC)   • Iron deficiency anemia   • Pulmonary emphysema (CMS/HCC)   • Pneumonitis, radiation (CMS/HCC)   • Palpitations   • Localized edema   • Weight gain   • Anemia   • Blood loss anemia   • Rectal bleeding      Impression  #1 anemia: H&H relatively static  #2 rectal bleeding: Evidence of AVMs on endoscopy with coagulation therapy  #3 thrombotic disease: Requiring anticoagulant therapy  #4 post-prandial nausea    Recommendation  Resume anticoagulant judiciously with low therapeutic goal.  Continue scheduled zofran  Will f/u on results of GES, if she is able to tolerate this        I discussed the patients findings and my recommendations with patient and nursing staff.          Yann Simon M.D.  [facility], [facility]  [address] [place] [address]  Office: [phone]

## 2019-09-10 NOTE — PROGRESS NOTES
"  PROGRESS NOTE  Patient Name: Ale Narayan  Age/Sex: 78 y.o. female  : 1940  MRN: [id number]    Date of Admission: 2019  Date of Encounter Visit: 09/10/19   LOS: 5 days   Patient Care Team:  Harmeet Payne MD as PCP - General  Harmeet Payne MD as PCP - Claims Attributed  Yoan Cash MD as Consulting Physician (Hematology and Oncology)  Devon Lamb MD as Referring Physician (Thoracic Surgery)    Chief Complaint: Stage IV COPD status post colonoscopy and EGD for GI bleed work-up, no active respiratory issues, had bloody BM    Hospital course: Still complaining of abdominal pain with postprandial nausea, there is no active respiratory issues. No nausea while NPO, only post prandially. Afebrile, had some arrhythias, followed by cardiology, unable to resume AC because of the GI bleed    REVIEW OF SYSTEMS:   On home study nasal cannula oxygen with no acute respiratory complaints mainly GI issues as discussed per primary team    Ventilator/Non-Invasive Ventilation Settings (From admission, onward)    Nasal cannula oxygen at home regimen of 3 L/min            Vital Signs  Temp:  [98.2 °F (36.8 °C)-100 °F (37.8 °C)] 99.2 °F (37.3 °C)  Heart Rate:  [54-80] 56  Resp:  [18-28] 22  BP: (130-165)/(53-69) 144/63  SpO2:  [90 %-100 %] 100 %  on  Flow (L/min):  [3-4] 3 Device (Oxygen Therapy): nasal cannula    Intake/Output Summary (Last 24 hours) at 9/10/2019 1253  Last data filed at 9/10/2019 0909  Gross per 24 hour   Intake 1096.25 ml   Output 750 ml   Net 346.25 ml     Flowsheet Rows      First Filed Value   Admission Height  157.5 cm (62\") Documented at 2019 1500   Admission Weight  --        Body mass index is 32.78 kg/m².      19  0654   Weight: 81.3 kg (179 lb 3.2 oz)       Physical Exam: no changes  GEN:  No acute distress, alert, cooperative, well developed on nasal cannula oxygen  LUNGS: Normal chest on inspection, patient has positive expiratory wheezes and rhonchi with diminished " breath sounds but no prolongation of the expiratory phase, she sounds similar to her baseline outpatient lung exam finding.     Results Review:      Results from last 7 days   Lab Units 09/10/19  0810 09/09/19  0315 09/08/19  0839 09/06/19  0351   SODIUM mmol/L 142 143 146*  143 142   POTASSIUM mmol/L 4.5 4.7 4.6  4.5 3.8   CHLORIDE mmol/L 100 102 103  103 98   CO2 mmol/L 34.4* 32.7* 31.0*  31.2* 30.7*   BUN mg/dL 13 15 17  17 23   CREATININE mg/dL 0.59 0.69 0.90  0.87 0.88   CALCIUM mg/dL 8.7 9.1 8.8  8.8 8.2*   AST (SGOT) U/L 23  --  11  --    ALT (SGPT) U/L 24  --  9  --    ANION GAP mmol/L 7.6 8.3 12.0  8.8 13.3   ALBUMIN g/dL 3.50  --  3.40*  --                  Results from last 7 days   Lab Units 09/10/19  0810 09/09/19  0315 09/08/19  0839 09/08/19  0003 09/07/19  1551 09/07/19  0954 09/06/19  2357  09/06/19  0351  09/05/19  1545   WBC 10*3/mm3 11.12* 11.23* 10.58  --   --   --   --   --  10.32  --  10.35   HEMOGLOBIN g/dL 9.0* 9.9* 9.7* 9.7* 10.3* 10.1* 7.7*   < > 8.0*   < > 8.5*   HEMATOCRIT % 31.7* 33.8* 33.0* 32.3* 34.6 32.7* 25.7*   < > 27.0*   < > 28.7*   PLATELETS 10*3/mm3 255 260 246  --   --   --   --   --  281  --  302   MCV fL 100.3* 99.1* 97.3*  --   --   --   --   --  96.8  --  97.3*   NEUTROPHIL % % 80.1* 70.6 73.0  --   --   --   --   --  66.3  --  73.7   LYMPHOCYTE % % 9.4* 15.0* 14.0*  --   --   --   --   --  21.2  --  16.5*   MONOCYTES % % 8.8 11.0 9.6  --   --   --   --   --  8.2  --  6.4   EOSINOPHIL % % 0.9 2.5 2.3  --   --   --   --   --  2.9  --  2.0   BASOPHIL % % 0.4 0.7 0.7  --   --   --   --   --  0.9  --  1.0   IMM GRAN % % 0.4 0.2 0.4  --   --   --   --   --  0.5  --  0.4    < > = values in this interval not displayed.     Results from last 7 days   Lab Units 09/06/19  0351   INR  1.08     Results from last 7 days   Lab Units 09/06/19  0351 09/05/19  1545   MAGNESIUM mg/dL 2.0 1.9           Invalid input(s): LDLCALC          No results found for: POCGLU          Results  from last 7 days   Lab Units 09/05/19  1615   NITRITE UA  Negative               Imaging:   Imaging Results (all)     None                   Medication Review:     arformoterol 15 mcg Nebulization BID - RT   atorvastatin 20 mg Oral Nightly   bisacodyl 10 mg Rectal Daily   budesonide 0.5 mg Nebulization BID - RT   carvedilol 25 mg Oral BID With Meals   ipratropium-albuterol 3 mL Nebulization 4x Daily - RT   levothyroxine 50 mcg Oral Q AM   ondansetron 4 mg Intravenous Q6H   pantoprazole 40 mg Oral Q AM   potassium chloride 20 mEq Oral Daily   rivaroxaban 20 mg Oral Daily With Dinner   sennosides-docusate sodium 2 tablet Oral Nightly   sertraline 50 mg Oral Daily         dextrose 5 % and sodium chloride 0.45 % 75 mL/hr Last Rate: 75 mL/hr (09/10/19 0417)       ASSESSMENT:   1. Very few severe COPD, FEV1 36%  2. Chronic hypoxemic respiratory failure  3. History of lung cancer in 2009, in remission next number GI bleed with history of telangiectasia status post cauterization on 9/6/2019  4. Acute blood loss anemia, improved with transfusion  5. History of PE on outpatient anti-coag elation that is currently on hold   6. Postprandial  abdominal pain, normal GI emptying study  7. HA: normal CT head    PLAN:  GI residual issues per the primary team and per the GI consultant  We will follow-up mainly on the respiratory issues, she is compromised by baseline but she is compensated and is at baseline.    Disposition: Per GI and primary team, will follow up on her respiratory status while in the hospital    Nelson Fernandes MD  09/10/19  12:53 PM           Dictated utilizing Dragon dictation

## 2019-09-10 NOTE — THERAPY TREATMENT NOTE
Patient Name: Ale Narayan  : 1940    MRN: [id number]                              Today's Date: 9/10/2019       Admit Date: 2019    Visit Dx:     ICD-10-CM ICD-9-CM   1. Blood loss anemia D50.0 280.0   2. Rectal bleeding K62.5 569.3     Patient Active Problem List   Diagnosis   • Adenocarcinoma of right lung (CMS/HCC)   • Decreased blood pressure, not hypotension   • Cardiomyopathy (CMS/HCC)   • Cardiovascular disease   • Dizziness   • Dyspnea on exertion   • Nodule of left lung   • Peripheral arterial occlusive disease (CMS/HCC)   • Iron deficiency anemia   • Pulmonary emphysema (CMS/HCC)   • Pneumonitis, radiation (CMS/HCC)   • Palpitations   • Localized edema   • Weight gain   • Anemia   • Blood loss anemia   • Rectal bleeding     Past Medical History:   Diagnosis Date   • Adenocarcinoma of lung (CMS/HCC)    • Asthma    • Carotid artery disease (CMS/HCC)    • Cough    • Deep vein thrombosis (CMS/HCC)    • Emphysema of lung (CMS/HCC)    • Hyperlipidemia    • Hypertension    • Hypothyroidism    • Lung cancer (CMS/HCC)    • Osteoporosis    • Paroxysmal atrial fibrillation (CMS/HCC)    • Peripheral arterial disease (CMS/HCC)    • Pulmonary embolism (CMS/HCC)    • Thrombophilia (CMS/HCC)     Since age 25     Past Surgical History:   Procedure Laterality Date   • ANKLE SURGERY     • BREAST SURGERY      removal of benign melanoma spot on nipple of left breast 2013   • COLONOSCOPY      Negative   • COLONOSCOPY N/A 2019    Procedure: COLONOSCOPY to cecum with biopsy / polypectomy and  APC cautery;  Surgeon: Alexsandra Ruiz MD;  Location: [facility] ENDOSCOPY;  Service: Gastroenterology   • ENDOSCOPY N/A 2019    Procedure: ESOPHAGOGASTRODUODENOSCOPY with biopsies;  Surgeon: Alexsandra Ruiz MD;  Location: [facility] ENDOSCOPY;  Service: Gastroenterology   • HERNIA REPAIR      Lumbar spine   • HYSTERECTOMY     • LUNG LOBECTOMY     • OTHER SURGICAL HISTORY      Bypass of lower extremities   •  OTHER SURGICAL HISTORY      IVC filter placed     General Information     Row Name 09/10/19 1405          PT Evaluation Time/Intention    Document Type  therapy note (daily note)  -     Mode of Treatment  physical therapy  -     Row Name 09/10/19 1405          General Information    Existing Precautions/Restrictions  fall;oxygen therapy device and L/min  -     Row Name 09/10/19 1405          Cognitive Assessment/Intervention- PT/OT    Orientation Status (Cognition)  oriented x 3  -       User Key  (r) = Recorded By, (t) = Taken By, (c) = Cosigned By    Initials Name Provider Type     Alexsandra Kim PTA Physical Therapy Assistant        Mobility     Row Name 09/10/19 1405          Bed Mobility Assessment/Treatment    Comment (Bed Mobility)  up in chair, then sitting on commode  -[facility] Name 09/10/19 1405          Sit-Stand Transfer    Sit-Stand [place] (Transfers)  minimum assist (75% patient effort);2 person assist  -     Assistive Device (Sit-Stand Transfers)  walker, front-wheeled  -[facility] Name 09/10/19 1405          Gait/Stairs Assessment/Training    [place]ley Level (Gait)  contact guard;2 person assist  -     Assistive Device (Gait)  walker, front-wheeled  -     Distance in Feet (Gait)  40ft, then 15ft  -     Pattern (Gait)  step-through;step-to  -SM     Deviations/Abnormal Patterns (Gait)  migdalia decreased;stride length decreased  -     Bilateral Gait Deviations  forward flexed posture  -     Comment (Gait/Stairs)  seated rest break after 40ft, then ambulated another 15ft  -       User Key  (r) = Recorded By, (t) = Taken By, (c) = Cosigned By    Initials Name Provider Type     Alexsandra Kim PTA Physical Therapy Assistant        Obj/Interventions    No documentation.       Goals/Plan    No documentation.       Clinical Impression     Row Name 09/10/19 1409          Pain Assessment    Additional Documentation  Pain Scale: Numbers Pre/Post-Treatment (Group)   -SM     Row Name 09/10/19 1408          Pain Scale: Numbers Pre/Post-Treatment    Pain Scale: Numbers, Pretreatment  0/10 - no pain  -SM     Pain Scale: Numbers, Post-Treatment  0/10 - no pain  -SM     Row Name 09/10/19 1408          Positioning and Restraints    Pre-Treatment Position  sitting in chair/recliner  -SM     Post Treatment Position  bsc  -SM     On BS commode  sitting;call light within reach;encouraged to call for assist  -SM       User Key  (r) = Recorded By, (t) = Taken By, (c) = Cosigned By    Initials Name Provider Type    Alexsandra Estes PTA Physical Therapy Assistant        Outcome Measures     Row Name 09/10/19 1410          How much help from another person do you currently need...    Turning from your back to your side while in flat bed without using bedrails?  3  -SM     Moving from lying on back to sitting on the side of a flat bed without bedrails?  3  -SM     Moving to and from a bed to a chair (including a wheelchair)?  3  -SM     Standing up from a chair using your arms (e.g., wheelchair, bedside chair)?  3  -SM     Climbing 3-5 steps with a railing?  1  -SM     To walk in hospital room?  3  -SM     AM-PAC 6 Clicks Score (PT)  16  -SM     Row Name 09/10/19 1410          Functional Assessment    Outcome Measure Options  AM-PAC 6 Clicks Basic Mobility (PT)  -       User Key  (r) = Recorded By, (t) = Taken By, (c) = Cosigned By    Initials Name Provider Type    Alexsandra Estes PTA Physical Therapy Assistant        Physical Therapy Education     Title: PT OT SLP Therapies (In Progress)     Topic: Physical Therapy (In Progress)     Point: Mobility training (Done)     Learning Progress Summary           Patient Acceptance, E,TB,D, VU,NR by JIMY at 9/10/2019  2:08 PM    Acceptance, E,TB,D, VU,NR by JULIEN at 9/9/2019 11:59 AM                   Point: Body mechanics (Done)     Learning Progress Summary           Patient Acceptance, E,TB,D, VU,NR by  at 9/10/2019  2:08 PM                    Point: Precautions (Done)     Learning Progress Summary           Patient Acceptance, E,TB,D, VU,NR by  at 9/10/2019  2:08 PM                               User Key     Initials Effective Dates Name Provider Type Discipline     04/03/18 -  Lacey Burroughs, PT Physical Therapist PT     03/07/18 -  Alexsandra Kim PTA Physical Therapy Assistant PT              PT Recommendation and Plan     Outcome Summary/Treatment Plan (PT)  Anticipated Discharge Disposition (PT): skilled nursing facility  Plan of Care Reviewed With: patient  Progress: improving  Outcome Summary: Pt tolerated treatment well this date. Increased gait distance to 55ft total, requiring seated rest break after 40ft. Used Rw and CGA x2 for safety. no knee buckling noted, however, pt was very fatigued towards end. PT will continue to address functional mobility deficits as tolerated.     Time Calculation:   PT Charges     Row Name 09/10/19 1410             Time Calculation    Start Time  1316  -      Stop Time  1335  -      Time Calculation (min)  19 min  -      PT Received On  09/10/19  -      PT - Next Appointment  09/11/19  -        User Key  (r) = Recorded By, (t) = Taken By, (c) = Cosigned By    Initials Name Provider Type     Alexsandra Kim PTA Physical Therapy Assistant            PT G-Codes  Outcome Measure Options: AM-PAC 6 Clicks Basic Mobility (PT)  AM-PAC 6 Clicks Score (PT): 16    Alexsandra Kim PTA  9/10/2019

## 2019-09-11 ENCOUNTER — APPOINTMENT (OUTPATIENT)
Dept: NUCLEAR MEDICINE | Facility: HOSPITAL | Age: 79
End: 2019-09-11

## 2019-09-11 PROCEDURE — 99231 SBSQ HOSP IP/OBS SF/LOW 25: CPT | Performed by: INTERNAL MEDICINE

## 2019-09-11 PROCEDURE — A9541 TC99M SULFUR COLLOID: HCPCS | Performed by: INTERNAL MEDICINE

## 2019-09-11 PROCEDURE — 25010000002 ONDANSETRON PER 1 MG: Performed by: INTERNAL MEDICINE

## 2019-09-11 PROCEDURE — 99232 SBSQ HOSP IP/OBS MODERATE 35: CPT | Performed by: INTERNAL MEDICINE

## 2019-09-11 PROCEDURE — 94799 UNLISTED PULMONARY SVC/PX: CPT

## 2019-09-11 PROCEDURE — 78264 GASTRIC EMPTYING IMG STUDY: CPT

## 2019-09-11 PROCEDURE — 0 TECHNETIUM SULFUR COLLOID: Performed by: INTERNAL MEDICINE

## 2019-09-11 RX ORDER — BISACODYL 10 MG
10 SUPPOSITORY, RECTAL RECTAL DAILY
Status: DISCONTINUED | OUTPATIENT
Start: 2019-09-11 | End: 2019-09-11

## 2019-09-11 RX ADMIN — BUDESONIDE 0.5 MG: 0.5 SUSPENSION RESPIRATORY (INHALATION) at 21:27

## 2019-09-11 RX ADMIN — IPRATROPIUM BROMIDE AND ALBUTEROL SULFATE 3 ML: 2.5; .5 SOLUTION RESPIRATORY (INHALATION) at 16:41

## 2019-09-11 RX ADMIN — BUDESONIDE 0.5 MG: 0.5 SUSPENSION RESPIRATORY (INHALATION) at 07:20

## 2019-09-11 RX ADMIN — DEXTROSE AND SODIUM CHLORIDE 75 ML/HR: 5; 450 INJECTION, SOLUTION INTRAVENOUS at 03:00

## 2019-09-11 RX ADMIN — ARFORMOTEROL TARTRATE 15 MCG: 15 SOLUTION RESPIRATORY (INHALATION) at 21:27

## 2019-09-11 RX ADMIN — TRAMADOL HYDROCHLORIDE 50 MG: 50 TABLET ORAL at 13:30

## 2019-09-11 RX ADMIN — ATORVASTATIN CALCIUM 20 MG: 20 TABLET, FILM COATED ORAL at 20:50

## 2019-09-11 RX ADMIN — TECHNETIUM TC 99M SULFUR COLLOID 1 DOSE: KIT at 08:05

## 2019-09-11 RX ADMIN — IPRATROPIUM BROMIDE AND ALBUTEROL SULFATE 3 ML: 2.5; .5 SOLUTION RESPIRATORY (INHALATION) at 13:56

## 2019-09-11 RX ADMIN — RIVAROXABAN 20 MG: 20 TABLET, FILM COATED ORAL at 19:08

## 2019-09-11 RX ADMIN — ARFORMOTEROL TARTRATE 15 MCG: 15 SOLUTION RESPIRATORY (INHALATION) at 07:20

## 2019-09-11 RX ADMIN — ONDANSETRON 4 MG: 2 INJECTION INTRAMUSCULAR; INTRAVENOUS at 13:58

## 2019-09-11 RX ADMIN — ONDANSETRON 4 MG: 2 INJECTION INTRAMUSCULAR; INTRAVENOUS at 19:08

## 2019-09-11 RX ADMIN — CARVEDILOL 25 MG: 25 TABLET, FILM COATED ORAL at 19:08

## 2019-09-11 RX ADMIN — SENNOSIDES AND DOCUSATE SODIUM 2 TABLET: 8.6; 5 TABLET ORAL at 20:50

## 2019-09-11 NOTE — PROGRESS NOTES
Patient Name: Ale Narayan  Patient : 1940        Date of Service:19  Provider of Service: Flavio Del Rosario MD  Place of Service: [facility]  Referral Provider: Harmeet Payne MD          Follow Up: Paroxysmal atrial fibrillation    Interval Hx: Patient remains in sinus rhythm.  No atrial fibrillation or heart block noted in the past 24 hours.        OBJECTIVE  Temp:  [97.9 °F (36.6 °C)-98.9 °F (37.2 °C)] 98.4 °F (36.9 °C)  Heart Rate:  [51-66] 59  Resp:  [18-22] 20  BP: ()/() 121/46     Intake/Output Summary (Last 24 hours) at 2019 0723  Last data filed at 2019 0629  Gross per 24 hour   Intake 1600 ml   Output 900 ml   Net 700 ml     Body mass index is 32.78 kg/m².      19  0654   Weight: 81.3 kg (179 lb 3.2 oz)         Physical Exam:   Physical Exam      CURRENT MEDS    Scheduled Meds:  arformoterol 15 mcg Nebulization BID - RT   atorvastatin 20 mg Oral Nightly   bisacodyl 10 mg Rectal Daily   budesonide 0.5 mg Nebulization BID - RT   carvedilol 25 mg Oral BID With Meals   ipratropium-albuterol 3 mL Nebulization 4x Daily - RT   levothyroxine 50 mcg Oral Q AM   ondansetron 4 mg Intravenous Q6H   pantoprazole 40 mg Oral Q AM   potassium chloride 20 mEq Oral Daily   rivaroxaban 20 mg Oral Daily With Dinner   sennosides-docusate sodium 2 tablet Oral Nightly   sertraline 50 mg Oral Daily     Continuous Infusions:  dextrose 5 % and sodium chloride 0.45 % 75 mL/hr Last Rate: 75 mL/hr (19 0300)         Lab Review:   Results from last 7 days   Lab Units 09/10/19  0810 19  0315 19  0839   SODIUM mmol/L 142 143 146*  143   POTASSIUM mmol/L 4.5 4.7 4.6  4.5   CHLORIDE mmol/L 100 102 103  103   CO2 mmol/L 34.4* 32.7* 31.0*  31.2*   BUN mg/dL 13 15 17  17   CREATININE mg/dL 0.59 0.69 0.90  0.87   GLUCOSE mg/dL 110* 107* 111*  112*   CALCIUM mg/dL 8.7 9.1 8.8  8.8   AST (SGOT) U/L 23  --  11   ALT (SGPT) U/L 24  --  9          Results from last 7 days   Lab Units 09/10/19  0810 09/09/19  0315   WBC 10*3/mm3 11.12* 11.23*   HEMOGLOBIN g/dL 9.0* 9.9*   HEMATOCRIT % 31.7* 33.8*   PLATELETS 10*3/mm3 255 260     Results from last 7 days   Lab Units 09/06/19  0351   INR  1.08     Results from last 7 days   Lab Units 09/06/19  0351 09/05/19  1545   MAGNESIUM mg/dL 2.0 1.9                   I personally reviewed the patient's ECG and telemetry data    ASSESSMENT & PLAN    Anemia    Blood loss anemia    Rectal bleeding    1.  Blood loss anemia:   2.  Paroxysmal atrial fibrillation:    Rythmol has been discontinued [id number]: 6.  This places her at a very high risk for thromboembolism.  At this point there is no evidence that we have seen any further since admission.  She is having periods of heart block but not prolonged at this point.  I think we should send her home with a ZIO patch for an initial evaluation.  She might need an internal loop recorder to really assess her rhythm if the CO patch is not revealing.  Xarelto has been restarted at this time.     3.  Cardiomyopathy: Dilated.  Last left ventricular ejection fraction 40%.  No signs of heart failure at this time  4.  COPD: Oxygen dependent  5.  History of adenocarcinoma of the lung.  6.  Pulmonary embolus: Status post IVC filter.  On chronic anticoagulation  7.  Peripheral vascular disease    Recommend: Zio patch at discharge.  Will see in hospital as needed.  Please call for concerns.  Thank you    Flavio Del Rosario MD  09/11/19

## 2019-09-11 NOTE — SIGNIFICANT NOTE
09/11/19 1548   Rehab Treatment   Discipline physical therapist   Reason Treatment Not Performed other (see comments)  (hold PT per RN. Pt needs to get rest. Will follow up tomorrow.)   Recommendation   PT - Next Appointment 09/12/19

## 2019-09-11 NOTE — PROGRESS NOTES
[facility]  Inpatient Progress Note    Reason for Follow Up: Anemia, GI bleed    Subjective     Interval History:   Better today, not really clear as to why.  She has returned from her gastric emptying scan, awaiting report.        Current Facility-Administered Medications:   •  acetaminophen (TYLENOL) tablet 1,000 mg, 1,000 mg, Oral, Nightly PRN, 1,000 mg at 09/10/19 2116 **AND** diphenhydrAMINE (BENADRYL) capsule 50 mg, 50 mg, Oral, Nightly PRN, Harmeet Payne MD, 50 mg at 09/10/19 2116  •  acetaminophen (TYLENOL) tablet 650 mg, 650 mg, Oral, Q6H PRN, Harmeet Payne MD, 650 mg at 09/10/19 0551  •  arformoterol (BROVANA) nebulizer solution 15 mcg, 15 mcg, Nebulization, BID - RT, Alirio Alfaro MD, 15 mcg at 09/11/19 0720  •  atorvastatin (LIPITOR) tablet 20 mg, 20 mg, Oral, Nightly, Chet Payne Jr., MD, 20 mg at 09/10/19 2116  •  bisacodyl (DULCOLAX) suppository 10 mg, 10 mg, Rectal, Daily, Harmeet Payne MD, 10 mg at 09/09/19 0856  •  budesonide (PULMICORT) nebulizer solution 0.5 mg, 0.5 mg, Nebulization, BID - RT, Chet Payne Jr., MD, 0.5 mg at 09/11/19 0720  •  carvedilol (COREG) tablet 25 mg, 25 mg, Oral, BID With Meals, Chet Payne Jr., MD, 25 mg at 09/10/19 1841  •  ipratropium-albuterol (DUO-NEB) nebulizer solution 3 mL, 3 mL, Nebulization, 4x Daily - RT, Chet Payne Jr., MD, 3 mL at 09/10/19 1638  •  levothyroxine (SYNTHROID, LEVOTHROID) tablet 50 mcg, 50 mcg, Oral, Q AM, Chet Payne Jr., MD, 50 mcg at 09/10/19 0548  •  ondansetron (ZOFRAN) injection 4 mg, 4 mg, Intravenous, Q6H, Yann Simon MD, 4 mg at 09/10/19 1841  •  pantoprazole (PROTONIX) EC tablet 40 mg, 40 mg, Oral, Q AM, Harmeet Payne MD, 40 mg at 09/10/19 0548  •  polyethylene glycol 3350 powder (packet), 17 g, Oral, Daily PRN, Harmeet Payne MD, 17 g at 09/09/19 0856  •  potassium chloride (KLOR-CON) packet 20 mEq, 20 mEq, Oral, Daily, Chet Payne Jr., MD,  20 mEq at 09/10/19 1056  •  promethazine (PHENERGAN) tablet 12.5 mg, 12.5 mg, Oral, Q6H PRN, 12.5 mg at 09/10/19 0626 **OR** promethazine (PHENERGAN) injection 12.5 mg, 12.5 mg, Intravenous, Q6H PRN, Harmeet Payne MD  •  rivaroxaban (XARELTO) tablet 20 mg, 20 mg, Oral, Daily With Dinner, Harmeet Payne MD, 20 mg at 09/10/19 1841  •  sennosides-docusate sodium (SENOKOT-S) 8.6-50 MG tablet 2 tablet, 2 tablet, Oral, Nightly, Harmeet aPyne MD, 2 tablet at 09/10/19 2116  •  sertraline (ZOLOFT) tablet 50 mg, 50 mg, Oral, Daily, Chet Payne Jr., MD, 50 mg at 09/10/19 1056  •  traMADol (ULTRAM) tablet 50 mg, 50 mg, Oral, Q6H PRN, Harmeet Payne MD, 50 mg at 09/10/19 1847  Review of Systems:   GI: nausea    Objective     Vital Signs  Temp:  [98 °F (36.7 °C)-98.9 °F (37.2 °C)] 98 °F (36.7 °C)  Heart Rate:  [51-88] 88  Resp:  [18-20] 20  BP: ()/(46-57) 138/56  Body mass index is 32.78 kg/m².    Intake/Output Summary (Last 24 hours) at 9/11/2019 1321  Last data filed at 9/11/2019 0629  Gross per 24 hour   Intake 1360 ml   Output 900 ml   Net 460 ml     No intake/output data recorded.     Physical Exam:   General: patient awake, alert and cooperative   Abdomen: soft, nontender, nondistended; normal bowel sounds   Rectal: deferred   Extremities: no rash or edema   Psychiatric: Normal mood and behavior; memory intact     Results Review:     I reviewed the patient's new clinical results.               I reviewed patient's RUQ u/s - negative    Results from last 7 days   Lab Units 09/10/19  0810 09/09/19  0315 09/08/19  0839   WBC 10*3/mm3 11.12* 11.23* 10.58   HEMOGLOBIN g/dL 9.0* 9.9* 9.7*   HEMATOCRIT % 31.7* 33.8* 33.0*   PLATELETS 10*3/mm3 255 260 246     Results from last 7 days   Lab Units 09/10/19  0810 09/09/19  0315 09/08/19  0839   SODIUM mmol/L 142 143 146*  143   POTASSIUM mmol/L 4.5 4.7 4.6  4.5   CHLORIDE mmol/L 100 102 103  103   CO2 mmol/L 34.4* 32.7* 31.0*  31.2*   BUN mg/dL 13 15 17  17    CREATININE mg/dL 0.59 0.69 0.90  0.87   CALCIUM mg/dL 8.7 9.1 8.8  8.8   BILIRUBIN mg/dL 0.3  --  <0.2*   ALK PHOS U/L 72  --  58   ALT (SGPT) U/L 24  --  9   AST (SGOT) U/L 23  --  11   GLUCOSE mg/dL 110* 107* 111*  112*     Results from last 7 days   Lab Units 09/06/19  0351   INR  1.08     Lab Results   Lab Value Date/Time    LIPASE 49 09/08/2019 0839    LIPASE 12 (L) 11/17/2015 1401       Radiology:  US Gallbladder   Final Result   No abnormalities demonstrated on this gallbladder ultrasound.       This report was finalized on 9/10/2019 7:45 PM by Dr. George Fernando M.D.          CT Head Without Contrast   Final Result   No evidence for acute intracranial pathology.       Radiation dose reduction techniques were utilized, including automated   exposure control and exposure modulation based on body size.       This report was finalized on 9/10/2019 11:42 AM by Dr. Geovani Nunes M.D.          XR Abdomen 2 View With Chest 1 View   Final Result   1. There is moderate retention of stool throughout the colon. No   evidence for bowel obstruction or ileus is appreciated.   2. There is scattered interstitial scarring throughout both lungs with   bilateral pleural-based scarring at the lung bases.       This report was finalized on 9/8/2019 2:42 PM by Dr. Wallace Michaud M.D.          NM Gastric Emptying    (Results Pending)       Assessment/Plan     Patient Active Problem List   Diagnosis   • Adenocarcinoma of right lung (CMS/HCC)   • Decreased blood pressure, not hypotension   • Cardiomyopathy (CMS/HCC)   • Cardiovascular disease   • Dizziness   • Dyspnea on exertion   • Nodule of left lung   • Peripheral arterial occlusive disease (CMS/HCC)   • Iron deficiency anemia   • Pulmonary emphysema (CMS/HCC)   • Pneumonitis, radiation (CMS/HCC)   • Palpitations   • Localized edema   • Weight gain   • Anemia   • Blood loss anemia   • Rectal bleeding     Impression  #1 anemia: H&H relatively static  #2 rectal  bleeding: Evidence of AVMs on endoscopy with coagulation therapy  #3 thrombotic disease: Requiring anticoagulant therapy  #4 post-prandial nausea    Recommendation  Continue current medical therapy  Will f/u on results of GES    I discussed the patients findings and my recommendations with patient and nursing staff.          Yann Simon M.D.  [facility]  [address] [place] [address]  Office: [phone]

## 2019-09-11 NOTE — PROGRESS NOTES
Continued Stay Note  [facility]     Patient Name: Ale Narayan  MRN: [id number]  Today's Date: 9/11/2019    Admit Date: 9/5/2019    Discharge Plan     Row Name 09/11/19 1618       Plan    Plan  Skilled rehab    Patient/Family in Agreement with Plan  yes    Plan Comments  Spoke with Ap[facility] and she accepts for the [facility], [facility] is also following.  .................................Kristina Shelley RN        Discharge Codes    No documentation.             Kristina Shelley RN

## 2019-09-11 NOTE — PROGRESS NOTES
History:  This 78-year-old lady looks much better this morning.  She was short of breath last night.  She is not having nausea.  She is having no headache.  She is getting ready to go for her gastric emptying study.    Allergies  Adhesive tape; Augmentin [amoxicillin-pot clavulanate]; Cephalexin; Metoclopramide; Pentazocine; Simvastatin; and Sucralfate      Current Facility-Administered Medications:   •  acetaminophen (TYLENOL) tablet 1,000 mg, 1,000 mg, Oral, Nightly PRN, 1,000 mg at 09/10/19 2116 **AND** diphenhydrAMINE (BENADRYL) capsule 50 mg, 50 mg, Oral, Nightly PRN, Harmeet Payne MD, 50 mg at 09/10/19 2116  •  acetaminophen (TYLENOL) tablet 650 mg, 650 mg, Oral, Q6H PRN, Harmeet Payne MD, 650 mg at 09/10/19 0551  •  arformoterol (BROVANA) nebulizer solution 15 mcg, 15 mcg, Nebulization, BID - RT, Alirio Alfaro MD, 15 mcg at 09/11/19 0720  •  atorvastatin (LIPITOR) tablet 20 mg, 20 mg, Oral, Nightly, Chet Payne Jr., MD, 20 mg at 09/10/19 2116  •  bisacodyl (DULCOLAX) suppository 10 mg, 10 mg, Rectal, Daily, Harmeet Payne MD, 10 mg at 09/09/19 0856  •  budesonide (PULMICORT) nebulizer solution 0.5 mg, 0.5 mg, Nebulization, BID - RT, Chet Payne Jr., MD, 0.5 mg at 09/11/19 0720  •  carvedilol (COREG) tablet 25 mg, 25 mg, Oral, BID With Meals, Chet Payne Jr., MD, 25 mg at 09/10/19 1841  •  dextrose 5 % and sodium chloride 0.45 % infusion, 75 mL/hr, Intravenous, Continuous, Harmeet Payne MD, Last Rate: 75 mL/hr at 09/11/19 0300, 75 mL/hr at 09/11/19 0300  •  ipratropium-albuterol (DUO-NEB) nebulizer solution 3 mL, 3 mL, Nebulization, 4x Daily - RT, Chet Payne Jr., MD, 3 mL at 09/10/19 1638  •  levothyroxine (SYNTHROID, LEVOTHROID) tablet 50 mcg, 50 mcg, Oral, Q AM, Chet Payne Jr., MD, 50 mcg at 09/10/19 0548  •  ondansetron (ZOFRAN) injection 4 mg, 4 mg, Intravenous, Q6H, Yann Simon MD, 4 mg at 09/10/19 1841  •  pantoprazole (PROTONIX) EC tablet  "40 mg, 40 mg, Oral, Q AM, Harmeet Payne MD, 40 mg at 09/10/19 0548  •  polyethylene glycol 3350 powder (packet), 17 g, Oral, Daily PRN, Harmeet Payne MD, 17 g at 09/09/19 0856  •  potassium chloride (KLOR-CON) packet 20 mEq, 20 mEq, Oral, Daily, Chet Payne Jr., MD, 20 mEq at 09/10/19 1056  •  promethazine (PHENERGAN) tablet 12.5 mg, 12.5 mg, Oral, Q6H PRN, 12.5 mg at 09/10/19 0626 **OR** promethazine (PHENERGAN) injection 12.5 mg, 12.5 mg, Intravenous, Q6H PRN, Harmeet Payne MD  •  rivaroxaban (XARELTO) tablet 20 mg, 20 mg, Oral, Daily With Dinner, Harmeet Payne MD, 20 mg at 09/10/19 1841  •  sennosides-docusate sodium (SENOKOT-S) 8.6-50 MG tablet 2 tablet, 2 tablet, Oral, Nightly, Harmeet Payne MD, 2 tablet at 09/10/19 2116  •  sertraline (ZOLOFT) tablet 50 mg, 50 mg, Oral, Daily, Chet Payne Jr., MD, 50 mg at 09/10/19 1056  •  traMADol (ULTRAM) tablet 50 mg, 50 mg, Oral, Q6H PRN, Harmeet Payne MD, 50 mg at 09/10/19 1847    /46 (BP Location: Left arm, Patient Position: Lying)   Pulse 59   Temp 98.4 °F (36.9 °C) (Oral)   Resp 20   Ht 157.5 cm (62\")   Wt 81.3 kg (179 lb 3.2 oz)   LMP  (LMP Unknown)   SpO2 100%   BMI 32.78 kg/m²     Physical Exam     Appearance: Elderly  lady who is lying comfortably in bed.  She has central obesity.  HEENT: Normocephalic and atraumatic.  No scalp tenderness.  No sinus tenderness.  Pupils round and equal.  Pharynx clear mucous membranes moist.  Lungs: Decreased breath sounds bilaterally.  No wheezes, rales or rhonchi.  Heart: Distant tones.  Bradycardic.  Regular.  Abdomen: Protuberant.  Large fat pad.  Hypoactive bowel sounds.  No tenderness.  No rebound or guarding.  Extremities: Without clubbing, cyanosis or edema.  Neurologic: Nonfocal.      Lab Results (last 24 hours)     Procedure Component Value Units Date/Time    Sedimentation Rate [[id number]]  (Abnormal) Collected:  09/10/19 0810    Specimen:  Blood Updated:  09/10/19 1012     Sed " Rate 63 mm/hr     CBC & Differential [[id number]] Collected:  09/10/19 0810    Specimen:  Blood Updated:  09/10/19 0902    Narrative:       The following orders were created for panel order CBC & Differential.  Procedure                               Abnormality         Status                     ---------                               -----------         ------                     CBC Auto Differential[[id number]]        Abnormal            Final result                 Please view results for these tests on the individual orders.    CBC Auto Differential [[id number]]  (Abnormal) Collected:  09/10/19 0810    Specimen:  Blood Updated:  09/10/19 0902     WBC 11.12 10*3/mm3      RBC 3.16 10*6/mm3      Hemoglobin 9.0 g/dL      Hematocrit 31.7 %      .3 fL      MCH 28.5 pg      MCHC 28.4 g/dL      RDW 14.5 %      RDW-SD 53.1 fl      MPV 9.9 fL      Platelets 255 10*3/mm3      Neutrophil % 80.1 %      Lymphocyte % 9.4 %      Monocyte % 8.8 %      Eosinophil % 0.9 %      Basophil % 0.4 %      Immature Grans % 0.4 %      Neutrophils, Absolute 8.89 10*3/mm3      Lymphocytes, Absolute 1.05 10*3/mm3      Monocytes, Absolute 0.98 10*3/mm3      Eosinophils, Absolute 0.10 10*3/mm3      Basophils, Absolute 0.05 10*3/mm3      Immature Grans, Absolute 0.05 10*3/mm3      nRBC 0.0 /100 WBC     Comprehensive Metabolic Panel [[id number]]  (Abnormal) Collected:  09/10/19 0810    Specimen:  Blood Updated:  09/10/19 0900     Glucose 110 mg/dL      BUN 13 mg/dL      Creatinine 0.59 mg/dL      Sodium 142 mmol/L      Potassium 4.5 mmol/L      Chloride 100 mmol/L      CO2 34.4 mmol/L      Calcium 8.7 mg/dL      Total Protein 6.6 g/dL      Albumin 3.50 g/dL      ALT (SGPT) 24 U/L      AST (SGOT) 23 U/L      Alkaline Phosphatase 72 U/L      Total Bilirubin 0.3 mg/dL      eGFR Non African Amer 99 mL/min/1.73      Globulin 3.1 gm/dL      A/G Ratio 1.1 g/dL      BUN/Creatinine Ratio 22.0     Anion Gap 7.6 mmol/L     Narrative:       GFR Normal  >60  Chronic Kidney Disease <60  Kidney Failure <15        CT scan head without contrast:The gray-white matter differentiation is within normal limits.  The basal ganglia and thalami are unremarkable in appearance. The  posterior fossa structures are within normal limits.    Flat plate upright of abdomen:  1. There is moderate retention of stool throughout the colon. No  evidence for bowel obstruction or ileus is appreciated.  2. There is scattered interstitial scarring throughout both lungs with  bilateral pleural-based scarring at the lung bases.        Imp:  1.  Nausea etiology unknown.  Better this morning. Consider gastroparesis.  Consider mesenteric ischemia.  2.  Headache.  Resolved  3.  Lower GI bleed related to angiodysplasia, resolved  4.  Dilated cardiomyopathy  5.  Junctional rhythm/atrial fib.  Back on Xarelto.  6.  Severe COPD  7.  Anemia related to both blood loss and chronic inflammation, stable        Plan:  We will continue current treatment.  I will check results of the gastric emptying study.  I will give the patient another Dulcolax suppository today.  I am going to try to get the patient a bed at [facility] for further rehab before going back home  We will do a ZIO Patch as an outpatient    Harmeet Payne MD  9/11/2019  7:33 AM

## 2019-09-11 NOTE — PLAN OF CARE
Problem: Patient Care Overview  Goal: Plan of Care Review  Outcome: Ongoing (interventions implemented as appropriate)   09/11/19 0329   Coping/Psychosocial   Plan of Care Reviewed With patient   Plan of Care Review   Progress no change   OTHER   Outcome Summary Pt rested intermittently during the night with no significant changes. Pt is scheduled for a gastric empting study today. VSS- carlyly on 4L, will continue to monitor. 9/11/19 @0332       Problem: Fall Risk (Adult)  Goal: Absence of Fall  Outcome: Ongoing (interventions implemented as appropriate)      Problem: Anemia (Adult)  Goal: Symptom Improvement  Outcome: Ongoing (interventions implemented as appropriate)      Problem: Skin Injury Risk (Adult)  Goal: Skin Health and Integrity  Outcome: Ongoing (interventions implemented as appropriate)

## 2019-09-12 PROCEDURE — 63710000001 PROMETHAZINE PER 12.5 MG: Performed by: INTERNAL MEDICINE

## 2019-09-12 PROCEDURE — 25010000002 ONDANSETRON PER 1 MG: Performed by: INTERNAL MEDICINE

## 2019-09-12 PROCEDURE — 99232 SBSQ HOSP IP/OBS MODERATE 35: CPT | Performed by: INTERNAL MEDICINE

## 2019-09-12 PROCEDURE — 94799 UNLISTED PULMONARY SVC/PX: CPT

## 2019-09-12 RX ORDER — FERROUS SULFATE 325(65) MG
325 TABLET ORAL
Status: DISCONTINUED | OUTPATIENT
Start: 2019-09-12 | End: 2019-09-13

## 2019-09-12 RX ADMIN — PANTOPRAZOLE SODIUM 40 MG: 40 TABLET, DELAYED RELEASE ORAL at 05:44

## 2019-09-12 RX ADMIN — SERTRALINE 50 MG: 50 TABLET, FILM COATED ORAL at 09:22

## 2019-09-12 RX ADMIN — PROMETHAZINE HYDROCHLORIDE 12.5 MG: 12.5 TABLET ORAL at 17:26

## 2019-09-12 RX ADMIN — PROMETHAZINE HYDROCHLORIDE 12.5 MG: 12.5 TABLET ORAL at 10:34

## 2019-09-12 RX ADMIN — SENNOSIDES AND DOCUSATE SODIUM 2 TABLET: 8.6; 5 TABLET ORAL at 22:04

## 2019-09-12 RX ADMIN — LEVOTHYROXINE SODIUM 50 MCG: 50 TABLET ORAL at 05:44

## 2019-09-12 RX ADMIN — FERROUS SULFATE TAB 325 MG (65 MG ELEMENTAL FE) 325 MG: 325 (65 FE) TAB at 09:22

## 2019-09-12 RX ADMIN — ARFORMOTEROL TARTRATE 15 MCG: 15 SOLUTION RESPIRATORY (INHALATION) at 07:52

## 2019-09-12 RX ADMIN — BUDESONIDE 0.5 MG: 0.5 SUSPENSION RESPIRATORY (INHALATION) at 07:39

## 2019-09-12 RX ADMIN — ARFORMOTEROL TARTRATE 15 MCG: 15 SOLUTION RESPIRATORY (INHALATION) at 20:47

## 2019-09-12 RX ADMIN — RIVAROXABAN 20 MG: 20 TABLET, FILM COATED ORAL at 17:26

## 2019-09-12 RX ADMIN — CARVEDILOL 25 MG: 25 TABLET, FILM COATED ORAL at 09:22

## 2019-09-12 RX ADMIN — BUDESONIDE 0.5 MG: 0.5 SUSPENSION RESPIRATORY (INHALATION) at 20:47

## 2019-09-12 RX ADMIN — POTASSIUM CHLORIDE 20 MEQ: 1.5 POWDER, FOR SOLUTION ORAL at 09:22

## 2019-09-12 RX ADMIN — ATORVASTATIN CALCIUM 20 MG: 20 TABLET, FILM COATED ORAL at 20:24

## 2019-09-12 RX ADMIN — IPRATROPIUM BROMIDE AND ALBUTEROL SULFATE 3 ML: 2.5; .5 SOLUTION RESPIRATORY (INHALATION) at 15:04

## 2019-09-12 RX ADMIN — ONDANSETRON 4 MG: 2 INJECTION INTRAMUSCULAR; INTRAVENOUS at 01:36

## 2019-09-12 RX ADMIN — TRAMADOL HYDROCHLORIDE 50 MG: 50 TABLET ORAL at 20:37

## 2019-09-12 RX ADMIN — CARVEDILOL 25 MG: 25 TABLET, FILM COATED ORAL at 17:26

## 2019-09-12 RX ADMIN — TRAMADOL HYDROCHLORIDE 50 MG: 50 TABLET ORAL at 05:44

## 2019-09-12 RX ADMIN — ONDANSETRON 4 MG: 2 INJECTION INTRAMUSCULAR; INTRAVENOUS at 05:44

## 2019-09-12 NOTE — PROGRESS NOTES
[facility]  Inpatient Progress Note    Reason for Follow Up: Nausea and anemia and AVMs    Subjective     Interval History:   Touch of nausea no vomiting, gastric emptying study was normal    Current Facility-Administered Medications:   •  acetaminophen (TYLENOL) tablet 1,000 mg, 1,000 mg, Oral, Nightly PRN, 1,000 mg at 09/10/19 2116 **AND** diphenhydrAMINE (BENADRYL) capsule 50 mg, 50 mg, Oral, Nightly PRN, Harmeet Payne MD, 50 mg at 09/10/19 2116  •  acetaminophen (TYLENOL) tablet 650 mg, 650 mg, Oral, Q6H PRN, Harmeet Payne MD, 650 mg at 09/10/19 0551  •  arformoterol (BROVANA) nebulizer solution 15 mcg, 15 mcg, Nebulization, BID - RT, Alirio Alfaro MD, 15 mcg at 09/12/19 0752  •  atorvastatin (LIPITOR) tablet 20 mg, 20 mg, Oral, Nightly, Chet Payne Jr., MD, 20 mg at 09/11/19 2050  •  bisacodyl (DULCOLAX) suppository 10 mg, 10 mg, Rectal, Daily, Harmeet Payne MD, 10 mg at 09/09/19 0856  •  budesonide (PULMICORT) nebulizer solution 0.5 mg, 0.5 mg, Nebulization, BID - RT, Chet Payne Jr., MD, 0.5 mg at 09/12/19 0739  •  carvedilol (COREG) tablet 25 mg, 25 mg, Oral, BID With Meals, Chet Payne Jr., MD, 25 mg at 09/12/19 0922  •  ferrous sulfate tablet 325 mg, 325 mg, Oral, Daily With Breakfast, Harmeet Payne MD, 325 mg at 09/12/19 0922  •  ipratropium-albuterol (DUO-NEB) nebulizer solution 3 mL, 3 mL, Nebulization, 4x Daily - RT, Chet Payne Jr., MD, 3 mL at 09/11/19 1641  •  levothyroxine (SYNTHROID, LEVOTHROID) tablet 50 mcg, 50 mcg, Oral, Q AM, Chet Payne Jr., MD, 50 mcg at 09/12/19 0544  •  pantoprazole (PROTONIX) EC tablet 40 mg, 40 mg, Oral, Q AM, Harmeet Payne MD, 40 mg at 09/12/19 0544  •  polyethylene glycol 3350 powder (packet), 17 g, Oral, Daily PRN, Harmeet Payne MD, 17 g at 09/09/19 0856  •  potassium chloride (KLOR-CON) packet 20 mEq, 20 mEq, Oral, Daily, Chet Payne Jr., MD, 20 mEq at 09/12/19 0922  •   promethazine (PHENERGAN) tablet 12.5 mg, 12.5 mg, Oral, Q6H PRN, 12.5 mg at 09/12/19 1034 **OR** promethazine (PHENERGAN) injection 12.5 mg, 12.5 mg, Intravenous, Q6H PRN, Harmeet Payne MD  •  rivaroxaban (XARELTO) tablet 20 mg, 20 mg, Oral, Daily With Dinner, Harmeet Payne MD, 20 mg at 09/11/19 1908  •  sennosides-docusate sodium (SENOKOT-S) 8.6-50 MG tablet 2 tablet, 2 tablet, Oral, Nightly, Harmeet Payne MD, 2 tablet at 09/11/19 2050  •  sertraline (ZOLOFT) tablet 50 mg, 50 mg, Oral, Daily, Chet Payne Jr., MD, 50 mg at 09/12/19 0922  •  traMADol (ULTRAM) tablet 50 mg, 50 mg, Oral, Q6H PRN, Harmeet Payne MD, 50 mg at 09/12/19 0544  Review of Systems:    She has weakness and fatigue all other systems reviewed and negative    Objective     Vital Signs  Temp:  [98 °F (36.7 °C)-98.9 °F (37.2 °C)] 98 °F (36.7 °C)  Heart Rate:  [47-73] 66  Resp:  [16-20] 18  BP: (138-148)/(53-56) 147/55  Body mass index is 32.78 kg/m².    Intake/Output Summary (Last 24 hours) at 9/12/2019 1435  Last data filed at 9/11/2019 2056  Gross per 24 hour   Intake 600 ml   Output --   Net 600 ml     No intake/output data recorded.     Physical Exam:   General: patient awake, alert and cooperative   Eyes: Normal lids and lashes, no scleral icterus   Neck: supple, normal ROM   Skin: warm and dry, not jaundiced   Cardiovascular: regular rhythm and rate, no murmurs auscultated   Pulm: clear to auscultation bilaterally, regular and unlabored   Abdomen: soft, nontender, nondistended; normal bowel sounds   Rectal: deferred   Extremities: no rash or edema   Psychiatric: Normal mood and behavior; memory intact     Results Review:     I reviewed the patient's new clinical results.    Results from last 7 days   Lab Units 09/10/19  0810 09/09/19  0315 09/08/19  0839   WBC 10*3/mm3 11.12* 11.23* 10.58   HEMOGLOBIN g/dL 9.0* 9.9* 9.7*   HEMATOCRIT % 31.7* 33.8* 33.0*   PLATELETS 10*3/mm3 255 260 246     Results from last 7 days   Lab Units  09/10/19  0810 09/09/19  0315 09/08/19  0839   SODIUM mmol/L 142 143 146*  143   POTASSIUM mmol/L 4.5 4.7 4.6  4.5   CHLORIDE mmol/L 100 102 103  103   CO2 mmol/L 34.4* 32.7* 31.0*  31.2*   BUN mg/dL 13 15 17  17   CREATININE mg/dL 0.59 0.69 0.90  0.87   CALCIUM mg/dL 8.7 9.1 8.8  8.8   BILIRUBIN mg/dL 0.3  --  <0.2*   ALK PHOS U/L 72  --  58   ALT (SGPT) U/L 24  --  9   AST (SGOT) U/L 23  --  11   GLUCOSE mg/dL 110* 107* 111*  112*     Results from last 7 days   Lab Units 09/06/19  0351   INR  1.08     Lab Results   Lab Value Date/Time    LIPASE 49 09/08/2019 0839    LIPASE 12 (L) 11/17/2015 1401       Radiology:  NM Gastric Emptying   Final Result   Negative.       This report was finalized on 9/11/2019 5:41 PM by Dr. Ilya Lange M.D.          US Gallbladder   Final Result   No abnormalities demonstrated on this gallbladder ultrasound.       This report was finalized on 9/10/2019 7:45 PM by Dr. George Fernando M.D.          CT Head Without Contrast   Final Result   No evidence for acute intracranial pathology.       Radiation dose reduction techniques were utilized, including automated   exposure control and exposure modulation based on body size.       This report was finalized on 9/10/2019 11:42 AM by Dr. Geovani Nunes M.D.          XR Abdomen 2 View With Chest 1 View   Final Result   1. There is moderate retention of stool throughout the colon. No   evidence for bowel obstruction or ileus is appreciated.   2. There is scattered interstitial scarring throughout both lungs with   bilateral pleural-based scarring at the lung bases.       This report was finalized on 9/8/2019 2:42 PM by Dr. Wallace Michaud M.D.              Assessment/Plan     Patient Active Problem List   Diagnosis   • Adenocarcinoma of right lung (CMS/HCC)   • Decreased blood pressure, not hypotension   • Cardiomyopathy (CMS/HCC)   • Cardiovascular disease   • Dizziness   • Dyspnea on exertion   • Nodule of left lung   •  Peripheral arterial occlusive disease (CMS/HCC)   • Iron deficiency anemia   • Pulmonary emphysema (CMS/HCC)   • Pneumonitis, radiation (CMS/HCC)   • Palpitations   • Localized edema   • Weight gain   • Anemia   • Blood loss anemia   • Rectal bleeding        Impression  #1 anemia: H&H relatively static  #2 rectal bleeding: Evidence of AVMs on endoscopy with coagulation therapy  #3 thrombotic disease: Requiring anticoagulant therapy  #4 post-prandial nausea     Recommendation    No further bleeding  Follow hemoglobin  Continue current medical therapy  Gastric emptying study normal, vomiting is resolved but still with a touch of nausea, continue PRN antiemetics      I discussed the patients findings and my recommendations with patient and nursing staff.    Yann Flores MD

## 2019-09-12 NOTE — PROGRESS NOTES
History:  Ms. Narayan continues to improve.  She is having no headache.  She is having no abdominal discomfort or nausea.  She has not eaten hardly any solids in the last few days.  She is starting to get more active but is still somewhat unsteady on her feet.  She is still very weak.    Allergies  Adhesive tape; Augmentin [amoxicillin-pot clavulanate]; Cephalexin; Metoclopramide; Pentazocine; Simvastatin; and Sucralfate      Current Facility-Administered Medications:   •  acetaminophen (TYLENOL) tablet 1,000 mg, 1,000 mg, Oral, Nightly PRN, 1,000 mg at 09/10/19 2116 **AND** diphenhydrAMINE (BENADRYL) capsule 50 mg, 50 mg, Oral, Nightly PRN, Harmeet Payne MD, 50 mg at 09/10/19 2116  •  acetaminophen (TYLENOL) tablet 650 mg, 650 mg, Oral, Q6H PRN, Harmeet Payne MD, 650 mg at 09/10/19 0551  •  arformoterol (BROVANA) nebulizer solution 15 mcg, 15 mcg, Nebulization, BID - RT, Alirio Alfaro MD, 15 mcg at 09/11/19 2127  •  atorvastatin (LIPITOR) tablet 20 mg, 20 mg, Oral, Nightly, Chet Payne Jr., MD, 20 mg at 09/11/19 2050  •  bisacodyl (DULCOLAX) suppository 10 mg, 10 mg, Rectal, Daily, Harmeet Payne MD, 10 mg at 09/09/19 0856  •  budesonide (PULMICORT) nebulizer solution 0.5 mg, 0.5 mg, Nebulization, BID - RT, Chet Payne Jr., MD, 0.5 mg at 09/11/19 2127  •  carvedilol (COREG) tablet 25 mg, 25 mg, Oral, BID With Meals, Chet Payne Jr., MD, 25 mg at 09/11/19 1908  •  ferrous sulfate tablet 325 mg, 325 mg, Oral, Daily With Breakfast, Harmeet Payne MD  •  ipratropium-albuterol (DUO-NEB) nebulizer solution 3 mL, 3 mL, Nebulization, 4x Daily - RT, Chet Payne Jr., MD, 3 mL at 09/11/19 1641  •  levothyroxine (SYNTHROID, LEVOTHROID) tablet 50 mcg, 50 mcg, Oral, Q AM, Chet Payne Jr., MD, 50 mcg at 09/12/19 0553  •  pantoprazole (PROTONIX) EC tablet 40 mg, 40 mg, Oral, Q AM, Harmeet Payne MD, 40 mg at 09/12/19 0544  •  polyethylene glycol 3350 powder (packet), 17 g, Oral,  "Daily PRN, Harmeet Payne MD, 17 g at 09/09/19 0856  •  potassium chloride (KLOR-CON) packet 20 mEq, 20 mEq, Oral, Daily, Chet Payne Jr., MD, 20 mEq at 09/10/19 1056  •  promethazine (PHENERGAN) tablet 12.5 mg, 12.5 mg, Oral, Q6H PRN, 12.5 mg at 09/10/19 0626 **OR** promethazine (PHENERGAN) injection 12.5 mg, 12.5 mg, Intravenous, Q6H PRN, Harmeet Payne MD  •  rivaroxaban (XARELTO) tablet 20 mg, 20 mg, Oral, Daily With Dinner, Harmeet Payne MD, 20 mg at 09/11/19 1908  •  sennosides-docusate sodium (SENOKOT-S) 8.6-50 MG tablet 2 tablet, 2 tablet, Oral, Nightly, Harmeet Payne MD, 2 tablet at 09/11/19 2050  •  sertraline (ZOLOFT) tablet 50 mg, 50 mg, Oral, Daily, Chet Payne Jr., MD, 50 mg at 09/10/19 1056  •  traMADol (ULTRAM) tablet 50 mg, 50 mg, Oral, Q6H PRN, Harmeet Payne MD, 50 mg at 09/12/19 0544    /55 (BP Location: Right arm, Patient Position: Sitting)   Pulse 60   Temp 98 °F (36.7 °C) (Oral)   Resp 18   Ht 157.5 cm (62\")   Wt 81.3 kg (179 lb 3.2 oz)   LMP  (LMP Unknown)   SpO2 90%   BMI 32.78 kg/m²     Physical Exam   Appearance: Elderly  lady.  She is sitting in a chair.  She is in no distress.  HEENT: Normocephalic and atraumatic.  No scalp tenderness.  No sinus tenderness.  Pupils round and equal.  Pharynx clear mucous membranes moist.  Lungs: Decreased breath sounds bilaterally.  No wheezes, rales or rhonchi.  Heart: Distant tones.  Bradycardic.  Regular.  Abdomen: Protuberant.  Large fat pad.  Hypoactive bowel sounds.  No tenderness.  No rebound or guarding.  Extremities: Without clubbing, cyanosis or edema.  Neurologic: Nonfocal.         Lab Results (last 24 hours)     ** No results found for the last 24 hours. **        Gastric emptying study: Negative    Imp:  1.  Nausea, resolved.  2.  Lower GI bleed related to angiodysplasia, resolved.  3.  Anemia, multifactorial in part related to GI blood loss  4.  Junctional rib/atrial fibrillation.  Rate controlled, " patient on Xarelto.  5.  Essential hypertension.  6.  Severe COPD.  7.  Dilated cardiomyopathy      Plan:  I am going to start supplemental iron today.  She tolerates this well we will increase it to twice a day.  We will continue with physical therapy.  The patient will see how she does with her p.o. intake today.  An[facility].    Harmeet Payne MD  9/12/2019  7:39 AM

## 2019-09-12 NOTE — PROGRESS NOTES
Continued Stay Note  [facility]     Patient Name: Ale Narayan  MRN: [id number]  Today's Date: 9/12/2019    Admit Date: 9/5/2019    Discharge Plan     Row Name 09/12/19 1426       Plan    Plan  Skilled rehab    Patient/Family in Agreement with Plan  yes    Plan Comments  Patient would like to go to [facility] for skilled rehab is Dr Payne will follow her there.  Spoke with Apurva and she will follow up and inform CCP .....................Kristina Shelley RN        Discharge Codes    No documentation.             Kristina Shelley RN

## 2019-09-12 NOTE — PLAN OF CARE
Problem: Patient Care Overview  Goal: Plan of Care Review  Outcome: Ongoing (interventions implemented as appropriate)   09/12/19 0447 09/12/19 1400 09/12/19 1711   Coping/Psychosocial   Plan of Care Reviewed With --  patient --    Plan of Care Review   Progress improving --  --    OTHER   Outcome Summary --  --  VSS, pt remains on 4L, complaints of nausea but no headache, hopeful to d/c tomorrow, will continue to closely monitor.     Goal: Individualization and Mutuality  Outcome: Ongoing (interventions implemented as appropriate)    Goal: Discharge Needs Assessment  Outcome: Ongoing (interventions implemented as appropriate)    Goal: Interprofessional Rounds/Family Conf  Outcome: Ongoing (interventions implemented as appropriate)      Problem: Fall Risk (Adult)  Goal: Absence of Fall  Outcome: Ongoing (interventions implemented as appropriate)      Problem: Anemia (Adult)  Goal: Symptom Improvement  Outcome: Ongoing (interventions implemented as appropriate)      Problem: Skin Injury Risk (Adult)  Goal: Skin Health and Integrity  Outcome: Ongoing (interventions implemented as appropriate)

## 2019-09-12 NOTE — PLAN OF CARE
Problem: Patient Care Overview  Goal: Plan of Care Review  Outcome: Ongoing (interventions implemented as appropriate)   09/12/19 9534   Coping/Psychosocial   Plan of Care Reviewed With patient   Plan of Care Review   Progress improving   OTHER   Outcome Summary Pt has no complaints of pain/headache, but complains of nausea intermittently. Pt is on 4 L nc now, came down from 4.5L. Home O2 is 3L nc., will continue to wean O2. VSS. will continue to monitor.      Goal: Individualization and Mutuality  Outcome: Ongoing (interventions implemented as appropriate)    Goal: Discharge Needs Assessment  Outcome: Ongoing (interventions implemented as appropriate)    Goal: Interprofessional Rounds/Family Conf  Outcome: Ongoing (interventions implemented as appropriate)      Problem: Fall Risk (Adult)  Goal: Absence of Fall  Outcome: Ongoing (interventions implemented as appropriate)      Problem: Anemia (Adult)  Goal: Symptom Improvement  Outcome: Ongoing (interventions implemented as appropriate)      Problem: Skin Injury Risk (Adult)  Goal: Skin Health and Integrity  Outcome: Ongoing (interventions implemented as appropriate)

## 2019-09-13 ENCOUNTER — APPOINTMENT (OUTPATIENT)
Dept: CARDIOLOGY | Facility: HOSPITAL | Age: 79
End: 2019-09-13

## 2019-09-13 PROBLEM — R51.9 TEMPORAL HEADACHE: Status: ACTIVE | Noted: 2019-09-13

## 2019-09-13 LAB
BASOPHILS # BLD AUTO: 0.07 10*3/MM3 (ref 0–0.2)
BASOPHILS NFR BLD AUTO: 0.9 % (ref 0–1.5)
BH CV VAS TEMPORAL LEFT DIST EDV: 4.3 CM/S
BH CV VAS TEMPORAL LEFT DIST PSV: 90 CM/S
BH CV VAS TEMPORAL LEFT MID EDV: 4.3 CM/S
BH CV VAS TEMPORAL LEFT MID PSV: 73 CM/S
BH CV VAS TEMPORAL LEFT MID/DIST EDV: 9.4 CM/S
BH CV VAS TEMPORAL LEFT MID/DIST PSV: 79 CM/S
BH CV VAS TEMPORAL LEFT PROX PSV: 73 CM/S
BH CV VAS TEMPORAL LEFT PROX/MID EDV: 6.8 CM/S
BH CV VAS TEMPORAL LEFT PROX/MID PSV: 58 CM/S
BH CV VAS TEMPORAL RIGHT DIST PSV: 86 CM/S
BH CV VAS TEMPORAL RIGHT MID EDV: 5.4 CM/S
BH CV VAS TEMPORAL RIGHT MID PSV: 99 CM/S
BH CV VAS TEMPORAL RIGHT MID/DIST EDV: 6.4 CM/S
BH CV VAS TEMPORAL RIGHT MID/DIST PSV: 85 CM/S
BH CV VAS TEMPORAL RIGHT PROX EDV: 3 CM/S
BH CV VAS TEMPORAL RIGHT PROX PSV: 96.4 CM/S
BH CV VAS TEMPORAL RIGHT PROX/MID PSV: 82 CM/S
DEPRECATED RDW RBC AUTO: 50.5 FL (ref 37–54)
EOSINOPHIL # BLD AUTO: 0.24 10*3/MM3 (ref 0–0.4)
EOSINOPHIL NFR BLD AUTO: 3 % (ref 0.3–6.2)
ERYTHROCYTE [DISTWIDTH] IN BLOOD BY AUTOMATED COUNT: 13.9 % (ref 12.3–15.4)
ERYTHROCYTE [SEDIMENTATION RATE] IN BLOOD: 61 MM/HR (ref 0–30)
HCT VFR BLD AUTO: 29.9 % (ref 34–46.6)
HCYS SERPL-MCNC: 6.3 UMOL/L (ref 0–15)
HGB BLD-MCNC: 8.3 G/DL (ref 12–15.9)
IMM GRANULOCYTES # BLD AUTO: 0.04 10*3/MM3 (ref 0–0.05)
IMM GRANULOCYTES NFR BLD AUTO: 0.5 % (ref 0–0.5)
LYMPHOCYTES # BLD AUTO: 1.26 10*3/MM3 (ref 0.7–3.1)
LYMPHOCYTES NFR BLD AUTO: 16 % (ref 19.6–45.3)
MCH RBC QN AUTO: 27.8 PG (ref 26.6–33)
MCHC RBC AUTO-ENTMCNC: 27.8 G/DL (ref 31.5–35.7)
MCV RBC AUTO: 100 FL (ref 79–97)
MONOCYTES # BLD AUTO: 0.87 10*3/MM3 (ref 0.1–0.9)
MONOCYTES NFR BLD AUTO: 11 % (ref 5–12)
NEUTROPHILS # BLD AUTO: 5.41 10*3/MM3 (ref 1.7–7)
NEUTROPHILS NFR BLD AUTO: 68.6 % (ref 42.7–76)
NRBC BLD AUTO-RTO: 0 /100 WBC (ref 0–0.2)
PLATELET # BLD AUTO: 264 10*3/MM3 (ref 140–450)
PMV BLD AUTO: 10 FL (ref 6–12)
RBC # BLD AUTO: 2.99 10*6/MM3 (ref 3.77–5.28)
VIT B12 BLD-MCNC: 717 PG/ML (ref 211–946)
WBC NRBC COR # BLD: 7.89 10*3/MM3 (ref 3.4–10.8)

## 2019-09-13 PROCEDURE — 63710000001 PROMETHAZINE PER 12.5 MG: Performed by: INTERNAL MEDICINE

## 2019-09-13 PROCEDURE — 99232 SBSQ HOSP IP/OBS MODERATE 35: CPT | Performed by: INTERNAL MEDICINE

## 2019-09-13 PROCEDURE — 82747 ASSAY OF FOLIC ACID RBC: CPT | Performed by: INTERNAL MEDICINE

## 2019-09-13 PROCEDURE — 94799 UNLISTED PULMONARY SVC/PX: CPT

## 2019-09-13 PROCEDURE — 85652 RBC SED RATE AUTOMATED: CPT | Performed by: INTERNAL MEDICINE

## 2019-09-13 PROCEDURE — 85025 COMPLETE CBC W/AUTO DIFF WBC: CPT | Performed by: INTERNAL MEDICINE

## 2019-09-13 PROCEDURE — 85014 HEMATOCRIT: CPT | Performed by: INTERNAL MEDICINE

## 2019-09-13 PROCEDURE — 83921 ORGANIC ACID SINGLE QUANT: CPT | Performed by: INTERNAL MEDICINE

## 2019-09-13 PROCEDURE — 83090 ASSAY OF HOMOCYSTEINE: CPT | Performed by: INTERNAL MEDICINE

## 2019-09-13 PROCEDURE — 82607 VITAMIN B-12: CPT | Performed by: INTERNAL MEDICINE

## 2019-09-13 PROCEDURE — 93880 EXTRACRANIAL BILAT STUDY: CPT

## 2019-09-13 RX ORDER — FERROUS SULFATE 325(65) MG
325 TABLET ORAL 2 TIMES DAILY WITH MEALS
Status: DISCONTINUED | OUTPATIENT
Start: 2019-09-13 | End: 2019-09-17 | Stop reason: HOSPADM

## 2019-09-13 RX ADMIN — ATORVASTATIN CALCIUM 20 MG: 20 TABLET, FILM COATED ORAL at 20:07

## 2019-09-13 RX ADMIN — BUDESONIDE 0.5 MG: 0.5 SUSPENSION RESPIRATORY (INHALATION) at 07:44

## 2019-09-13 RX ADMIN — POTASSIUM CHLORIDE 20 MEQ: 1.5 POWDER, FOR SOLUTION ORAL at 08:46

## 2019-09-13 RX ADMIN — PROMETHAZINE HYDROCHLORIDE 12.5 MG: 12.5 TABLET ORAL at 08:46

## 2019-09-13 RX ADMIN — SENNOSIDES AND DOCUSATE SODIUM 2 TABLET: 8.6; 5 TABLET ORAL at 20:07

## 2019-09-13 RX ADMIN — TRAMADOL HYDROCHLORIDE 50 MG: 50 TABLET ORAL at 20:07

## 2019-09-13 RX ADMIN — IPRATROPIUM BROMIDE AND ALBUTEROL SULFATE 3 ML: 2.5; .5 SOLUTION RESPIRATORY (INHALATION) at 10:54

## 2019-09-13 RX ADMIN — CARVEDILOL 25 MG: 25 TABLET, FILM COATED ORAL at 17:54

## 2019-09-13 RX ADMIN — SERTRALINE 50 MG: 50 TABLET, FILM COATED ORAL at 08:46

## 2019-09-13 RX ADMIN — ARFORMOTEROL TARTRATE 15 MCG: 15 SOLUTION RESPIRATORY (INHALATION) at 21:00

## 2019-09-13 RX ADMIN — CARVEDILOL 25 MG: 25 TABLET, FILM COATED ORAL at 08:46

## 2019-09-13 RX ADMIN — FERROUS SULFATE TAB 325 MG (65 MG ELEMENTAL FE) 325 MG: 325 (65 FE) TAB at 17:54

## 2019-09-13 RX ADMIN — FERROUS SULFATE TAB 325 MG (65 MG ELEMENTAL FE) 325 MG: 325 (65 FE) TAB at 08:46

## 2019-09-13 RX ADMIN — LEVOTHYROXINE SODIUM 50 MCG: 50 TABLET ORAL at 06:39

## 2019-09-13 RX ADMIN — ARFORMOTEROL TARTRATE 15 MCG: 15 SOLUTION RESPIRATORY (INHALATION) at 07:43

## 2019-09-13 RX ADMIN — IPRATROPIUM BROMIDE AND ALBUTEROL SULFATE 3 ML: 2.5; .5 SOLUTION RESPIRATORY (INHALATION) at 21:00

## 2019-09-13 RX ADMIN — PANTOPRAZOLE SODIUM 40 MG: 40 TABLET, DELAYED RELEASE ORAL at 06:39

## 2019-09-13 RX ADMIN — BUDESONIDE 0.5 MG: 0.5 SUSPENSION RESPIRATORY (INHALATION) at 21:00

## 2019-09-13 NOTE — PROGRESS NOTES
[facility]  Inpatient Progress Note    Reason for Follow Up: Nausea and anemia and AVMs    Subjective     Interval History:   Feels a little nausea this am, has not had emesis.      Current Facility-Administered Medications:   •  acetaminophen (TYLENOL) tablet 1,000 mg, 1,000 mg, Oral, Nightly PRN, 1,000 mg at 09/10/19 2116 **AND** diphenhydrAMINE (BENADRYL) capsule 50 mg, 50 mg, Oral, Nightly PRN, Harmeet Payne MD, 50 mg at 09/10/19 2116  •  acetaminophen (TYLENOL) tablet 650 mg, 650 mg, Oral, Q6H PRN, Harmeet Payne MD, 650 mg at 09/10/19 0551  •  arformoterol (BROVANA) nebulizer solution 15 mcg, 15 mcg, Nebulization, BID - RT, Alirio Alfaro MD, 15 mcg at 09/13/19 0743  •  atorvastatin (LIPITOR) tablet 20 mg, 20 mg, Oral, Nightly, Chet Payne Jr., MD, 20 mg at 09/12/19 2024  •  bisacodyl (DULCOLAX) suppository 10 mg, 10 mg, Rectal, Daily, Harmeet Payne MD, 10 mg at 09/09/19 0856  •  budesonide (PULMICORT) nebulizer solution 0.5 mg, 0.5 mg, Nebulization, BID - RT, Chet Payne Jr., MD, 0.5 mg at 09/13/19 0744  •  carvedilol (COREG) tablet 25 mg, 25 mg, Oral, BID With Meals, Chet Payne Jr., MD, 25 mg at 09/13/19 0846  •  ferrous sulfate tablet 325 mg, 325 mg, Oral, BID With Meals, Harmeet Payne MD, 325 mg at 09/13/19 0846  •  ipratropium-albuterol (DUO-NEB) nebulizer solution 3 mL, 3 mL, Nebulization, 4x Daily - RT, Chet Payne Jr., MD, 3 mL at 09/12/19 1504  •  levothyroxine (SYNTHROID, LEVOTHROID) tablet 50 mcg, 50 mcg, Oral, Q AM, Chet Payne Jr., MD, 50 mcg at 09/13/19 0639  •  pantoprazole (PROTONIX) EC tablet 40 mg, 40 mg, Oral, Q AM, Harmeet Payne MD, 40 mg at 09/13/19 0639  •  polyethylene glycol 3350 powder (packet), 17 g, Oral, Daily PRN, Harmeet Payne MD, 17 g at 09/09/19 0856  •  potassium chloride (KLOR-CON) packet 20 mEq, 20 mEq, Oral, Daily, Chet Payne Jr., MD, 20 mEq at 09/13/19 0846  •  promethazine (PHENERGAN)  tablet 12.5 mg, 12.5 mg, Oral, Q6H PRN, 12.5 mg at 09/13/19 0846 **OR** promethazine (PHENERGAN) injection 12.5 mg, 12.5 mg, Intravenous, Q6H PRN, Harmeet Payne MD  •  rivaroxaban (XARELTO) tablet 20 mg, 20 mg, Oral, Daily With Dinner, Harmeet Payne MD, 20 mg at 09/12/19 1726  •  sennosides-docusate sodium (SENOKOT-S) 8.6-50 MG tablet 2 tablet, 2 tablet, Oral, Nightly, Harmeet Payne MD, 2 tablet at 09/12/19 2204  •  sertraline (ZOLOFT) tablet 50 mg, 50 mg, Oral, Daily, Chet Payne Jr., MD, 50 mg at 09/13/19 0846  •  traMADol (ULTRAM) tablet 50 mg, 50 mg, Oral, Q6H PRN, Harmeet Payne MD, 50 mg at 09/12/19 2037  Review of Systems:   GI: nausea    Objective     Vital Signs  Temp:  [97.8 °F (36.6 °C)-98 °F (36.7 °C)] 97.8 °F (36.6 °C)  Heart Rate:  [47-72] 72  Resp:  [18-20] 20  BP: (117-146)/(55-71) 117/59  Body mass index is 32.78 kg/m².    Intake/Output Summary (Last 24 hours) at 9/13/2019 0905  Last data filed at 9/13/2019 0742  Gross per 24 hour   Intake 720 ml   Output 300 ml   Net 420 ml     I/O this shift:  In: 360 [P.O.:360]  Out: -      Physical Exam:   General: patient awake, alert and cooperative   Abdomen: soft, nontender, nondistended; normal bowel sounds   Rectal: deferred   Extremities: no rash or edema   Psychiatric: Normal mood and behavior; memory intact     Results Review:     I reviewed the patient's new clinical results.    Results from last 7 days   Lab Units 09/13/19  0422 09/10/19  0810 09/09/19  0315   WBC 10*3/mm3 7.89 11.12* 11.23*   HEMOGLOBIN g/dL 8.3* 9.0* 9.9*   HEMATOCRIT % 29.9* 31.7* 33.8*   PLATELETS 10*3/mm3 264 255 260     Results from last 7 days   Lab Units 09/10/19  0810 09/09/19  0315 09/08/19  0839   SODIUM mmol/L 142 143 146*  143   POTASSIUM mmol/L 4.5 4.7 4.6  4.5   CHLORIDE mmol/L 100 102 103  103   CO2 mmol/L 34.4* 32.7* 31.0*  31.2*   BUN mg/dL 13 15 17  17   CREATININE mg/dL 0.59 0.69 0.90  0.87   CALCIUM mg/dL 8.7 9.1 8.8  8.8   BILIRUBIN mg/dL 0.3   --  <0.2*   ALK PHOS U/L 72  --  58   ALT (SGPT) U/L 24  --  9   AST (SGOT) U/L 23  --  11   GLUCOSE mg/dL 110* 107* 111*  112*         Lab Results   Lab Value Date/Time    LIPASE 49 09/08/2019 0839    LIPASE 12 (L) 11/17/2015 1401       Radiology:  NM Gastric Emptying   Final Result   Negative.       This report was finalized on 9/11/2019 5:41 PM by Dr. Ilya Lange M.D.          US Gallbladder   Final Result   No abnormalities demonstrated on this gallbladder ultrasound.       This report was finalized on 9/10/2019 7:45 PM by Dr. George Fernando M.D.          CT Head Without Contrast   Final Result   No evidence for acute intracranial pathology.       Radiation dose reduction techniques were utilized, including automated   exposure control and exposure modulation based on body size.       This report was finalized on 9/10/2019 11:42 AM by Dr. Geovani Nunes M.D.          XR Abdomen 2 View With Chest 1 View   Final Result   1. There is moderate retention of stool throughout the colon. No   evidence for bowel obstruction or ileus is appreciated.   2. There is scattered interstitial scarring throughout both lungs with   bilateral pleural-based scarring at the lung bases.       This report was finalized on 9/8/2019 2:42 PM by Dr. Wallace Michaud M.D.              Assessment/Plan     Patient Active Problem List   Diagnosis   • Adenocarcinoma of right lung (CMS/HCC)   • Decreased blood pressure, not hypotension   • Cardiomyopathy (CMS/HCC)   • Cardiovascular disease   • Dizziness   • Dyspnea on exertion   • Nodule of left lung   • Peripheral arterial occlusive disease (CMS/HCC)   • Iron deficiency anemia   • Pulmonary emphysema (CMS/HCC)   • Pneumonitis, radiation (CMS/HCC)   • Palpitations   • Localized edema   • Weight gain   • Anemia   • Blood loss anemia   • Rectal bleeding        Impression  #1 anemia: H&H relatively slowly drifting down, no e/o rebleeding however  #2 rectal bleeding: Evidence of AVMs  on endoscopy with coagulation therapy  #3 thrombotic disease: Requiring anticoagulant therapy  #4 post-prandial nausea -stable - RUQ u/s and GES negative     Recommendation  Continue to follow Hb with gradual decline with reinstitution of NOAC  Continue current supportive care for her nausea.  May need to consider HIDA scan    I discussed the patients findings and my recommendations with patient and nursing staff.          Yann Simon M.D.  [facility]  [address] [place] [address]  Office: [phone]

## 2019-09-13 NOTE — CONSULTS
"Name: Ale Narayan ADMIT: 2019   : 1940  PCP: Harmeet Payne MD    MRN: [id number] LOS: 8 days   AGE/SEX: 78 y.o. female  ROOM: [place] [id number]/     Inpatient Vascular Surgery Consult  Consult performed by: Aramis Maher MD  Consult ordered by: Harmeet Payne MD  Reason for consult: Headache, evaluate for temporal arteritis        Subjective     History of Present Illness  78 y.o. female who is well-known to my partner, Dr. Yancy Siddiqui.  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  She is currently in the hospital with anemia associated with an AVM.  This is undergone treatment by GI.  She has complained of visual changes over the last 3 months as well as intermittent headaches over the last 3 months.  Visual changes were felt by her ophthalmologist to be potentially related to cataracts or some \"blood behind the eye\".  Patient has a generalized headache and scalp tenderness occasionally.  She denies focal temporal tenderness.  She does say that her jaw hurts occasionally.  She has a history of polymyalgia rheumatica.  She is not sure if she is been on steroids before.    HPI Elements:  Location: Generalized headache  Quality: Aching and tenderness  Severity: Moderate  Duration: Months    Modifying Factors: Not improved with nonsteroidals  Associated Signs and Symptoms: No visual auras    Review of Systems   Constitutional: Positive for activity change and fatigue.   HENT: Positive for congestion.    Eyes: Positive for visual disturbance.   Respiratory: Positive for shortness of breath.    Gastrointestinal: Positive for diarrhea.   Musculoskeletal: Positive for myalgias.   Neurological: Positive for headaches.   Hematological: Bruises/bleeds easily.   Psychiatric/Behavioral: Negative for confusion.   All other systems reviewed and are negative.    PMH: Anemia, rectal " bleeding, AVMs, adenocarcinoma the lung status post resection, cardiovascular disease, paroxysmal atrial fibrillation, peripheral arterial disease status post femorofemoral bypass, history of potential temporal artery biopsies in the past, carotid artery disease    Family History: Negative for autoimmune disease    Social History: Patient is a former smoker.  She quit in 2008    Allergies: Adhesive tape; Augmentin [amoxicillin-pot clavulanate]; Cephalexin; Metoclopramide; Pentazocine; Simvastatin; and Sucralfate      Objective   Temp:  [97.8 °F (36.6 °C)-98 °F (36.7 °C)] 97.8 °F (36.6 °C)  Heart Rate:  [47-72] 72  Resp:  [18-20] 20  BP: (117-146)/(55-71) 117/59    I/O this shift:  In: 360 [P.O.:360]  Out: -     Physical Exam   Constitutional: She is oriented to person, place, and time. She appears well-developed and well-nourished. No distress.   Eyes: Conjunctivae and lids are normal.   Neck: No JVD present.   Cardiovascular: Regular rhythm.   No murmur heard.  Pulses:       Carotid pulses are 2+ on the right side, and 2+ on the left side.  Femorofemoral graft is patent.  Feet are warm and well-perfused but pedal pulses are not dopplerable.  Temporal arteries are not tender bilaterally.  I cannot see prior surgical incisions although they could be well hidden and wrinkles or the hairline.   Pulmonary/Chest: Effort normal and breath sounds normal.   Abdominal: Normal appearance. There is no tenderness.   Musculoskeletal:   No digital cyanosis or clubbing   Neurological: She is alert and oriented to person, place, and time.   Psychiatric: She has a normal mood and affect.   Vitals reviewed.        Data Reviewed:  CBC    Results from last 7 days   Lab Units 09/13/19  0422 09/10/19  0810 09/09/19  0315 09/08/19  0839 09/08/19  0003 09/07/19  1551 09/07/19  0954   WBC 10*3/mm3 7.89 11.12* 11.23* 10.58  --   --   --    HEMOGLOBIN g/dL 8.3* 9.0* 9.9* 9.7* 9.7* 10.3* 10.1*   PLATELETS 10*3/mm3 264 255 260 246  --   --   --       BMP   Results from last 7 days   Lab Units 09/10/19  0810 19  0315 19  0839   SODIUM mmol/L 142 143 146*  143   POTASSIUM mmol/L 4.5 4.7 4.6  4.5   CHLORIDE mmol/L 100 102 103  103   CO2 mmol/L 34.4* 32.7* 31.0*  31.2*   BUN mg/dL 13 15 17  17   CREATININE mg/dL 0.59 0.69 0.90  0.87   GLUCOSE mg/dL 110* 107* 111*  112*     Coag     Infection     Radiology(recent) Nm Gastric Emptying    Result Date: 2019  Negative.  This report was finalized on 2019 5:41 PM by Dr. Ilya Lange M.D.      ESR: 65    Imaging Studies:   Abdominal x-ray reviewed.  This looks like a Valeriano IVC filter.      Records Summarized:  Patient's last office note from Dr. Siddiqui was reviewed.  In reviewing that study I see that the patient has a history of IVC filter placement as well as a temporal artery biopsy (?)      Active Hospital Problems    Diagnosis  POA   • Temporal headache [R51]  Unknown   • Anemia [D64.9]  Yes   • Blood loss anemia [D50.0]  Unknown   • Rectal bleeding [K62.5]  Unknown      Resolved Hospital Problems   No resolved problems to display.     Problem Points:  4:  Patient has a new problem, with additional work-up planned  Total problem points:4 or more    Data Points:  1:  I have reviewed or order clinical lab test  1:  I have reviewed or order radiology test (except heart catheterization or echo)  2:  I have personally and independently review of image, tracing, or specimen  2:  I have reviewed and summation of old records and/or discussed the patients care with another health care provider  Total data points:4 or more    Risk Points:  Moderate: New diagnosis with unknown prognosis    Billin    Assessment/Plan       Anemia    Blood loss anemia    Rectal bleeding    Temporal headache      78 y.o. female with multiple medical problems who is well-known to my partner Dr. Yancy Siddiqui.  She has a history of DVT, peripheral arterial disease, has an IVC filter in place and also has  an femorofemoral bypass graft in place.  He saw her earlier this year for ongoing follow-up.  She has stable moderate peripheral arterial disease and moderate carotid artery disease.  We were asked to see her in consultation to evaluate her for potential giant cell arteritis and potentially do a temporal artery biopsy.  The patient has had visual changes but she was diagnosed with cataracts and has some other potential etiologies that could explain this.  She also complains of some jaw pain but not really true jaw claudication.  She has a generalized headache with no scalp tenderness but does have an elevated ESR of 65.  In reviewing the patient's history from Dr. Siddiqui's office, I see that he has documented temporal artery biopsies as part of her past surgical history already.  I have a hard time seeing these in the patient's scalp because of her hairline and wrinkles.  I would looked in the [facility] system and was not able to find any pathology reports for this but it only went back to 2014.    The patient is on anticoagulation.  I will hold this in case she does need to have temporal artery biopsies but I think the first best step would be to perform temporal artery duplex and go from there.  I will also discuss with Dr. Siddiqui to see if he recalls this or if we have any other records of this in our office.    I discussed the patients findings and my recommendations with patient.  Please call my office with any question: [phone]    Aramis Maher MD  09/13/19  10:38 AM

## 2019-09-13 NOTE — PLAN OF CARE
Problem: Patient Care Overview  Goal: Plan of Care Review  Outcome: Ongoing (interventions implemented as appropriate)   09/13/19 0208 09/13/19 1400 09/13/19 1517   Coping/Psychosocial   Plan of Care Reviewed With --  patient --    Plan of Care Review   Progress no change --  --    OTHER   Outcome Summary --  --  VSS, 4L O2NC, vascular consult today, thoracic artery doppler pending, will continue to closely monitor.     Goal: Individualization and Mutuality  Outcome: Ongoing (interventions implemented as appropriate)    Goal: Discharge Needs Assessment  Outcome: Ongoing (interventions implemented as appropriate)    Goal: Interprofessional Rounds/Family Conf  Outcome: Ongoing (interventions implemented as appropriate)      Problem: Fall Risk (Adult)  Goal: Absence of Fall  Outcome: Ongoing (interventions implemented as appropriate)      Problem: Anemia (Adult)  Goal: Symptom Improvement  Outcome: Ongoing (interventions implemented as appropriate)      Problem: Skin Injury Risk (Adult)  Goal: Skin Health and Integrity  Outcome: Ongoing (interventions implemented as appropriate)      Problem: Breathing Pattern Ineffective (Adult)  Goal: Effective Oxygenation/Ventilation  Outcome: Ongoing (interventions implemented as appropriate)    Goal: Anxiety/Fear Reduction  Outcome: Ongoing (interventions implemented as appropriate)

## 2019-09-13 NOTE — PROGRESS NOTES
Duplex scan of temporal arteries were normal without signs of inflammation/arteritis.  Duplex scan to evaluate for arteritis has more than 95% sensitivity and specificity.  Doubt that temporal artery biopsies would be adding much.    We will discuss with the patient and Dr. Payne.

## 2019-09-13 NOTE — PLAN OF CARE
Problem: Patient Care Overview  Goal: Plan of Care Review  Outcome: Ongoing (interventions implemented as appropriate)   09/13/19 0208   Coping/Psychosocial   Plan of Care Reviewed With patient   Plan of Care Review   Progress no change   OTHER   Outcome Summary pt currently on 4L O2 nc, baseline is 3L. O2 sats drop and becomes SOA with activity. [facility] frequent urination. Pt transfered from chair to bed multiple times throughout shift. VSS. Complaint of HA at beginning of shift--gave ultram. will continue to monitor.      Goal: Individualization and Mutuality  Outcome: Ongoing (interventions implemented as appropriate)    Goal: Discharge Needs Assessment  Outcome: Ongoing (interventions implemented as appropriate)    Goal: Interprofessional Rounds/Family Conf  Outcome: Ongoing (interventions implemented as appropriate)      Problem: Fall Risk (Adult)  Goal: Absence of Fall  Outcome: Ongoing (interventions implemented as appropriate)      Problem: Anemia (Adult)  Goal: Symptom Improvement  Outcome: Ongoing (interventions implemented as appropriate)      Problem: Skin Injury Risk (Adult)  Goal: Skin Health and Integrity  Outcome: Ongoing (interventions implemented as appropriate)      Problem: Breathing Pattern Ineffective (Adult)  Goal: Identify Related Risk Factors and Signs and Symptoms  Outcome: Outcome(s) achieved Date Met: 09/13/19    Goal: Effective Oxygenation/Ventilation  Outcome: Ongoing (interventions implemented as appropriate)    Goal: Anxiety/Fear Reduction  Outcome: Ongoing (interventions implemented as appropriate)

## 2019-09-13 NOTE — PROGRESS NOTES
History:  This is a 78-year-old lady.  She was admitted with anemia and lower GI bleed.  She was found to have angiodysplasia.  She is no longer having significant GI blood loss.  Patient was having nausea but this has resolved.  The patient reports waxing and waning global headache.  Its associated with some scalp tenderness.  No jaw claudication.  She states her vision has gotten worse over the past 6 weeks.  The patient has a distant history of polymyalgia rheumatica.  Her erythrocyte sedimentation rate is somewhat elevated.  CT scan of the head was unremarkable.    Allergies  Adhesive tape; Augmentin [amoxicillin-pot clavulanate]; Cephalexin; Metoclopramide; Pentazocine; Simvastatin; and Sucralfate      Current Facility-Administered Medications:   •  acetaminophen (TYLENOL) tablet 1,000 mg, 1,000 mg, Oral, Nightly PRN, 1,000 mg at 09/10/19 2116 **AND** diphenhydrAMINE (BENADRYL) capsule 50 mg, 50 mg, Oral, Nightly PRN, Harmeet Payne MD, 50 mg at 09/10/19 2116  •  acetaminophen (TYLENOL) tablet 650 mg, 650 mg, Oral, Q6H PRN, Harmeet Payne MD, 650 mg at 09/10/19 0551  •  arformoterol (BROVANA) nebulizer solution 15 mcg, 15 mcg, Nebulization, BID - RT, Alirio Alfaro MD, 15 mcg at 09/13/19 0743  •  atorvastatin (LIPITOR) tablet 20 mg, 20 mg, Oral, Nightly, Chet Payne Jr., MD, 20 mg at 09/12/19 2024  •  bisacodyl (DULCOLAX) suppository 10 mg, 10 mg, Rectal, Daily, Harmeet Payne MD, 10 mg at 09/09/19 0856  •  budesonide (PULMICORT) nebulizer solution 0.5 mg, 0.5 mg, Nebulization, BID - RT, Chet Payne Jr., MD, 0.5 mg at 09/13/19 0744  •  carvedilol (COREG) tablet 25 mg, 25 mg, Oral, BID With Meals, Chet Payne Jr., MD, 25 mg at 09/12/19 1726  •  ferrous sulfate tablet 325 mg, 325 mg, Oral, Daily With Breakfast, Harmeet Payne MD, 325 mg at 09/12/19 0922  •  ipratropium-albuterol (DUO-NEB) nebulizer solution 3 mL, 3 mL, Nebulization, 4x Daily - RT, Chet Payne Jr., MD, 3 mL  "at 09/12/19 1504  •  levothyroxine (SYNTHROID, LEVOTHROID) tablet 50 mcg, 50 mcg, Oral, Q AM, Chet Payne Jr., MD, 50 mcg at 09/13/19 0639  •  pantoprazole (PROTONIX) EC tablet 40 mg, 40 mg, Oral, Q AM, Harmeet Payne MD, 40 mg at 09/13/19 0639  •  polyethylene glycol 3350 powder (packet), 17 g, Oral, Daily PRN, Harmeet Payen MD, 17 g at 09/09/19 0856  •  potassium chloride (KLOR-CON) packet 20 mEq, 20 mEq, Oral, Daily, Chet Payne Jr., MD, 20 mEq at 09/12/19 0922  •  promethazine (PHENERGAN) tablet 12.5 mg, 12.5 mg, Oral, Q6H PRN, 12.5 mg at 09/12/19 1726 **OR** promethazine (PHENERGAN) injection 12.5 mg, 12.5 mg, Intravenous, Q6H PRN, Harmeet Payne MD  •  rivaroxaban (XARELTO) tablet 20 mg, 20 mg, Oral, Daily With Dinner, Harmeet Payne MD, 20 mg at 09/12/19 1726  •  sennosides-docusate sodium (SENOKOT-S) 8.6-50 MG tablet 2 tablet, 2 tablet, Oral, Nightly, Harmeet Payne MD, 2 tablet at 09/12/19 2204  •  sertraline (ZOLOFT) tablet 50 mg, 50 mg, Oral, Daily, Chet Payne Jr., MD, 50 mg at 09/12/19 0922  •  traMADol (ULTRAM) tablet 50 mg, 50 mg, Oral, Q6H PRN, Harmeet Payne MD, 50 mg at 09/12/19 2037    /55 (BP Location: Right arm, Patient Position: Sitting)   Pulse 58   Temp 97.9 °F (36.6 °C) (Oral)   Resp 20   Ht 157.5 cm (62\")   Wt 81.3 kg (179 lb 3.2 oz)   LMP  (LMP Unknown)   SpO2 94%   BMI 32.78 kg/m²     Physical Exam   Appearance: Elderly  lady.  She is sitting in a chair.  She is in no distress.  She appears chronically ill.  HEENT: Normocephalic and atraumatic.  Scalp and temporal tenderness .  No sinus tenderness.  No TMJ crepitation. Pupils round and equal.  Pharynx clear mucous membranes moist.  Lungs: Decreased breath sounds bilaterally.  No wheezes, rales or rhonchi.  Heart: Distant tones.  Bradycardic.  Regular.  Abdomen: Protuberant.  Large fat pad.  Hypoactive bowel sounds.  No tenderness.  No rebound or guarding.  Extremities: Without clubbing, " cyanosis or edema.  Neurologic: Nonfocal.      Lab Results (last 24 hours)     Procedure Component Value Units Date/Time    CBC & Differential [[id number]] Collected:  09/13/19 0422    Specimen:  Blood Updated:  09/13/19 0528    Narrative:       The following orders were created for panel order CBC & Differential.  Procedure                               Abnormality         Status                     ---------                               -----------         ------                     CBC Auto Differential[[id number]]        Abnormal            Final result                 Please view results for these tests on the individual orders.    CBC Auto Differential [[id number]]  (Abnormal) Collected:  09/13/19 0422    Specimen:  Blood from Arm, Left Updated:  09/13/19 0528     WBC 7.89 10*3/mm3      RBC 2.99 10*6/mm3      Hemoglobin 8.3 g/dL      Hematocrit 29.9 %      .0 fL      MCH 27.8 pg      MCHC 27.8 g/dL      RDW 13.9 %      RDW-SD 50.5 fl      MPV 10.0 fL      Platelets 264 10*3/mm3      Neutrophil % 68.6 %      Lymphocyte % 16.0 %      Monocyte % 11.0 %      Eosinophil % 3.0 %      Basophil % 0.9 %      Immature Grans % 0.5 %      Neutrophils, Absolute 5.41 10*3/mm3      Lymphocytes, Absolute 1.26 10*3/mm3      Monocytes, Absolute 0.87 10*3/mm3      Eosinophils, Absolute 0.24 10*3/mm3      Basophils, Absolute 0.07 10*3/mm3      Immature Grans, Absolute 0.04 10*3/mm3      nRBC 0.0 /100 WBC           Imp:  1.    Headache with scalp tenderness, elevated sed rate and unremarkable CT head  2.  Lower GI bleed related to angiodysplasia, resolved.  3.  Anemia, multifactorial in part related to GI blood loss.  H&H decreased today  4.  Junctional rib/atrial fibrillation.  Rate controlled, patient on Xarelto.  5.  Essential hypertension.  6.  Severe COPD.  7.  Dilated cardiomyopathy        Plan:  I will consult vascular surgery concerning the possibility of giant cell arteritis.  We will check homocysteine,  methylmalonic acid B12 and RBC folate.  We will continue to monitor H&H.  Increase iron to twice daily  The patient will not be able to be discharged today    Harmeet Payne MD  9/13/2019  7:45 AM

## 2019-09-13 NOTE — PROGRESS NOTES
Continued Stay Note  [facility]     Patient Name: Ale Narayan  MRN: [id number]  Today's Date: 9/13/2019    Admit Date: 9/5/2019    Discharge Plan     Row Name 09/13/19 1327       Plan    Plan  [facility] accepts and has a bed    Patient/Family in Agreement with Plan  yes    Plan Comments  Per Maribel/ [facility] has accepted and has a bed.  Vasular consult pending.  ..........................Kristina Shelley RN        Discharge Codes    No documentation.       Expected Discharge Date and Time     Expected Discharge Date Expected Discharge Time    Sep 13, 2019             Kristina Shelley RN

## 2019-09-14 LAB
BASOPHILS # BLD AUTO: 0.09 10*3/MM3 (ref 0–0.2)
BASOPHILS NFR BLD AUTO: 1.1 % (ref 0–1.5)
DEPRECATED RDW RBC AUTO: 49.5 FL (ref 37–54)
EOSINOPHIL # BLD AUTO: 0.2 10*3/MM3 (ref 0–0.4)
EOSINOPHIL NFR BLD AUTO: 2.4 % (ref 0.3–6.2)
ERYTHROCYTE [DISTWIDTH] IN BLOOD BY AUTOMATED COUNT: 13.8 % (ref 12.3–15.4)
HCT VFR BLD AUTO: 32 % (ref 34–46.6)
HGB BLD-MCNC: 9.2 G/DL (ref 12–15.9)
IMM GRANULOCYTES # BLD AUTO: 0.05 10*3/MM3 (ref 0–0.05)
IMM GRANULOCYTES NFR BLD AUTO: 0.6 % (ref 0–0.5)
LYMPHOCYTES # BLD AUTO: 1.07 10*3/MM3 (ref 0.7–3.1)
LYMPHOCYTES NFR BLD AUTO: 12.9 % (ref 19.6–45.3)
MCH RBC QN AUTO: 28.1 PG (ref 26.6–33)
MCHC RBC AUTO-ENTMCNC: 28.8 G/DL (ref 31.5–35.7)
MCV RBC AUTO: 97.9 FL (ref 79–97)
MONOCYTES # BLD AUTO: 0.78 10*3/MM3 (ref 0.1–0.9)
MONOCYTES NFR BLD AUTO: 9.4 % (ref 5–12)
NEUTROPHILS # BLD AUTO: 6.09 10*3/MM3 (ref 1.7–7)
NEUTROPHILS NFR BLD AUTO: 73.6 % (ref 42.7–76)
NRBC BLD AUTO-RTO: 0 /100 WBC (ref 0–0.2)
NT-PROBNP SERPL-MCNC: 1965 PG/ML (ref 5–1800)
PLATELET # BLD AUTO: 297 10*3/MM3 (ref 140–450)
PMV BLD AUTO: 10 FL (ref 6–12)
RBC # BLD AUTO: 3.27 10*6/MM3 (ref 3.77–5.28)
WBC NRBC COR # BLD: 8.28 10*3/MM3 (ref 3.4–10.8)

## 2019-09-14 PROCEDURE — 63710000001 DIPHENHYDRAMINE PER 50 MG: Performed by: INTERNAL MEDICINE

## 2019-09-14 PROCEDURE — 83880 ASSAY OF NATRIURETIC PEPTIDE: CPT | Performed by: INTERNAL MEDICINE

## 2019-09-14 PROCEDURE — 94799 UNLISTED PULMONARY SVC/PX: CPT

## 2019-09-14 PROCEDURE — 99232 SBSQ HOSP IP/OBS MODERATE 35: CPT | Performed by: INTERNAL MEDICINE

## 2019-09-14 PROCEDURE — 94760 N-INVAS EAR/PLS OXIMETRY 1: CPT

## 2019-09-14 PROCEDURE — 63710000001 PROMETHAZINE PER 12.5 MG: Performed by: INTERNAL MEDICINE

## 2019-09-14 PROCEDURE — 97110 THERAPEUTIC EXERCISES: CPT

## 2019-09-14 PROCEDURE — 85025 COMPLETE CBC W/AUTO DIFF WBC: CPT | Performed by: INTERNAL MEDICINE

## 2019-09-14 RX ORDER — LOPERAMIDE HYDROCHLORIDE 2 MG/1
2 CAPSULE ORAL 4 TIMES DAILY PRN
Status: DISCONTINUED | OUTPATIENT
Start: 2019-09-14 | End: 2019-09-17 | Stop reason: HOSPADM

## 2019-09-14 RX ADMIN — TRAMADOL HYDROCHLORIDE 50 MG: 50 TABLET ORAL at 21:57

## 2019-09-14 RX ADMIN — FERROUS SULFATE TAB 325 MG (65 MG ELEMENTAL FE) 325 MG: 325 (65 FE) TAB at 18:26

## 2019-09-14 RX ADMIN — PROMETHAZINE HYDROCHLORIDE 12.5 MG: 12.5 TABLET ORAL at 16:34

## 2019-09-14 RX ADMIN — SENNOSIDES AND DOCUSATE SODIUM 2 TABLET: 8.6; 5 TABLET ORAL at 21:21

## 2019-09-14 RX ADMIN — ARFORMOTEROL TARTRATE 15 MCG: 15 SOLUTION RESPIRATORY (INHALATION) at 20:03

## 2019-09-14 RX ADMIN — LEVOTHYROXINE SODIUM 50 MCG: 50 TABLET ORAL at 06:19

## 2019-09-14 RX ADMIN — DIPHENHYDRAMINE HYDROCHLORIDE 50 MG: 25 CAPSULE ORAL at 21:57

## 2019-09-14 RX ADMIN — IPRATROPIUM BROMIDE AND ALBUTEROL SULFATE 3 ML: 2.5; .5 SOLUTION RESPIRATORY (INHALATION) at 10:30

## 2019-09-14 RX ADMIN — IPRATROPIUM BROMIDE AND ALBUTEROL SULFATE 3 ML: 2.5; .5 SOLUTION RESPIRATORY (INHALATION) at 15:23

## 2019-09-14 RX ADMIN — CARVEDILOL 25 MG: 25 TABLET, FILM COATED ORAL at 18:26

## 2019-09-14 RX ADMIN — LOPERAMIDE HYDROCHLORIDE 2 MG: 2 CAPSULE ORAL at 12:48

## 2019-09-14 RX ADMIN — PROMETHAZINE HYDROCHLORIDE 12.5 MG: 12.5 TABLET ORAL at 10:46

## 2019-09-14 RX ADMIN — CARVEDILOL 25 MG: 25 TABLET, FILM COATED ORAL at 09:01

## 2019-09-14 RX ADMIN — PROMETHAZINE HYDROCHLORIDE 12.5 MG: 12.5 TABLET ORAL at 00:13

## 2019-09-14 RX ADMIN — FERROUS SULFATE TAB 325 MG (65 MG ELEMENTAL FE) 325 MG: 325 (65 FE) TAB at 09:02

## 2019-09-14 RX ADMIN — ATORVASTATIN CALCIUM 20 MG: 20 TABLET, FILM COATED ORAL at 21:21

## 2019-09-14 RX ADMIN — PANTOPRAZOLE SODIUM 40 MG: 40 TABLET, DELAYED RELEASE ORAL at 06:19

## 2019-09-14 RX ADMIN — SERTRALINE 50 MG: 50 TABLET, FILM COATED ORAL at 09:02

## 2019-09-14 RX ADMIN — BUDESONIDE 0.5 MG: 0.5 SUSPENSION RESPIRATORY (INHALATION) at 06:38

## 2019-09-14 RX ADMIN — IPRATROPIUM BROMIDE AND ALBUTEROL SULFATE 3 ML: 2.5; .5 SOLUTION RESPIRATORY (INHALATION) at 20:03

## 2019-09-14 RX ADMIN — BUDESONIDE 0.5 MG: 0.5 SUSPENSION RESPIRATORY (INHALATION) at 20:03

## 2019-09-14 RX ADMIN — ARFORMOTEROL TARTRATE 15 MCG: 15 SOLUTION RESPIRATORY (INHALATION) at 06:38

## 2019-09-14 RX ADMIN — ACETAMINOPHEN 650 MG: 325 TABLET, FILM COATED ORAL at 16:34

## 2019-09-14 RX ADMIN — POTASSIUM CHLORIDE 20 MEQ: 1.5 POWDER, FOR SOLUTION ORAL at 09:02

## 2019-09-14 NOTE — PROGRESS NOTES
[facility]  Inpatient Progress Note    Reason for Follow Up: Nausea    Subjective     Interval History:   Nausea continue    Current Facility-Administered Medications:   •  acetaminophen (TYLENOL) tablet 1,000 mg, 1,000 mg, Oral, Nightly PRN, 1,000 mg at 09/10/19 2116 **AND** diphenhydrAMINE (BENADRYL) capsule 50 mg, 50 mg, Oral, Nightly PRN, Harmeet Payne MD, 50 mg at 09/10/19 2116  •  acetaminophen (TYLENOL) tablet 650 mg, 650 mg, Oral, Q6H PRN, Harmeet Payne MD, 650 mg at 09/10/19 0551  •  arformoterol (BROVANA) nebulizer solution 15 mcg, 15 mcg, Nebulization, BID - RT, Alirio Alfaro MD, 15 mcg at 09/14/19 0638  •  atorvastatin (LIPITOR) tablet 20 mg, 20 mg, Oral, Nightly, Chet Payne Jr., MD, 20 mg at 09/13/19 2007  •  bisacodyl (DULCOLAX) suppository 10 mg, 10 mg, Rectal, Daily, Harmeet Payne MD, 10 mg at 09/09/19 0856  •  budesonide (PULMICORT) nebulizer solution 0.5 mg, 0.5 mg, Nebulization, BID - RT, Chet Payne Jr., MD, 0.5 mg at 09/14/19 0638  •  carvedilol (COREG) tablet 25 mg, 25 mg, Oral, BID With Meals, Chet Payne Jr., MD, 25 mg at 09/14/19 0901  •  ferrous sulfate tablet 325 mg, 325 mg, Oral, BID With Meals, Harmeet Payne MD, 325 mg at 09/14/19 0902  •  ipratropium-albuterol (DUO-NEB) nebulizer solution 3 mL, 3 mL, Nebulization, 4x Daily - RT, Chet Payne Jr., MD, 3 mL at 09/14/19 1030  •  levothyroxine (SYNTHROID, LEVOTHROID) tablet 50 mcg, 50 mcg, Oral, Q AM, Chet Payne Jr., MD, 50 mcg at 09/14/19 0619  •  loperamide (IMODIUM) capsule 2 mg, 2 mg, Oral, 4x Daily PRN, Harmeet Payne MD, 2 mg at 09/14/19 1248  •  pantoprazole (PROTONIX) EC tablet 40 mg, 40 mg, Oral, Q AM, Harmeet Payne MD, 40 mg at 09/14/19 0619  •  polyethylene glycol 3350 powder (packet), 17 g, Oral, Daily PRN, Harmeet Payne MD, 17 g at 09/09/19 0856  •  potassium chloride (KLOR-CON) packet 20 mEq, 20 mEq, Oral, Daily, Chet Payne Jr., MD, 20 mEq  at 09/14/19 0902  •  promethazine (PHENERGAN) tablet 12.5 mg, 12.5 mg, Oral, Q6H PRN, 12.5 mg at 09/14/19 1046 **OR** promethazine (PHENERGAN) injection 12.5 mg, 12.5 mg, Intravenous, Q6H PRN, Harmeet Payne MD  •  sennosides-docusate sodium (SENOKOT-S) 8.6-50 MG tablet 2 tablet, 2 tablet, Oral, Nightly, Harmeet Payne MD, 2 tablet at 09/13/19 2007  •  sertraline (ZOLOFT) tablet 50 mg, 50 mg, Oral, Daily, Chet Payne Jr., MD, 50 mg at 09/14/19 0902  •  traMADol (ULTRAM) tablet 50 mg, 50 mg, Oral, Q6H PRN, Harmeet Payne MD, 50 mg at 09/13/19 2007  Review of Systems:    She has weakness and fatigue all other systems reviewed and negative    Objective     Vital Signs  Temp:  [97.5 °F (36.4 °C)-98.5 °F (36.9 °C)] 97.5 °F (36.4 °C)  Heart Rate:  [53-60] 53  Resp:  [14-18] 14  BP: (155-163)/(54-58) 163/54  Body mass index is 32.78 kg/m².    Intake/Output Summary (Last 24 hours) at 9/14/2019 1346  Last data filed at 9/14/2019 1311  Gross per 24 hour   Intake 360 ml   Output --   Net 360 ml     I/O this shift:  In: 120 [P.O.:120]  Out: -      Physical Exam:   General: patient awake, alert and cooperative   Eyes: Normal lids and lashes, no scleral icterus   Neck: supple, normal ROM   Skin: warm and dry, not jaundiced   Cardiovascular: regular rhythm and rate, no murmurs auscultated   Pulm: clear to auscultation bilaterally, regular and unlabored   Abdomen: soft, nontender, nondistended; normal bowel sounds   Extremities: no rash or edema   Psychiatric: Normal mood and behavior; memory intact     Results Review:     I reviewed the patient's new clinical results.    Results from last 7 days   Lab Units 09/14/19  0532 09/13/19  0422 09/10/19  0810   WBC 10*3/mm3 8.28 7.89 11.12*   HEMOGLOBIN g/dL 9.2* 8.3* 9.0*   HEMATOCRIT % 32.0* 29.9* 31.7*   PLATELETS 10*3/mm3 297 264 255     Results from last 7 days   Lab Units 09/10/19  0810 09/09/19  0315 09/08/19  0839   SODIUM mmol/L 142 143 146*  143   POTASSIUM mmol/L 4.5  4.7 4.6  4.5   CHLORIDE mmol/L 100 102 103  103   CO2 mmol/L 34.4* 32.7* 31.0*  31.2*   BUN mg/dL 13 15 17  17   CREATININE mg/dL 0.59 0.69 0.90  0.87   CALCIUM mg/dL 8.7 9.1 8.8  8.8   BILIRUBIN mg/dL 0.3  --  <0.2*   ALK PHOS U/L 72  --  58   ALT (SGPT) U/L 24  --  9   AST (SGOT) U/L 23  --  11   GLUCOSE mg/dL 110* 107* 111*  112*         Lab Results   Lab Value Date/Time    LIPASE 49 09/08/2019 0839    LIPASE 12 (L) 11/17/2015 1401       Radiology:  NM Gastric Emptying   Final Result   Negative.       This report was finalized on 9/11/2019 5:41 PM by Dr. Ilya Lange M.D.          US Gallbladder   Final Result   No abnormalities demonstrated on this gallbladder ultrasound.       This report was finalized on 9/10/2019 7:45 PM by Dr. George Fernando M.D.          CT Head Without Contrast   Final Result   No evidence for acute intracranial pathology.       Radiation dose reduction techniques were utilized, including automated   exposure control and exposure modulation based on body size.       This report was finalized on 9/10/2019 11:42 AM by Dr. Geovani Nunes M.D.          XR Abdomen 2 View With Chest 1 View   Final Result   1. There is moderate retention of stool throughout the colon. No   evidence for bowel obstruction or ileus is appreciated.   2. There is scattered interstitial scarring throughout both lungs with   bilateral pleural-based scarring at the lung bases.       This report was finalized on 9/8/2019 2:42 PM by Dr. Wallace Michaud M.D.          MRI Angiogram Abdomen With & Without Contrast    (Results Pending)       Assessment/Plan     Patient Active Problem List   Diagnosis   • Adenocarcinoma of right lung (CMS/HCC)   • Decreased blood pressure, not hypotension   • Cardiomyopathy (CMS/HCC)   • Cardiovascular disease   • Dizziness   • Dyspnea on exertion   • Nodule of left lung   • Peripheral arterial occlusive disease (CMS/HCC)   • Iron deficiency anemia   • Pulmonary emphysema  (CMS/HCC)   • Pneumonitis, radiation (CMS/HCC)   • Palpitations   • Localized edema   • Weight gain   • Anemia   • Blood loss anemia   • Rectal bleeding   • Temporal headache     Impression  #1 anemia: H&H relatively slowly drifting down, no e/o rebleeding however  #2 rectal bleeding: Evidence of AVMs on endoscopy with coagulation therapy  #3 thrombotic disease: Requiring anticoagulant therapy  #4 post-prandial nausea -stable - RUQ u/s and GES negative     Recommendation  Continue to follow Hb with gradual decline with reinstitution of NOAC  Continue current supportive care for her nausea.    Consider HIDA scan in the future  Await MR angiogram ordered by primary team        I discussed the patients findings and my recommendations with patient and nursing staff.    Yann Flores MD

## 2019-09-14 NOTE — PROGRESS NOTES
Overall the patient is feeling better.  No longer having any issues with GI bleeding.    Hemoglobin yesterday was 8.3.  White blood cell count is 7.89.  Sed rate 61.    Noninvasive arterial studies demonstrated normal temporal arteries bilaterally without signs of inflammation.    Duplex scan of the temporal arteries for giant cell arteritis has more than 95% sensitivity and specificity.  The patient has no jaw pain but has a decayed tooth in the left lower jaw.  It may be inflammation from this.    I doubt that temporal artery biopsies would be adding much benefit.  I have discussed this with the patient and she is in agreement with the plan.  Hopefully I will get a chance to speak to Dr. Payne as well.

## 2019-09-14 NOTE — PLAN OF CARE
Problem: Patient Care Overview  Goal: Plan of Care Review  Outcome: Ongoing (interventions implemented as appropriate)   09/14/19 6546   Coping/Psychosocial   Plan of Care Reviewed With patient   Plan of Care Review   Progress improving   OTHER   Outcome Summary Pt tolerated treatment fair this date. Ambulated 15ft x2, w/ seated rest break [facility]. Very limited d/t SOA and fatigue. Increased time required for seated rest break and for PLB when returning to recliner. O2 sats at 95% at very end. PT will continue to address functional mobility deficits as tolerated.

## 2019-09-14 NOTE — THERAPY TREATMENT NOTE
Patient Name: Ale Narayan  : 1940    MRN: [id number]                              Today's Date: 2019       Admit Date: 2019    Visit Dx:     ICD-10-CM ICD-9-CM   1. Blood loss anemia D50.0 280.0   2. Rectal bleeding K62.5 569.3     Patient Active Problem List   Diagnosis   • Adenocarcinoma of right lung (CMS/HCC)   • Decreased blood pressure, not hypotension   • Cardiomyopathy (CMS/HCC)   • Cardiovascular disease   • Dizziness   • Dyspnea on exertion   • Nodule of left lung   • Peripheral arterial occlusive disease (CMS/HCC)   • Iron deficiency anemia   • Pulmonary emphysema (CMS/HCC)   • Pneumonitis, radiation (CMS/HCC)   • Palpitations   • Localized edema   • Weight gain   • Anemia   • Blood loss anemia   • Rectal bleeding   • Temporal headache     Past Medical History:   Diagnosis Date   • Adenocarcinoma of lung (CMS/HCC)    • Asthma    • Carotid artery disease (CMS/HCC)    • Cough    • Deep vein thrombosis (CMS/HCC)    • Emphysema of lung (CMS/HCC)    • Hyperlipidemia    • Hypertension    • Hypothyroidism    • Lung cancer (CMS/HCC)    • Osteoporosis    • Paroxysmal atrial fibrillation (CMS/HCC)    • Peripheral arterial disease (CMS/HCC)    • Pulmonary embolism (CMS/HCC)    • Thrombophilia (CMS/HCC)     Since age 25     Past Surgical History:   Procedure Laterality Date   • ANKLE SURGERY     • BREAST SURGERY      removal of benign melanoma spot on nipple of left breast 2013   • COLONOSCOPY      Negative   • COLONOSCOPY N/A 2019    Procedure: COLONOSCOPY to cecum with biopsy / polypectomy and  APC cautery;  Surgeon: Alexsandra Ruiz MD;  Location: [facility] ENDOSCOPY;  Service: Gastroenterology   • ENDOSCOPY N/A 2019    Procedure: ESOPHAGOGASTRODUODENOSCOPY with biopsies;  Surgeon: Alexsandra Ruiz MD;  Location: [facility] ENDOSCOPY;  Service: Gastroenterology   • HERNIA REPAIR      Lumbar spine   • HYSTERECTOMY     • LUNG LOBECTOMY     • OTHER SURGICAL HISTORY      Bypass of  lower extremities   • OTHER SURGICAL HISTORY      IVC filter placed     General Information     Row Name 09/14/19 1514          PT Evaluation Time/Intention    Document Type  therapy note (daily note)  -     Mode of Treatment  physical therapy  -[facility] Name 09/14/19 1514          General Information    Existing Precautions/Restrictions  fall;oxygen therapy device and L/min  -     Row Name 09/14/19 1514          Cognitive Assessment/Intervention- PT/OT    Orientation Status (Cognition)  oriented x 3  -       User Key  (r) = Recorded By, (t) = Taken By, (c) = Cosigned By    Initials Name Provider Type     Alexsandra Kim PTA Physical Therapy Assistant        Mobility     Row Name 09/14/19 1515          Bed Mobility Assessment/Treatment    Comment (Bed Mobility)  up in chair  -     Row Name 09/14/19 1515          Sit-Stand Transfer    Sit-Stand [place] (Transfers)  stand by assist  -     Assistive Device (Sit-Stand Transfers)  walker, front-wheeled  -[facility] Name 09/14/19 1515          Gait/Stairs Assessment/Training    [place] Level (Gait)  contact guard  -     Assistive Device (Gait)  walker, front-wheeled  -     Distance in Feet (Gait)  15ft x2  -     Deviations/Abnormal Patterns (Gait)  stride length decreased  -     Bilateral Gait Deviations  forward flexed posture  -     Comment (Gait/Stairs)  seated rest break needed shelter d/t SOA and fatigue  -       User Key  (r) = Recorded By, (t) = Taken By, (c) = Cosigned By    Initials Name Provider Type     Alexsandra Kim PTA Physical Therapy Assistant        Obj/Interventions     Row Name 09/14/19 1516          Therapeutic Exercise    Lower Extremity (Therapeutic Exercise)  LAQ (long arc quad), bilateral  -     Lower Extremity Range of Motion (Therapeutic Exercise)  ankle dorsiflexion/plantar flexion, bilateral  -     Exercise Type (Therapeutic Exercise)  AROM (active range of motion)  -     Position  (Therapeutic Exercise)  seated  -SM     Sets/Reps (Therapeutic Exercise)  10 reps  -SM       User Key  (r) = Recorded By, (t) = Taken By, (c) = Cosigned By    Initials Name Provider Type    Alexsandra Estes PTA Physical Therapy Assistant        Goals/Plan    No documentation.       Clinical Impression     Row Name 09/14/19 1516          Pain Scale: Numbers Pre/Post-Treatment    Pain Scale: Numbers, Pretreatment  0/10 - no pain  -SM     Pain Scale: Numbers, Post-Treatment  0/10 - no pain  -SM     Row Name 09/14/19 1516          Vital Signs    Intra Systolic BP Rehab  196  -SM     Intra Treatment Diastolic BP  76  -SM     Pre SpO2 (%)  -- no sensor  -SM     O2 Delivery Pre Treatment  supplemental O2  -SM     Intra SpO2 (%)  90  -SM     O2 Delivery Intra Treatment  supplemental O2  -SM     Post SpO2 (%)  95  -SM     O2 Delivery Post Treatment  supplemental O2  -SM     Pre Patient Position  Sitting  -SM     Intra Patient Position  Standing  -SM     Post Patient Position  Sitting  -SM     Row Name 09/14/19 1516          Positioning and Restraints    Pre-Treatment Position  sitting in chair/recliner  -SM     Post Treatment Position  chair  -SM     In Chair  sitting;call light within reach;encouraged to call for assist  -SM       User Key  (r) = Recorded By, (t) = Taken By, (c) = Cosigned By    Initials Name Provider Type    Alexsandra Estes PTA Physical Therapy Assistant        Outcome Measures     Row Name 09/14/19 1522          How much help from another person do you currently need...    Turning from your back to your side while in flat bed without using bedrails?  3  -SM     Moving from lying on back to sitting on the side of a flat bed without bedrails?  3  -SM     Moving to and from a bed to a chair (including a wheelchair)?  3  -SM     Standing up from a chair using your arms (e.g., wheelchair, bedside chair)?  3  -SM     Climbing 3-5 steps with a railing?  1  -SM     To walk in hospital room?  2  -SM      AM-[facility] 6 Clicks Score (PT)  15  -     Row Name 09/14/19 1522          Functional Assessment    Outcome Measure Options  AM-[facility] 6 Clicks Basic Mobility (PT)  -       User Key  (r) = Recorded By, (t) = Taken By, (c) = Cosigned By    Initials Name Provider Type     Alexsandra Kim PTA Physical Therapy Assistant        Physical Therapy Education     Title: PT OT SLP Therapies (Done)     Topic: Physical Therapy (Done)     Point: Mobility training (Done)     Learning Progress Summary           Patient Acceptance, E,TB,D, VU,NR by  at 9/14/2019  3:19 PM    Acceptance, E,TB,D, VU,NR by  at 9/10/2019  2:08 PM    Acceptance, E,TB,D, VU,NR by  at 9/9/2019 11:59 AM                   Point: Home exercise program (Done)     Learning Progress Summary           Patient Acceptance, E,TB,D, VU,NR by  at 9/14/2019  3:19 PM                   Point: Body mechanics (Done)     Learning Progress Summary           Patient Acceptance, E,TB,D, VU,NR by  at 9/14/2019  3:19 PM    Acceptance, E,TB,D, VU,NR by  at 9/10/2019  2:08 PM                   Point: Precautions (Done)     Learning Progress Summary           Patient Acceptance, E,TB,D, VU,NR by  at 9/14/2019  3:19 PM    Acceptance, E,TB,D, VU,NR by  at 9/10/2019  2:08 PM                               User Key     Initials Effective Dates Name Provider Type Discipline     04/03/18 -  Lacey Burroughs, PT Physical Therapist PT     03/07/18 -  Alexsandra Kim PTA Physical Therapy Assistant PT              PT Recommendation and Plan     Outcome Summary/Treatment Plan (PT)  Anticipated Discharge Disposition (PT): skilled nursing facility  Plan of Care Reviewed With: patient  Progress: improving  Outcome Summary: Pt tolerated treatment fair this date. Ambulated 15ft x2, w/ seated rest break residential. Very limited d/t SOA and fatigue. Increased time required for seated rest break and for PLB when returning to recliner. O2 sats at 95% at very end. PT will  continue to address functional mobility deficits as tolerated.     Time Calculation:   PT Charges     Row Name 09/14/19 1522             Time Calculation    Start Time  1420  -      Stop Time  1443  -      Time Calculation (min)  23 min  -      PT Received On  09/14/19  -      PT - Next Appointment  09/15/19  -        User Key  (r) = Recorded By, (t) = Taken By, (c) = Cosigned By    Initials Name Provider Type     Alexsandra Kim PTA Physical Therapy Assistant        Therapy Charges for Today     Code Description Service Date Service Provider Modifiers Qty    76215581116 HC PT THER SUPP EA 15 MIN 9/14/2019 Alexsandra Kim PTA GP 1    99459211744 HC PT THER PROC EA 15 MIN 9/14/2019 Alexsandra Kim PTA  2          PT G-Codes  Outcome Measure Options: AM-PAC 6 Clicks Basic Mobility (PT)  AM-PAC 6 Clicks Score (PT): 15    Alexsandra Kim PTA  9/14/2019

## 2019-09-14 NOTE — PLAN OF CARE
Problem: Patient Care Overview  Goal: Plan of Care Review  Outcome: Ongoing (interventions implemented as appropriate)    Goal: Individualization and Mutuality  Outcome: Ongoing (interventions implemented as appropriate)    Goal: Discharge Needs Assessment  Outcome: Ongoing (interventions implemented as appropriate)    Goal: Interprofessional Rounds/Family Conf  Outcome: Ongoing (interventions implemented as appropriate)      Problem: Fall Risk (Adult)  Goal: Absence of Fall  Outcome: Ongoing (interventions implemented as appropriate)      Problem: Anemia (Adult)  Goal: Symptom Improvement  Outcome: Ongoing (interventions implemented as appropriate)      Problem: Skin Injury Risk (Adult)  Goal: Skin Health and Integrity  Outcome: Ongoing (interventions implemented as appropriate)      Problem: Breathing Pattern Ineffective (Adult)  Goal: Effective Oxygenation/Ventilation  Outcome: Ongoing (interventions implemented as appropriate)    Goal: Anxiety/Fear Reduction  Outcome: Ongoing (interventions implemented as appropriate)

## 2019-09-14 NOTE — PLAN OF CARE
Problem: Patient Care Overview  Goal: Plan of Care Review  Outcome: Ongoing (interventions implemented as appropriate)   09/14/19 5033   Coping/Psychosocial   Plan of Care Reviewed With patient;daughter   OTHER   Outcome Summary patient sitting on recliner. Spo2 at 4 lit. diarrhea treated by prn imodium. attempting send patient to MRI but need more documentation about stent in her leg and pre med for clostrophobia.     Goal: Individualization and Mutuality  Outcome: Ongoing (interventions implemented as appropriate)    Goal: Discharge Needs Assessment  Outcome: Ongoing (interventions implemented as appropriate)    Goal: Interprofessional Rounds/Family Conf  Outcome: Ongoing (interventions implemented as appropriate)      Problem: Fall Risk (Adult)  Goal: Absence of Fall  Outcome: Ongoing (interventions implemented as appropriate)      Problem: Anemia (Adult)  Goal: Symptom Improvement  Outcome: Ongoing (interventions implemented as appropriate)      Problem: Skin Injury Risk (Adult)  Goal: Skin Health and Integrity  Outcome: Ongoing (interventions implemented as appropriate)      Problem: Breathing Pattern Ineffective (Adult)  Goal: Effective Oxygenation/Ventilation  Outcome: Ongoing (interventions implemented as appropriate)    Goal: Anxiety/Fear Reduction  Outcome: Ongoing (interventions implemented as appropriate)

## 2019-09-14 NOTE — PROGRESS NOTES
History:  This 78-year-old lady is still having significant nausea.  She has poor p.o. intake but is able to take liquids.  She still having a mild headache.  She is having no chest pain or shortness of breath.  She is having no diarrhea    Allergies  Adhesive tape; Augmentin [amoxicillin-pot clavulanate]; Cephalexin; Metoclopramide; Pentazocine; Simvastatin; and Sucralfate      Current Facility-Administered Medications:   •  acetaminophen (TYLENOL) tablet 1,000 mg, 1,000 mg, Oral, Nightly PRN, 1,000 mg at 09/10/19 2116 **AND** diphenhydrAMINE (BENADRYL) capsule 50 mg, 50 mg, Oral, Nightly PRN, Harmeet Payne MD, 50 mg at 09/10/19 2116  •  acetaminophen (TYLENOL) tablet 650 mg, 650 mg, Oral, Q6H PRN, Harmeet Payne MD, 650 mg at 09/10/19 0551  •  arformoterol (BROVANA) nebulizer solution 15 mcg, 15 mcg, Nebulization, BID - RT, Alirio Alfaro MD, 15 mcg at 09/14/19 0638  •  atorvastatin (LIPITOR) tablet 20 mg, 20 mg, Oral, Nightly, Chet Payne Jr., MD, 20 mg at 09/13/19 2007  •  bisacodyl (DULCOLAX) suppository 10 mg, 10 mg, Rectal, Daily, Harmeet Payne MD, 10 mg at 09/09/19 0856  •  budesonide (PULMICORT) nebulizer solution 0.5 mg, 0.5 mg, Nebulization, BID - RT, Chet Payne Jr., MD, 0.5 mg at 09/14/19 0638  •  carvedilol (COREG) tablet 25 mg, 25 mg, Oral, BID With Meals, Chet Payne Jr., MD, 25 mg at 09/13/19 1754  •  ferrous sulfate tablet 325 mg, 325 mg, Oral, BID With Meals, Harmeet Payne MD, 325 mg at 09/13/19 1754  •  ipratropium-albuterol (DUO-NEB) nebulizer solution 3 mL, 3 mL, Nebulization, 4x Daily - RT, Chet Payne Jr., MD, 3 mL at 09/13/19 2100  •  levothyroxine (SYNTHROID, LEVOTHROID) tablet 50 mcg, 50 mcg, Oral, Q AM, Chet Payne Jr., MD, 50 mcg at 09/14/19 0619  •  pantoprazole (PROTONIX) EC tablet 40 mg, 40 mg, Oral, Q AM, Harmeet Payne MD, 40 mg at 09/14/19 0619  •  polyethylene glycol 3350 powder (packet), 17 g, Oral, Daily PRN, Harmeet Payne MD,  "17 g at 09/09/19 0856  •  potassium chloride (KLOR-CON) packet 20 mEq, 20 mEq, Oral, Daily, Chet Payne Jr., MD, 20 mEq at 09/13/19 0846  •  promethazine (PHENERGAN) tablet 12.5 mg, 12.5 mg, Oral, Q6H PRN, 12.5 mg at 09/14/19 0013 **OR** promethazine (PHENERGAN) injection 12.5 mg, 12.5 mg, Intravenous, Q6H PRN, Harmeet Payne MD  •  sennosides-docusate sodium (SENOKOT-S) 8.6-50 MG tablet 2 tablet, 2 tablet, Oral, Nightly, Harmeet Payne MD, 2 tablet at 09/13/19 2007  •  sertraline (ZOLOFT) tablet 50 mg, 50 mg, Oral, Daily, Chet Payne Jr., MD, 50 mg at 09/13/19 0846  •  traMADol (ULTRAM) tablet 50 mg, 50 mg, Oral, Q6H PRN, Harmeet Payne MD, 50 mg at 09/13/19 2007    /54 (BP Location: Right arm, Patient Position: Lying)   Pulse 60   Temp 97.5 °F (36.4 °C) (Oral)   Resp 16   Ht 157.5 cm (62\")   Wt 81.3 kg (179 lb 3.2 oz)   LMP  (LMP Unknown)   SpO2 95%   BMI 32.78 kg/m²     Physical Exam  Unchanged from yesterday    Lab Results (last 24 hours)     Procedure Component Value Units Date/Time    CBC & Differential [[id number]] Collected:  09/14/19 0532    Specimen:  Blood Updated:  09/14/19 0544    Narrative:       The following orders were created for panel order CBC & Differential.  Procedure                               Abnormality         Status                     ---------                               -----------         ------                     CBC Auto Differential[[id number]]                            In process                   Please view results for these tests on the individual orders.    CBC Auto Differential [[id number]] Collected:  09/14/19 0532    Specimen:  Blood Updated:  09/14/19 0544    Homocysteine [[id number]]  (Normal) Collected:  09/13/19 0822    Specimen:  Blood Updated:  09/13/19 1028     Homocysteine, Plasma (Quant) 6.3 umol/L     Vitamin B12 [[id number]]  (Normal) Collected:  09/13/19 0822    Specimen:  Blood Updated:  09/13/19 1015     Vitamin B-12 717 pg/mL  "    Methylmalonic Acid, Serum [[id number]] Collected:  09/13/19 0822    Specimen:  Blood from Arm, Left Updated:  09/13/19 0929    Folate RBC [[id number]] Collected:  09/13/19 0822    Specimen:  Blood Updated:  09/13/19 0900    Sedimentation Rate [[id number]]  (Abnormal) Collected:  09/13/19 0422    Specimen:  Blood from Arm, Left Updated:  09/13/19 0829     Sed Rate 61 mm/hr         Ultrasound of temporal arteries:  Right Prox: No significant stenosis  There is no inflammation present on the right side.  Right Dist: No significant stenosis  Right Mid: No significant stenosis  Left Mid: No significant stenosis  Left Dist: No significant stenosis  There is no inflammation present on the left side.    Imp:  1.  Nausea etiology unknown.  I do not believe this is related to a medication.  The patient had a gastric emptying study which was normal.  Patient has significant vascular disease.  I wonder if this is chronic mesenteric ischemia.  2.  Lower GI bleed related to angiodysplasia, resolved.  3.  Anemia.  Likely in part related to chronic inflammation.  Certainly is in part related to GI blood loss.  I need to make sure the H&H is stable.  4.  Junctional rhythm/atrial fib, rate controlled.  Patient back on Xarelto.  5.  Essential hypertension  6.  Severe COPD.  7.  Dilated cardiomyopathy  8.  Headache, mild.  I agree with Dr. Yancy Siddiqui.  I do not believe temporal artery biopsy is indicated based on her clinical parameters and the normal ultrasound of the temporal artery    Plan:  We will continue with physical therapy.  I need to check results of the CBC.  I will check a BNP.  I will try to get an MRA of her mesenteric vessels    Harmeet Payne MD  9/14/2019  8:25 AM

## 2019-09-15 ENCOUNTER — APPOINTMENT (OUTPATIENT)
Dept: CARDIOLOGY | Facility: HOSPITAL | Age: 79
End: 2019-09-15

## 2019-09-15 LAB
BASOPHILS # BLD AUTO: 0.08 10*3/MM3 (ref 0–0.2)
BASOPHILS NFR BLD AUTO: 0.7 % (ref 0–1.5)
BH CV VAS SMA 1ST PP TIME: 15 MIN
BH CV VAS SMA 2ND PP TIME: 30 MIN
BH CV VAS SMA 3RD PP TIME: 45 MIN
BH CV VAS SMA AORTA EDV: 19.8 CM/S
BH CV VAS SMA AORTA PSV: 114 CM/S
BH CV VAS SMA CELIAC DIST EDV: 23.5 CM/S
BH CV VAS SMA CELIAC DIST PSV: 125 CM/S
BH CV VAS SMA CELIAC MID EDV: 24.9 CM/S
BH CV VAS SMA CELIAC MID PSV: 174 CM/S
BH CV VAS SMA CELIAC PROX EDV: 21.6 CM/S
BH CV VAS SMA CELIAC PROX PSV: 184 CM/S
BH CV VAS SMA HEPATIC EDV: 28.2 CM/S
BH CV VAS SMA HEPATIC PSV: 149 CM/S
BH CV VAS SMA ORIGIN EDV: 23.1 CM/S
BH CV VAS SMA ORIGIN PSV: 321 CM/S
BH CV VAS SMA SMA MID EDV: 16.1 CM/S
BH CV VAS SMA SMA MID PSV: 91.6 CM/S
BH CV VAS SMA SMA PROX EDV: 32.3 CM/S
BH CV VAS SMA SMA PROX PSV: 336 CM/S
BH CV VAS SMA SPLENIC EDV: 16.1 CM/S
BH CV VAS SMA SPLENIC PSV: 71.4 CM/S
DEPRECATED RDW RBC AUTO: 49.1 FL (ref 37–54)
EOSINOPHIL # BLD AUTO: 0.24 10*3/MM3 (ref 0–0.4)
EOSINOPHIL NFR BLD AUTO: 2.1 % (ref 0.3–6.2)
ERYTHROCYTE [DISTWIDTH] IN BLOOD BY AUTOMATED COUNT: 13.9 % (ref 12.3–15.4)
FOLATE BLD-MCNC: 299 NG/ML
FOLATE RBC-MCNC: 1042 NG/ML
HCT VFR BLD AUTO: 28.7 % (ref 34–46.6)
HCT VFR BLD AUTO: 33.3 % (ref 34–46.6)
HGB BLD-MCNC: 9.7 G/DL (ref 12–15.9)
IMM GRANULOCYTES # BLD AUTO: 0.06 10*3/MM3 (ref 0–0.05)
IMM GRANULOCYTES NFR BLD AUTO: 0.5 % (ref 0–0.5)
LYMPHOCYTES # BLD AUTO: 1.05 10*3/MM3 (ref 0.7–3.1)
LYMPHOCYTES NFR BLD AUTO: 9 % (ref 19.6–45.3)
MCH RBC QN AUTO: 28.3 PG (ref 26.6–33)
MCHC RBC AUTO-ENTMCNC: 29.1 G/DL (ref 31.5–35.7)
MCV RBC AUTO: 97.1 FL (ref 79–97)
MONOCYTES # BLD AUTO: 0.99 10*3/MM3 (ref 0.1–0.9)
MONOCYTES NFR BLD AUTO: 8.5 % (ref 5–12)
NEUTROPHILS # BLD AUTO: 9.24 10*3/MM3 (ref 1.7–7)
NEUTROPHILS NFR BLD AUTO: 79.2 % (ref 42.7–76)
NRBC BLD AUTO-RTO: 0 /100 WBC (ref 0–0.2)
PLATELET # BLD AUTO: 328 10*3/MM3 (ref 140–450)
PMV BLD AUTO: 9.9 FL (ref 6–12)
RBC # BLD AUTO: 3.43 10*6/MM3 (ref 3.77–5.28)
WBC NRBC COR # BLD: 11.66 10*3/MM3 (ref 3.4–10.8)

## 2019-09-15 PROCEDURE — 94799 UNLISTED PULMONARY SVC/PX: CPT

## 2019-09-15 PROCEDURE — 99232 SBSQ HOSP IP/OBS MODERATE 35: CPT | Performed by: INTERNAL MEDICINE

## 2019-09-15 PROCEDURE — 63710000001 PROMETHAZINE PER 12.5 MG: Performed by: INTERNAL MEDICINE

## 2019-09-15 PROCEDURE — 85025 COMPLETE CBC W/AUTO DIFF WBC: CPT | Performed by: INTERNAL MEDICINE

## 2019-09-15 PROCEDURE — 93975 VASCULAR STUDY: CPT

## 2019-09-15 PROCEDURE — 97110 THERAPEUTIC EXERCISES: CPT

## 2019-09-15 RX ADMIN — FERROUS SULFATE TAB 325 MG (65 MG ELEMENTAL FE) 325 MG: 325 (65 FE) TAB at 08:45

## 2019-09-15 RX ADMIN — BUDESONIDE 0.5 MG: 0.5 SUSPENSION RESPIRATORY (INHALATION) at 20:40

## 2019-09-15 RX ADMIN — ARFORMOTEROL TARTRATE 15 MCG: 15 SOLUTION RESPIRATORY (INHALATION) at 20:40

## 2019-09-15 RX ADMIN — BUDESONIDE 0.5 MG: 0.5 SUSPENSION RESPIRATORY (INHALATION) at 08:04

## 2019-09-15 RX ADMIN — CARVEDILOL 25 MG: 25 TABLET, FILM COATED ORAL at 08:45

## 2019-09-15 RX ADMIN — SENNOSIDES AND DOCUSATE SODIUM 2 TABLET: 8.6; 5 TABLET ORAL at 22:05

## 2019-09-15 RX ADMIN — TRAMADOL HYDROCHLORIDE 50 MG: 50 TABLET ORAL at 03:19

## 2019-09-15 RX ADMIN — IPRATROPIUM BROMIDE AND ALBUTEROL SULFATE 3 ML: 2.5; .5 SOLUTION RESPIRATORY (INHALATION) at 12:33

## 2019-09-15 RX ADMIN — CARVEDILOL 25 MG: 25 TABLET, FILM COATED ORAL at 18:19

## 2019-09-15 RX ADMIN — TRAMADOL HYDROCHLORIDE 50 MG: 50 TABLET ORAL at 16:29

## 2019-09-15 RX ADMIN — SERTRALINE 50 MG: 50 TABLET, FILM COATED ORAL at 08:45

## 2019-09-15 RX ADMIN — ACETAMINOPHEN 650 MG: 325 TABLET, FILM COATED ORAL at 22:06

## 2019-09-15 RX ADMIN — ATORVASTATIN CALCIUM 20 MG: 20 TABLET, FILM COATED ORAL at 22:05

## 2019-09-15 RX ADMIN — POTASSIUM CHLORIDE 20 MEQ: 1.5 POWDER, FOR SOLUTION ORAL at 08:45

## 2019-09-15 RX ADMIN — PROMETHAZINE HYDROCHLORIDE 12.5 MG: 12.5 TABLET ORAL at 04:13

## 2019-09-15 RX ADMIN — PROMETHAZINE HYDROCHLORIDE 12.5 MG: 12.5 TABLET ORAL at 22:05

## 2019-09-15 RX ADMIN — PANTOPRAZOLE SODIUM 40 MG: 40 TABLET, DELAYED RELEASE ORAL at 05:44

## 2019-09-15 RX ADMIN — ARFORMOTEROL TARTRATE 15 MCG: 15 SOLUTION RESPIRATORY (INHALATION) at 08:04

## 2019-09-15 RX ADMIN — TRAMADOL HYDROCHLORIDE 50 MG: 50 TABLET ORAL at 09:39

## 2019-09-15 RX ADMIN — FERROUS SULFATE TAB 325 MG (65 MG ELEMENTAL FE) 325 MG: 325 (65 FE) TAB at 18:20

## 2019-09-15 RX ADMIN — LEVOTHYROXINE SODIUM 50 MCG: 50 TABLET ORAL at 05:44

## 2019-09-15 RX ADMIN — PROMETHAZINE HYDROCHLORIDE 12.5 MG: 12.5 TABLET ORAL at 16:29

## 2019-09-15 NOTE — PLAN OF CARE
Problem: Patient Care Overview  Goal: Plan of Care Review  Outcome: Ongoing (interventions implemented as appropriate)   09/15/19 2442   Coping/Psychosocial   Plan of Care Reviewed With patient   Plan of Care Review   Progress improving   OTHER   Outcome Summary Pt tolerated treatment fair this date. More difficulty w/ breathing and keeping O2 sats above 90%. Required CGA to transfer to and from [facility], then required seated break before ambulating 5ft. Pt sat and rested again, then able to ambulate another 3ft to transport bed. PT will continue to address functional mobility deficits as tolerated.

## 2019-09-15 NOTE — PROGRESS NOTES
History:  Multiple complaints this morning.  Yesterday she had some loose bowels.  She was given Imodium.  This morning she complained of back pain and side pain.  She received 2 tramadol over the last 24 hours.  She also complains of chest pain nausea and some right upper quadrant discomfort.  She has received 1 dose of p.o. Phenergan over the past 24 hours.  She still has a dull headache.  Additionally the patient sees a woman walking around in her room.  When I was in the room she stated the lady was under her bed.  Obviously there was no one under the bed.    Allergies  Adhesive tape; Augmentin [amoxicillin-pot clavulanate]; Cephalexin; Metoclopramide; Pentazocine; Simvastatin; and Sucralfate      Current Facility-Administered Medications:   •  acetaminophen (TYLENOL) tablet 1,000 mg, 1,000 mg, Oral, Nightly PRN, 1,000 mg at 09/10/19 2116 **AND** diphenhydrAMINE (BENADRYL) capsule 50 mg, 50 mg, Oral, Nightly PRN, Harmeet Payne MD, 50 mg at 09/14/19 2157  •  acetaminophen (TYLENOL) tablet 650 mg, 650 mg, Oral, Q6H PRN, Harmeet Payne MD, 650 mg at 09/14/19 1634  •  arformoterol (BROVANA) nebulizer solution 15 mcg, 15 mcg, Nebulization, BID - RT, Alirio Alfaro MD, 15 mcg at 09/15/19 0804  •  atorvastatin (LIPITOR) tablet 20 mg, 20 mg, Oral, Nightly, Chet Payne Jr., MD, 20 mg at 09/14/19 2121  •  bisacodyl (DULCOLAX) suppository 10 mg, 10 mg, Rectal, Daily, Harmeet Payne MD, 10 mg at 09/09/19 0856  •  budesonide (PULMICORT) nebulizer solution 0.5 mg, 0.5 mg, Nebulization, BID - RT, Chet Payne Jr., MD, 0.5 mg at 09/15/19 0804  •  carvedilol (COREG) tablet 25 mg, 25 mg, Oral, BID With Meals, Chet Payne Jr., MD, 25 mg at 09/15/19 0845  •  ferrous sulfate tablet 325 mg, 325 mg, Oral, BID With Meals, Harmeet Payne MD, 325 mg at 09/15/19 0845  •  ipratropium-albuterol (DUO-NEB) nebulizer solution 3 mL, 3 mL, Nebulization, 4x Daily - RT, Chet Payne Jr., MD, 3 mL at 09/14/19  "2003  •  levothyroxine (SYNTHROID, LEVOTHROID) tablet 50 mcg, 50 mcg, Oral, Q AM, Chet Payne Jr., MD, 50 mcg at 09/15/19 0544  •  loperamide (IMODIUM) capsule 2 mg, 2 mg, Oral, 4x Daily PRN, Harmeet Payne MD, 2 mg at 09/14/19 1248  •  pantoprazole (PROTONIX) EC tablet 40 mg, 40 mg, Oral, Q AM, Harmeet Payne MD, 40 mg at 09/15/19 0544  •  polyethylene glycol 3350 powder (packet), 17 g, Oral, Daily PRN, Harmeet Payne MD, 17 g at 09/09/19 0856  •  potassium chloride (KLOR-CON) packet 20 mEq, 20 mEq, Oral, Daily, Chet Payne Jr., MD, 20 mEq at 09/15/19 0845  •  promethazine (PHENERGAN) tablet 12.5 mg, 12.5 mg, Oral, Q6H PRN, 12.5 mg at 09/15/19 0413 **OR** promethazine (PHENERGAN) injection 12.5 mg, 12.5 mg, Intravenous, Q6H PRN, Harmeet Payne MD  •  sennosides-docusate sodium (SENOKOT-S) 8.6-50 MG tablet 2 tablet, 2 tablet, Oral, Nightly, Harmeet Payne MD, 2 tablet at 09/14/19 2121  •  sertraline (ZOLOFT) tablet 50 mg, 50 mg, Oral, Daily, Chet Pyane Jr., MD, 50 mg at 09/15/19 0845  •  traMADol (ULTRAM) tablet 50 mg, 50 mg, Oral, Q6H PRN, Harmeet Payne MD, 50 mg at 09/15/19 0939    /69 (BP Location: Right arm, Patient Position: Lying)   Pulse 71   Temp 97.5 °F (36.4 °C) (Oral)   Resp 20   Ht 157.5 cm (62\")   Wt 81.3 kg (179 lb 3.2 oz)   LMP  (LMP Unknown)   SpO2 94%   BMI 32.78 kg/m²     Physical Exam  Appearance: Obese elderly  lady.  She is sitting comfortably in a chair.  She does not appear to be in any distress.  She does appear emotionally stressed  HEENT: Normocephalic atraumatic.  Pupils were round and equal.  Pharynx was clear.  Neck: Decreased range of motion.  No lymphadenopathy or JVD.  Lungs: Diminished breath sounds.  Perhaps a few faint rales in the bases.  Heart: Distant tones.  Regular rate and rhythm.  Abdomen: Protuberant with a large fat pad.  Hypoactive bowel sounds.  Some tenderness in the right upper quadrant.  Extremities: Without clubbing " sounds edema.  Neurologic: Alert and oriented x3.  No focal findings.    Lab Results (last 24 hours)     Procedure Component Value Units Date/Time    CBC & Differential [[id number]] Collected:  09/15/19 0539    Specimen:  Blood Updated:  09/15/19 0625    Narrative:       The following orders were created for panel order CBC & Differential.  Procedure                               Abnormality         Status                     ---------                               -----------         ------                     CBC Auto Differential[[id number]]        Abnormal            Final result                 Please view results for these tests on the individual orders.    CBC Auto Differential [[id number]]  (Abnormal) Collected:  09/15/19 0539    Specimen:  Blood Updated:  09/15/19 0625     WBC 11.66 10*3/mm3      RBC 3.43 10*6/mm3      Hemoglobin 9.7 g/dL      Hematocrit 33.3 %      MCV 97.1 fL      MCH 28.3 pg      MCHC 29.1 g/dL      RDW 13.9 %      RDW-SD 49.1 fl      MPV 9.9 fL      Platelets 328 10*3/mm3      Neutrophil % 79.2 %      Lymphocyte % 9.0 %      Monocyte % 8.5 %      Eosinophil % 2.1 %      Basophil % 0.7 %      Immature Grans % 0.5 %      Neutrophils, Absolute 9.24 10*3/mm3      Lymphocytes, Absolute 1.05 10*3/mm3      Monocytes, Absolute 0.99 10*3/mm3      Eosinophils, Absolute 0.24 10*3/mm3      Basophils, Absolute 0.08 10*3/mm3      Immature Grans, Absolute 0.06 10*3/mm3      nRBC 0.0 /100 WBC           Imp:  1.  Nausea, etiology unknown.  Patient is scheduled for ultrasound of mesenteric arteries.  I will also check a HIDA scan ..  2.  Delirium.  Likely related to prolonged hospitalization and multiple medical issues.  3.  Anemia related to GI blood loss and chronic inflammatory disease, stable.  4.  Lower GI bleed related to angiodysplasia, resolved.  5.  Dilated cardiomyopathy.  6.  Junctional rhythm/atrial fib.  Rate controlled.  On Xarelto.  7.  Severe COPD.      Plan:  I am going to check a HIDA  scan.  The ultrasound of the mesenteric artery should be done this morning.  I will continue other treatment for now.    Harmeet Payne MD  9/15/2019  11:04 AM

## 2019-09-15 NOTE — PROGRESS NOTES
I have reviewed the CT scan of the abdomen and pelvis from March 5 of this year.  She does have considerable plaquing throughout her abdominal aorta and iliac vessels.  There is plaquing at the origin of the celiac trunk and SMA with the celiac trunk looking widely patent and moderate stenosis of the SMA.  The GUANAKO is patent as well.  Duplex scan of the mesenteric vessels is pending.  If there has been no change from the March CT scan, likely not to be mesenteric artery occlusive disease.

## 2019-09-15 NOTE — PROGRESS NOTES
[facility]  Inpatient Progress Note    Reason for Follow Up:  nausea    Subjective     Interval History:   Still with nausea and some loose stools    Current Facility-Administered Medications:   •  acetaminophen (TYLENOL) tablet 1,000 mg, 1,000 mg, Oral, Nightly PRN, 1,000 mg at 09/10/19 2116 **AND** diphenhydrAMINE (BENADRYL) capsule 50 mg, 50 mg, Oral, Nightly PRN, Harmeet Payne MD, 50 mg at 09/14/19 2157  •  acetaminophen (TYLENOL) tablet 650 mg, 650 mg, Oral, Q6H PRN, Harmeet Payne MD, 650 mg at 09/14/19 1634  •  arformoterol (BROVANA) nebulizer solution 15 mcg, 15 mcg, Nebulization, BID - RT, Alirio Alfaro MD, 15 mcg at 09/15/19 0804  •  atorvastatin (LIPITOR) tablet 20 mg, 20 mg, Oral, Nightly, Chet Payne Jr., MD, 20 mg at 09/14/19 2121  •  bisacodyl (DULCOLAX) suppository 10 mg, 10 mg, Rectal, Daily, Harmeet Payne MD, 10 mg at 09/09/19 0856  •  budesonide (PULMICORT) nebulizer solution 0.5 mg, 0.5 mg, Nebulization, BID - RT, Chet Payne Jr., MD, 0.5 mg at 09/15/19 0804  •  carvedilol (COREG) tablet 25 mg, 25 mg, Oral, BID With Meals, Chet Payne Jr., MD, 25 mg at 09/15/19 0845  •  ferrous sulfate tablet 325 mg, 325 mg, Oral, BID With Meals, Harmeet Payne MD, 325 mg at 09/15/19 0845  •  ipratropium-albuterol (DUO-NEB) nebulizer solution 3 mL, 3 mL, Nebulization, 4x Daily - RT, Chet Payne Jr., MD, 3 mL at 09/15/19 1233  •  levothyroxine (SYNTHROID, LEVOTHROID) tablet 50 mcg, 50 mcg, Oral, Q AM, Chet Payne Jr., MD, 50 mcg at 09/15/19 0544  •  loperamide (IMODIUM) capsule 2 mg, 2 mg, Oral, 4x Daily PRN, Harmeet Payne MD, 2 mg at 09/14/19 1248  •  pantoprazole (PROTONIX) EC tablet 40 mg, 40 mg, Oral, Q AM, Harmeet Payne MD, 40 mg at 09/15/19 0544  •  polyethylene glycol 3350 powder (packet), 17 g, Oral, Daily PRN, Harmeet Payne MD, 17 g at 09/09/19 0856  •  potassium chloride (KLOR-CON) packet 20 mEq, 20 mEq, Oral, Daily, Kerry  Chet Moreira Jr., MD, 20 mEq at 09/15/19 0845  •  promethazine (PHENERGAN) tablet 12.5 mg, 12.5 mg, Oral, Q6H PRN, 12.5 mg at 09/15/19 0413 **OR** promethazine (PHENERGAN) injection 12.5 mg, 12.5 mg, Intravenous, Q6H PRN, Harmeet Payne MD  •  sennosides-docusate sodium (SENOKOT-S) 8.6-50 MG tablet 2 tablet, 2 tablet, Oral, Nightly, Harmeet Payne MD, 2 tablet at 09/14/19 2121  •  sertraline (ZOLOFT) tablet 50 mg, 50 mg, Oral, Daily, Chet Payne Jr., MD, 50 mg at 09/15/19 0845  •  traMADol (ULTRAM) tablet 50 mg, 50 mg, Oral, Q6H PRN, Harmeet Payne MD, 50 mg at 09/15/19 0939  Review of Systems:    She has weakness and fatigue all other systems reviewed and negative    Objective     Vital Signs  Temp:  [97.5 °F (36.4 °C)-98.4 °F (36.9 °C)] 97.5 °F (36.4 °C)  Heart Rate:  [56-72] 66  Resp:  [14-20] 20  BP: (154-170)/(64-94) 170/69  Body mass index is 32.78 kg/m².    Intake/Output Summary (Last 24 hours) at 9/15/2019 1329  Last data filed at 9/15/2019 0300  Gross per 24 hour   Intake --   Output 300 ml   Net -300 ml     No intake/output data recorded.     Physical Exam:   General: patient awake, alert and cooperative   Eyes: Normal lids and lashes, no scleral icterus   Neck: supple, normal ROM   Skin: warm and dry, not jaundiced   Cardiovascular: regular rhythm and rate, no murmurs auscultated   Pulm: clear to auscultation bilaterally, regular and unlabored   Abdomen: soft, nontender, nondistended; normal bowel sounds   Extremities: no rash or edema   Psychiatric: Normal mood and behavior; memory intact     Results Review:     I reviewed the patient's new clinical results.    Results from last 7 days   Lab Units 09/15/19  0506 09/14/19  0532 09/13/19  0422   WBC 10*3/mm3 11.66* 8.28 7.89   HEMOGLOBIN g/dL 9.7* 9.2* 8.3*   HEMATOCRIT % 33.3* 32.0* 29.9*   PLATELETS 10*3/mm3 328 297 264     Results from last 7 days   Lab Units 09/10/19  0810 09/09/19  0315   SODIUM mmol/L 142 143   POTASSIUM mmol/L 4.5 4.7    CHLORIDE mmol/L 100 102   CO2 mmol/L 34.4* 32.7*   BUN mg/dL 13 15   CREATININE mg/dL 0.59 0.69   CALCIUM mg/dL 8.7 9.1   BILIRUBIN mg/dL 0.3  --    ALK PHOS U/L 72  --    ALT (SGPT) U/L 24  --    AST (SGOT) U/L 23  --    GLUCOSE mg/dL 110* 107*         Lab Results   Lab Value Date/Time    LIPASE 49 09/08/2019 0839    LIPASE 12 (L) 11/17/2015 1401       Radiology:  NM Gastric Emptying   Final Result   Negative.       This report was finalized on 9/11/2019 5:41 PM by Dr. Ilya Lange M.D.          US Gallbladder   Final Result   No abnormalities demonstrated on this gallbladder ultrasound.       This report was finalized on 9/10/2019 7:45 PM by Dr. George Fernando M.D.          CT Head Without Contrast   Final Result   No evidence for acute intracranial pathology.       Radiation dose reduction techniques were utilized, including automated   exposure control and exposure modulation based on body size.       This report was finalized on 9/10/2019 11:42 AM by Dr. Geovani Nunes M.D.          XR Abdomen 2 View With Chest 1 View   Final Result   1. There is moderate retention of stool throughout the colon. No   evidence for bowel obstruction or ileus is appreciated.   2. There is scattered interstitial scarring throughout both lungs with   bilateral pleural-based scarring at the lung bases.       This report was finalized on 9/8/2019 2:42 PM by Dr. Wallace Michaud M.D.          NM HIDA Scan With Pharmacological Intervention    (Results Pending)       Assessment/Plan     Patient Active Problem List   Diagnosis   • Adenocarcinoma of right lung (CMS/HCC)   • Decreased blood pressure, not hypotension   • Cardiomyopathy (CMS/HCC)   • Cardiovascular disease   • Dizziness   • Dyspnea on exertion   • Nodule of left lung   • Peripheral arterial occlusive disease (CMS/HCC)   • Iron deficiency anemia   • Pulmonary emphysema (CMS/HCC)   • Pneumonitis, radiation (CMS/HCC)   • Palpitations   • Localized edema   • Weight  gain   • Anemia   • Blood loss anemia   • Rectal bleeding   • Temporal headache     Impression  #1 anemia: H&H relatively slowly drifting down, no e/o rebleeding however  #2 rectal bleeding: Evidence of AVMs on endoscopy with coagulation therapy  #3 thrombotic disease: Requiring anticoagulant therapy  #4 post-prandial nausea -stable - RUQ u/s and GES negative     Recommendation    Continue to follow Hb has been stable  Continue current supportive care for her nausea.        HIDA scan pending      I discussed the patients findings and my recommendations with patient and nursing staff.    Yann Flores MD

## 2019-09-15 NOTE — THERAPY TREATMENT NOTE
Patient Name: Ale Narayan  : 1940    MRN: [id number]                              Today's Date: 9/15/2019       Admit Date: 2019    Visit Dx:     ICD-10-CM ICD-9-CM   1. Blood loss anemia D50.0 280.0   2. Rectal bleeding K62.5 569.3     Patient Active Problem List   Diagnosis   • Adenocarcinoma of right lung (CMS/HCC)   • Decreased blood pressure, not hypotension   • Cardiomyopathy (CMS/HCC)   • Cardiovascular disease   • Dizziness   • Dyspnea on exertion   • Nodule of left lung   • Peripheral arterial occlusive disease (CMS/HCC)   • Iron deficiency anemia   • Pulmonary emphysema (CMS/HCC)   • Pneumonitis, radiation (CMS/HCC)   • Palpitations   • Localized edema   • Weight gain   • Anemia   • Blood loss anemia   • Rectal bleeding   • Temporal headache     Past Medical History:   Diagnosis Date   • Adenocarcinoma of lung (CMS/HCC)    • Asthma    • Carotid artery disease (CMS/HCC)    • Cough    • Deep vein thrombosis (CMS/HCC)    • Emphysema of lung (CMS/HCC)    • Hyperlipidemia    • Hypertension    • Hypothyroidism    • Lung cancer (CMS/HCC)    • Osteoporosis    • Paroxysmal atrial fibrillation (CMS/HCC)    • Peripheral arterial disease (CMS/HCC)    • Pulmonary embolism (CMS/HCC)    • Thrombophilia (CMS/HCC)     Since age 25     Past Surgical History:   Procedure Laterality Date   • ANKLE SURGERY     • BREAST SURGERY      removal of benign melanoma spot on nipple of left breast 2013   • COLONOSCOPY      Negative   • COLONOSCOPY N/A 2019    Procedure: COLONOSCOPY to cecum with biopsy / polypectomy and  APC cautery;  Surgeon: Alexsandra Ruiz MD;  Location: [facility] ENDOSCOPY;  Service: Gastroenterology   • ENDOSCOPY N/A 2019    Procedure: ESOPHAGOGASTRODUODENOSCOPY with biopsies;  Surgeon: Alexsandra Ruiz MD;  Location: [facility] ENDOSCOPY;  Service: Gastroenterology   • HERNIA REPAIR      Lumbar spine   • HYSTERECTOMY     • LUNG LOBECTOMY     • OTHER SURGICAL HISTORY      Bypass of  lower extremities   • OTHER SURGICAL HISTORY      IVC filter placed     General Information     [facility] Name 09/15/19 1407          PT Evaluation Time/Intention    Document Type  therapy note (daily note)  -     Mode of Treatment  physical therapy  -[facility] Name 09/15/19 1407          General Information    Existing Precautions/Restrictions  fall;oxygen therapy device and L/min  -SM     Row Name 09/15/19 1407          Cognitive Assessment/Intervention- PT/OT    Orientation Status (Cognition)  oriented x 3  -       User Key  (r) = Recorded By, (t) = Taken By, (c) = Cosigned By    Initials Name Provider Type     Alexsandra Kim PTA Physical Therapy Assistant        Mobility     [facility] Name 09/15/19 1408          Bed Mobility Assessment/Treatment    Comment (Bed Mobility)  sitting EOB  -[facility] Name 09/15/19 1408          Sit-Stand Transfer    Sit-Stand [place] (Transfers)  contact guard  -     Assistive Device (Sit-Stand Transfers)  walker, front-wheeled  -SM     Row Name 09/15/19 1408          Gait/Stairs Assessment/Training    [place] Level (Gait)  contact guard  -     Assistive Device (Gait)  walker, front-wheeled  -     Distance in Feet (Gait)  8  -SM     Deviations/Abnormal Patterns (Gait)  migdalia decreased;stride length decreased  -SM     Bilateral Gait Deviations  forward flexed posture  -     Comment (Gait/Stairs)  seated break after 5ft d/t SOA and fatigue  -       User Key  (r) = Recorded By, (t) = Taken By, (c) = Cosigned By    Initials Name Provider Type     Alexsandra Kim PTA Physical Therapy Assistant        Obj/Interventions    No documentation.       Goals/Plan    No documentation.       Clinical Impression     Row Name 09/15/19 1410          Pain Assessment    Additional Documentation  Pain Scale: Numbers Pre/Post-Treatment (Group)  -SM     Row Name 09/15/19 1410          Pain Scale: Numbers Pre/Post-Treatment    Pain Scale: Numbers, Pretreatment  0/10 - no pain   -     Pain Scale: Numbers, Post-Treatment  0/10 - no pain  -     Row Name 09/15/19 1410          Positioning and Restraints    Pre-Treatment Position  in bed  -     Post Treatment Position  bed transport bed  -SM     In Bed  sitting;with other staff  -       User Key  (r) = Recorded By, (t) = Taken By, (c) = Cosigned By    Initials Name Provider Type    Alexsandra Estes PTA Physical Therapy Assistant        Outcome Measures     Row Name 09/15/19 1426          How much help from another person do you currently need...    Turning from your back to your side while in flat bed without using bedrails?  3  -SM     Moving from lying on back to sitting on the side of a flat bed without bedrails?  3  -SM     Moving to and from a bed to a chair (including a wheelchair)?  3  -SM     Standing up from a chair using your arms (e.g., wheelchair, bedside chair)?  3  -SM     Climbing 3-5 steps with a railing?  2  -SM     To walk in hospital room?  3  -SM     AM-PAC 6 Clicks Score (PT)  17  -     Row Name 09/15/19 1426          Functional Assessment    Outcome Measure Options  AM-PAC 6 Clicks Basic Mobility (PT)  -       User Key  (r) = Recorded By, (t) = Taken By, (c) = Cosigned By    Initials Name Provider Type    Alexsandra Estes PTA Physical Therapy Assistant        Physical Therapy Education     Title: PT OT SLP Therapies (Done)     Topic: Physical Therapy (Done)     Point: Mobility training (Done)     Learning Progress Summary           Patient Acceptance, E,TB,D, VU,NR by  at 9/15/2019  2:11 PM    Acceptance, E,TB,D, VU,NR by  at 9/14/2019  3:19 PM    Acceptance, E,TB,D, VU,NR by  at 9/10/2019  2:08 PM    Acceptance, E,TB,D, VU,NR by  at 9/9/2019 11:59 AM                   Point: Home exercise program (Done)     Learning Progress Summary           Patient Acceptance, E,TB,D, VU,NR by  at 9/15/2019  2:11 PM    Acceptance, E,TB,D, VU,NR by  at 9/14/2019  3:19 PM                   Point: Body  mechanics (Done)     Learning Progress Summary           Patient Acceptance, E,TB,D, VU,NR by  at 9/15/2019  2:11 PM    Acceptance, E,TB,D, VU,NR by  at 9/14/2019  3:19 PM    Acceptance, E,TB,D, VU,NR by  at 9/10/2019  2:08 PM                   Point: Precautions (Done)     Learning Progress Summary           Patient Acceptance, E,TB,D, VU,NR by  at 9/15/2019  2:11 PM    Acceptance, E,TB,D, VU,NR by  at 9/14/2019  3:19 PM    Acceptance, E,TB,D, VU,NR by  at 9/10/2019  2:08 PM                               User Key     Initials Effective Dates Name Provider Type Discipline     04/03/18 -  Lacey Burroughs, PT Physical Therapist PT     03/07/18 -  Alexsandra Kim PTA Physical Therapy Assistant PT              PT Recommendation and Plan     Outcome Summary/Treatment Plan (PT)  Anticipated Discharge Disposition (PT): skilled nursing facility  Plan of Care Reviewed With: patient  Progress: improving  Outcome Summary: Pt tolerated treatment fair this date. More difficulty w/ breathing and keeping O2 sats above 90%. Required CGA to transfer to and from [facility], then required seated break before ambulating 5ft. Pt sat and rested again, then able to ambulate another 3ft to transport bed. PT will continue to address functional mobility deficits as tolerated.     Time Calculation:   PT Charges     Row Name 09/15/19 1420             Time Calculation    Start Time  1345  -      Stop Time  1356  -      Time Calculation (min)  11 min  -      PT Received On  09/15/19  -      PT - Next Appointment  09/16/19  -        User Key  (r) = Recorded By, (t) = Taken By, (c) = Cosigned By    Initials Name Provider Type     Alexsandra Kim PTA Physical Therapy Assistant        Therapy Charges for Today     Code Description Service Date Service Provider Modifiers Qty    33341378700 HC PT THER SUPP EA 15 MIN 9/14/2019 Alexsandra Kim PTA GP 1    34365891769 HC PT THER PROC EA 15 MIN 9/14/2019 Julio  Alexsandra Hale, PTA GP 2    24621808984 HC PT THER PROC EA 15 MIN 9/15/2019 Alexsandra Kim, PTA GP 1          PT G-Codes  Outcome Measure Options: AM-PAC 6 Clicks Basic Mobility (PT)  AM-PAC 6 Clicks Score (PT): 17    Alexsandra Kim PTA  9/15/2019

## 2019-09-15 NOTE — PLAN OF CARE
Problem: Patient Care Overview  Goal: Plan of Care Review  Outcome: Ongoing (interventions implemented as appropriate)   09/15/19 1525   Coping/Psychosocial   Plan of Care Reviewed With patient;family   OTHER   Outcome Summary dignostic test well tolerated by patient. back pain and nausea medicated by medications per doctor order. Spo2 stay over 90%.     Goal: Individualization and Mutuality  Outcome: Ongoing (interventions implemented as appropriate)    Goal: Discharge Needs Assessment  Outcome: Ongoing (interventions implemented as appropriate)    Goal: Interprofessional Rounds/Family Conf  Outcome: Ongoing (interventions implemented as appropriate)      Problem: Fall Risk (Adult)  Goal: Absence of Fall  Outcome: Ongoing (interventions implemented as appropriate)      Problem: Anemia (Adult)  Goal: Symptom Improvement  Outcome: Ongoing (interventions implemented as appropriate)      Problem: Skin Injury Risk (Adult)  Goal: Skin Health and Integrity  Outcome: Ongoing (interventions implemented as appropriate)      Problem: Breathing Pattern Ineffective (Adult)  Goal: Effective Oxygenation/Ventilation  Outcome: Ongoing (interventions implemented as appropriate)    Goal: Anxiety/Fear Reduction  Outcome: Ongoing (interventions implemented as appropriate)

## 2019-09-16 ENCOUNTER — APPOINTMENT (OUTPATIENT)
Dept: NUCLEAR MEDICINE | Facility: HOSPITAL | Age: 79
End: 2019-09-16

## 2019-09-16 LAB
ALBUMIN SERPL-MCNC: 3.2 G/DL (ref 3.5–5.2)
ALBUMIN/GLOB SERPL: 1 G/DL
ALP SERPL-CCNC: 71 U/L (ref 39–117)
ALT SERPL W P-5'-P-CCNC: 13 U/L (ref 1–33)
ANION GAP SERPL CALCULATED.3IONS-SCNC: 7.7 MMOL/L (ref 5–15)
AST SERPL-CCNC: 11 U/L (ref 1–32)
BILIRUB SERPL-MCNC: 0.2 MG/DL (ref 0.2–1.2)
BUN BLD-MCNC: 10 MG/DL (ref 8–23)
BUN/CREAT SERPL: 17.5 (ref 7–25)
CALCIUM SPEC-SCNC: 8.8 MG/DL (ref 8.6–10.5)
CHLORIDE SERPL-SCNC: 98 MMOL/L (ref 98–107)
CO2 SERPL-SCNC: 38.3 MMOL/L (ref 22–29)
CREAT BLD-MCNC: 0.57 MG/DL (ref 0.57–1)
GFR SERPL CREATININE-BSD FRML MDRD: 103 ML/MIN/1.73
GLOBULIN UR ELPH-MCNC: 3.1 GM/DL
GLUCOSE BLD-MCNC: 102 MG/DL (ref 65–99)
POTASSIUM BLD-SCNC: 3.8 MMOL/L (ref 3.5–5.2)
PROT SERPL-MCNC: 6.3 G/DL (ref 6–8.5)
SODIUM BLD-SCNC: 144 MMOL/L (ref 136–145)
TSH SERPL DL<=0.05 MIU/L-ACNC: 2.53 UIU/ML (ref 0.27–4.2)

## 2019-09-16 PROCEDURE — 80053 COMPREHEN METABOLIC PANEL: CPT | Performed by: INTERNAL MEDICINE

## 2019-09-16 PROCEDURE — 25010000002 SINCALIDE PER 5 MCG: Performed by: INTERNAL MEDICINE

## 2019-09-16 PROCEDURE — 94799 UNLISTED PULMONARY SVC/PX: CPT

## 2019-09-16 PROCEDURE — 97110 THERAPEUTIC EXERCISES: CPT

## 2019-09-16 PROCEDURE — 63710000001 PROMETHAZINE PER 12.5 MG: Performed by: INTERNAL MEDICINE

## 2019-09-16 PROCEDURE — 84443 ASSAY THYROID STIM HORMONE: CPT | Performed by: INTERNAL MEDICINE

## 2019-09-16 PROCEDURE — A9537 TC99M MEBROFENIN: HCPCS | Performed by: INTERNAL MEDICINE

## 2019-09-16 PROCEDURE — 99232 SBSQ HOSP IP/OBS MODERATE 35: CPT | Performed by: INTERNAL MEDICINE

## 2019-09-16 PROCEDURE — 78227 HEPATOBIL SYST IMAGE W/DRUG: CPT

## 2019-09-16 PROCEDURE — 63710000001 DIPHENHYDRAMINE PER 50 MG: Performed by: INTERNAL MEDICINE

## 2019-09-16 PROCEDURE — 0 TECHNETIUM TC 99M MEBROFENIN KIT: Performed by: INTERNAL MEDICINE

## 2019-09-16 RX ORDER — KIT FOR THE PREPARATION OF TECHNETIUM TC 99M MEBROFENIN 45 MG/10ML
1 INJECTION, POWDER, LYOPHILIZED, FOR SOLUTION INTRAVENOUS
Status: COMPLETED | OUTPATIENT
Start: 2019-09-16 | End: 2019-09-16

## 2019-09-16 RX ADMIN — PROMETHAZINE HYDROCHLORIDE 12.5 MG: 12.5 TABLET ORAL at 18:17

## 2019-09-16 RX ADMIN — TRAMADOL HYDROCHLORIDE 50 MG: 50 TABLET ORAL at 06:02

## 2019-09-16 RX ADMIN — PROMETHAZINE HYDROCHLORIDE 12.5 MG: 12.5 TABLET ORAL at 12:03

## 2019-09-16 RX ADMIN — TRAMADOL HYDROCHLORIDE 50 MG: 50 TABLET ORAL at 12:03

## 2019-09-16 RX ADMIN — ARFORMOTEROL TARTRATE 15 MCG: 15 SOLUTION RESPIRATORY (INHALATION) at 20:39

## 2019-09-16 RX ADMIN — MEBROFENIN 1 DOSE: 45 INJECTION, POWDER, LYOPHILIZED, FOR SOLUTION INTRAVENOUS at 07:32

## 2019-09-16 RX ADMIN — ATORVASTATIN CALCIUM 20 MG: 20 TABLET, FILM COATED ORAL at 21:32

## 2019-09-16 RX ADMIN — PANTOPRAZOLE SODIUM 40 MG: 40 TABLET, DELAYED RELEASE ORAL at 06:02

## 2019-09-16 RX ADMIN — FERROUS SULFATE TAB 325 MG (65 MG ELEMENTAL FE) 325 MG: 325 (65 FE) TAB at 18:55

## 2019-09-16 RX ADMIN — SINCALIDE 1.6 MCG: 5 INJECTION, POWDER, LYOPHILIZED, FOR SOLUTION INTRAVENOUS at 12:30

## 2019-09-16 RX ADMIN — IPRATROPIUM BROMIDE AND ALBUTEROL SULFATE 3 ML: 2.5; .5 SOLUTION RESPIRATORY (INHALATION) at 16:04

## 2019-09-16 RX ADMIN — CARVEDILOL 25 MG: 25 TABLET, FILM COATED ORAL at 18:55

## 2019-09-16 RX ADMIN — DIPHENHYDRAMINE HYDROCHLORIDE 50 MG: 25 CAPSULE ORAL at 20:05

## 2019-09-16 RX ADMIN — PROMETHAZINE HYDROCHLORIDE 12.5 MG: 12.5 TABLET ORAL at 06:02

## 2019-09-16 RX ADMIN — BUDESONIDE 0.5 MG: 0.5 SUSPENSION RESPIRATORY (INHALATION) at 20:39

## 2019-09-16 RX ADMIN — TRAMADOL HYDROCHLORIDE 50 MG: 50 TABLET ORAL at 18:17

## 2019-09-16 RX ADMIN — ACETAMINOPHEN 650 MG: 325 TABLET, FILM COATED ORAL at 14:34

## 2019-09-16 NOTE — PLAN OF CARE
Problem: Patient Care Overview  Goal: Plan of Care Review  Outcome: Ongoing (interventions implemented as appropriate)   09/16/19 7810   Coping/Psychosocial   Plan of Care Reviewed With patient   Plan of Care Review   Progress improving   OTHER   Outcome Summary Pt tolerated treatment well this date. Increased gait distance to 40ft w/ Rw and CGA. Required 3 seated breaks throughout d/t fatigue and SOA. PT will continue to address functional mobility deficits as tolerated.

## 2019-09-16 NOTE — NURSING NOTE
Called back to see patient while in Nuc Med. Left wrist area noted to have infiltrate of diluted kinevac 21 cc. Area around left wrist about 4 cm long, puffy and soft to touch, no redness, no c/o finger pain or numbness. Left hand elevated and warm compress applied. Report called to nurse Will . IV removed, tip intact. IV nurse called to restart line. No distress.

## 2019-09-16 NOTE — PROGRESS NOTES
History:  This 79-year-old lady is getting her HIDA scan at this minute.  She had a decent day yesterday.  She had some nausea.  No significant headache.  She had no diarrhea.  She had no confusion.  Still not eating real well.    Allergies  Adhesive tape; Augmentin [amoxicillin-pot clavulanate]; Cephalexin; Metoclopramide; Pentazocine; Simvastatin; and Sucralfate      Current Facility-Administered Medications:   •  acetaminophen (TYLENOL) tablet 1,000 mg, 1,000 mg, Oral, Nightly PRN, 1,000 mg at 09/10/19 2116 **AND** diphenhydrAMINE (BENADRYL) capsule 50 mg, 50 mg, Oral, Nightly PRN, Harmeet Payne MD, 50 mg at 09/14/19 2157  •  acetaminophen (TYLENOL) tablet 650 mg, 650 mg, Oral, Q6H PRN, Harmeet Payne MD, 650 mg at 09/15/19 2206  •  arformoterol (BROVANA) nebulizer solution 15 mcg, 15 mcg, Nebulization, BID - RT, Alirio Alfaro MD, 15 mcg at 09/15/19 2040  •  atorvastatin (LIPITOR) tablet 20 mg, 20 mg, Oral, Nightly, Chet Payne Jr., MD, 20 mg at 09/15/19 2205  •  bisacodyl (DULCOLAX) suppository 10 mg, 10 mg, Rectal, Daily, Harmeet Payne MD, 10 mg at 09/09/19 0856  •  budesonide (PULMICORT) nebulizer solution 0.5 mg, 0.5 mg, Nebulization, BID - RT, Chet Payne Jr., MD, 0.5 mg at 09/15/19 2040  •  carvedilol (COREG) tablet 25 mg, 25 mg, Oral, BID With Meals, Chet Payne Jr., MD, 25 mg at 09/15/19 1819  •  ferrous sulfate tablet 325 mg, 325 mg, Oral, BID With Meals, Harmeet Payne MD, 325 mg at 09/15/19 1820  •  ipratropium-albuterol (DUO-NEB) nebulizer solution 3 mL, 3 mL, Nebulization, 4x Daily - RT, Chet Payne Jr., MD, 3 mL at 09/15/19 1233  •  levothyroxine (SYNTHROID, LEVOTHROID) tablet 50 mcg, 50 mcg, Oral, Q AM, Chet Payne Jr., MD, 50 mcg at 09/15/19 0544  •  loperamide (IMODIUM) capsule 2 mg, 2 mg, Oral, 4x Daily PRN, Harmeet Payne MD, 2 mg at 09/14/19 1248  •  pantoprazole (PROTONIX) EC tablet 40 mg, 40 mg, Oral, Q AM, Harmeet Payne MD, 40 mg at  "09/16/19 0602  •  polyethylene glycol 3350 powder (packet), 17 g, Oral, Daily PRN, Harmeet Payne MD, 17 g at 09/09/19 0856  •  potassium chloride (KLOR-CON) packet 20 mEq, 20 mEq, Oral, Daily, Chet Payne Jr., MD, 20 mEq at 09/15/19 0845  •  promethazine (PHENERGAN) tablet 12.5 mg, 12.5 mg, Oral, Q6H PRN, 12.5 mg at 09/16/19 0602 **OR** promethazine (PHENERGAN) injection 12.5 mg, 12.5 mg, Intravenous, Q6H PRN, Harmeet Payne MD  •  sennosides-docusate sodium (SENOKOT-S) 8.6-50 MG tablet 2 tablet, 2 tablet, Oral, Nightly, Harmeet Payne MD, 2 tablet at 09/15/19 2205  •  sertraline (ZOLOFT) tablet 50 mg, 50 mg, Oral, Daily, Chet Payne Jr., MD, 50 mg at 09/15/19 0845  •  traMADol (ULTRAM) tablet 50 mg, 50 mg, Oral, Q6H PRN, Harmeet Payne MD, 50 mg at 09/16/19 0602    /57 (BP Location: Right arm, Patient Position: Sitting)   Pulse 66   Temp 97.8 °F (36.6 °C) (Oral)   Resp 18   Ht 157.5 cm (62\")   Wt 81.3 kg (179 lb 3.2 oz)   LMP  (LMP Unknown)   SpO2 98%   BMI 32.78 kg/m²     Physical Exam  Appearance: Obese elderly  lady.   She does not appear to be in any distress.    HEENT: Normocephalic atraumatic.  Pupils were round and equal.  Pharynx was clear.  Neck: Decreased range of motion.  No lymphadenopathy or JVD.  Lungs: Diminished breath sounds.  Perhaps a few faint rales in the bases.  Heart: Distant tones.  Regular rate and rhythm.  Abdomen: Protuberant with a large fat pad.  Hypoactive bowel sounds.    No tenderness in the right upper quadrant.  Extremities: Without clubbing sounds edema.  Neurologic: Alert and oriented x3.  No focal findings.    Lab Results (last 24 hours)     Procedure Component Value Units Date/Time    TSH [[id number]]  (Normal) Collected:  09/16/19 0437    Specimen:  Blood Updated:  09/16/19 0553     TSH 2.530 uIU/mL     Comprehensive Metabolic Panel [[id number]]  (Abnormal) Collected:  09/16/19 0437    Specimen:  Blood Updated:  09/16/19 0539     Glucose " 102 mg/dL      BUN 10 mg/dL      Creatinine 0.57 mg/dL      Sodium 144 mmol/L      Potassium 3.8 mmol/L      Chloride 98 mmol/L      CO2 38.3 mmol/L      Calcium 8.8 mg/dL      Total Protein 6.3 g/dL      Albumin 3.20 g/dL      ALT (SGPT) 13 U/L      AST (SGOT) 11 U/L      Alkaline Phosphatase 71 U/L      Total Bilirubin 0.2 mg/dL      eGFR Non African Amer 103 mL/min/1.73      Globulin 3.1 gm/dL      A/G Ratio 1.0 g/dL      BUN/Creatinine Ratio 17.5     Anion Gap 7.7 mmol/L     Narrative:       GFR Normal >60  Chronic Kidney Disease <60  Kidney Failure <15    Folate RBC [[id number]]  (Abnormal) Collected:  09/13/19 0822    Specimen:  Blood Updated:  09/15/19 1707     Folate, Hemolysate 299.0 ng/mL      Hematocrit 28.7 %      RBC Folate 1042 ng/mL     Narrative:       Performed at:  [address] [place] [address]  [id number]  : Lucho Hoang PhD, Phone:  [phone]        Ultrasound of mesenteric arteries:  No hemodynamically significant stenosis of the celiac artery is noted.  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  Exam technically limited due to body habitus. Inferior mesenteric artery not visualized.    Imp:  1.  Nausea, etiology unknown.   I will also check a HIDA scan ..  2.  Delirium.  Resolved  3.  Anemia related to GI blood loss and chronic inflammatory disease, stable.  4.  Lower GI bleed related to angiodysplasia, resolved.  5.  Dilated cardiomyopathy.  6.  Junctional rhythm/atrial fib.  Rate controlled.  On Xarelto.  7.  Severe COPD.        Plan:  I will check results of the HIDA scan.  Even though the ultrasound mesenteric arteries with suboptimal but not believe further evaluation is necessary at this time.  If the HIDA scan is okay I am going to send the patient to a nursing facility where I can watch her for a few weeks  We will get the Zeo patch as an outpatient    Harmeet Payne MD  9/16/2019  8:03 AM

## 2019-09-16 NOTE — THERAPY TREATMENT NOTE
Patient Name: Ale Narayan  : 1940    MRN: [id number]                              Today's Date: 2019       Admit Date: 2019    Visit Dx:     ICD-10-CM ICD-9-CM   1. Blood loss anemia D50.0 280.0   2. Rectal bleeding K62.5 569.3     Patient Active Problem List   Diagnosis   • Adenocarcinoma of right lung (CMS/HCC)   • Decreased blood pressure, not hypotension   • Cardiomyopathy (CMS/HCC)   • Cardiovascular disease   • Dizziness   • Dyspnea on exertion   • Nodule of left lung   • Peripheral arterial occlusive disease (CMS/HCC)   • Iron deficiency anemia   • Pulmonary emphysema (CMS/HCC)   • Pneumonitis, radiation (CMS/HCC)   • Palpitations   • Localized edema   • Weight gain   • Anemia   • Blood loss anemia   • Rectal bleeding   • Temporal headache     Past Medical History:   Diagnosis Date   • Adenocarcinoma of lung (CMS/HCC)    • Asthma    • Carotid artery disease (CMS/HCC)    • Cough    • Deep vein thrombosis (CMS/HCC)    • Emphysema of lung (CMS/HCC)    • Hyperlipidemia    • Hypertension    • Hypothyroidism    • Lung cancer (CMS/HCC)    • Osteoporosis    • Paroxysmal atrial fibrillation (CMS/HCC)    • Peripheral arterial disease (CMS/HCC)    • Pulmonary embolism (CMS/HCC)    • Thrombophilia (CMS/HCC)     Since age 25     Past Surgical History:   Procedure Laterality Date   • ANKLE SURGERY     • BREAST SURGERY      removal of benign melanoma spot on nipple of left breast 2013   • COLONOSCOPY      Negative   • COLONOSCOPY N/A 2019    Procedure: COLONOSCOPY to cecum with biopsy / polypectomy and  APC cautery;  Surgeon: Alexsandra Ruiz MD;  Location: [facility] ENDOSCOPY;  Service: Gastroenterology   • ENDOSCOPY N/A 2019    Procedure: ESOPHAGOGASTRODUODENOSCOPY with biopsies;  Surgeon: Alexsandra Ruiz MD;  Location: [facility] ENDOSCOPY;  Service: Gastroenterology   • HERNIA REPAIR      Lumbar spine   • HYSTERECTOMY     • LUNG LOBECTOMY     • OTHER SURGICAL HISTORY      Bypass of  lower extremities   • OTHER SURGICAL HISTORY      IVC filter placed     General Information     [facility] Name 09/16/19 1558          PT Evaluation Time/Intention    Document Type  therapy note (daily note)  -     Mode of Treatment  physical therapy  -[facility] 09/16/19 1558          General Information    Existing Precautions/Restrictions  fall;oxygen therapy device and L/min  -SM     Row Name 09/16/19 1558          Cognitive Assessment/Intervention- PT/OT    Orientation Status (Cognition)  oriented x 3  -       User Key  (r) = Recorded By, (t) = Taken By, (c) = Cosigned By    Initials Name Provider Type     Alexsandra Kim PTA Physical Therapy Assistant        Mobility     [facility] Name 09/16/19 1558          Bed Mobility Assessment/Treatment    Comment (Bed Mobility)  up in chair  -SM     Row Name 09/16/19 1558          Sit-Stand Transfer    Sit-Stand Emery (Transfers)  stand by assist  -     Assistive Device (Sit-Stand Transfers)  walker, front-wheeled  -SM     Row Name 09/16/19 1558          Gait/Stairs Assessment/Training    Emery Level (Gait)  contact guard  -     Assistive Device (Gait)  walker, front-wheeled  -     Distance in Feet (Gait)  40  -SM     Pattern (Gait)  step-through  -     Deviations/Abnormal Patterns (Gait)  migdalia decreased;stride length decreased  -SM     Bilateral Gait Deviations  forward flexed posture  -     Comment (Gait/Stairs)  3 seated rest breaks; limited d/t fatigue and SOA  -       User Key  (r) = Recorded By, (t) = Taken By, (c) = Cosigned By    Initials Name Provider Type     Alexsandra Kim PTA Physical Therapy Assistant        Obj/Interventions    No documentation.       Goals/Plan    No documentation.       Clinical Impression     Row Name 09/16/19 1600          Pain Assessment    Additional Documentation  Pain Scale: Numbers Pre/Post-Treatment (Group)  -SM     Row Name 09/16/19 1600          Pain Scale: Numbers Pre/Post-Treatment     Pain Scale: Numbers, Pretreatment  0/10 - no pain  -SM     Pain Scale: Numbers, Post-Treatment  0/10 - no pain  -SM     Row Name 09/16/19 1600          Positioning and Restraints    Pre-Treatment Position  sitting in chair/recliner  -SM     Post Treatment Position  chair  -SM     In Chair  sitting;call light within reach;encouraged to call for assist  -SM       User Key  (r) = Recorded By, (t) = Taken By, (c) = Cosigned By    Initials Name Provider Type    Alexsandra Estes PTA Physical Therapy Assistant        Outcome Measures     Row Name 09/16/19 1608          How much help from another person do you currently need...    Turning from your back to your side while in flat bed without using bedrails?  3  -SM     Moving from lying on back to sitting on the side of a flat bed without bedrails?  3  -SM     Moving to and from a bed to a chair (including a wheelchair)?  3  -SM     Standing up from a chair using your arms (e.g., wheelchair, bedside chair)?  3  -SM     Climbing 3-5 steps with a railing?  1  -SM     To walk in hospital room?  3  -SM     AM-PAC 6 Clicks Score (PT)  16  -SM     Row Name 09/16/19 1608          Functional Assessment    Outcome Measure Options  AM-PAC 6 Clicks Basic Mobility (PT)  -SM       User Key  (r) = Recorded By, (t) = Taken By, (c) = Cosigned By    Initials Name Provider Type    Alexsandra Estes PTA Physical Therapy Assistant        Physical Therapy Education     Title: PT OT SLP Therapies (Done)     Topic: Physical Therapy (Done)     Point: Mobility training (Done)     Learning Progress Summary           Patient Acceptance, E,TB,D, VU,NR by  at 9/16/2019  4:06 PM    Acceptance, E,TB,D, VU,NR by  at 9/15/2019  2:11 PM    Acceptance, E,TB,D, VU,NR by  at 9/14/2019  3:19 PM    Acceptance, E,TB,D, VU,NR by  at 9/10/2019  2:08 PM    Acceptance, E,TB,D, VU,NR by  at 9/9/2019 11:59 AM                   Point: Home exercise program (Done)     Learning Progress Summary            Patient Acceptance, E,TB,D, VU,NR by  at 9/16/2019  4:06 PM    Acceptance, E,TB,D, VU,NR by  at 9/15/2019  2:11 PM    Acceptance, E,TB,D, VU,NR by  at 9/14/2019  3:19 PM                   Point: Body mechanics (Done)     Learning Progress Summary           Patient Acceptance, E,TB,D, VU,NR by  at 9/16/2019  4:06 PM    Acceptance, E,TB,D, VU,NR by  at 9/15/2019  2:11 PM    Acceptance, E,TB,D, VU,NR by  at 9/14/2019  3:19 PM    Acceptance, E,TB,D, VU,NR by  at 9/10/2019  2:08 PM                   Point: Precautions (Done)     Learning Progress Summary           Patient Acceptance, E,TB,D, VU,NR by  at 9/16/2019  4:06 PM    Acceptance, E,TB,D, VU,NR by  at 9/15/2019  2:11 PM    Acceptance, E,TB,D, VU,NR by  at 9/14/2019  3:19 PM    Acceptance, E,TB,D, VU,NR by  at 9/10/2019  2:08 PM                               User Key     Initials Effective Dates Name Provider Type Discipline     04/03/18 -  Lacey Burroughs, PT Physical Therapist PT     03/07/18 -  Alexsandra Kim PTA Physical Therapy Assistant PT              PT Recommendation and Plan     Outcome Summary/Treatment Plan (PT)  Anticipated Discharge Disposition (PT): skilled nursing facility  Plan of Care Reviewed With: patient  Progress: improving  Outcome Summary: Pt tolerated treatment well this date. Increased gait distance to 40ft w/ Rw and CGA. Required 3 seated breaks throughout d/t fatigue and SOA. PT will continue to address functional mobility deficits as tolerated.     Time Calculation:   PT Charges     Row Name 09/16/19 3415             Time Calculation    Start Time  1536  -      Stop Time  1559  -      Time Calculation (min)  23 min  -      PT Received On  09/16/19  -      PT - Next Appointment  09/17/19  -        User Key  (r) = Recorded By, (t) = Taken By, (c) = Cosigned By    Initials Name Provider Type     Alexsandra Kim PTA Physical Therapy Assistant        Therapy Charges for Today     Code  Description Service Date Service Provider Modifiers Qty    03951216488 HC PT THER PROC EA 15 MIN 9/15/2019 Alexsandra Kim, PTA GP 1    85044813309 HC PT THER PROC EA 15 MIN 9/16/2019 Alexsandra Kim, CLAU GP 2    10036063674 HC PT THER SUPP EA 15 MIN 9/16/2019 Alexsandra Kim, PTA GP 1          PT G-Codes  Outcome Measure Options: AM-PAC 6 Clicks Basic Mobility (PT)  AM-PAC 6 Clicks Score (PT): 16    Alexsandra Kim PTA  9/16/2019

## 2019-09-16 NOTE — PROGRESS NOTES
[facility]  Inpatient Progress Note    Reason for Follow Up: Nausea    Subjective     Interval History:   Still nauseous this morning, awaiting transport to take her for HIDA scan    Current Facility-Administered Medications:   •  acetaminophen (TYLENOL) tablet 1,000 mg, 1,000 mg, Oral, Nightly PRN, 1,000 mg at 09/10/19 2116 **AND** diphenhydrAMINE (BENADRYL) capsule 50 mg, 50 mg, Oral, Nightly PRN, Harmeet Payne MD, 50 mg at 09/14/19 2157  •  acetaminophen (TYLENOL) tablet 650 mg, 650 mg, Oral, Q6H PRN, Harmeet Payne MD, 650 mg at 09/15/19 2206  •  arformoterol (BROVANA) nebulizer solution 15 mcg, 15 mcg, Nebulization, BID - RT, Alirio Alfaro MD, 15 mcg at 09/15/19 2040  •  atorvastatin (LIPITOR) tablet 20 mg, 20 mg, Oral, Nightly, Chet Payne Jr., MD, 20 mg at 09/15/19 2205  •  bisacodyl (DULCOLAX) suppository 10 mg, 10 mg, Rectal, Daily, Harmeet Payne MD, 10 mg at 09/09/19 0856  •  budesonide (PULMICORT) nebulizer solution 0.5 mg, 0.5 mg, Nebulization, BID - RT, Chet Payne Jr., MD, 0.5 mg at 09/15/19 2040  •  carvedilol (COREG) tablet 25 mg, 25 mg, Oral, BID With Meals, Chet Payne Jr., MD, 25 mg at 09/15/19 1819  •  ferrous sulfate tablet 325 mg, 325 mg, Oral, BID With Meals, Harmeet Payne MD, 325 mg at 09/15/19 1820  •  ipratropium-albuterol (DUO-NEB) nebulizer solution 3 mL, 3 mL, Nebulization, 4x Daily - RT, Chet Payne Jr., MD, 3 mL at 09/15/19 1233  •  levothyroxine (SYNTHROID, LEVOTHROID) tablet 50 mcg, 50 mcg, Oral, Q AM, Chet Payne Jr., MD, 50 mcg at 09/15/19 0544  •  loperamide (IMODIUM) capsule 2 mg, 2 mg, Oral, 4x Daily PRN, Harmeet Payne MD, 2 mg at 09/14/19 1248  •  pantoprazole (PROTONIX) EC tablet 40 mg, 40 mg, Oral, Q AM, Harmeet Payne MD, 40 mg at 09/16/19 0602  •  polyethylene glycol 3350 powder (packet), 17 g, Oral, Daily PRN, Harmeet Payne MD, 17 g at 09/09/19 0856  •  potassium chloride (KLOR-CON) packet 20  mEq, 20 mEq, Oral, Daily, Chet Payne Jr., MD, 20 mEq at 09/15/19 0845  •  promethazine (PHENERGAN) tablet 12.5 mg, 12.5 mg, Oral, Q6H PRN, 12.5 mg at 09/16/19 0602 **OR** promethazine (PHENERGAN) injection 12.5 mg, 12.5 mg, Intravenous, Q6H PRN, Hrameet Payne MD  •  sennosides-docusate sodium (SENOKOT-S) 8.6-50 MG tablet 2 tablet, 2 tablet, Oral, Nightly, Harmeet Payne MD, 2 tablet at 09/15/19 2205  •  sertraline (ZOLOFT) tablet 50 mg, 50 mg, Oral, Daily, Chet Payne Jr., MD, 50 mg at 09/15/19 0845  •  traMADol (ULTRAM) tablet 50 mg, 50 mg, Oral, Q6H PRN, Harmeet Payne MD, 50 mg at 09/16/19 0602  Review of Systems:    She has weakness fatigue all other systems reviewed and negative    Objective     Vital Signs  Temp:  [97.8 °F (36.6 °C)-98.3 °F (36.8 °C)] 97.8 °F (36.6 °C)  Heart Rate:  [52-94] 66  Resp:  [18-20] 18  BP: (138-149)/(57-90) 149/57  Body mass index is 32.78 kg/m².    Intake/Output Summary (Last 24 hours) at 9/16/2019 0902  Last data filed at 9/15/2019 2040  Gross per 24 hour   Intake 120 ml   Output --   Net 120 ml     No intake/output data recorded.     Physical Exam:   General: patient awake, alert and cooperative   Eyes: Normal lids and lashes, no scleral icterus   Neck: supple, normal ROM   Skin: warm and dry, not jaundiced   Cardiovascular: regular rhythm and rate, no murmurs auscultated   Pulm: clear to auscultation bilaterally, regular and unlabored   Abdomen: soft, nontender, nondistended; normal bowel sounds   Extremities: no rash or edema   Psychiatric: Normal mood and behavior; memory intact     Results Review:     I reviewed the patient's new clinical results.    Results from last 7 days   Lab Units 09/15/19  0539 09/14/19  0532 09/13/19  0822 09/13/19  0422   WBC 10*3/mm3 11.66* 8.28  --  7.89   HEMOGLOBIN g/dL 9.7* 9.2*  --  8.3*   HEMATOCRIT % 33.3* 32.0* 28.7* 29.9*   PLATELETS 10*3/mm3 328 297  --  264     Results from last 7 days   Lab Units 09/16/19  0430  09/10/19  0810   SODIUM mmol/L 144 142   POTASSIUM mmol/L 3.8 4.5   CHLORIDE mmol/L 98 100   CO2 mmol/L 38.3* 34.4*   BUN mg/dL 10 13   CREATININE mg/dL 0.57 0.59   CALCIUM mg/dL 8.8 8.7   BILIRUBIN mg/dL 0.2 0.3   ALK PHOS U/L 71 72   ALT (SGPT) U/L 13 24   AST (SGOT) U/L 11 23   GLUCOSE mg/dL 102* 110*         Lab Results   Lab Value Date/Time    LIPASE 49 09/08/2019 0839    LIPASE 12 (L) 11/17/2015 1401       Radiology:  NM Gastric Emptying   Final Result   Negative.       This report was finalized on 9/11/2019 5:41 PM by Dr. Ilya Lange M.D.          US Gallbladder   Final Result   No abnormalities demonstrated on this gallbladder ultrasound.       This report was finalized on 9/10/2019 7:45 PM by Dr. George Fernando M.D.          CT Head Without Contrast   Final Result   No evidence for acute intracranial pathology.       Radiation dose reduction techniques were utilized, including automated   exposure control and exposure modulation based on body size.       This report was finalized on 9/10/2019 11:42 AM by Dr. Geovani Nunes M.D.          XR Abdomen 2 View With Chest 1 View   Final Result   1. There is moderate retention of stool throughout the colon. No   evidence for bowel obstruction or ileus is appreciated.   2. There is scattered interstitial scarring throughout both lungs with   bilateral pleural-based scarring at the lung bases.       This report was finalized on 9/8/2019 2:42 PM by Dr. Wallace Michaud M.D.          NM HIDA Scan With Pharmacological Intervention    (Results Pending)       Assessment/Plan     Patient Active Problem List   Diagnosis   • Adenocarcinoma of right lung (CMS/HCC)   • Decreased blood pressure, not hypotension   • Cardiomyopathy (CMS/HCC)   • Cardiovascular disease   • Dizziness   • Dyspnea on exertion   • Nodule of left lung   • Peripheral arterial occlusive disease (CMS/HCC)   • Iron deficiency anemia   • Pulmonary emphysema (CMS/HCC)   • Pneumonitis, radiation  (CMS/HCC)   • Palpitations   • Localized edema   • Weight gain   • Anemia   • Blood loss anemia   • Rectal bleeding   • Temporal headache     Impression  #1 anemia: H&H stable   #2 rectal bleeding: Evidence of AVMs on endoscopy with coagulation therapy  #3 thrombotic disease: Requiring anticoagulant therapy  #4 post-prandial nausea -stable - RUQ u/s and GES negative     Recommendation     Continue to follow Hb has been stable  Continue current supportive care for her nausea.        HIDA scan pending  Agree with discharge to rehab if HIDA scan is normal    I discussed the patients findings and my recommendations with patient and nursing staff.    Yann Flores MD

## 2019-09-16 NOTE — PLAN OF CARE
Problem: Patient Care Overview  Goal: Plan of Care Review  Outcome: Ongoing (interventions implemented as appropriate)   09/15/19 1424 09/16/19 1153 09/16/19 1354   Coping/Psychosocial   Plan of Care Reviewed With --  patient;sibling --    Plan of Care Review   Progress improving --  --    OTHER   Outcome Summary --  --  Pt a&ox4, VSS. Pt c/o back pian controlled by medication. Pt up to chair today with assist x1. Pt tolerating diet well. Will continue to monitor Pt.      Goal: Individualization and Mutuality  Outcome: Ongoing (interventions implemented as appropriate)    Goal: Discharge Needs Assessment  Outcome: Ongoing (interventions implemented as appropriate)    Goal: Interprofessional Rounds/Family Conf  Outcome: Ongoing (interventions implemented as appropriate)      Problem: Fall Risk (Adult)  Goal: Absence of Fall  Outcome: Ongoing (interventions implemented as appropriate)      Problem: Anemia (Adult)  Goal: Symptom Improvement  Outcome: Ongoing (interventions implemented as appropriate)      Problem: Skin Injury Risk (Adult)  Goal: Skin Health and Integrity  Outcome: Ongoing (interventions implemented as appropriate)      Problem: Breathing Pattern Ineffective (Adult)  Goal: Effective Oxygenation/Ventilation  Outcome: Ongoing (interventions implemented as appropriate)    Goal: Anxiety/Fear Reduction  Outcome: Ongoing (interventions implemented as appropriate)

## 2019-09-16 NOTE — PROGRESS NOTES
Continued Stay Note  [facility]     Patient Name: Ale Narayan  MRN: [id number]  Today's Date: 9/16/2019    Admit Date: 9/5/2019    Discharge Plan     Row Name 09/16/19 1549       Plan    Plan  [facility] via [facility] with oxygen set up for 1400 9/17    Plan Comments  Spoke with Maribel and she has a bed and accepts Pt for skilled rehab.  Spoke with [facility] and they declined for coverage.  Needs WC transport and oxygen 4 L that mobility may require more oxygen.  Patient now prefers [facility] and scheduled for 1400 on 9/17. Trip cost quoted for $83.00.  She will have her brother pay for the trip. Packet in office.....................Kristina Shelley RN          Discharge Codes    No documentation.       Expected Discharge Date and Time     Expected Discharge Date Expected Discharge Time    Sep 13, 2019             Kristina Shelley RN

## 2019-09-17 ENCOUNTER — APPOINTMENT (OUTPATIENT)
Dept: CARDIOLOGY | Facility: HOSPITAL | Age: 79
End: 2019-09-17

## 2019-09-17 VITALS
SYSTOLIC BLOOD PRESSURE: 156 MMHG | TEMPERATURE: 97.8 F | WEIGHT: 179.2 LBS | BODY MASS INDEX: 32.97 KG/M2 | OXYGEN SATURATION: 90 % | HEIGHT: 62 IN | DIASTOLIC BLOOD PRESSURE: 62 MMHG | RESPIRATION RATE: 20 BRPM | HEART RATE: 70 BPM

## 2019-09-17 PROBLEM — D50.0 BLOOD LOSS ANEMIA: Status: RESOLVED | Noted: 2019-09-05 | Resolved: 2019-09-17

## 2019-09-17 PROBLEM — R51.9 TEMPORAL HEADACHE: Status: RESOLVED | Noted: 2019-09-13 | Resolved: 2019-09-17

## 2019-09-17 LAB
Lab: NORMAL
METHYLMALONATE SERPL-SCNC: 164 NMOL/L (ref 0–378)

## 2019-09-17 PROCEDURE — 99232 SBSQ HOSP IP/OBS MODERATE 35: CPT | Performed by: INTERNAL MEDICINE

## 2019-09-17 PROCEDURE — 94799 UNLISTED PULMONARY SVC/PX: CPT

## 2019-09-17 PROCEDURE — 63710000001 PROMETHAZINE PER 12.5 MG: Performed by: INTERNAL MEDICINE

## 2019-09-17 PROCEDURE — 0296T HC EXT ECG > 48HR TO 21 DAY RCRD W/CONECT INTL RCRD: CPT

## 2019-09-17 RX ORDER — ACETAMINOPHEN 325 MG/1
650 TABLET ORAL EVERY 6 HOURS PRN
Qty: 100 TABLET | Refills: 3 | Status: ON HOLD | OUTPATIENT
Start: 2019-09-17 | End: 2021-01-01

## 2019-09-17 RX ORDER — IPRATROPIUM BROMIDE AND ALBUTEROL SULFATE 2.5; .5 MG/3ML; MG/3ML
3 SOLUTION RESPIRATORY (INHALATION)
Qty: 360 ML | Refills: 3 | Status: SHIPPED | OUTPATIENT
Start: 2019-09-17 | End: 2021-01-01 | Stop reason: HOSPADM

## 2019-09-17 RX ORDER — BUDESONIDE 0.5 MG/2ML
0.5 INHALANT ORAL
Qty: 100 EACH | Refills: 3 | Status: SHIPPED | OUTPATIENT
Start: 2019-09-17

## 2019-09-17 RX ORDER — LEVOTHYROXINE SODIUM 0.05 MG/1
50 TABLET ORAL DAILY
Qty: 120 TABLET | Refills: 3 | Status: SHIPPED | OUTPATIENT
Start: 2019-09-17

## 2019-09-17 RX ORDER — FERROUS SULFATE 325(65) MG
325 TABLET ORAL 2 TIMES DAILY WITH MEALS
Qty: 120 TABLET | Refills: 3 | Status: SHIPPED | OUTPATIENT
Start: 2019-09-17 | End: 2021-01-01 | Stop reason: HOSPADM

## 2019-09-17 RX ORDER — PROMETHAZINE HYDROCHLORIDE 12.5 MG/1
12.5 TABLET ORAL EVERY 6 HOURS PRN
Qty: 60 TABLET | Refills: 3 | Status: SHIPPED | OUTPATIENT
Start: 2019-09-17 | End: 2021-01-01 | Stop reason: HOSPADM

## 2019-09-17 RX ADMIN — POTASSIUM CHLORIDE 20 MEQ: 1.5 POWDER, FOR SOLUTION ORAL at 08:58

## 2019-09-17 RX ADMIN — BUDESONIDE 0.5 MG: 0.5 SUSPENSION RESPIRATORY (INHALATION) at 06:59

## 2019-09-17 RX ADMIN — ARFORMOTEROL TARTRATE 15 MCG: 15 SOLUTION RESPIRATORY (INHALATION) at 06:59

## 2019-09-17 RX ADMIN — TRAMADOL HYDROCHLORIDE 50 MG: 50 TABLET ORAL at 14:59

## 2019-09-17 RX ADMIN — PANTOPRAZOLE SODIUM 40 MG: 40 TABLET, DELAYED RELEASE ORAL at 05:24

## 2019-09-17 RX ADMIN — TRAMADOL HYDROCHLORIDE 50 MG: 50 TABLET ORAL at 08:58

## 2019-09-17 RX ADMIN — CARVEDILOL 25 MG: 25 TABLET, FILM COATED ORAL at 08:58

## 2019-09-17 RX ADMIN — PROMETHAZINE HYDROCHLORIDE 12.5 MG: 12.5 TABLET ORAL at 14:59

## 2019-09-17 RX ADMIN — LEVOTHYROXINE SODIUM 50 MCG: 50 TABLET ORAL at 05:24

## 2019-09-17 RX ADMIN — FERROUS SULFATE TAB 325 MG (65 MG ELEMENTAL FE) 325 MG: 325 (65 FE) TAB at 08:58

## 2019-09-17 RX ADMIN — IPRATROPIUM BROMIDE AND ALBUTEROL SULFATE 3 ML: 2.5; .5 SOLUTION RESPIRATORY (INHALATION) at 10:55

## 2019-09-17 RX ADMIN — SERTRALINE 50 MG: 50 TABLET, FILM COATED ORAL at 08:58

## 2019-09-17 RX ADMIN — PROMETHAZINE HYDROCHLORIDE 12.5 MG: 12.5 TABLET ORAL at 08:58

## 2019-09-17 NOTE — PROGRESS NOTES
On her mesenteric duplex scan she was noted to have a widely patent celiac trunk.  There was approximately 70% stenosis of her SMA.    With the celiac trunk being widely patent in the SMA of borderline hemodynamic significance I believe her perfusion is still more than adequate.  Agree with the plan of Dr. Payne.

## 2019-09-17 NOTE — PROGRESS NOTES
Continued Stay Note  [facility]     Patient Name: Ale Narayan  MRN: [id number]  Today's Date: 9/17/2019    Admit Date: 9/5/2019    Discharge Plan     Row Name 09/17/19 1147       Plan    Plan  [facility] via [facility] transport with oxygen at 1400    Plan Comments  Informed Eliane with Signature of DC orders and transport time.  Packet given to EDILSON Sterling.  She is to obtain prescription form [employer] for patient......................Kristina Shelley RN         Discharge Codes    No documentation.       Expected Discharge Date and Time     Expected Discharge Date Expected Discharge Time    Sep 17, 2019             Kristina Shelley RN

## 2019-09-17 NOTE — PLAN OF CARE
Problem: Patient Care Overview  Goal: Discharge Needs Assessment  Outcome: Ongoing (interventions implemented as appropriate)    Goal: Interprofessional Rounds/Family Conf  Outcome: Ongoing (interventions implemented as appropriate)      Problem: Fall Risk (Adult)  Goal: Absence of Fall  Outcome: Ongoing (interventions implemented as appropriate)      Problem: Anemia (Adult)  Goal: Symptom Improvement  Outcome: Ongoing (interventions implemented as appropriate)      Problem: Skin Injury Risk (Adult)  Goal: Skin Health and Integrity  Outcome: Ongoing (interventions implemented as appropriate)      Problem: Breathing Pattern Ineffective (Adult)  Goal: Effective Oxygenation/Ventilation  Outcome: Ongoing (interventions implemented as appropriate)    Goal: Anxiety/Fear Reduction  Outcome: Ongoing (interventions implemented as appropriate)

## 2019-09-17 NOTE — SIGNIFICANT NOTE
09/17/19 0824   PT Discharge Summary   Reason for Discharge Discharge from facility   Discharge Destination SNF

## 2019-09-17 NOTE — PROGRESS NOTES
[facility]  Inpatient Progress Note    Reason for Follow Up:  Nausea    Subjective     Interval History:   C/o nausea and mid abdominal pain. I reviewed the multiple tests that she has had this year and this admission.     Current Facility-Administered Medications:   •  acetaminophen (TYLENOL) tablet 1,000 mg, 1,000 mg, Oral, Nightly PRN, 1,000 mg at 09/10/19 2116 **AND** diphenhydrAMINE (BENADRYL) capsule 50 mg, 50 mg, Oral, Nightly PRN, Harmeet Payne MD, 50 mg at 09/16/19 2005  •  acetaminophen (TYLENOL) tablet 650 mg, 650 mg, Oral, Q6H PRN, Harmeet Payne MD, 650 mg at 09/16/19 1434  •  arformoterol (BROVANA) nebulizer solution 15 mcg, 15 mcg, Nebulization, BID - RT, Alirio Alfaro MD, 15 mcg at 09/17/19 0659  •  atorvastatin (LIPITOR) tablet 20 mg, 20 mg, Oral, Nightly, Chet Payne Jr., MD, 20 mg at 09/16/19 2132  •  bisacodyl (DULCOLAX) suppository 10 mg, 10 mg, Rectal, Daily, Harmeet Payne MD, 10 mg at 09/09/19 0856  •  budesonide (PULMICORT) nebulizer solution 0.5 mg, 0.5 mg, Nebulization, BID - RT, Chet Payne Jr., MD, 0.5 mg at 09/17/19 0659  •  carvedilol (COREG) tablet 25 mg, 25 mg, Oral, BID With Meals, Chet Payne Jr., MD, 25 mg at 09/17/19 0858  •  ferrous sulfate tablet 325 mg, 325 mg, Oral, BID With Meals, Harmeet Payne MD, 325 mg at 09/17/19 0858  •  ipratropium-albuterol (DUO-NEB) nebulizer solution 3 mL, 3 mL, Nebulization, 4x Daily - RT, Chet Payne Jr., MD, 3 mL at 09/16/19 1604  •  levothyroxine (SYNTHROID, LEVOTHROID) tablet 50 mcg, 50 mcg, Oral, Q AM, Chet Payne Jr., MD, 50 mcg at 09/17/19 0524  •  loperamide (IMODIUM) capsule 2 mg, 2 mg, Oral, 4x Daily PRN, Harmeet Payne MD, 2 mg at 09/14/19 1248  •  pantoprazole (PROTONIX) EC tablet 40 mg, 40 mg, Oral, Q AM, Harmeet Payne MD, 40 mg at 09/17/19 0524  •  polyethylene glycol 3350 powder (packet), 17 g, Oral, Daily PRN, Harmeet Payne MD, 17 g at 09/09/19 0856  •   potassium chloride (KLOR-CON) packet 20 mEq, 20 mEq, Oral, Daily, Chet Payne Jr., MD, 20 mEq at 09/17/19 0858  •  promethazine (PHENERGAN) tablet 12.5 mg, 12.5 mg, Oral, Q6H PRN, 12.5 mg at 09/17/19 0858 **OR** promethazine (PHENERGAN) injection 12.5 mg, 12.5 mg, Intravenous, Q6H PRN, Harmeet Payne MD  •  sennosides-docusate sodium (SENOKOT-S) 8.6-50 MG tablet 2 tablet, 2 tablet, Oral, Nightly, Harmeet Payne MD, 2 tablet at 09/15/19 2205  •  sertraline (ZOLOFT) tablet 50 mg, 50 mg, Oral, Daily, Chet Payne Jr., MD, 50 mg at 09/17/19 0858  •  traMADol (ULTRAM) tablet 50 mg, 50 mg, Oral, Q6H PRN, Harmeet Payne MD, 50 mg at 09/17/19 0858  Review of Systems:    The following systems were reviewed and negative;  respiratory, cardiovascular, musculoskeletal and neurological    Objective     Vital Signs  Temp:  [97.7 °F (36.5 °C)-98.1 °F (36.7 °C)] 97.8 °F (36.6 °C)  Heart Rate:  [56-81] 65  Resp:  [20] 20  BP: (140-159)/(58-78) 140/60  Body mass index is 32.78 kg/m².    Intake/Output Summary (Last 24 hours) at 9/17/2019 0917  Last data filed at 9/17/2019 0051  Gross per 24 hour   Intake 600 ml   Output --   Net 600 ml     No intake/output data recorded.     Physical Exam:   General: patient awake, alert and cooperative   Eyes: Normal lids and lashes, no scleral icterus   Neck: supple, normal ROM   Skin: warm and dry, not jaundiced   Cardiovascular: regular rhythm and rate, no murmurs auscultated   Pulm: clear to auscultation bilaterally, regular and unlabored   Abdomen: soft, mild diffuse tenderness, nondistended; normal bowel sounds   Extremities: no rash or edema   Psychiatric: Normal mood and behavior; memory intact     Results Review:     I reviewed the patient's new clinical results.    Results from last 7 days   Lab Units 09/15/19  0539 09/14/19  0532 09/13/19  0822 09/13/19  0422   WBC 10*3/mm3 11.66* 8.28  --  7.89   HEMOGLOBIN g/dL 9.7* 9.2*  --  8.3*   HEMATOCRIT % 33.3* 32.0* 28.7* 29.9*    PLATELETS 10*3/mm3 328 297  --  264     Results from last 7 days   Lab Units 09/16/19  0437   SODIUM mmol/L 144   POTASSIUM mmol/L 3.8   CHLORIDE mmol/L 98   CO2 mmol/L 38.3*   BUN mg/dL 10   CREATININE mg/dL 0.57   CALCIUM mg/dL 8.8   BILIRUBIN mg/dL 0.2   ALK PHOS U/L 71   ALT (SGPT) U/L 13   AST (SGOT) U/L 11   GLUCOSE mg/dL 102*         Lab Results   Lab Value Date/Time    LIPASE 49 09/08/2019 0839    LIPASE 12 (L) 11/17/2015 1401       Radiology:  NM HIDA Scan With Pharmacological Intervention   Final Result   Negative.       This report was finalized on 9/16/2019 4:55 PM by Dr. Ilya Lange M.D.          NM Gastric Emptying   Final Result   Negative.       This report was finalized on 9/11/2019 5:41 PM by Dr. Ilya Lange M.D.          US Gallbladder   Final Result   No abnormalities demonstrated on this gallbladder ultrasound.       This report was finalized on 9/10/2019 7:45 PM by Dr. George Fernando M.D.          CT Head Without Contrast   Final Result   No evidence for acute intracranial pathology.       Radiation dose reduction techniques were utilized, including automated   exposure control and exposure modulation based on body size.       This report was finalized on 9/10/2019 11:42 AM by Dr. Geovani Nunes M.D.          XR Abdomen 2 View With Chest 1 View   Final Result   1. There is moderate retention of stool throughout the colon. No   evidence for bowel obstruction or ileus is appreciated.   2. There is scattered interstitial scarring throughout both lungs with   bilateral pleural-based scarring at the lung bases.       This report was finalized on 9/8/2019 2:42 PM by Dr. Wallace Michaud M.D.              Assessment/Plan     Patient Active Problem List   Diagnosis   • Adenocarcinoma of right lung (CMS/HCC)   • Decreased blood pressure, not hypotension   • Cardiomyopathy (CMS/HCC)   • Cardiovascular disease   • Dizziness   • Dyspnea on exertion   • Nodule of left lung   •  Peripheral arterial occlusive disease (CMS/HCC)   • Iron deficiency anemia   • Pulmonary emphysema (CMS/HCC)   • Pneumonitis, radiation (CMS/HCC)   • Palpitations   • Localized edema   • Weight gain   • Anemia   • Rectal bleeding       Assessment:  #1 anemia: H&H stable   #2 rectal bleeding: Evidence of AVMs on endoscopy with coagulation therapy  #3 thrombotic disease: Requiring anticoagulant therapy  #4 post-prandial nausea -stable - RUQ u/s and GES negative  #5 - Abdominal pain - HIDA scan normal with EF 81%      Plan:  · Consider an outpatient CT small bowel enterography of the abdomen/pelvis to evaluate her abdominal pain and nausea. This could be done as an outpatient or while in the Rehab hospital?  ·   I discussed the patients findings and my recommendations with patient and nursing staff.    Portillo Jack MD

## 2019-09-17 NOTE — PLAN OF CARE
Problem: Patient Care Overview  Goal: Plan of Care Review  Outcome: Ongoing (interventions implemented as appropriate)   09/17/19 0321   Plan of Care Review   Progress improving   OTHER   Outcome Summary Pt on 4L O2 humidified nasal cannula. Ax[facility]by to [facility]. Pt complaints of diarrhea and HA. no complaints of nausea. precipitating discharge to skilled nursing facility soon. VSS. will continue to monitor.      Goal: Individualization and Mutuality  Outcome: Ongoing (interventions implemented as appropriate)    Goal: Discharge Needs Assessment  Outcome: Ongoing (interventions implemented as appropriate)

## 2019-09-17 NOTE — PROGRESS NOTES
Case Management Discharge Note    Final Note: Skilled rehab at [facility] with [employer] [facility] private pay with oxygen    Destination - Selection Complete      Service Provider Request Status Selected Services Address Phone Number Fax Number    [facility] Selected Skilled Nursing [address] [address] [phone]      Durable Medical Equipment      No service has been selected for the patient.      Dialysis/Infusion      No service has been selected for the patient.      Home Medical Care      No service has been selected for the patient.      Therapy      No service has been selected for the patient.      Community Resources      No service has been selected for the patient.        Transportation Services  [facility]    Final Discharge Disposition Code: 03 - skilled nursing facility (SNF)

## 2019-09-17 NOTE — DISCHARGE SUMMARY
"Date of admission: September 5, 2019  Date of discharge: September 17, 2019    Discharge diagnosis: Dilated cardiomyopathy.  1.  Lower GI bleed related to angiodysplasia, resolved  2.  Anemia related to GI blood loss plus inflammatory chronic disease stable  3.  Nausea, etiology unknown  4.  Dilated cardiomyopathy.  5.  Junctional rhythm/atrial fibrillation, rate controlled on Xarelto  6.  Severe COPD.  7.  Essential hypertension.  8.  Hyper lipid  9.  Hypothyroid on replacement  10.  Past DVT/PEs  11.  History of adenocarcinoma in the long 2009, 2016  12.  Generalized anxiety disorder.  13.  GERD.    Consulting physicians:  Cardiology Dr. Stone  Gastroenterology Dr. Malin  Vascular surgeon Dr. Siddiqui  Pulmonology Dr. Moore    Procedures performed during this admission:  1.  Colonoscopy was performed on 9/6/2019 by Dr. Kimo simon diverticulosis, for colonic angiodysplasia treated with argon plasma coagulation and one 5 mm polyp in the sigmoid colon which turned out to be a serrated adenoma with low-grade malignant potential  2.  EGD performed on 9/6/2019 by Dr. Kimo simon Z line was regular.  Hiatal hernia.  Esophageal polyps found and biopsied.  Gastritis.  Otherwise normal    History of present illness:  This is a 78-year-old lady.  She has history of severe COPD cardiomyopathy and past adenocarcinoma of the lung x2 she was admitted with fatigue and frequent watery black stools.  No abdominal discomfort, chest pain or shortness of air.  The stools was found to be heme positive and the patient was determined to have worsening anemia at the time of admission    Physical exam at the time of discharge:  /52 (BP Location: Right arm, Patient Position: Sitting)   Pulse 71   Temp 97.9 °F (36.6 °C) (Oral)   Resp 18   Ht 157.5 cm (62\")   LMP  (LMP Unknown)   SpO2 96%   BMI 32.45 kg/m²   Appearance: Elderly  lady with central obesity.  She is sitting in a chair in no distress.  HEENT: Pupils " were round and equal.  Pharynx clear mucous membranes moist.  Neck: Without lymphadenopathy or JVD.  Lungs: Decreased breath sounds but no wheezes, rales or rhonchi  Heart: Very distant tones.  Regular.  No murmur gallop.  Abdomen: Protuberant.  Soft.  Normal bowel sounds.  No rebound, tenderness or guarding.  Extremities: Without edema clubbing or cyanosis      Significant laboratory studies:  Prior to discharge comprehensive metabolic panel was unremarkable.  Estimated GFR was 103.  Creatinine 0.57, BUN 10  TSH 2.53  RBC folate 1042  B12 717  CBC at the time of discharge white count 11, hemoglobin 9.7, hematocrit 33, MCV 97, platelet count 320  ESR was 61    EKGs demonstrated fibrillation with slow ventricular rate versus junctional rhythm.  At the time of discharge rates were always in the 60s and 70s.    CT scan of the head was within normal limits.    Gastric emptying study was within normal limits.    Ultrasound the gallbladder was negative  HIDA scan was within normal limits.    Duplex of the temporal arteries was unremarkable.  Duplex of the mesenteric arteries was a suboptimal study but normal celiac artery and 70% stenosis of the superior mesenteric artery    Hospital course:  The patient admitted she was given IV fluids she required about 3 units of packed red blood cells she was taken to the endoscopy suite and she was determined to have bleeding angiodysplasia.  Bleeding was stopped with argon plasma coagulation.    Patient had bradycardia disc arrhythmia at the beginning of this hospitalization she was seen by cardiology.  She was found to have atrial fibrillation junctional rhythm with slow ventricular rate.  Her Rythmol was discontinued.  The patient is going to have a ZIO Patch done as an outpatient.  After discontinuing the Rythmol her rate remained in the 60s and 70s.    Patient had a brief episode of delirium which resolved with time and required no specific therapy.    The patient complained of  headache during admission.  We have ruled out giant cell arteritis.  The headache is likely tension in nature and at the time of discharge had resolved.    Patient had nausea throughout admission.  Multiple studies were performed.  These are listed above.    Patient COPD was treated with nebulizer treatments.  She remained stable.  She does require 3-1/2 L of oxygen.    Patient been discharged to [facility] on Tuesday, September 17.    Medications at time of discharge:  Pletal 100 mg twice daily  Tylenol 650 every 6 hours as needed  Tramadol 50 mg every 6 hours as needed  Pulmicort 2 mL's twice daily  Duo nebs 3 mL's 4 times daily as needed  Xarelto 20 mill grams a day  Phenergan 12-1/2 mg every 6 as needed  Zoloft 50 mg a day  Lipitor 20 g a day  Coreg 25 twice daily  Lasix 20 mill grams a day  Ferrous sulfate 325 twice daily  MiraLAX 17.5 mL's PRN daily  Senokot 2 nightly  Potassium chloride 20 mg a day  Synthroid 50 mcg a day  Nexium 40 mg a day  Vitamin D 1000 IUs daily  Oxygen 3-1/2 L nasal cannula    The patient will eat a healthy heart diet  She will receive physical therapy while at [facility].    Would do a ZIO Patch while she is at [facility].  I will follow the patient in the nursing home  She has a no code

## 2019-09-19 ENCOUNTER — TELEPHONE (OUTPATIENT)
Dept: GASTROENTEROLOGY | Facility: CLINIC | Age: 79
End: 2019-09-19

## 2019-09-19 NOTE — TELEPHONE ENCOUNTER
----- Message from Alexsandra Ruiz MD sent at 9/12/2019  2:33 PM EDT -----  Colon polyp was adenomatous.    Mild gastric inflammation, no evidence of H. pylori

## 2019-09-30 PROCEDURE — 0298T HOLTER MONITOR - 72 HOUR UP TO 21 DAY: CPT | Performed by: INTERNAL MEDICINE

## 2019-10-06 ENCOUNTER — APPOINTMENT (OUTPATIENT)
Dept: CT IMAGING | Facility: HOSPITAL | Age: 79
End: 2019-10-06

## 2019-10-06 ENCOUNTER — HOSPITAL ENCOUNTER (INPATIENT)
Facility: HOSPITAL | Age: 79
LOS: 7 days | Discharge: HOME HEALTH | End: 2019-10-13
Attending: EMERGENCY MEDICINE | Admitting: INTERNAL MEDICINE

## 2019-10-06 ENCOUNTER — APPOINTMENT (OUTPATIENT)
Dept: GENERAL RADIOLOGY | Facility: HOSPITAL | Age: 79
End: 2019-10-06

## 2019-10-06 DIAGNOSIS — R10.84 GENERALIZED ABDOMINAL PAIN: ICD-10-CM

## 2019-10-06 DIAGNOSIS — J43.1 PANLOBULAR EMPHYSEMA (HCC): ICD-10-CM

## 2019-10-06 DIAGNOSIS — R06.89 HYPERCAPNIA: Primary | ICD-10-CM

## 2019-10-06 DIAGNOSIS — I42.0 DILATED CARDIOMYOPATHY (HCC): ICD-10-CM

## 2019-10-06 DIAGNOSIS — J96.21 ACUTE ON CHRONIC RESPIRATORY FAILURE WITH HYPOXEMIA (HCC): ICD-10-CM

## 2019-10-06 DIAGNOSIS — D50.8 OTHER IRON DEFICIENCY ANEMIA: ICD-10-CM

## 2019-10-06 DIAGNOSIS — R06.09 DYSPNEA ON EXERTION: ICD-10-CM

## 2019-10-06 DIAGNOSIS — D50.0 IRON DEFICIENCY ANEMIA DUE TO CHRONIC BLOOD LOSS: ICD-10-CM

## 2019-10-06 LAB
ALBUMIN SERPL-MCNC: 3.6 G/DL (ref 3.5–5.2)
ALBUMIN/GLOB SERPL: 1.1 G/DL
ALP SERPL-CCNC: 82 U/L (ref 39–117)
ALT SERPL W P-5'-P-CCNC: 17 U/L (ref 1–33)
ANION GAP SERPL CALCULATED.3IONS-SCNC: 5.6 MMOL/L (ref 5–15)
ARTERIAL PATENCY WRIST A: POSITIVE
AST SERPL-CCNC: 14 U/L (ref 1–32)
ATMOSPHERIC PRESS: 750.4 MMHG
BACTERIA UR QL AUTO: ABNORMAL /HPF
BASE EXCESS BLDA CALC-SCNC: 11.7 MMOL/L (ref 0–2)
BASOPHILS # BLD AUTO: 0.05 10*3/MM3 (ref 0–0.2)
BASOPHILS NFR BLD AUTO: 0.6 % (ref 0–1.5)
BDY SITE: ABNORMAL
BILIRUB SERPL-MCNC: 0.3 MG/DL (ref 0.2–1.2)
BILIRUB UR QL STRIP: NEGATIVE
BUN BLD-MCNC: 11 MG/DL (ref 8–23)
BUN/CREAT SERPL: 18 (ref 7–25)
CALCIUM SPEC-SCNC: 8.7 MG/DL (ref 8.6–10.5)
CHLORIDE SERPL-SCNC: 87 MMOL/L (ref 98–107)
CLARITY UR: CLEAR
CO2 SERPL-SCNC: 44.4 MMOL/L (ref 22–29)
COLOR UR: YELLOW
CREAT BLD-MCNC: 0.61 MG/DL (ref 0.57–1)
DEPRECATED RDW RBC AUTO: 43.3 FL (ref 37–54)
EOSINOPHIL # BLD AUTO: 0.03 10*3/MM3 (ref 0–0.4)
EOSINOPHIL NFR BLD AUTO: 0.4 % (ref 0.3–6.2)
ERYTHROCYTE [DISTWIDTH] IN BLOOD BY AUTOMATED COUNT: 13.2 % (ref 12.3–15.4)
GAS FLOW AIRWAY: 15 LPM
GFR SERPL CREATININE-BSD FRML MDRD: 95 ML/MIN/1.73
GLOBULIN UR ELPH-MCNC: 3.2 GM/DL
GLUCOSE BLD-MCNC: 119 MG/DL (ref 65–99)
GLUCOSE UR STRIP-MCNC: NEGATIVE MG/DL
HCO3 BLDA-SCNC: 44.3 MMOL/L (ref 22–28)
HCT VFR BLD AUTO: 31.3 % (ref 34–46.6)
HGB BLD-MCNC: 9.9 G/DL (ref 12–15.9)
HGB UR QL STRIP.AUTO: NEGATIVE
HYALINE CASTS UR QL AUTO: ABNORMAL /LPF
IMM GRANULOCYTES # BLD AUTO: 0.02 10*3/MM3 (ref 0–0.05)
IMM GRANULOCYTES NFR BLD AUTO: 0.3 % (ref 0–0.5)
KETONES UR QL STRIP: NEGATIVE
LEUKOCYTE ESTERASE UR QL STRIP.AUTO: NEGATIVE
LYMPHOCYTES # BLD AUTO: 0.86 10*3/MM3 (ref 0.7–3.1)
LYMPHOCYTES NFR BLD AUTO: 10.9 % (ref 19.6–45.3)
MAGNESIUM SERPL-MCNC: 1.9 MG/DL (ref 1.6–2.4)
MCH RBC QN AUTO: 28.6 PG (ref 26.6–33)
MCHC RBC AUTO-ENTMCNC: 31.6 G/DL (ref 31.5–35.7)
MCV RBC AUTO: 90.5 FL (ref 79–97)
MODALITY: ABNORMAL
MONOCYTES # BLD AUTO: 0.69 10*3/MM3 (ref 0.1–0.9)
MONOCYTES NFR BLD AUTO: 8.8 % (ref 5–12)
NEUTROPHILS # BLD AUTO: 6.23 10*3/MM3 (ref 1.7–7)
NEUTROPHILS NFR BLD AUTO: 79 % (ref 42.7–76)
NITRITE UR QL STRIP: NEGATIVE
NRBC BLD AUTO-RTO: 0 /100 WBC (ref 0–0.2)
NT-PROBNP SERPL-MCNC: 6451 PG/ML (ref 5–1800)
PCO2 BLDA: 118.8 MM HG (ref 35–45)
PH BLDA: 7.18 PH UNITS (ref 7.35–7.45)
PH UR STRIP.AUTO: 6 [PH] (ref 5–8)
PLATELET # BLD AUTO: 265 10*3/MM3 (ref 140–450)
PMV BLD AUTO: 10 FL (ref 6–12)
PO2 BLDA: 82.3 MM HG (ref 80–100)
POTASSIUM BLD-SCNC: 4.2 MMOL/L (ref 3.5–5.2)
PROT SERPL-MCNC: 6.8 G/DL (ref 6–8.5)
PROT UR QL STRIP: ABNORMAL
RBC # BLD AUTO: 3.46 10*6/MM3 (ref 3.77–5.28)
RBC # UR: ABNORMAL /HPF
REF LAB TEST METHOD: ABNORMAL
SAO2 % BLDCOA: 90.9 % (ref 92–99)
SODIUM BLD-SCNC: 137 MMOL/L (ref 136–145)
SP GR UR STRIP: 1.02 (ref 1–1.03)
SQUAMOUS #/AREA URNS HPF: ABNORMAL /HPF
TOTAL RATE: 24 BREATHS/MINUTE
TROPONIN T SERPL-MCNC: <0.01 NG/ML (ref 0–0.03)
TROPONIN T SERPL-MCNC: <0.01 NG/ML (ref 0–0.03)
TSH SERPL DL<=0.05 MIU/L-ACNC: 0.72 UIU/ML (ref 0.27–4.2)
UROBILINOGEN UR QL STRIP: ABNORMAL
WBC NRBC COR # BLD: 7.88 10*3/MM3 (ref 3.4–10.8)
WBC UR QL AUTO: ABNORMAL /HPF

## 2019-10-06 PROCEDURE — 93010 ELECTROCARDIOGRAM REPORT: CPT | Performed by: INTERNAL MEDICINE

## 2019-10-06 PROCEDURE — 94660 CPAP INITIATION&MGMT: CPT

## 2019-10-06 PROCEDURE — 81001 URINALYSIS AUTO W/SCOPE: CPT | Performed by: NURSE PRACTITIONER

## 2019-10-06 PROCEDURE — 94799 UNLISTED PULMONARY SVC/PX: CPT

## 2019-10-06 PROCEDURE — 71046 X-RAY EXAM CHEST 2 VIEWS: CPT

## 2019-10-06 PROCEDURE — 25010000002 FUROSEMIDE PER 20 MG: Performed by: NURSE PRACTITIONER

## 2019-10-06 PROCEDURE — 36600 WITHDRAWAL OF ARTERIAL BLOOD: CPT

## 2019-10-06 PROCEDURE — P9612 CATHETERIZE FOR URINE SPEC: HCPCS

## 2019-10-06 PROCEDURE — 5A09357 ASSISTANCE WITH RESPIRATORY VENTILATION, LESS THAN 24 CONSECUTIVE HOURS, CONTINUOUS POSITIVE AIRWAY PRESSURE: ICD-10-PCS | Performed by: INTERNAL MEDICINE

## 2019-10-06 PROCEDURE — 82803 BLOOD GASES ANY COMBINATION: CPT

## 2019-10-06 PROCEDURE — 25010000002 ONDANSETRON PER 1 MG: Performed by: NURSE PRACTITIONER

## 2019-10-06 PROCEDURE — 80053 COMPREHEN METABOLIC PANEL: CPT | Performed by: NURSE PRACTITIONER

## 2019-10-06 PROCEDURE — 83735 ASSAY OF MAGNESIUM: CPT | Performed by: NURSE PRACTITIONER

## 2019-10-06 PROCEDURE — 84443 ASSAY THYROID STIM HORMONE: CPT | Performed by: INTERNAL MEDICINE

## 2019-10-06 PROCEDURE — 25010000002 IOPAMIDOL 61 % SOLUTION: Performed by: NURSE PRACTITIONER

## 2019-10-06 PROCEDURE — 93005 ELECTROCARDIOGRAM TRACING: CPT | Performed by: NURSE PRACTITIONER

## 2019-10-06 PROCEDURE — 84484 ASSAY OF TROPONIN QUANT: CPT | Performed by: INTERNAL MEDICINE

## 2019-10-06 PROCEDURE — 93005 ELECTROCARDIOGRAM TRACING: CPT | Performed by: INTERNAL MEDICINE

## 2019-10-06 PROCEDURE — 74177 CT ABD & PELVIS W/CONTRAST: CPT

## 2019-10-06 PROCEDURE — 99285 EMERGENCY DEPT VISIT HI MDM: CPT

## 2019-10-06 PROCEDURE — 85025 COMPLETE CBC W/AUTO DIFF WBC: CPT | Performed by: NURSE PRACTITIONER

## 2019-10-06 PROCEDURE — 83880 ASSAY OF NATRIURETIC PEPTIDE: CPT | Performed by: NURSE PRACTITIONER

## 2019-10-06 RX ORDER — SODIUM CHLORIDE 0.9 % (FLUSH) 0.9 %
10 SYRINGE (ML) INJECTION AS NEEDED
Status: DISCONTINUED | OUTPATIENT
Start: 2019-10-06 | End: 2019-10-13 | Stop reason: HOSPADM

## 2019-10-06 RX ORDER — LEVOTHYROXINE SODIUM 0.05 MG/1
50 TABLET ORAL
Status: DISCONTINUED | OUTPATIENT
Start: 2019-10-07 | End: 2019-10-13 | Stop reason: HOSPADM

## 2019-10-06 RX ORDER — MELATONIN
1000 DAILY
Status: DISCONTINUED | OUTPATIENT
Start: 2019-10-06 | End: 2019-10-13 | Stop reason: HOSPADM

## 2019-10-06 RX ORDER — NITROGLYCERIN 0.4 MG/1
0.4 TABLET SUBLINGUAL
Status: DISCONTINUED | OUTPATIENT
Start: 2019-10-06 | End: 2019-10-13 | Stop reason: HOSPADM

## 2019-10-06 RX ORDER — IPRATROPIUM BROMIDE AND ALBUTEROL SULFATE 2.5; .5 MG/3ML; MG/3ML
3 SOLUTION RESPIRATORY (INHALATION) ONCE
Status: COMPLETED | OUTPATIENT
Start: 2019-10-06 | End: 2019-10-06

## 2019-10-06 RX ORDER — FUROSEMIDE 10 MG/ML
40 INJECTION INTRAMUSCULAR; INTRAVENOUS ONCE
Status: COMPLETED | OUTPATIENT
Start: 2019-10-06 | End: 2019-10-06

## 2019-10-06 RX ORDER — PANTOPRAZOLE SODIUM 40 MG/1
40 TABLET, DELAYED RELEASE ORAL EVERY MORNING
Status: DISCONTINUED | OUTPATIENT
Start: 2019-10-07 | End: 2019-10-13 | Stop reason: HOSPADM

## 2019-10-06 RX ORDER — CARVEDILOL 25 MG/1
25 TABLET ORAL 2 TIMES DAILY WITH MEALS
Status: DISCONTINUED | OUTPATIENT
Start: 2019-10-06 | End: 2019-10-08

## 2019-10-06 RX ORDER — BUDESONIDE 0.5 MG/2ML
0.5 INHALANT ORAL
Status: DISCONTINUED | OUTPATIENT
Start: 2019-10-06 | End: 2019-10-13 | Stop reason: HOSPADM

## 2019-10-06 RX ORDER — ATORVASTATIN CALCIUM 10 MG/1
10 TABLET, FILM COATED ORAL EVERY EVENING
Status: DISCONTINUED | OUTPATIENT
Start: 2019-10-06 | End: 2019-10-13 | Stop reason: HOSPADM

## 2019-10-06 RX ORDER — ONDANSETRON 2 MG/ML
4 INJECTION INTRAMUSCULAR; INTRAVENOUS ONCE
Status: COMPLETED | OUTPATIENT
Start: 2019-10-06 | End: 2019-10-06

## 2019-10-06 RX ORDER — SODIUM CHLORIDE 0.9 % (FLUSH) 0.9 %
10 SYRINGE (ML) INJECTION EVERY 12 HOURS SCHEDULED
Status: DISCONTINUED | OUTPATIENT
Start: 2019-10-06 | End: 2019-10-13 | Stop reason: HOSPADM

## 2019-10-06 RX ORDER — ACETAMINOPHEN 325 MG/1
650 TABLET ORAL EVERY 6 HOURS PRN
Status: DISCONTINUED | OUTPATIENT
Start: 2019-10-06 | End: 2019-10-13 | Stop reason: HOSPADM

## 2019-10-06 RX ORDER — TRAMADOL HYDROCHLORIDE 50 MG/1
50 TABLET ORAL EVERY 8 HOURS PRN
Status: DISCONTINUED | OUTPATIENT
Start: 2019-10-06 | End: 2019-10-07

## 2019-10-06 RX ORDER — IPRATROPIUM BROMIDE AND ALBUTEROL SULFATE 2.5; .5 MG/3ML; MG/3ML
3 SOLUTION RESPIRATORY (INHALATION)
Status: DISCONTINUED | OUTPATIENT
Start: 2019-10-06 | End: 2019-10-13 | Stop reason: HOSPADM

## 2019-10-06 RX ORDER — FERROUS SULFATE 325(65) MG
325 TABLET ORAL 2 TIMES DAILY WITH MEALS
Status: DISCONTINUED | OUTPATIENT
Start: 2019-10-06 | End: 2019-10-13 | Stop reason: HOSPADM

## 2019-10-06 RX ADMIN — IPRATROPIUM BROMIDE AND ALBUTEROL SULFATE 3 ML: 2.5; .5 SOLUTION RESPIRATORY (INHALATION) at 19:33

## 2019-10-06 RX ADMIN — IPRATROPIUM BROMIDE AND ALBUTEROL SULFATE 3 ML: 2.5; .5 SOLUTION RESPIRATORY (INHALATION) at 16:16

## 2019-10-06 RX ADMIN — BUDESONIDE 0.5 MG: 0.5 INHALANT RESPIRATORY (INHALATION) at 19:33

## 2019-10-06 RX ADMIN — SODIUM CHLORIDE 500 ML: 9 INJECTION, SOLUTION INTRAVENOUS at 10:09

## 2019-10-06 RX ADMIN — ONDANSETRON 4 MG: 2 INJECTION INTRAMUSCULAR; INTRAVENOUS at 10:07

## 2019-10-06 RX ADMIN — FUROSEMIDE 40 MG: 10 INJECTION, SOLUTION INTRAMUSCULAR; INTRAVENOUS at 11:13

## 2019-10-06 RX ADMIN — IOPAMIDOL 85 ML: 612 INJECTION, SOLUTION INTRAVENOUS at 10:23

## 2019-10-06 RX ADMIN — IPRATROPIUM BROMIDE AND ALBUTEROL SULFATE 3 ML: 2.5; .5 SOLUTION RESPIRATORY (INHALATION) at 09:21

## 2019-10-06 NOTE — ED PROVIDER NOTES
MD ATTESTATION NOTE    The OBINNA and I have discussed this patient's history, physical exam, and treatment plan.  I have reviewed the documentation and personally had a face to face interaction with the patient. I affirm the documentation and agree with the treatment and plan.  The attached note describes my personal findings.      History  79-year-old female from [facility] presents initially with abdominal pain and constipation.  Patient has had decreased oxygen saturations while here in the emergency department.    Physical Exam  Vital Signs reviewed  Alert, chronically ill-appearing patient in mild distress  There are decreased breath sounds bilateral bases.  Saturations are 95% on 100% nonrebreather mask.    Disposition  Dr. Payne is at the bedside and will admit this patient for further evaluation and treatment.  We discussed her respiratory status and will admit to the telemetry bed.  We discussed possible CTA of the chest but will hold as patient is currently anticoagulated on Xarelto and Dr. Payne feels that other causes are more likely for her worsening shortness of air.     Jad Aponte MD  10/06/19 1125

## 2019-10-06 NOTE — ED PROVIDER NOTES
"EMERGENCY DEPARTMENT ENCOUNTER    Room Number:  N330/1  Date seen:  10/6/2019  Time seen: 8:43 AM  PCP: Harmeet Payne MD  Historian: patient     HPI:  Chief complaint:constipation  Context:Ale Narayan is a 79 y.o. female who presents via EMS from [facility] to the ED with c/o constipation for several days. Pt states that she has not had a BM in several days and has also been nauseated with diffuse abd distension and pain. Pt states \"the staff said I had one yesterday but to me that was not a bowel movement.\" Pt states that she has been using Dulcollax and suppositories without relief. Pt denies prior episodes to this extent. Family at bedside states that pt has had to use magnesium citrate in the past with relief. Family at bedside states pt had an upper/lower GI scopes 3 days ago and that \"they cauterized some part of her colon.\" Pt has hx of hysterectomy and appendectomy. She has some remote history of atrial fib and COPD.  Just saw PCP on Friday.     Timing:constant  Duration: several days  Location:abd  Intensity/Severity:moderate  Associated Symptoms:nausea, abd pain, abd distension  Previous Episodes:\"not to his extent\"   Treatment before arrival:EMS care and treatment    MEDICAL RECORD REVIEW  Reviewed discharge summary from 9/17/19.     ALLERGIES  Adhesive tape; Augmentin [amoxicillin-pot clavulanate]; Cephalexin; Metoclopramide; Pentazocine; Simvastatin; and Sucralfate    PAST MEDICAL HISTORY  Active Ambulatory Problems     Diagnosis Date Noted   • Adenocarcinoma of right lung (CMS/HCC) 02/03/2016   • Decreased blood pressure, not hypotension 02/03/2016   • Cardiomyopathy (CMS/HCC) 02/03/2016   • Cardiovascular disease 02/03/2016   • Dizziness 02/03/2016   • Dyspnea on exertion 02/03/2016   • Nodule of left lung 02/03/2016   • Peripheral arterial occlusive disease (CMS/HCC) 02/03/2016   • Iron deficiency anemia 04/26/2016   • Pulmonary emphysema (CMS/HCC) 07/27/2016   • Pneumonitis, radiation " (CMS/HCC) 12/21/2016   • Palpitations 03/03/2017   • Localized edema 03/03/2017   • Weight gain 03/15/2017   • Anemia 09/05/2019   • Rectal bleeding 09/05/2019     Resolved Ambulatory Problems     Diagnosis Date Noted   • Disease of lung 02/03/2016   • Blood loss anemia 09/05/2019   • Temporal headache 09/13/2019     Past Medical History:   Diagnosis Date   • Adenocarcinoma of lung (CMS/HCC)    • Asthma    • Carotid artery disease (CMS/HCC)    • Cough    • Deep vein thrombosis (CMS/HCC)    • Emphysema of lung (CMS/HCC)    • Hyperlipidemia    • Hypertension    • Hypothyroidism    • Lung cancer (CMS/HCC)    • Osteoporosis    • Paroxysmal atrial fibrillation (CMS/HCC)    • Peripheral arterial disease (CMS/HCC)    • Pulmonary embolism (CMS/HCC)    • Thrombophilia (CMS/HCC)        PAST SURGICAL HISTORY  Past Surgical History:   Procedure Laterality Date   • ANKLE SURGERY     • BREAST SURGERY  2015    removal of benign melanoma spot on nipple of left breast 7/2013   • COLONOSCOPY      Negative   • COLONOSCOPY N/A 9/6/2019    Procedure: COLONOSCOPY to cecum with biopsy / polypectomy and  APC cautery;  Surgeon: Alexsandra Ruiz MD;  Location: [facility] ENDOSCOPY;  Service: Gastroenterology   • ENDOSCOPY N/A 9/6/2019    Procedure: ESOPHAGOGASTRODUODENOSCOPY with biopsies;  Surgeon: Alexsandra Ruiz MD;  Location: [facility] ENDOSCOPY;  Service: Gastroenterology   • HERNIA REPAIR      Lumbar spine   • HYSTERECTOMY     • LUNG LOBECTOMY  2009   • OTHER SURGICAL HISTORY      Bypass of lower extremities   • OTHER SURGICAL HISTORY      IVC filter placed       FAMILY HISTORY  Family History   Problem Relation Age of Onset   • Lung cancer Mother        SOCIAL HISTORY  Social History     Socioeconomic History   • Marital status:      Spouse name: Not on file   • Number of children: Not on file   • Years of education: High School   • Highest education level: Not on file   Occupational History   • Occupation: , retired      Employer: [employer] [place], [place]    Tobacco Use   • Smoking status: Former Smoker     Packs/day: 1.00     Years: 50.00     Pack years: 50.00     Types: Cigarettes     Start date:      Last attempt to quit:      Years since quittin.7   • Smokeless tobacco: Former User   Substance and Sexual Activity   • Alcohol use: No   • Drug use: No   • Sexual activity: Defer       REVIEW OF SYSTEMS  Review of Systems   Constitutional: Negative for activity change, appetite change, diaphoresis and fever.   HENT: Negative for trouble swallowing.    Eyes: Negative for visual disturbance.   Respiratory: Negative for cough, chest tightness, shortness of breath and wheezing.    Cardiovascular: Negative for chest pain, palpitations and leg swelling.   Gastrointestinal: Positive for abdominal distention, abdominal pain, constipation and nausea. Negative for diarrhea and vomiting.   Genitourinary: Negative for dysuria.   Musculoskeletal: Negative for back pain.   Skin: Negative for rash.   Neurological: Negative for dizziness, speech difficulty and light-headedness.       PHYSICAL EXAM  ED Triage Vitals [10/06/19 0836]   Temp Heart Rate Resp BP SpO2   98.2 °F (36.8 °C) 71 20 (!) 155/106 94 %      Temp src Heart Rate Source Patient Position BP Location FiO2 (%)   Tympanic Monitor Lying -- --     Physical Exam   Constitutional: She is oriented to person, place, and time and well-developed, well-nourished, and in no distress. She does not have a sickly appearance.   Chronically ill appearing  apears uncomfortable   HENT:   Head: Normocephalic and atraumatic.   Mouth/Throat: Uvula is midline, oropharynx is clear and moist and mucous membranes are normal.   Eyes: EOM are normal. Pupils are equal, round, and reactive to light.   Neck: Normal range of motion. Neck supple.   Cardiovascular: Normal rate, regular rhythm, S1 normal, S2 normal and normal heart sounds. Exam reveals no gallop and no friction rub.   No murmur  heard.  Pulmonary/Chest: Effort normal. She has decreased breath sounds (throughout). She has no wheezes. She has no rhonchi. She has no rales.   Labored respirations   Abdominal: Soft. Normal appearance and bowel sounds are normal. She exhibits distension (rounded). There is tenderness (diffusely). There is no rebound and no guarding.   Musculoskeletal: Normal range of motion. She exhibits edema (1+ BLE).   Neurological: She is alert and oriented to person, place, and time. GCS score is 15.   Intermittent BUE jerking motions noted   Skin: Skin is warm, dry and intact. There is pallor.   Psychiatric: Affect and judgment normal.   Nursing note and vitals reviewed.      LAB RESULTS  Recent Results (from the past 24 hour(s))   Comprehensive Metabolic Panel    Collection Time: 10/06/19  9:13 AM   Result Value Ref Range    Glucose 119 (H) 65 - 99 mg/dL    BUN 11 8 - 23 mg/dL    Creatinine 0.61 0.57 - 1.00 mg/dL    Sodium 137 136 - 145 mmol/L    Potassium 4.2 3.5 - 5.2 mmol/L    Chloride 87 (L) 98 - 107 mmol/L    CO2 44.4 (H) 22.0 - 29.0 mmol/L    Calcium 8.7 8.6 - 10.5 mg/dL    Total Protein 6.8 6.0 - 8.5 g/dL    Albumin 3.60 3.50 - 5.20 g/dL    ALT (SGPT) 17 1 - 33 U/L    AST (SGOT) 14 1 - 32 U/L    Alkaline Phosphatase 82 39 - 117 U/L    Total Bilirubin 0.3 0.2 - 1.2 mg/dL    eGFR Non African Amer 95 >60 mL/min/1.73    Globulin 3.2 gm/dL    A/G Ratio 1.1 g/dL    BUN/Creatinine Ratio 18.0 7.0 - 25.0    Anion Gap 5.6 5.0 - 15.0 mmol/L   Magnesium    Collection Time: 10/06/19  9:13 AM   Result Value Ref Range    Magnesium 1.9 1.6 - 2.4 mg/dL   CBC Auto Differential    Collection Time: 10/06/19  9:13 AM   Result Value Ref Range    WBC 7.88 3.40 - 10.80 10*3/mm3    RBC 3.46 (L) 3.77 - 5.28 10*6/mm3    Hemoglobin 9.9 (L) 12.0 - 15.9 g/dL    Hematocrit 31.3 (L) 34.0 - 46.6 %    MCV 90.5 79.0 - 97.0 fL    MCH 28.6 26.6 - 33.0 pg    MCHC 31.6 31.5 - 35.7 g/dL    RDW 13.2 12.3 - 15.4 %    RDW-SD 43.3 37.0 - 54.0 fl    MPV 10.0  6.0 - 12.0 fL    Platelets 265 140 - 450 10*3/mm3    Neutrophil % 79.0 (H) 42.7 - 76.0 %    Lymphocyte % 10.9 (L) 19.6 - 45.3 %    Monocyte % 8.8 5.0 - 12.0 %    Eosinophil % 0.4 0.3 - 6.2 %    Basophil % 0.6 0.0 - 1.5 %    Immature Grans % 0.3 0.0 - 0.5 %    Neutrophils, Absolute 6.23 1.70 - 7.00 10*3/mm3    Lymphocytes, Absolute 0.86 0.70 - 3.10 10*3/mm3    Monocytes, Absolute 0.69 0.10 - 0.90 10*3/mm3    Eosinophils, Absolute 0.03 0.00 - 0.40 10*3/mm3    Basophils, Absolute 0.05 0.00 - 0.20 10*3/mm3    Immature Grans, Absolute 0.02 0.00 - 0.05 10*3/mm3    nRBC 0.0 0.0 - 0.2 /100 WBC   BNP    Collection Time: 10/06/19  9:13 AM   Result Value Ref Range    proBNP 6,451.0 (H) 5.0-1,800.0 pg/mL   Urinalysis With Microscopic If Indicated (No Culture) - Urine, Catheter    Collection Time: 10/06/19  9:59 AM   Result Value Ref Range    Color, UA Yellow Yellow, Straw    Appearance, UA Clear Clear    pH, UA 6.0 5.0 - 8.0    Specific Gravity, UA 1.018 1.005 - 1.030    Glucose, UA Negative Negative    Ketones, UA Negative Negative    Bilirubin, UA Negative Negative    Blood, UA Negative Negative    Protein,  mg/dL (2+) (A) Negative    Leuk Esterase, UA Negative Negative    Nitrite, UA Negative Negative    Urobilinogen, UA 1.0 E.U./dL 0.2 - 1.0 E.U./dL   Urinalysis, Microscopic Only - Urine, Catheter    Collection Time: 10/06/19  9:59 AM   Result Value Ref Range    RBC, UA 3-5 (A) None Seen, 0-2 /HPF    WBC, UA 0-2 None Seen, 0-2 /HPF    Bacteria, UA None Seen None Seen /HPF    Squamous Epithelial Cells, UA 3-6 (A) None Seen, 0-2 /HPF    Hyaline Casts, UA 3-6 None Seen /LPF    Methodology Automated Microscopy    Blood Gas, Arterial    Collection Time: 10/06/19 11:07 AM   Result Value Ref Range    Site Arterial: right radial     Ector's Test Positive     pH, Arterial 7.179 (C) 7.350 - 7.450 pH units    pCO2, Arterial 118.8 (C) 35.0 - 45.0 mm Hg    pO2, Arterial 82.3 80.0 - 100.0 mm Hg    HCO3, Arterial 44.3 (H) 22.0 -  28.0 mmol/L    Base Excess, Arterial 11.7 (H) 0.0 - 2.0 mmol/L    O2 Saturation Calculated 90.9 (L) 92.0 - 99.0 %    Barometric Pressure for Blood Gas 750.4 mmHg    Modality NRB     Flow Rate 15 lpm    Rate 24 Breaths/minute   Troponin    Collection Time: 10/06/19 12:32 PM   Result Value Ref Range    Troponin T <0.010 0.000 - 0.030 ng/mL       Ordered the above labs and independently reviewed the results.     RADIOLOGY  Xr Chest 2 View    Result Date: 10/6/2019  TWO-VIEW CHEST  HISTORY: Shortness of breath.  FINDINGS: There is cardiomegaly with mild vascular congestion and probable very tiny pleural effusions and the appearance is quite similar to a previous chest x-ray dated 2019. This may relate to an element of mild congestive heart failure. I cannot completely exclude a component of superimposed minimal infiltrate.  This report was finalized on 10/6/2019 12:39 PM by Dr. Quang Mejía M.D.      Ct Abdomen Pelvis With Contrast    Result Date: 10/6/2019  CT SCAN OF THE ABDOMEN AND PELVIS WITH INTRAVENOUS CONTRAST  HISTORY: Abdominal pain and constipation. GI bleeding.  FINDINGS: The CT scan was performed as an emergency procedure through the abdomen and pelvis with intravenous contrast and compared to the previous CT scan of the abdomen and pelvis dated 2019 and demonstrates the followin. There is some minimal pleural calcification at the right lung base posteriorly as well as some bibasilar scarring that is unchanged. The liver, spleen, pancreas, both adrenal glands, and both kidneys were unremarkable and unchanged. The gallbladder shows no gallstones or wall thickening. 2. There is prominent calcification of the abdominal aorta with some associated luminal narrowing that is unchanged. There is no aortic aneurysm or retroperitoneal lymphadenopathy. The large and small bowel loops are normal in caliber and show no inflammatory change. There is a moderate amount of semisolid stool scattered  throughout the colon and no evidence of colonic distention or fecal impaction. 3. In the pelvis the urinary bladder has a smooth contour.      Radiation dose reduction techniques were utilized, including automated exposure control and exposure modulation based on body size.  This report was finalized on 10/6/2019 12:39 PM by Dr. Quang Mejía M.D.        I ordered the above noted radiological studies and reviewed the images on the PACS system.  Reviewed by me and discussed with radiologist.  See dictation for official radiology interpretation.       MEDICATIONS GIVEN IN ER  Medications   sodium chloride 0.9 % flush 10 mL (not administered)   acetaminophen (TYLENOL) tablet 650 mg (not administered)   atorvastatin (LIPITOR) tablet 10 mg (not administered)   budesonide (PULMICORT) nebulizer solution 0.5 mg (not administered)   carvedilol (COREG) tablet 25 mg (not administered)   cholecalciferol (VITAMIN D3) tablet 1,000 Units (not administered)   pantoprazole (PROTONIX) EC tablet 40 mg (not administered)   ferrous sulfate tablet 325 mg (not administered)   ipratropium-albuterol (DUO-NEB) nebulizer solution 3 mL (not administered)   levothyroxine (SYNTHROID, LEVOTHROID) tablet 50 mcg (not administered)   polyethylene glycol 3350 powder (packet) (not administered)   rivaroxaban (XARELTO) tablet 20 mg (not administered)   sertraline (ZOLOFT) tablet 50 mg (not administered)   traMADol (ULTRAM) tablet 50 mg (not administered)   sodium chloride 0.9 % flush 10 mL (not administered)   sodium chloride 0.9 % flush 10 mL (not administered)   nitroglycerin (NITROSTAT) SL tablet 0.4 mg (not administered)   ipratropium-albuterol (DUO-NEB) nebulizer solution 3 mL (3 mL Nebulization Given 10/6/19 0921)   sodium chloride 0.9 % bolus 500 mL (0 mL Intravenous Stopped 10/6/19 1055)   ondansetron (ZOFRAN) injection 4 mg (4 mg Intravenous Given 10/6/19 1007)   iopamidol (ISOVUE-300) 61 % injection 100 mL (85 mL Intravenous Given 10/6/19  1023)   furosemide (LASIX) injection 40 mg (40 mg Intravenous Given 10/6/19 1113)           PROCEDURES    EKG          EKG time: 1252  Rhythm/Rate: aflutter 4-1 62 BPM  QRS, axis: LBBB   ST and T waves: no acute ischemic changes     Interpreted Contemporaneously by me, independently viewed  changed compared to prior 9/6/19, rate of 48 in junctional rhythm, LBBB was present at that time    Critical Care  Performed by: Deisi Mratines APRN  Authorized by: Deisi Martines APRN     Critical care provider statement:     Critical care time (minutes):  35    Critical care time was exclusive of:  Separately billable procedures and treating other patients    Critical care was necessary to treat or prevent imminent or life-threatening deterioration of the following conditions:  Respiratory failure and metabolic crisis (hypoxia)    Critical care was time spent personally by me on the following activities:  Development of treatment plan with patient or surrogate, discussions with consultants, discussions with primary provider, evaluation of patient's response to treatment, examination of patient, gastric intubation, obtaining history from patient or surrogate, ordering and performing treatments and interventions, ordering and review of laboratory studies, ordering and review of radiographic studies, pulse oximetry, re-evaluation of patient's condition and review of old charts                  PROGRESS, DATA ANALYSIS, CONSULTS AND MEDICAL DECISION MAKING  All labs have been independently reviewed by me.  All radiology studies have been reviewed by me and discussed with radiologist dictating the report. Discussion below represents my analysis of pertinent findings related to patient's condition, differential diagnosis, treatment plan and final disposition.     ED Course as of Oct 06 1427   Sun Oct 06, 2019   1110 ABG shows CO2 118.  Bipap ordered.  Dr. Payne here and at bedside and has had discussion with family.   [EP]    1321 Updated Dr. Payne about EKG.  He states she has h/o afib and with all her pulmonary issues he is not surprised she is in flutter.  He will officially consult cardiology.    [EP]      ED Course User Index  [EP] Deisi Martines, APRN       0900-Per RN, pt is 87% on pt's baseline 4L. Rechecked pt. Pt states she has had increased SOA. Discussed plan to review CXR along with a breathing treatment Supplemental oxygen increased to 5L with increased oxygen saturation. The patient indicates understanding of these issues and agrees with the plan.    0902-Ordered lab work, CT abd/pelvis and CXR for further evaluation. RT consult placed. Duo-neb ordered for SOA. Pt declined pain/nausea medication at this time.     0943-Pt complains of worsening nausea. Zofran ordered.     1037-Called to room for decrease in O2 saturation. Pt is less responsive. O2 sats increased to upper 70s% with repositioning of pt. Pt currently on 9L and I am not sure who placed her on this level of oxygen. She is much more somnolent.  ABG ordered. Respiratory called for evaluation for possible bipap. Discussed with pt and family need for admission and further evaluation. The patient and family indicates understanding of these issues and agrees with the plan.    1044-Discussed pt case with  (PCP) who agrees with plan to consult with pulmonary and will see pt in consult in ED.     1045- Reviewed pt's history and workup with Dr. Aponte.  After a bedside evaluation, Dr. Aponte agrees with the plan of care.    1053-RN to place pt on nonrebreather. MD Ev at bedside.     1059- (PCP) at bedside to evaluate pt. Oxygen saturations are improving significantly after being placed on NRB.     1109-Per  (radiology), CT abd/pelvis shows some stool but is negative acute.    1112- (PCP) has evaluated pt in ED and will admit pt but ask for pulmonology consult. Based on the patient's lab findings and presenting symptoms, the  "doctor and I feel it is appropriate to admit the patient for further management, evaluation, and treatment.  I have discussed this with the admitting team.  I have also discussed this with the patient/family.  They are in agreement with admission.      1126-Discussed pt case with  (pulmonology) who will see pt in consult. Pt resting comfortably on Bi-PAP, O2 saturation 100%.     1133-, ED attending, is requesting ICU admission after reviewing ABG. Will admit pt to ICU. Family and pt updated and are in agreement. All questions addressed.     Disposition vitals:  /76 (BP Location: Right arm)   Pulse 62   Temp 98.2 °F (36.8 °C) (Tympanic)   Resp 24   Ht 162.6 cm (64\")   Wt 86.3 kg (190 lb 4.8 oz)   LMP  (LMP Unknown)   SpO2 100%   BMI 32.66 kg/m²       DIAGNOSIS  Final diagnoses:   Generalized abdominal pain   Acute on chronic respiratory failure with hypoxemia (CMS/HCC)   Hypercapnia       ADMISSION    Discussed treatment plan and reason for admission with pt/family and admitting physician.  Pt/family voiced understanding of the plan for admission for further testing/treatment as needed.         Documentation assistance provided by junior Borges for CRISTHIAN Sandoval.  Information recorded by the junior was done at my direction and has been verified and validated by me.        Sheree Borges  10/06/19 1324       Deisi Martines APRN  10/06/19 1430       Deisi Martines APRN  10/06/19 1431    "

## 2019-10-06 NOTE — CONSULTS
Group: [facility]         CRITICAL CARE CONSULT NOTE    Patient Identification:  Ale Narayan  79 y.o.  female  1940  [id number]            Requesting physician: Dr. Harmeet Payne    Reason for Consultation: Acute on chronic respiratory failure with altered mental status    CC: Altered mental status    History of Present Illness:  79-year-old  female who presents with altered mental status.  She cannot provide her own history.  I discussed the case extensively with Dr. Jad Aponte from the ER as well as Dr. Harmeet Payne, internal medicine.  Family is at bedside and they also helped with history.  Apparently she had some abdominal pain for several days with some abdominal distention.  She is also been much more short of breath than usual.  She is chronically short of breath at baseline but over the past 24 hours her family states that she is been huffing and puffing, having difficulty breathing.  They also state that her legs have been swelling.  When she arrived to the emergency room, she received additional oxygen while she was down in the CT scanner.  Her oxygen flow was set quite high and that may have caused carbon dioxide retention.  The patient became unresponsive and she suddenly started requiring 100% FiO2 to keep oxygen saturations up.  Stat blood gas was obtained and it showed severe respiratory acidosis with hypercapnia.  At this point we were consulted for management.  The patient has been admitted to the intensive care unit and we started her on noninvasive positive pressure ventilation.  There is no history of fever, chills or diarrhea.  Recent x-ray at the outlying facility suggested perhaps pneumonia.  She was started on antibiotics a few days ago.  She had been undergoing rehabilitation at [facility].  Diuretics were also initiated.      Review of Systems   Unable to perform ROS: Mental status change       Past Medical History:  Past Medical History:  [address] DIALYSIS NOTE    Wendy Galindo is a 66year old female. [id number]  Patient was seen and examined on dialysis.     DIALYSIS MODALITY:     Intermittent hemodialysis    DIALYSIS PRESCRIPTION:     ACCESS: RIJ tunnelled dialysis c   Diagnosis Date   • Adenocarcinoma of lung (CMS/HCC)    • Asthma    • Carotid artery disease (CMS/HCC)    • Cough    • Deep vein thrombosis (CMS/HCC)    • Emphysema of lung (CMS/HCC)    • Hyperlipidemia    • Hypertension    • Hypothyroidism    • Lung cancer (CMS/HCC)    • Osteoporosis    • Paroxysmal atrial fibrillation (CMS/HCC)    • Peripheral arterial disease (CMS/HCC)    • Pulmonary embolism (CMS/HCC)    • Thrombophilia (CMS/HCC)     Since age 25       Past Surgical History:  Past Surgical History:   Procedure Laterality Date   • ANKLE SURGERY     • BREAST SURGERY  2015    removal of benign melanoma spot on nipple of left breast 7/2013   • COLONOSCOPY      Negative   • COLONOSCOPY N/A 9/6/2019    Procedure: COLONOSCOPY to cecum with biopsy / polypectomy and  APC cautery;  Surgeon: Alexsandra Ruiz MD;  Location: [facility] ENDOSCOPY;  Service: Gastroenterology   • ENDOSCOPY N/A 9/6/2019    Procedure: ESOPHAGOGASTRODUODENOSCOPY with biopsies;  Surgeon: Alexsandra Ruiz MD;  Location: [facility] ENDOSCOPY;  Service: Gastroenterology   • HERNIA REPAIR      Lumbar spine   • HYSTERECTOMY     • LUNG LOBECTOMY  2009   • OTHER SURGICAL HISTORY      Bypass of lower extremities   • OTHER SURGICAL HISTORY      IVC filter placed        Home Meds:  Medications Prior to Admission   Medication Sig Dispense Refill Last Dose   • acetaminophen (TYLENOL) 325 MG tablet Take 2 tablets by mouth Every 6 (Six) Hours As Needed for Mild Pain . 100 tablet 3    • atorvastatin (LIPITOR) 20 MG tablet Take 10 mg by mouth Every Evening.   Taking   • budesonide (PULMICORT) 0.5 MG/2ML nebulizer solution Take 2 mL by nebulization 2 (Two) Times a Day. 100 each 3    • carvedilol (COREG) 25 MG tablet Take 25 mg by mouth 2 (Two) Times a Day With Meals.      • cholecalciferol (VITAMIN D3) 1000 units tablet Take 1,000 Units by mouth Daily.      • cilostazol (PLETAL) 100 MG tablet Take 1 tablet by mouth 2 (two) times a day.   Taking   • esomeprazole (nexIUM)  neurology following     Radiculopathy:  -- starting gabapentin, needs to be dosed for ESRD and since dialyzable will also need small supplemental dose post HD (dose is  mg/d in ESRD)  -- defer dosing to neurology service    [address] [facility] ANABEL Varghese MD  [address] [place] [address] 40 MG capsule Take 20 mg by mouth Every Morning Before Breakfast.      • ferrous sulfate 325 (65 FE) MG tablet Take 1 tablet by mouth 2 (Two) Times a Day With Meals. 120 tablet 3    • ipratropium-albuterol (DUO-NEB) 0.5-2.5 mg/3 ml nebulizer Take 3 mL by nebulization 4 (Four) Times a Day. 360 mL 3    • levothyroxine (SYNTHROID, LEVOTHROID) 50 MCG tablet Take 1 tablet by mouth Daily. 120 tablet 3    • polyethylene glycol (MIRALAX) pack packet Take 17 g by mouth Daily As Needed (Constipation). 30 each 3    • potassium chloride (MICRO-K) 10 MEQ CR capsule Take 20 mEq by mouth Daily.      • promethazine (PHENERGAN) 12.5 MG tablet Take 1 tablet by mouth Every 6 (Six) Hours As Needed for Nausea or Vomiting. 60 tablet 3    • rivaroxaban (XARELTO) 20 MG tablet Xarelto 20 mg tablet   1 tab(s) orally once a day #30 Tablet(s) Substitutions Allowed   9/3/2019   • sertraline (ZOLOFT) 50 MG tablet Take 50 mg by mouth Daily.      • traMADol (ULTRAM) 50 MG tablet 50 mg Every 6 (Six) Hours As Needed for Moderate Pain .   Taking       Allergies:  Allergies   Allergen Reactions   • Adhesive Tape    • Augmentin [Amoxicillin-Pot Clavulanate]    • Cephalexin    • Metoclopramide Unknown (See Comments)   • Pentazocine    • Simvastatin Unknown (See Comments)   • Sucralfate Unknown (See Comments)       Social History:   Social History     Socioeconomic History   • Marital status:      Spouse name: Not on file   • Number of children: Not on file   • Years of education: High School   • Highest education level: Not on file   Occupational History   • Occupation: , retired     Employer: [employer] [place], [place]    Tobacco Use   • Smoking status: Former Smoker     Packs/day: 1.00     Years: 50.00     Pack years: 50.00     Types: Cigarettes     Start date:      Last attempt to quit:      Years since quittin.7   • Smokeless tobacco: Former User   Substance and Sexual Activity   • Alcohol use: No   • Drug use:  "No   • Sexual activity: Defer       Family History:  Family History   Problem Relation Age of Onset   • Lung cancer Mother        Physical Exam:  /91   Pulse 66   Temp 98.2 °F (36.8 °C) (Tympanic)   Resp 24   Ht 162.6 cm (64\")   Wt 81.7 kg (180 lb 1.9 oz)   LMP  (LMP Unknown)   SpO2 100%   BMI 30.92 kg/m²  Body mass index is 30.92 kg/m². 100% 81.7 kg (180 lb 1.9 oz)  Physical Exam   Constitutional:   Nonverbal, unresponsive, patient is obtunded   HENT:   Right Ear: External ear normal.   Left Ear: External ear normal.   Nose and mouth cannot be examined because they are covered by noninvasive ventilator BiPAP mask due to respiratory failure   Eyes: Conjunctivae are normal. No scleral icterus.   Pupils are small but equal and pinpoint bilaterally, nonreactive   Neck: No JVD present. No tracheal deviation present. No thyromegaly present.   Cardiovascular: Normal rate, regular rhythm and normal heart sounds.   No murmur heard.  Pulmonary/Chest:   Distant breath sounds.  No wheezing.  Very shallow breathing.  Very slow breaths.  No dullness to percussion, no use of accessory muscles   Abdominal: Soft. She exhibits no mass. There is no tenderness. There is no rebound.   No liver or spleen enlargement, palpated   Musculoskeletal: She exhibits no deformity.   Neurological:   Obtunded, nonverbal, unresponsive   Skin: Skin is warm. No rash noted.   No palpable nodules   Psychiatric:   Cannot be evaluated because patient is unresponsive       LABS:  Lab Results   Component Value Date    CALCIUM 8.7 10/06/2019    PHOS 2.9 09/12/2015     Results from last 7 days   Lab Units 10/06/19  0913   MAGNESIUM mg/dL 1.9   SODIUM mmol/L 137   POTASSIUM mmol/L 4.2   CHLORIDE mmol/L 87*   CO2 mmol/L 44.4*   BUN mg/dL 11   CREATININE mg/dL 0.61   GLUCOSE mg/dL 119*   CALCIUM mg/dL 8.7   WBC 10*3/mm3 7.88   HEMOGLOBIN g/dL 9.9*   PLATELETS 10*3/mm3 265   ALT (SGPT) U/L 17   AST (SGOT) U/L 14   PROBNP pg/mL 6,451.0*     Lab " Results   Component Value Date    CKTOTAL 43 03/15/2017    CKMB 1.61 03/15/2017    TROPONINT <0.010 10/06/2019     Results from last 7 days   Lab Units 10/06/19  1232 10/06/19  0913   TROPONIN T ng/mL <0.010 <0.010             Results from last 7 days   Lab Units 10/06/19  1107   PH, ARTERIAL pH units 7.179*   PCO2, ARTERIAL mm Hg 118.8*   PO2 ART mm Hg 82.3   FLOW RATE lpm 15   MODALITY  NRB   O2 SATURATION CALC % 90.9*                 Lab Results   Component Value Date    TSH 0.722 10/06/2019     Estimated Creatinine Clearance: 60.9 mL/min (by C-G formula based on SCr of 0.61 mg/dL).  Results from last 7 days   Lab Units 10/06/19  0959   NITRITE UA  Negative   WBC UA /HPF 0-2   BACTERIA UA /HPF None Seen   SQUAM EPITHEL UA /HPF 3-6*        Imaging: I personally visualized the images of portable chest x-ray and compared to previous ones.  She has poor respiratory effort.  Flattening of the diaphragms.  Chronic linear opacities scattered in both lungs indicative of chronic lung disease.  Blunting of the costophrenic angle on the right side indicative of perhaps small effusion.    EKG: I directly visualized twelve-lead EKG showing what seems like atrial flutter with 4-1 conduction block but acceptable heart rate.  Nonspecific ST-T changes throughout with left bundle branch block.      Assessment:  Acute on chronic respiratory failure  Abdominal pain  Edema  COPD  Fluid overload  History of ToccoSubu cardiomyopathy with heart failure  Angiodysplasia with chronic GI bleed  Chronic anemia related to GI blood loss  Personal history of thrombosis with pulmonary embolism  History of adenocarcinoma of the lung x2, 2009 in 2016 status post palliative radiation  Peripheral vascular disease  Hypothyroidism        Recommendations:  Patient is critically ill.  I discussed with her usual primary care physician, Dr. Payne as well as all her children.  Due to her long-standing underlying comorbidities, this patient is DNR.  Scope  of treatment is limited to noninvasive positive pressure ventilation with BiPAP, antiarrhythmics, pressors, antibiotics and possibly transfusion if needed.  Artificial nutrition, dialysis and cardioversion are not desired.    We will admit her to the intensive care unit.  I will begin bilevel noninvasive ventilation with BiPAP and repeat arterial blood gases in a few hours to see if PCO2 is improving.  We will also give nebulized ipratropium-albuterol 4 times daily.  The patient is currently quite obtunded.  Nothing by mouth, no pills, no tablets until tomorrow morning until patient completely awake.    I will check a procalcitonin level.  Continue to monitor temperature curve at the bedside and white count with CBC in the morning.  I will hold all home medications and resume them slowly tomorrow, depending on her level of alertness.    Total critical care time 38 minutes, excluding any separately billable procedure time      Lauri Arriaga MD  10/6/2019  4:56 PM      Much of this encounter note is an electronic transcription/translation of spoken language to printed text using Dragon Software.

## 2019-10-06 NOTE — H&P
Date of admission: October 6, 2019    Chief complaint: Abdominal discomfort, constipation, respiratory failure    History of present illness:  This is a very nice 79-year-old lady.  She has a history of COPD, dilated cardiomyopathy with ejection fraction of about 40, past DVT/PE/atrial fibrillation and past lung cancer (adenocarcinoma of the lung diagnosed 2009, 2016) patient was admitted to the hospital on September 5 and discharged on September 17.  She was treated for lower GI bleed related to angiodysplasia.  She developed anemia related to this bleed and chronic disease.    Patient has been rehabbing at [facility].  She has complained of nausea and some constipation.  She recently developed some swelling in her feet and ankles and some rales in the right base.  I noticed her CO2 was slowly rising.  Patient was placed on an increased dose of furosemide and started on doxycycline 3 days ago.    Today the patient presented to the emergency room complaining of abdominal discomfort and constipation.  She was sent to radiology for CT scan of the abdomen pelvis.  Patient came back from radiology on 9 L of oxygen.  She substernally had a respiratory arrest.  She stopped breathing and became unresponsive.  Patient was placed on a nonrebreather and her mental status improved where she could follow simple directions and her respiratory efforts were better.    I can get no history from the patient at this time.    Past medical history:     Allergies: Augmentin, Carafate, Keflex, Reglan, Talwin, Zocor     Current medications:  Pletal 100 mg twice daily  Tylenol 650 every 6 hours as needed  Tramadol 50 mg every 6 hours as needed  Pulmicort 2 mL's twice daily  Duo nebs 3 mL's 4 times daily as needed  Xarelto 20 mill grams a day  Phenergan 12-1/2 mg every 6 as needed  Zoloft 50 mg a day  Lipitor 20 g a day  Coreg 25 twice daily  Lasix 40 mill grams a day  Ferrous sulfate 325 twice daily  MiraLAX 17.5 mL's PRN  daily  Senokot 2 nightly  Potassium chloride 20 mg a day  Synthroid 50 mcg a day  Nexium 40 mg a day  Vitamin D 1000 IUs daily  Doxycycline 100 twice daily  Oxygen 3-1/2 L nasal cannula       Past medical history:  Admission  through .  Diagnosed with GI bleed related to angiodysplasia.  Anemia related to acute blood loss plus chronic disease.  Patient had chronic nausea throughout this hospitalization despite inconclusive work-up  Essential hypertension  Hyperlipidemia  GERD  MISSY  Osteoarthritis  Hypothyroid  Peripheral vascular disease  COPD  Distant polymyalgia rheumatica  Past DVT/PEs, multiple occasions  Paroxysmal atrial fibrillation  Takasubo cardiomyopathy 2012 and   Malignant melanoma in situ left breast region 2013  Adenocarcinoma of the lung 2009 likely resected, 2016 treated with radiation     Surgical history:  Bilateral tubal ligation 196  Total hysterectomy 1971  Nephrectomy   Vena cava filter   Hernia repair at   Breast biopsy   Vena cava filter   Video-assisted thorascopic wedge resection right lower lobe 2009  Vein stripping 2010  Femorofemoral bypass right lower extremity   Open reduction internal fixation left bimalleolar ankle fracture   Irrigation and debridement removal of hardware left ankle      Colonoscopy 2019 demonstrated diverticulosis and a couple colonic angiodysplasia which were bleeding.  EGD 2019 demonstrated a hiatal hernia and some esophageal polyps.  The patient also had some gastritis.     Family history:  Father  age 54, esophageal rupture  Mother  age 65, lung cancer and heart disease.  Patient has 3 brothers and one sister who are living.  They are in their 70s.  One has prostate cancer.  One has polymyalgia rheumatica.  She had a brother who  age 7.  The death was accidental.  The patient has a daughter 62.  She has hypertension irritable bowel syndrome and hyperlipidemia.  The  patient has 8 grandchildren and 6 great-grandchildren     Social history  Former smoker.  80-pack-year history of cigarette abuse.  Quit   The patient rarely drinks alcohol.  The patient is a .  Her   in 2016.  She has been  5 times.    The patient lives alone.  The patient is sedentary.     Review of systems: Based on history taken in the nursing home a few days ago.  General: No fevers or chills.    No night sweats.  Energy poor.  Neurologic: No current issues.  Cardiorespiratory: No chest pain.    Shortness of breath with activity but doing physical therapy.  GI: Nausea and constipation.  : Urinates 5-6 times a day and couple times at night.  No incontinence.  GYN: Negative.  Dermatologic: Unremarkable.  Musculoskeletal: Unremarkable.  Psychiatric: No depression.  Patient does suffer from anxiety.  She is not suicidal homicidal.     Physical exam:  Vital signs: /56, pulse 67, respirate 18, temperature 98.2, oxygen 100% on the non-rebreather  Appearance: Elderly  lady with central obesity.    She is lethargic and disoriented.  HEENT: Normocephalic and atraumatic.  Scalp unremarkable.  Pupils are round and equal.  Pharynx clear mucous membranes moist.  Neck: Decreased range of motion.  No lymphadenopathy, JVD or thyromegaly.  Lungs: Decreased breath sounds.    Rales in both bases.  Heart: PMI not palpable.    Rate between 60 and 70.  Irregularly irregular  Peripheral vascular exam: Radial pulses difficult to palpate.  Good brachial pulses.  Good popliteal pulses.  Dorsal needle and posterior tibial pulses not palpable.  Abdomen: Protuberant with a large fat pad.    Proactive active bowel sounds.  No rebound, tenderness or guarding.  Extremities: Patient had 1+ edema on the right and trace in the left and the ankle and tibial area.  No clubbing, cyanosis or edema.  There was atrophy in the muscles of her arms and legs.  Neurologic: Nonfocal.  Lethargic    Laboratory  data:  ABGs on 15 L nonrebreather pH 7.17, CO2 118, O2 82, HCO3 44.  proBNP 6451  Compress metabolic panel unremarkable except for CO2 44  Magnesium 1.9  White blood cell count 7.8, hemoglobin 9.9, hematocrit 31.3, MCV 90, platelet count 265  Urinalysis unremarkable.  Chest x-ray demonstrated cardiomegaly small bilateral effusions and slight increased interstitial markings.  CT of abdomen pelvis pending.    Impression:  1.  Respiratory failure.  This is a combination of the patient's severe COPD and some congestive heart failure.  The patient acutely had suppressed respirations due to oxygen supplementation.  2.  Severe COPD.  3.  Congestive heart failure.  Ejection fraction runs about 40.  The patient has had fariba Wood's cardiomyopathy x2 in the past.  Patient has paroxysmal atrial for  4.  History of adenocarcinoma of the lung 2009 in 2016.  Last CT scan demonstrated no active cancer.  5.  Recent angiodysplasia chronic bleed.  Anemia related to iron deficiency and chronic disease.  6.  Past DVTs/PEs   7.  History of depression/anxiety [place] 7.  Hypothyroid on replacement  8.  Peripheral vascular disease    Plan:  The patient is going to unit I placed her on noninvasive positive pressure breathing.  This should bring her CO2 down and return her back to her baseline.  Also also started her on 40 mg of Lasix IV.  She will receive this daily.  I will hold off on her Pletal now.  I am not going to give her antibiotics right now I am not sure she has an infection.  The patient will be seen by pulmonology.  Should be noted this patient is a no code.

## 2019-10-06 NOTE — ED TRIAGE NOTES
Pt a resident at [facility], staff reports pt has not had a bowel movement in 6 days. Pt c/o generalized abd pain and nausea.

## 2019-10-06 NOTE — NURSING NOTE
Discussed with Dr. Arriaga that pt is still lethargic and not able to safely take any PO meds. meds include Coreg lipitor xarelto. Asked if wanted me to drop and cortrak for meds.Telephone orders for strict NPO, no meds, no cortrak at this time.

## 2019-10-07 LAB
ANION GAP SERPL CALCULATED.3IONS-SCNC: 7.1 MMOL/L (ref 5–15)
ARTERIAL PATENCY WRIST A: ABNORMAL
ARTERIAL PATENCY WRIST A: POSITIVE
ATMOSPHERIC PRESS: 750.9 MMHG
ATMOSPHERIC PRESS: 752.6 MMHG
BASE EXCESS BLDA CALC-SCNC: 15.1 MMOL/L (ref 0–2)
BASE EXCESS BLDA CALC-SCNC: 19 MMOL/L (ref 0–2)
BASOPHILS # BLD AUTO: 0.05 10*3/MM3 (ref 0–0.2)
BASOPHILS NFR BLD AUTO: 0.6 % (ref 0–1.5)
BDY SITE: ABNORMAL
BDY SITE: ABNORMAL
BUN BLD-MCNC: 15 MG/DL (ref 8–23)
BUN/CREAT SERPL: 21.4 (ref 7–25)
CALCIUM SPEC-SCNC: 8.7 MG/DL (ref 8.6–10.5)
CHLORIDE SERPL-SCNC: 89 MMOL/L (ref 98–107)
CO2 SERPL-SCNC: 40.9 MMOL/L (ref 22–29)
CREAT BLD-MCNC: 0.7 MG/DL (ref 0.57–1)
DEPRECATED RDW RBC AUTO: 44.5 FL (ref 37–54)
EOSINOPHIL # BLD AUTO: 0.07 10*3/MM3 (ref 0–0.4)
EOSINOPHIL NFR BLD AUTO: 0.8 % (ref 0.3–6.2)
ERYTHROCYTE [DISTWIDTH] IN BLOOD BY AUTOMATED COUNT: 13.4 % (ref 12.3–15.4)
GFR SERPL CREATININE-BSD FRML MDRD: 81 ML/MIN/1.73
GLUCOSE BLD-MCNC: 115 MG/DL (ref 65–99)
HCO3 BLDA-SCNC: 46.6 MMOL/L (ref 22–28)
HCO3 BLDA-SCNC: 48 MMOL/L (ref 22–28)
HCT VFR BLD AUTO: 29.1 % (ref 34–46.6)
HGB BLD-MCNC: 8.9 G/DL (ref 12–15.9)
HOROWITZ INDEX BLD+IHG-RTO: 75 %
HOROWITZ INDEX BLD+IHG-RTO: 75 %
IMM GRANULOCYTES # BLD AUTO: 0.02 10*3/MM3 (ref 0–0.05)
IMM GRANULOCYTES NFR BLD AUTO: 0.2 % (ref 0–0.5)
LYMPHOCYTES # BLD AUTO: 0.82 10*3/MM3 (ref 0.7–3.1)
LYMPHOCYTES NFR BLD AUTO: 9.1 % (ref 19.6–45.3)
MAGNESIUM SERPL-MCNC: 1.8 MG/DL (ref 1.6–2.4)
MCH RBC QN AUTO: 28 PG (ref 26.6–33)
MCHC RBC AUTO-ENTMCNC: 30.6 G/DL (ref 31.5–35.7)
MCV RBC AUTO: 91.5 FL (ref 79–97)
MODALITY: ABNORMAL
MODALITY: ABNORMAL
MONOCYTES # BLD AUTO: 0.77 10*3/MM3 (ref 0.1–0.9)
MONOCYTES NFR BLD AUTO: 8.6 % (ref 5–12)
NEUTROPHILS # BLD AUTO: 7.25 10*3/MM3 (ref 1.7–7)
NEUTROPHILS NFR BLD AUTO: 80.7 % (ref 42.7–76)
NRBC BLD AUTO-RTO: 0 /100 WBC (ref 0–0.2)
O2 A-A PPRESDIFF RESPIRATORY: 0.3 MMHG
O2 A-A PPRESDIFF RESPIRATORY: 0.5 MMHG
PCO2 BLDA: 112 MM HG (ref 35–45)
PCO2 BLDA: 83.5 MM HG (ref 35–45)
PH BLDA: 7.23 PH UNITS (ref 7.35–7.45)
PH BLDA: 7.37 PH UNITS (ref 7.35–7.45)
PLATELET # BLD AUTO: 251 10*3/MM3 (ref 140–450)
PMV BLD AUTO: 10.2 FL (ref 6–12)
PO2 BLDA: 141 MM HG (ref 80–100)
PO2 BLDA: 226.7 MM HG (ref 80–100)
POTASSIUM BLD-SCNC: 3.6 MMOL/L (ref 3.5–5.2)
POTASSIUM BLD-SCNC: 5 MMOL/L (ref 3.5–5.2)
PROCALCITONIN SERPL-MCNC: <0.02 NG/ML (ref 0.1–0.25)
RBC # BLD AUTO: 3.18 10*6/MM3 (ref 3.77–5.28)
SAO2 % BLDCOA: 98.3 % (ref 92–99)
SAO2 % BLDCOA: 99.7 % (ref 92–99)
SET MECH RESP RATE: 24
SODIUM BLD-SCNC: 137 MMOL/L (ref 136–145)
TOTAL RATE: 22 BREATHS/MINUTE
TOTAL RATE: 24 BREATHS/MINUTE
TROPONIN T SERPL-MCNC: <0.01 NG/ML (ref 0–0.03)
TROPONIN T SERPL-MCNC: <0.01 NG/ML (ref 0–0.03)
VENTILATOR MODE: ABNORMAL
VENTILATOR MODE: ABNORMAL
VT ON VENT VENT: 323 ML
WBC NRBC COR # BLD: 8.98 10*3/MM3 (ref 3.4–10.8)

## 2019-10-07 PROCEDURE — 93005 ELECTROCARDIOGRAM TRACING: CPT | Performed by: INTERNAL MEDICINE

## 2019-10-07 PROCEDURE — 36600 WITHDRAWAL OF ARTERIAL BLOOD: CPT

## 2019-10-07 PROCEDURE — 80048 BASIC METABOLIC PNL TOTAL CA: CPT | Performed by: INTERNAL MEDICINE

## 2019-10-07 PROCEDURE — 85025 COMPLETE CBC W/AUTO DIFF WBC: CPT | Performed by: INTERNAL MEDICINE

## 2019-10-07 PROCEDURE — 94799 UNLISTED PULMONARY SVC/PX: CPT

## 2019-10-07 PROCEDURE — 84132 ASSAY OF SERUM POTASSIUM: CPT | Performed by: INTERNAL MEDICINE

## 2019-10-07 PROCEDURE — 93010 ELECTROCARDIOGRAM REPORT: CPT | Performed by: INTERNAL MEDICINE

## 2019-10-07 PROCEDURE — 82803 BLOOD GASES ANY COMBINATION: CPT

## 2019-10-07 PROCEDURE — 84484 ASSAY OF TROPONIN QUANT: CPT | Performed by: INTERNAL MEDICINE

## 2019-10-07 PROCEDURE — 25010000002 FUROSEMIDE PER 20 MG: Performed by: INTERNAL MEDICINE

## 2019-10-07 PROCEDURE — 83735 ASSAY OF MAGNESIUM: CPT | Performed by: INTERNAL MEDICINE

## 2019-10-07 PROCEDURE — 84145 PROCALCITONIN (PCT): CPT | Performed by: INTERNAL MEDICINE

## 2019-10-07 RX ORDER — LOPERAMIDE HYDROCHLORIDE 2 MG/1
2 CAPSULE ORAL 4 TIMES DAILY PRN
COMMUNITY
End: 2021-01-01 | Stop reason: HOSPADM

## 2019-10-07 RX ORDER — CLOTRIMAZOLE AND BETAMETHASONE DIPROPIONATE 10; .64 MG/G; MG/G
1 CREAM TOPICAL 2 TIMES DAILY
COMMUNITY
End: 2021-01-01 | Stop reason: HOSPADM

## 2019-10-07 RX ORDER — MIRTAZAPINE 30 MG/1
30 TABLET, FILM COATED ORAL NIGHTLY
Status: DISCONTINUED | OUTPATIENT
Start: 2019-10-07 | End: 2019-10-10

## 2019-10-07 RX ORDER — FUROSEMIDE 40 MG/1
40 TABLET ORAL EVERY MORNING
COMMUNITY
End: 2019-10-13 | Stop reason: HOSPADM

## 2019-10-07 RX ORDER — FUROSEMIDE 10 MG/ML
40 INJECTION INTRAMUSCULAR; INTRAVENOUS DAILY
Status: DISCONTINUED | OUTPATIENT
Start: 2019-10-07 | End: 2019-10-08

## 2019-10-07 RX ORDER — MIRTAZAPINE 30 MG/1
30 TABLET, FILM COATED ORAL NIGHTLY
COMMUNITY
End: 2019-10-13 | Stop reason: HOSPADM

## 2019-10-07 RX ORDER — FUROSEMIDE 20 MG/1
20 TABLET ORAL DAILY
COMMUNITY
End: 2019-10-13 | Stop reason: HOSPADM

## 2019-10-07 RX ADMIN — BUDESONIDE 0.5 MG: 0.5 INHALANT RESPIRATORY (INHALATION) at 20:12

## 2019-10-07 RX ADMIN — SERTRALINE 50 MG: 50 TABLET, FILM COATED ORAL at 08:43

## 2019-10-07 RX ADMIN — BUDESONIDE 0.5 MG: 0.5 INHALANT RESPIRATORY (INHALATION) at 09:02

## 2019-10-07 RX ADMIN — IPRATROPIUM BROMIDE AND ALBUTEROL SULFATE 3 ML: 2.5; .5 SOLUTION RESPIRATORY (INHALATION) at 20:12

## 2019-10-07 RX ADMIN — SODIUM CHLORIDE, PRESERVATIVE FREE 10 ML: 5 INJECTION INTRAVENOUS at 12:30

## 2019-10-07 RX ADMIN — MIRTAZAPINE 30 MG: 30 TABLET, FILM COATED ORAL at 21:09

## 2019-10-07 RX ADMIN — IPRATROPIUM BROMIDE AND ALBUTEROL SULFATE 3 ML: 2.5; .5 SOLUTION RESPIRATORY (INHALATION) at 14:11

## 2019-10-07 RX ADMIN — ACETAMINOPHEN 650 MG: 325 TABLET, FILM COATED ORAL at 21:14

## 2019-10-07 RX ADMIN — SODIUM CHLORIDE, PRESERVATIVE FREE 10 ML: 5 INJECTION INTRAVENOUS at 21:10

## 2019-10-07 RX ADMIN — FUROSEMIDE 40 MG: 10 INJECTION, SOLUTION INTRAMUSCULAR; INTRAVENOUS at 12:30

## 2019-10-07 RX ADMIN — FERROUS SULFATE TAB 325 MG (65 MG ELEMENTAL FE) 325 MG: 325 (65 FE) TAB at 17:08

## 2019-10-07 RX ADMIN — ATORVASTATIN CALCIUM 10 MG: 10 TABLET, FILM COATED ORAL at 17:08

## 2019-10-07 RX ADMIN — CARVEDILOL 25 MG: 25 TABLET, FILM COATED ORAL at 08:43

## 2019-10-07 RX ADMIN — RIVAROXABAN 20 MG: 20 TABLET, FILM COATED ORAL at 17:08

## 2019-10-07 RX ADMIN — FERROUS SULFATE TAB 325 MG (65 MG ELEMENTAL FE) 325 MG: 325 (65 FE) TAB at 08:43

## 2019-10-07 RX ADMIN — TRAMADOL HYDROCHLORIDE 50 MG: 50 TABLET ORAL at 08:49

## 2019-10-07 RX ADMIN — CARVEDILOL 25 MG: 25 TABLET, FILM COATED ORAL at 17:08

## 2019-10-07 RX ADMIN — VITAMIN D, TAB 1000IU (100/BT) 1000 UNITS: 25 TAB at 08:43

## 2019-10-07 RX ADMIN — IPRATROPIUM BROMIDE AND ALBUTEROL SULFATE 3 ML: 2.5; .5 SOLUTION RESPIRATORY (INHALATION) at 09:02

## 2019-10-07 NOTE — PLAN OF CARE
Problem: Patient Care Overview  Goal: Plan of Care Review  Outcome: Ongoing (interventions implemented as appropriate)   10/07/19 0536   Coping/Psychosocial   Plan of Care Reviewed With patient   Plan of Care Review   Progress no change   OTHER   Outcome Summary In CCU on bipap for hypercapnia. Able to tolerate 4-6L for breaks. Remains NPO. Continue to monitor.        Problem: Skin Injury Risk (Adult)  Goal: Skin Health and Integrity  Outcome: Ongoing (interventions implemented as appropriate)      Problem: Fall Risk (Adult)  Goal: Absence of Fall  Outcome: Ongoing (interventions implemented as appropriate)      Problem: Pain, Acute (Adult)  Goal: Acceptable Pain Control/Comfort Level  Outcome: Ongoing (interventions implemented as appropriate)      Problem: Breathing Pattern Ineffective (Adult)  Goal: Effective Oxygenation/Ventilation  Outcome: Ongoing (interventions implemented as appropriate)    Goal: Anxiety/Fear Reduction  Outcome: Ongoing (interventions implemented as appropriate)      Problem: Chronic Obstructive Pulmonary Disease (Adult)  Goal: Signs and Symptoms of Listed Potential Problems Will be Absent, Minimized or Managed (Chronic Obstructive Pulmonary Disease)  Outcome: Ongoing (interventions implemented as appropriate)

## 2019-10-07 NOTE — ACP (ADVANCE CARE PLANNING)
Visited Pt for providing education on Advance directives.   Pt was not interested in information about AD at that moment.     Advance directives.

## 2019-10-07 NOTE — PROGRESS NOTES
History:  This 79-year-old lady is comfortable with this morning.  She is on noninvasive positive pressure breathing.  When this has been weaned the patient has quickly deteriorated.  This morning the patient has no complaints and wants to get off the BiPAP    Allergies  Adhesive tape; Augmentin [amoxicillin-pot clavulanate]; Cephalexin; Metoclopramide; Pentazocine; Simvastatin; and Sucralfate      Current Facility-Administered Medications:   •  acetaminophen (TYLENOL) tablet 650 mg, 650 mg, Oral, Q6H PRN, Harmeet Payne MD  •  atorvastatin (LIPITOR) tablet 10 mg, 10 mg, Oral, Q PM, Harmeet Payne MD  •  budesonide (PULMICORT) nebulizer solution 0.5 mg, 0.5 mg, Nebulization, BID - RT, Harmeet Payne MD, 0.5 mg at 10/06/19 1933  •  carvedilol (COREG) tablet 25 mg, 25 mg, Oral, BID With Meals, Harmeet Payne MD  •  cholecalciferol (VITAMIN D3) tablet 1,000 Units, 1,000 Units, Oral, Daily, Harmeet Payne MD  •  ferrous sulfate tablet 325 mg, 325 mg, Oral, BID With Meals, Harmeet Payne MD  •  ipratropium-albuterol (DUO-NEB) nebulizer solution 3 mL, 3 mL, Nebulization, 4x Daily - RT, Harmeet Payne MD, 3 mL at 10/06/19 1933  •  levothyroxine (SYNTHROID, LEVOTHROID) tablet 50 mcg, 50 mcg, Oral, Q AM, Harmeet Payne MD  •  nitroglycerin (NITROSTAT) SL tablet 0.4 mg, 0.4 mg, Sublingual, Q5 Min PRN, Harmeet Payne MD  •  pantoprazole (PROTONIX) EC tablet 40 mg, 40 mg, Oral, QAM, Harmeet Payne MD  •  polyethylene glycol 3350 powder (packet), 17 g, Oral, Daily PRN, Harmeet Payne MD  •  rivaroxaban (XARELTO) tablet 20 mg, 20 mg, Oral, Daily With Dinner, Harmeet Payne MD  •  sertraline (ZOLOFT) tablet 50 mg, 50 mg, Oral, Daily, Harmeet Payne MD  •  [COMPLETED] Insert peripheral IV, , , Once **AND** sodium chloride 0.9 % flush 10 mL, 10 mL, Intravenous, PRN, Deisi Martines APRN  •  sodium chloride 0.9 % flush 10 mL, 10 mL, Intravenous, Q12H, Harmeet Payne MD  •  sodium chloride 0.9 % flush 10 mL, 10 mL, Intravenous,  "PRN, Harmeet Payne MD  •  traMADol (ULTRAM) tablet 50 mg, 50 mg, Oral, Q8H PRN, Harmeet Payne MD    /50   Pulse 68   Temp 98.2 °F (36.8 °C) (Oral)   Resp 25   Ht 162.6 cm (64\")   Wt 80.1 kg (176 lb 9.4 oz)   LMP  (LMP Unknown)   SpO2 98%   BMI 30.31 kg/m²     Physical Exam  Appearance: Elderly obese  lady.  She is resting in bed.  Heart: Regular rate of about 70.  Patient is in atrial flutter.  Lungs: Very poor movement of air with rhonchi bilaterally.  Abdomen: Protuberant, benign    Lab Results (last 24 hours)     Procedure Component Value Units Date/Time    Troponin [[id number]]  (Normal) Collected:  10/07/19 0455    Specimen:  Blood Updated:  10/07/19 0542     Troponin T <0.010 ng/mL     Narrative:       Troponin T Reference Range:  <= 0.03 ng/mL-   Negative for AMI  >0.03 ng/mL-     Abnormal for myocardial necrosis.  Clinicians would have to utilize clinical acumen, EKG, Troponin and serial changes to determine if it is an Acute Myocardial Infarction or myocardial injury due to an underlying chronic condition.     TSH [[id number]]  (Normal) Collected:  10/06/19 1232    Specimen:  Blood Updated:  10/06/19 1627     TSH 0.722 uIU/mL     Troponin [[id number]]  (Normal) Collected:  10/06/19 0913    Specimen:  Blood Updated:  10/06/19 1431     Troponin T <0.010 ng/mL     Narrative:       Troponin T Reference Range:  <= 0.03 ng/mL-   Negative for AMI  >0.03 ng/mL-     Abnormal for myocardial necrosis.  Clinicians would have to utilize clinical acumen, EKG, Troponin and serial changes to determine if it is an Acute Myocardial Infarction or myocardial injury due to an underlying chronic condition.     Troponin [[id number]]  (Normal) Collected:  10/06/19 1232    Specimen:  Blood Updated:  10/06/19 1305     Troponin T <0.010 ng/mL     Narrative:       Troponin T Reference Range:  <= 0.03 ng/mL-   Negative for AMI  >0.03 ng/mL-     Abnormal for myocardial necrosis.  Clinicians would have to utilize " clinical acumen, EKG, Troponin and serial changes to determine if it is an Acute Myocardial Infarction or myocardial injury due to an underlying chronic condition.     BNP [[id number]]  (Abnormal) Collected:  10/06/19 0913    Specimen:  Blood Updated:  10/06/19 1137     proBNP 6,451.0 pg/mL     Narrative:       Among patients with dyspnea, NT-proBNP is highly sensitive for the detection of acute congestive heart failure. In addition NT-proBNP of <300 pg/ml effectively rules out acute congestive heart failure with 99% negative predictive value.    Blood Gas, Arterial [[id number]]  (Abnormal) Collected:  10/06/19 1107    Specimen:  Arterial Blood Updated:  10/06/19 1112     Site Arterial: right radial     Ector's Test Positive     pH, Arterial 7.179 pH units      Comment: Critical:Notify EDILSON cullen (06-Oct-19 11:09:49)Read back ok        pCO2, Arterial 118.8 mm Hg      Comment: Critical:Notify EDILSON cullen (06-Oct-19 11:09:49)Read back ok        pO2, Arterial 82.3 mm Hg      HCO3, Arterial 44.3 mmol/L      Base Excess, Arterial 11.7 mmol/L      O2 Saturation Calculated 90.9 %      Barometric Pressure for Blood Gas 750.4 mmHg      Modality NRB     Flow Rate 15 lpm      Rate 24 Breaths/minute     Urinalysis With Microscopic If Indicated (No Culture) - Urine, Catheter [[id number]]  (Abnormal) Collected:  10/06/19 0959    Specimen:  Urine, Catheter Updated:  10/06/19 1017     Color, UA Yellow     Appearance, UA Clear     pH, UA 6.0     Specific Gravity, UA 1.018     Glucose, UA Negative     Ketones, UA Negative     Bilirubin, UA Negative     Blood, UA Negative     Protein,  mg/dL (2+)     Leuk Esterase, UA Negative     Nitrite, UA Negative     Urobilinogen, UA 1.0 E.U./dL    Urinalysis, Microscopic Only - Urine, Catheter [[id number]]  (Abnormal) Collected:  10/06/19 0959    Specimen:  Urine, Catheter Updated:  10/06/19 1017     RBC, UA 3-5 /HPF      WBC, UA 0-2 /HPF      Bacteria, UA None Seen /HPF      Squamous  Epithelial Cells, UA 3-6 /HPF      Hyaline Casts, UA 3-6 /LPF      Methodology Automated Microscopy    Comprehensive Metabolic Panel [[id number]]  (Abnormal) Collected:  10/06/19 0913    Specimen:  Blood Updated:  10/06/19 0944     Glucose 119 mg/dL      BUN 11 mg/dL      Creatinine 0.61 mg/dL      Sodium 137 mmol/L      Potassium 4.2 mmol/L      Chloride 87 mmol/L      CO2 44.4 mmol/L      Calcium 8.7 mg/dL      Total Protein 6.8 g/dL      Albumin 3.60 g/dL      ALT (SGPT) 17 U/L      AST (SGOT) 14 U/L      Alkaline Phosphatase 82 U/L      Total Bilirubin 0.3 mg/dL      eGFR Non African Amer 95 mL/min/1.73      Globulin 3.2 gm/dL      A/G Ratio 1.1 g/dL      BUN/Creatinine Ratio 18.0     Anion Gap 5.6 mmol/L     Narrative:       GFR Normal >60  Chronic Kidney Disease <60  Kidney Failure <15    Magnesium [[id number]]  (Normal) Collected:  10/06/19 0913    Specimen:  Blood Updated:  10/06/19 0942     Magnesium 1.9 mg/dL     CBC & Differential [[id number]] Collected:  10/06/19 0913    Specimen:  Blood Updated:  10/06/19 0925    Narrative:       The following orders were created for panel order CBC & Differential.  Procedure                               Abnormality         Status                     ---------                               -----------         ------                     CBC Auto Differential[[id number]]        Abnormal            Final result                 Please view results for these tests on the individual orders.    CBC Auto Differential [[id number]]  (Abnormal) Collected:  10/06/19 0913    Specimen:  Blood Updated:  10/06/19 0925     WBC 7.88 10*3/mm3      RBC 3.46 10*6/mm3      Hemoglobin 9.9 g/dL      Hematocrit 31.3 %      MCV 90.5 fL      MCH 28.6 pg      MCHC 31.6 g/dL      RDW 13.2 %      RDW-SD 43.3 fl      MPV 10.0 fL      Platelets 265 10*3/mm3      Neutrophil % 79.0 %      Lymphocyte % 10.9 %      Monocyte % 8.8 %      Eosinophil % 0.4 %      Basophil % 0.6 %      Immature Grans % 0.3 %       Neutrophils, Absolute 6.23 10*3/mm3      Lymphocytes, Absolute 0.86 10*3/mm3      Monocytes, Absolute 0.69 10*3/mm3      Eosinophils, Absolute 0.03 10*3/mm3      Basophils, Absolute 0.05 10*3/mm3      Immature Grans, Absolute 0.02 10*3/mm3      nRBC 0.0 /100 WBC           Imp:  1.  Acute on chronic respiratory failure.  In large part related to COPD but the patient has some dilated cardiomyopathy as well.  2.  Generalized anxiety.  3.  Multiple comorbidities including NOC of the lung x2, hypothyroid, hypertension, past DVT/PEs, peripheral vascular disease, recent lower GI bleed from angiodysplasia, multifactorial anemia      Plan:  We are going to try to get her off BiPAP today.  If this is not successful she would likely be a good candidate for palliative care    Harmeet Payne MD  10/7/2019  7:58 AM

## 2019-10-07 NOTE — DISCHARGE PLACEMENT REQUEST
"Miko Bob (79 y.o. Female)     Date of Birth Social Security Number Address Home Phone MRN    1940  [address] [facility] [address] [phone] [id number]    [employer] Marital Status          Oriental orthodox        Admission Date Admission Type Admitting Provider Attending Provider Department, Room/Bed    10/6/19 Emergency Lauri Arriaga MD Nidadavolu, Vinay Gopal, MD [facility] CORONARY [facility], N330/1    Discharge Date Discharge Disposition Discharge Destination                       Attending Provider:  Jhonatan Scott MD    Allergies:  Adhesive Tape, Augmentin [Amoxicillin-pot Clavulanate], Cephalexin, Metoclopramide, Pentazocine, Simvastatin, Sucralfate    Isolation:  None   Infection:  None   Code Status:  No CPR    Ht:  162.6 cm (64\")   Wt:  80.1 kg (176 lb 9.4 oz)    Admission Cmt:  None   Principal Problem:  None                Active Insurance as of 10/6/2019     Primary Coverage     Payor Plan Insurance Group Employer/Plan Group    MEDICARE MEDICARE A & B      Payor Plan Address Payor Plan Phone Number Payor Plan Fax Number Effective Dates    [address] [phone]  9/1/2007 - None Entered    [facility] [id number]       Subscriber Name Subscriber Birth Date Member ID       MIKO BOB 1940 [id number]           Secondary Coverage     Payor Plan Insurance Group Employer/Plan Group    [facility] SUPP [id number]     Payor Plan Address Payor Plan Phone Number Payor Plan Fax Number Effective Dates    [address]   12/1/2016 - None Entered    [facility] [address]       Subscriber Name Subscriber Birth Date Member ID       MIKO BOB 1940 [id number]                 Emergency Contacts      (Rel.) Home Phone Work Phone Mobile Phone    Michelle Gallegos (Daughter) [phone] -- --    ShanikaDung (Brother) -- -- [phone]              "

## 2019-10-07 NOTE — DISCHARGE PLACEMENT REQUEST
"Miko Bob (79 y.o. Female)     Date of Birth Social Security Number Address Home Phone MRN    1940  [id number] [facility] [address] [phone] [id number]    Orthodox Marital Status          Buddhist        Admission Date Admission Type Admitting Provider Attending Provider Department, Room/Bed    10/6/19 Emergency Lauri Arriaga MD Nidadavolu, Vinay Gopal, MD [facility] CORONARY [facility], N330/1    Discharge Date Discharge Disposition Discharge Destination                       Attending Provider:  Jhonatan Scott MD    Allergies:  Adhesive Tape, Augmentin [Amoxicillin-pot Clavulanate], Cephalexin, Metoclopramide, Pentazocine, Simvastatin, Sucralfate    Isolation:  None   Infection:  None   Code Status:  No CPR    Ht:  162.6 cm (64\")   Wt:  80.1 kg (176 lb 9.4 oz)    Admission Cmt:  None   Principal Problem:  None                Active Insurance as of 10/6/2019     Primary Coverage     Payor Plan Insurance Group Employer/Plan Group    MEDICARE MEDICARE A & B      Payor Plan Address Payor Plan Phone Number Payor Plan Fax Number Effective Dates    [address] [phone]  9/1/2007 - None Entered    [facility] [id number]       Subscriber Name Subscriber Birth Date Member ID       MIKO BOB 1940 [id number]           Secondary Coverage     Payor Plan Insurance Group Employer/Plan Group    [facility] SUPP [id number]     Payor Plan Address Payor Plan Phone Number Payor Plan Fax Number Effective Dates    [address]   12/1/2016 - None Entered    [facility] [address]       Subscriber Name Subscriber Birth Date Member ID       MIKO BOB 1940 [id number]                 Emergency Contacts      (Rel.) Home Phone Work Phone Mobile Phone    Michelle Gallegos (Daughter) [phone] -- --    ShanikaDung (Brother) -- -- [phone]            "

## 2019-10-07 NOTE — PLAN OF CARE
Problem: Chronic Obstructive Pulmonary Disease (Adult)  Intervention: Reduce/Relieve Breathlessness   10/07/19 1729   Coping/Psychosocial Interventions   Environmental Support calm environment promoted;distractions minimized;comfort object encouraged;environmental consistency promoted;personal routine supported;rest periods encouraged   Cognitive Interventions   Sensory Stimulation Regulation quiet environment promoted;care clustered;lighting decreased;tactile stimulation minimized;visual stimulation minimized     Intervention: Prevent/Manage DVT/VTE Risk   10/07/19 1729   Interventions   VTE Prevention/Management sequential compression devices on     Intervention: Optimize Nutrition and Fluid Status   10/07/19 1729   Nutrition Interventions   Oral Nutrition Promotion calorie dense foods provided;calorie dense liquids provided;physical activity promoted;rest periods promoted     Intervention: Optimize Functional Ability/Increase Activity Tolerance   10/07/19 1729   Activity   Activity Management activity adjusted per tolerance     Intervention: Optimize Oxygenation/Ventilation/Perfusion   10/07/19 1729   Respiratory Interventions   Airway/Ventilation Management humidification applied;pulmonary hygiene promoted;calming measures promoted   Breathing Techniques/Airway Clearance deep/controlled cough encouraged;diaphragmatic breathing promoted;pursed-lip breathing encouraged   Positioning   Head of Bed (HOB) HOB at 45 degrees;HOB at 60-90 degrees     Intervention: Support/Optimize Psychosocial Response to Chronic Pulmonary Disease   10/07/19 1729   Coping/Psychosocial Interventions   Supportive Measures active listening utilized;counseling provided;decision-making supported;goal setting facilitated;positive reinforcement provided;problem solving facilitated;relaxation techniques promoted;verbalization of feelings encouraged;self-responsibility promoted   Life Transition/Adjustment advance care planning facilitated    Interventions   Trust Relationship/Rapport care explained;emotional support provided;questions answered;reassurance provided;thoughts/feelings acknowledged   Psychosocial Support   Family/Support System Care involvement promoted   10/07/19 1729   Coping/Psychosocial Interventions   Supportive Measures active listening utilized;counseling provided;decision-making supported;goal setting facilitated;positive reinforcement provided;problem solving facilitated;relaxation techniques promoted;verbalization of feelings encouraged;self-responsibility promoted   Life Transition/Adjustment advance care planning facilitated   Interventions   Trust Relationship/Rapport care explained;emotional support provided;questions answered;reassurance provided;thoughts/feelings acknowledged   Psychosocial Support   Family/Support System Care involvement promoted;support provided   ;support provided       Goal: Signs and Symptoms of Listed Potential Problems Will be Absent, Minimized or Managed (Chronic Obstructive Pulmonary Disease)   10/07/19 1729   Goal/Outcome Evaluation   Problems Assessed (Chronic Obstructive Pulmonary Disease (COPD)) respiratory compromise;situational response   Problems Present (COPD, Bronch/Emphy) respiratory compromise;situational response

## 2019-10-07 NOTE — PROGRESS NOTES
Continued Stay Note  [facility]     Patient Name: Ale Narayan  MRN: [id number]  Today's Date: 10/7/2019    Admit Date: 10/6/2019    Discharge Plan     Row Name 10/07/19 1321       Plan    Plan Comments  Spoke with Alexia/[facility] and they are following.  They followed the patient while she was in rehab and were supposed to follow at home. Aidee Camarillo RN        Discharge Codes    No documentation.             Aidee Camarillo RN

## 2019-10-07 NOTE — PROGRESS NOTES
Clinical Pharmacy Services: Medication History    Ale Narayan is a 79 y.o. female presenting to [facility] for Hypercapnia [R06.89]  Generalized abdominal pain [R10.84]  Acute on chronic respiratory failure with hypoxemia (CMS/HCC) [J96.21]    She  has a past medical history of Adenocarcinoma of lung (CMS/HCC), Asthma, Carotid artery disease (CMS/HCC), Cough, Deep vein thrombosis (CMS/HCC), Emphysema of lung (CMS/HCC), Hyperlipidemia, Hypertension, Hypothyroidism, Lung cancer (CMS/HCC), Osteoporosis, Paroxysmal atrial fibrillation (CMS/HCC), Peripheral arterial disease (CMS/HCC), Pulmonary embolism (CMS/HCC), and Thrombophilia (CMS/[facility]).    Allergies as of 10/06/2019 - Reviewed 10/06/2019   Allergen Reaction Noted   • Adhesive tape  02/15/2017   • Augmentin [amoxicillin-pot clavulanate]  07/27/2016   • Cephalexin  07/27/2016   • Metoclopramide Unknown (See Comments) 09/23/2011   • Pentazocine  01/20/2016   • Simvastatin Unknown (See Comments) 09/23/2011   • Sucralfate Unknown (See Comments) 09/23/2011       Medication information was obtained from: Nursing home  Pharmacy and Phone Number: [facility] [address] [place] [address] N [address]ZA AT [facility]. & AZAL[facility] [phone] [facility] [phone] FX    Prior to Admission Medications     Prescriptions Last Dose Informant Patient Reported? Taking?    clotrimazole-betamethasone (LOTRISONE) 1-0.05 % cream  Nursing Home Yes Yes    Apply 1 application topically to the appropriate area as directed 2 (Two) Times a Day. Abdominal folds: Clean with soap and water, dry well, apply equal parts of Lotrisone and mupirocin, place abd pads in folds BID and PRN    furosemide (LASIX) 20 MG tablet  Nursing Home Yes Yes    Take 20 mg by mouth Daily. Pt takes 40 mg in morning and 20 mg in afternoon (mid-day).    furosemide (LASIX) 40 MG tablet  Nursing Home Yes Yes    Take 40 mg by mouth Every Morning. Pt takes 40 mg in morning and 20 mg in afternoon  (mid-day).    loperamide (IMODIUM) 2 MG capsule  Nursing Home Yes Yes    Take 2 mg by mouth 4 (Four) Times a Day As Needed for Diarrhea. Administer after each loose stool    mirtazapine (REMERON) 30 MG tablet  Nursing Home Yes Yes    Take 30 mg by mouth Every Night.    mupirocin (BACTROBAN) 2 % ointment  Nursing Home Yes Yes    Apply 1 application topically to the appropriate area as directed 2 (Two) Times a Day. Abdominal folds: Clean with soap and water, dry well, apply equal parts of Lotrisone and mupirocin, place abd pads in folds BID and PRN    rivaroxaban (XARELTO) 20 MG tablet  Nursing Home Yes Yes    Take 20 mg by mouth Daily With Dinner.    acetaminophen (TYLENOL) 325 MG tablet  Nursing Home No No    Take 2 tablets by mouth Every 6 (Six) Hours As Needed for Mild Pain .    atorvastatin (LIPITOR) 20 MG tablet  Nursing Home Yes No    Take 10 mg by mouth Every Evening.    budesonide (PULMICORT) 0.5 MG/2ML nebulizer solution  Nursing Home No No    Take 2 mL by nebulization 2 (Two) Times a Day.    carvedilol (COREG) 25 MG tablet  Nursing Home Yes No    Take 25 mg by mouth 2 (Two) Times a Day With Meals.    cholecalciferol (VITAMIN D3) 1000 units tablet  Nursing Home Yes No    Take 1,000 Units by mouth Daily.    esomeprazole (nexIUM) 40 MG capsule  Nursing Home Yes No    Take 40 mg by mouth Every Morning Before Breakfast.    ferrous sulfate 325 (65 FE) MG tablet  Nursing Home No No    Take 1 tablet by mouth 2 (Two) Times a Day With Meals.    ipratropium-albuterol (DUO-NEB) 0.5-2.5 mg/3 ml nebulizer  Nursing Home No No    Take 3 mL by nebulization 4 (Four) Times a Day.    levothyroxine (SYNTHROID, LEVOTHROID) 50 MCG tablet  Nursing Home No No    Take 1 tablet by mouth Daily.    polyethylene glycol (MIRALAX) pack packet  Nursing Home No No    Take 17 g by mouth Daily As Needed (Constipation).    potassium chloride (MICRO-K) 10 MEQ CR capsule  Nursing Home Yes No    Take 20 mEq by mouth Daily.    promethazine  (PHENERGAN) 12.5 MG tablet  Nursing Home No No    Take 1 tablet by mouth Every 6 (Six) Hours As Needed for Nausea or Vomiting.            Medication notes:      Per nursing home medication list,  No longer taking (discontinued on 9/19/19): Sertraline, Tramadol, Cilostazol. Thus removed from pt's home med list.  Added to PTA list: furosemide (takes 40 mg in morning and 20 mg midday), mirtazapine  (30 mg nightly), Lotrisone and Mupirocin cream BID (apply to abdominal folds).    This medication list is complete to the best of my knowledge as of 10/7/2019    Please call pharmacy for any questions.     Christal Felton, PharmD, BCPS  10/07/19 3:18 PM

## 2019-10-07 NOTE — NURSING NOTE
Pt had run of [email or web address]; pt asymptomatic resting in bed, bp 85/40 intially, now bp 120/50. Hr in chronic a flutter running 48-55. Dr. Payne paged for updates... Labs ordered mag, k , troponin, ekg. Consult [place] cardiology. Pt ok to transfer out tonight still.

## 2019-10-07 NOTE — PROGRESS NOTES
Jhonatan Scott MD                          [phone]      Patient ID:    Name:  Ale Narayan    MRN:  [id number]    1940   79 y.o.  female            Patient Care Team:  Harmeet Payne MD as PCP - General  Harmeet Payne MD as PCP - Claims Attributed  Yoan Cash MD as Consulting Physician (Hematology and Oncology)  Devon Lamb MD as Referring Physician (Thoracic Surgery)    CC/ Reason for visit: Respiratory failure metabolic encephalopathy    Subjective: Pt seen and examined this AM. No acute overnight events noted. Doing better.  Not requiring noninvasive ventilator anymore.  But awake and interactive.  Denies any worsening cough or shortness of breath now.  Responded some to diuresis.  Hungry and wants to eat.  Not much abdominal pain anymore    ROS: Denies any subjective fevers, syncope or presyncopal events, new neurological deficits, nausea or vomiting currently    Objective     Vital Signs past 24hrs    BP range: BP: (106-161)/() 144/56  Pulse range: Heart Rate:  [61-84] 69  Resp rate range: Resp:  [22-28] 28  Temp range: Temp (24hrs), Av.8 °F (36.6 °C), Min:97.4 °F (36.3 °C), Max:98.2 °F (36.8 °C)      Ventilator/Non-Invasive Ventilation Settings (From admission, onward)    Start     Ordered    10/06/19 1130  NIPPV (CPAP or BIPAP)  Until Discontinued     Question Answer Comment   Indication: Acute Respiratory Failure    Type: AVAPS/PC/PS    NIPPV Mask Interface: Per Patient Preference    Backup Rate 14    Target VT (mL) 450    EPAP/PEEP (cm H2O) 5    Min Pressure (cm H2O) 10    Max Pressure (cm H2O) 20    Titrate for SPO2 90%        10/06/19 1130          Device (Oxygen Therapy): high-flow nasal cannula       80.1 kg (176 lb 9.4 oz); Body mass index is 30.31 kg/m².      Intake/Output Summary (Last 24 hours) at 10/7/2019 1017  Last data filed at 10/7/2019 0455  Gross per 24 hour   Intake 500 ml   Output 1050 ml   Net -550 ml       PHYSICAL  EXAM   Constitutional:  Elderly obese white female patient pt in bed, No acute respiratory distress, + accessory muscle use  Head: - NCAT  Eyes: No pallor, Anicteric conjunctiva, EOMI.  ENMT:  Mallampati 4, no oral thrush. Dry MM.   NECK: Trachea midline, No thyromegaly, no palpable cervical lymphadenopathy  Heart: RRR, no murmur. Trace pedal edema   Lungs: YANICK +, distant breath sounds, decreased at bases. no wheezes/ crackles heard    Abdomen: Soft. Obese, No tenderness, guarding or rigidity. No palpable masses  Extremities: Extremities warm and well perfused. No cyanosis/ clubbing  Neuro: Conscious, answers appropriately, no gross focal neuro deficits  Psych: Mood and affect appropriate    Scheduled meds:    atorvastatin 10 mg Oral Q PM   budesonide 0.5 mg Nebulization BID - RT   carvedilol 25 mg Oral BID With Meals   cholecalciferol 1,000 Units Oral Daily   ferrous sulfate 325 mg Oral BID With Meals   ipratropium-albuterol 3 mL Nebulization 4x Daily - RT   levothyroxine 50 mcg Oral Q AM   pantoprazole 40 mg Oral QAM   rivaroxaban 20 mg Oral Daily With Dinner   sertraline 50 mg Oral Daily   sodium chloride 10 mL Intravenous Q12H       IV meds:                           Data Review:      Results from last 7 days   Lab Units 10/06/19  0913   SODIUM mmol/L 137   POTASSIUM mmol/L 4.2   CHLORIDE mmol/L 87*   CO2 mmol/L 44.4*   BUN mg/dL 11   CREATININE mg/dL 0.61   CALCIUM mg/dL 8.7   BILIRUBIN mg/dL 0.3   ALK PHOS U/L 82   ALT (SGPT) U/L 17   AST (SGOT) U/L 14   GLUCOSE mg/dL 119*   WBC 10*3/mm3 7.88   HEMOGLOBIN g/dL 9.9*   PLATELETS 10*3/mm3 265   PROBNP pg/mL 6,451.0*       Lab Results   Component Value Date    CALCIUM 8.7 10/06/2019    PHOS 2.9 09/12/2015             Results from last 7 days   Lab Units 10/06/19  1107   PH, ARTERIAL pH units 7.179*   PO2 ART mm Hg 82.3   PCO2, ARTERIAL mm Hg 118.8*   HCO3 ART mmol/L 44.3*        Results Review:    I have reviewed the relevant laboratory results and independently  reviewed the chest imaging from this hospitalization including the available echocardiogram reports personally and summarized it if/ when appropriate below    Assessment    Acute metabolic encephalopathy  Acute on chronic hypoxic resp failure( 4L @ home)  Acute on chronic hypercapnic resp failure   Acute on chronic congestive heart failure  Abdominal pain - ? 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  She states that she hated and would not go back on it.  Chest x-ray reviewed and shows some concern for venous congestion no obvious concern for pneumonia  Still requiring significant supplemental oxygen but not too far from baseline.  We will get an ABG to assess baseline CO2 and talk to her regarding need for home trilogy ventilator to avoid recurrent respiratory failure.  This will help with undiagnosed sleep apnea/ OHS as well.  Abdominal pain likely from constipation.  We will start bowel regimen along with diet.  COPD not in exacerbation.  We will continue with bronchodilators.  We will give 1 more dose of diuretic today.  We will restart home diuresis.  Will defer further work-up to primary  Continue anticoagulation for history of DVT/PE.    She states that she lives alone by herself and I do not think that is a good plan given her multiple medical issues.  Would recommend her to be transition to assisted living or nursing home.  She expressed understanding of my concern    Will transition to floor  with telemetry    I have discussed my findings and recommendations with patient and nursing staff.     Jhonatan Scott MD  10/7/2019

## 2019-10-08 LAB
ANION GAP SERPL CALCULATED.3IONS-SCNC: 5.8 MMOL/L (ref 5–15)
ARTERIAL PATENCY WRIST A: POSITIVE
ATMOSPHERIC PRESS: 755.4 MMHG
BASE EXCESS BLDA CALC-SCNC: 18.8 MMOL/L (ref 0–2)
BASOPHILS # BLD AUTO: 0.05 10*3/MM3 (ref 0–0.2)
BASOPHILS NFR BLD AUTO: 0.6 % (ref 0–1.5)
BDY SITE: ABNORMAL
BUN BLD-MCNC: 16 MG/DL (ref 8–23)
BUN/CREAT SERPL: 26.2 (ref 7–25)
CALCIUM SPEC-SCNC: 8.3 MG/DL (ref 8.6–10.5)
CHLORIDE SERPL-SCNC: 92 MMOL/L (ref 98–107)
CO2 SERPL-SCNC: 42.2 MMOL/L (ref 22–29)
CREAT BLD-MCNC: 0.61 MG/DL (ref 0.57–1)
DEPRECATED RDW RBC AUTO: 46.1 FL (ref 37–54)
EOSINOPHIL # BLD AUTO: 0.14 10*3/MM3 (ref 0–0.4)
EOSINOPHIL NFR BLD AUTO: 1.6 % (ref 0.3–6.2)
ERYTHROCYTE [DISTWIDTH] IN BLOOD BY AUTOMATED COUNT: 13.5 % (ref 12.3–15.4)
GAS FLOW AIRWAY: 8 LPM
GFR SERPL CREATININE-BSD FRML MDRD: 95 ML/MIN/1.73
GLUCOSE BLD-MCNC: 126 MG/DL (ref 65–99)
HCO3 BLDA-SCNC: 48.3 MMOL/L (ref 22–28)
HCT VFR BLD AUTO: 29.6 % (ref 34–46.6)
HGB BLD-MCNC: 9 G/DL (ref 12–15.9)
IMM GRANULOCYTES # BLD AUTO: 0.05 10*3/MM3 (ref 0–0.05)
IMM GRANULOCYTES NFR BLD AUTO: 0.6 % (ref 0–0.5)
LYMPHOCYTES # BLD AUTO: 0.88 10*3/MM3 (ref 0.7–3.1)
LYMPHOCYTES NFR BLD AUTO: 9.9 % (ref 19.6–45.3)
MCH RBC QN AUTO: 28.3 PG (ref 26.6–33)
MCHC RBC AUTO-ENTMCNC: 30.4 G/DL (ref 31.5–35.7)
MCV RBC AUTO: 93.1 FL (ref 79–97)
MODALITY: ABNORMAL
MONOCYTES # BLD AUTO: 0.87 10*3/MM3 (ref 0.1–0.9)
MONOCYTES NFR BLD AUTO: 9.8 % (ref 5–12)
NEUTROPHILS # BLD AUTO: 6.87 10*3/MM3 (ref 1.7–7)
NEUTROPHILS NFR BLD AUTO: 77.5 % (ref 42.7–76)
NRBC BLD AUTO-RTO: 0 /100 WBC (ref 0–0.2)
PCO2 BLDA: 80.9 MM HG (ref 35–45)
PH BLDA: 7.38 PH UNITS (ref 7.35–7.45)
PLATELET # BLD AUTO: 244 10*3/MM3 (ref 140–450)
PMV BLD AUTO: 10.3 FL (ref 6–12)
PO2 BLDA: 103.2 MM HG (ref 80–100)
POTASSIUM BLD-SCNC: 3.9 MMOL/L (ref 3.5–5.2)
RBC # BLD AUTO: 3.18 10*6/MM3 (ref 3.77–5.28)
SAO2 % BLDCOA: 97.3 % (ref 92–99)
SODIUM BLD-SCNC: 140 MMOL/L (ref 136–145)
TOTAL RATE: 20 BREATHS/MINUTE
WBC NRBC COR # BLD: 8.86 10*3/MM3 (ref 3.4–10.8)

## 2019-10-08 PROCEDURE — 85025 COMPLETE CBC W/AUTO DIFF WBC: CPT | Performed by: INTERNAL MEDICINE

## 2019-10-08 PROCEDURE — 25010000002 FUROSEMIDE PER 20 MG: Performed by: INTERNAL MEDICINE

## 2019-10-08 PROCEDURE — 80048 BASIC METABOLIC PNL TOTAL CA: CPT | Performed by: INTERNAL MEDICINE

## 2019-10-08 PROCEDURE — 99222 1ST HOSP IP/OBS MODERATE 55: CPT | Performed by: INTERNAL MEDICINE

## 2019-10-08 PROCEDURE — 93010 ELECTROCARDIOGRAM REPORT: CPT | Performed by: INTERNAL MEDICINE

## 2019-10-08 PROCEDURE — 94799 UNLISTED PULMONARY SVC/PX: CPT

## 2019-10-08 PROCEDURE — 93005 ELECTROCARDIOGRAM TRACING: CPT | Performed by: INTERNAL MEDICINE

## 2019-10-08 RX ORDER — CARVEDILOL 12.5 MG/1
12.5 TABLET ORAL 2 TIMES DAILY WITH MEALS
Status: DISCONTINUED | OUTPATIENT
Start: 2019-10-08 | End: 2019-10-13 | Stop reason: HOSPADM

## 2019-10-08 RX ORDER — LISINOPRIL 20 MG/1
20 TABLET ORAL
Status: DISCONTINUED | OUTPATIENT
Start: 2019-10-08 | End: 2019-10-13 | Stop reason: HOSPADM

## 2019-10-08 RX ORDER — FUROSEMIDE 10 MG/ML
40 INJECTION INTRAMUSCULAR; INTRAVENOUS
Status: DISCONTINUED | OUTPATIENT
Start: 2019-10-08 | End: 2019-10-10

## 2019-10-08 RX ADMIN — SODIUM CHLORIDE, PRESERVATIVE FREE 10 ML: 5 INJECTION INTRAVENOUS at 09:44

## 2019-10-08 RX ADMIN — FERROUS SULFATE TAB 325 MG (65 MG ELEMENTAL FE) 325 MG: 325 (65 FE) TAB at 09:42

## 2019-10-08 RX ADMIN — FUROSEMIDE 40 MG: 10 INJECTION, SOLUTION INTRAMUSCULAR; INTRAVENOUS at 17:38

## 2019-10-08 RX ADMIN — PANTOPRAZOLE SODIUM 40 MG: 40 TABLET, DELAYED RELEASE ORAL at 09:43

## 2019-10-08 RX ADMIN — BUDESONIDE 0.5 MG: 0.5 INHALANT RESPIRATORY (INHALATION) at 21:23

## 2019-10-08 RX ADMIN — IPRATROPIUM BROMIDE AND ALBUTEROL SULFATE 3 ML: 2.5; .5 SOLUTION RESPIRATORY (INHALATION) at 15:23

## 2019-10-08 RX ADMIN — ACETAMINOPHEN 650 MG: 325 TABLET, FILM COATED ORAL at 17:41

## 2019-10-08 RX ADMIN — IPRATROPIUM BROMIDE AND ALBUTEROL SULFATE 3 ML: 2.5; .5 SOLUTION RESPIRATORY (INHALATION) at 21:23

## 2019-10-08 RX ADMIN — CARVEDILOL 12.5 MG: 12.5 TABLET, FILM COATED ORAL at 17:35

## 2019-10-08 RX ADMIN — IPRATROPIUM BROMIDE AND ALBUTEROL SULFATE 3 ML: 2.5; .5 SOLUTION RESPIRATORY (INHALATION) at 10:30

## 2019-10-08 RX ADMIN — CARVEDILOL 25 MG: 25 TABLET, FILM COATED ORAL at 09:42

## 2019-10-08 RX ADMIN — MIRTAZAPINE 30 MG: 30 TABLET, FILM COATED ORAL at 21:04

## 2019-10-08 RX ADMIN — IPRATROPIUM BROMIDE AND ALBUTEROL SULFATE 3 ML: 2.5; .5 SOLUTION RESPIRATORY (INHALATION) at 06:59

## 2019-10-08 RX ADMIN — LISINOPRIL 20 MG: 20 TABLET ORAL at 19:36

## 2019-10-08 RX ADMIN — RIVAROXABAN 20 MG: 20 TABLET, FILM COATED ORAL at 17:35

## 2019-10-08 RX ADMIN — BUDESONIDE 0.5 MG: 0.5 INHALANT RESPIRATORY (INHALATION) at 06:59

## 2019-10-08 RX ADMIN — FUROSEMIDE 40 MG: 10 INJECTION, SOLUTION INTRAMUSCULAR; INTRAVENOUS at 09:44

## 2019-10-08 RX ADMIN — ACETAMINOPHEN 650 MG: 325 TABLET, FILM COATED ORAL at 04:59

## 2019-10-08 RX ADMIN — ATORVASTATIN CALCIUM 10 MG: 10 TABLET, FILM COATED ORAL at 17:35

## 2019-10-08 RX ADMIN — SODIUM CHLORIDE, PRESERVATIVE FREE 10 ML: 5 INJECTION INTRAVENOUS at 21:04

## 2019-10-08 RX ADMIN — VITAMIN D, TAB 1000IU (100/BT) 1000 UNITS: 25 TAB at 09:42

## 2019-10-08 RX ADMIN — FERROUS SULFATE TAB 325 MG (65 MG ELEMENTAL FE) 325 MG: 325 (65 FE) TAB at 17:35

## 2019-10-08 RX ADMIN — LEVOTHYROXINE SODIUM 50 MCG: 50 TABLET ORAL at 09:42

## 2019-10-08 NOTE — PLAN OF CARE
Problem: Patient Care Overview  Goal: Plan of Care Review   10/08/19 6017   Plan of Care Review   Progress improving   OTHER   Outcome Summary Pt vitals stable. alert and oriented x4. O2 @ 4 liters n/c. scd's in place. Will continue to monitor.     Goal: Individualization and Mutuality  Outcome: Ongoing (interventions implemented as appropriate)    Goal: Discharge Needs Assessment  Outcome: Ongoing (interventions implemented as appropriate)    Goal: Interprofessional Rounds/Family Conf  Outcome: Ongoing (interventions implemented as appropriate)      Problem: Skin Injury Risk (Adult)  Goal: Identify Related Risk Factors and Signs and Symptoms  Outcome: Ongoing (interventions implemented as appropriate)    Goal: Skin Health and Integrity  Outcome: Ongoing (interventions implemented as appropriate)      Problem: Fall Risk (Adult)  Goal: Identify Related Risk Factors and Signs and Symptoms  Outcome: Ongoing (interventions implemented as appropriate)    Goal: Absence of Fall  Outcome: Ongoing (interventions implemented as appropriate)      Problem: Pain, Acute (Adult)  Goal: Identify Related Risk Factors and Signs and Symptoms  Outcome: Ongoing (interventions implemented as appropriate)    Goal: Acceptable Pain Control/Comfort Level  Outcome: Ongoing (interventions implemented as appropriate)      Problem: Breathing Pattern Ineffective (Adult)  Goal: Identify Related Risk Factors and Signs and Symptoms  Outcome: Ongoing (interventions implemented as appropriate)    Goal: Effective Oxygenation/Ventilation  Outcome: Ongoing (interventions implemented as appropriate)    Goal: Anxiety/Fear Reduction  Outcome: Ongoing (interventions implemented as appropriate)

## 2019-10-08 NOTE — PLAN OF CARE
Problem: Patient Care Overview  Goal: Plan of Care Review  Outcome: Ongoing (interventions implemented as appropriate)   10/08/19 0603   Coping/Psychosocial   Plan of Care Reviewed With patient   Plan of Care Review   Progress improving   OTHER   Outcome Summary Pt was AAOx4 throughout shift except twice when pt had a hypoxic episode due to NC coming off. Pt became restless and confused but when oxygen was reapplied and O2 sats became stable pt became reoriented. O2 has been 3.5-6L throughout shift. Is currently at 5. Pt states she is on 4 L at home. BP, HR stable. Pt in sustained a flutter. Pt calm and cooperative. Awaiting tele bed.      Goal: Individualization and Mutuality  Outcome: Ongoing (interventions implemented as appropriate)    Goal: Discharge Needs Assessment  Outcome: Ongoing (interventions implemented as appropriate)    Goal: Interprofessional Rounds/Family Conf  Outcome: Ongoing (interventions implemented as appropriate)      Problem: Skin Injury Risk (Adult)  Goal: Identify Related Risk Factors and Signs and Symptoms  Outcome: Ongoing (interventions implemented as appropriate)    Goal: Skin Health and Integrity  Outcome: Ongoing (interventions implemented as appropriate)      Problem: Fall Risk (Adult)  Goal: Identify Related Risk Factors and Signs and Symptoms  Outcome: Ongoing (interventions implemented as appropriate)    Goal: Absence of Fall  Outcome: Ongoing (interventions implemented as appropriate)      Problem: Pain, Acute (Adult)  Goal: Identify Related Risk Factors and Signs and Symptoms  Outcome: Ongoing (interventions implemented as appropriate)    Goal: Acceptable Pain Control/Comfort Level  Outcome: Ongoing (interventions implemented as appropriate)      Problem: Breathing Pattern Ineffective (Adult)  Goal: Identify Related Risk Factors and Signs and Symptoms  Outcome: Ongoing (interventions implemented as appropriate)    Goal: Effective Oxygenation/Ventilation  Outcome: Ongoing  (interventions implemented as appropriate)    Goal: Anxiety/Fear Reduction  Outcome: Ongoing (interventions implemented as appropriate)      Problem: Chronic Obstructive Pulmonary Disease (Adult)  Goal: Signs and Symptoms of Listed Potential Problems Will be Absent, Minimized or Managed (Chronic Obstructive Pulmonary Disease)  Outcome: Ongoing (interventions implemented as appropriate)

## 2019-10-08 NOTE — PROGRESS NOTES
Jhonatan Scott MD                          [phone]      Patient ID:    Name:  Ale Narayan    MRN:  [id number]    1940   79 y.o.  female            Patient Care Team:  Harmeet Payne MD as PCP - General  Harmeet Payne MD as PCP - Claims Attributed  Yoan Cash MD as Consulting Physician (Hematology and Oncology)  Devon Lamb MD as Referring Physician (Thoracic Surgery)    CC/ Reason for visit: Respiratory failure metabolic encephalopathy    Subjective: Pt seen and examined this AM. No acute overnight events noted. Doing better.  Had self-limited episode of wide-complex tachycardia.  A flutter + Cardiology now consulted and awaiting evaluation.  Still awaiting floor bed    ROS: Denies any subjective fevers, syncope or presyncopal events, new neurological deficits, nausea or vomiting currently    Objective     Vital Signs past 24hrs    BP range: BP: (101-148)/(45-61) 121/45  Pulse range: Heart Rate:  [50-69] 62  Resp rate range: Resp:  [20-28] 21  Temp range: Temp (24hrs), Av.9 °F (36.6 °C), Min:97.5 °F (36.4 °C), Max:98.3 °F (36.8 °C)      Ventilator/Non-Invasive Ventilation Settings (From admission, onward)    Start     Ordered    10/06/19 1130  NIPPV (CPAP or BIPAP)  Until Discontinued     Question Answer Comment   Indication: Acute Respiratory Failure    Type: AVAPS/PC/PS    NIPPV Mask Interface: Per Patient Preference    Backup Rate 14    Target VT (mL) 450    EPAP/PEEP (cm H2O) 5    Min Pressure (cm H2O) 10    Max Pressure (cm H2O) 20    Titrate for SPO2 90%        10/06/19 1130          Device (Oxygen Therapy): humidified;high-flow nasal cannula       81.6 kg (179 lb 14.3 oz); Body mass index is 30.88 kg/m².      Intake/Output Summary (Last 24 hours) at 10/8/2019 0942  Last data filed at 10/7/2019 2020  Gross per 24 hour   Intake 650 ml   Output 1500 ml   Net -850 ml       PHYSICAL EXAM   Constitutional:  Elderly obese white female patient pt  in bed, No acute respiratory distress, + accessory muscle use  Head: - NCAT  Eyes: No pallor, Anicteric conjunctiva, EOMI.  ENMT:  Mallampati 4, no oral thrush. Dry MM.   NECK: Trachea midline, No thyromegaly, no palpable cervical lymphadenopathy  Heart: RRR, no murmur. Trace pedal edema   Lungs: YANICK +, distant breath sounds, decreased at bases. no wheezes/ crackles heard    Abdomen: Soft. Obese, No tenderness, guarding or rigidity. No palpable masses  Extremities: Extremities warm and well perfused. No cyanosis/ clubbing  Neuro: Conscious, answers appropriately, no gross focal neuro deficits  Psych: Mood and affect appropriate    Scheduled meds:      atorvastatin 10 mg Oral Q PM   budesonide 0.5 mg Nebulization BID - RT   carvedilol 25 mg Oral BID With Meals   cholecalciferol 1,000 Units Oral Daily   ferrous sulfate 325 mg Oral BID With Meals   furosemide 40 mg Intravenous Daily   ipratropium-albuterol 3 mL Nebulization 4x Daily - RT   levothyroxine 50 mcg Oral Q AM   mirtazapine 30 mg Oral Nightly   pantoprazole 40 mg Oral QAM   rivaroxaban 20 mg Oral Daily With Dinner   sodium chloride 10 mL Intravenous Q12H       IV meds:                           Data Review:      Results from last 7 days   Lab Units 10/08/19  0632 10/07/19  2020 10/07/19  1045 10/07/19  0455 10/06/19  0913   SODIUM mmol/L 140  --  137  --  137   POTASSIUM mmol/L 3.9 5.0 3.6  --  4.2   CHLORIDE mmol/L 92*  --  89*  --  87*   CO2 mmol/L 42.2*  --  40.9*  --  44.4*   BUN mg/dL 16  --  15  --  11   CREATININE mg/dL 0.61  --  0.70  --  0.61   CALCIUM mg/dL 8.3*  --  8.7  --  8.7   BILIRUBIN mg/dL  --   --   --   --  0.3   ALK PHOS U/L  --   --   --   --  82   ALT (SGPT) U/L  --   --   --   --  17   AST (SGOT) U/L  --   --   --   --  14   GLUCOSE mg/dL 126*  --  115*  --  119*   WBC 10*3/mm3 8.86  --  8.98  --  7.88   HEMOGLOBIN g/dL 9.0*  --  8.9*  --  9.9*   PLATELETS 10*3/mm3 244  --  251  --  265   PROBNP pg/mL  --   --   --   --  6,451.0*    PROCALCITONIN ng/mL  --   --   --  <0.02*  --        Lab Results   Component Value Date    CALCIUM 8.3 (L) 10/08/2019    PHOS 2.9 09/12/2015             Results from last 7 days   Lab Units 10/07/19  1416 10/06/19  1601 10/06/19  1231 10/06/19  1107   PH, ARTERIAL pH units 7.384 7.368 7.228* 7.179*   PO2 ART mm Hg 103.2* 226.7* 141.0* 82.3   PCO2, ARTERIAL mm Hg 80.9* 83.5* 112.0* 118.8*   HCO3 ART mmol/L 48.3* 48.0* 46.6* 44.3*        Results Review:    I have reviewed the relevant laboratory results and independently reviewed the chest imaging from this hospitalization including the available echocardiogram reports personally and summarized it if/ when appropriate below    Assessment    Acute metabolic encephalopathy  Acute on chronic hypoxic resp failure( 4L @ home)  Acute on undiagnosed chronic hypercapnic resp failure   Acute on chronic congestive heart failure  Abdominal pain - ? 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   She has mentioned that she hates the NIPPV. Will ask her to try again tonight on a diff mode to give a last shot.   Still requiring significant supplemental oxygen but not too far from baseline.  COPD not in exacerbation.  We will continue with bronchodilators.  Cardiology now on board and managing wide-complex tachycardia and heart failure issues  Continue anticoagulation for history of DVT/PE.    She states that she lives alone by herself  and I do not think that is a good plan given her multiple medical issues.  Would recommend her to be transition to assisted living or nursing home.  She expressed understanding of my concern. Totally agree with palliative care eval.     Awaiting bed - floor with telemetry.     I have discussed my findings and recommendations with patient and nursing staff.     Jhonatan Scott MD  10/8/2019

## 2019-10-08 NOTE — CONSULTS
Date of Hospital Visit: [unfilled]ENC@  Encounter Provider: Nona Romano MD  Place of Service: [facility] CARDIOLOGY  Patient Name: Ale Narayan  :1940  Referral Provider: Harmeet Payne MD    Chief complaint    Atrial fibrillation    History of Present Illness      This is a 78-year-old patient with history of PAF on Xarelto, hypertension, hyperlipidemia, history of pulmonary embolism and deep venous thrombosis with IVC filter, COPD and history of lung cancer with resection in .  She has a history of cardiomyopathy with an ejection fraction 40% .  They felt this is due to Takotsubo cardiomyopathy she was treated with Rythmol for her atrial fibrillation.      She was admitted on 2019 with black stools and bloody stools, heme positive and her hemoglobin was 8.5, her Xarelto was stopped.  Endoscopy studies showed colonic angiodysplasia and gastric inflammation causing GI blood loss.  Mesenteric duplex showed normal celiac artery with 70% stenosis of the superior mesenteric artery.    She was admitted on 2019 with abdominal discomfort for constipation and respiratory failure.  She had respiratory arrest in the emergency department.  CT abdomen and pelvis was unremarkable.  Chest x-ray shows mild vascular congestion with tiny pleural effusions.  This was done 10/6/2019.  She resides at [facility] and had noted increasing lower extremity edema and abdominal distention as well as increasing short windedness starting about 1 week prior to presentation.  It was limiting her ability to ambulate.  She had no chest pain or pressure and had no dizziness.  She had no nausea.  She had not moved her bowels for about 6 days prior to presentation.  Since being admitted, she has been diuresed with Lasix and feels much better.  She still is abdominal distention.  Her breathing is at baseline and she is on chronic oxygen.  She denies fevers, chills or nausea.   She has had no cough.    Past Medical History:   Diagnosis Date   • Adenocarcinoma of lung (CMS/HCC)    • Asthma    • Carotid artery disease (CMS/HCC)    • Cough    • Deep vein thrombosis (CMS/HCC)    • Emphysema of lung (CMS/HCC)    • Hyperlipidemia    • Hypertension    • Hypothyroidism    • Lung cancer (CMS/HCC)    • Osteoporosis    • Paroxysmal atrial fibrillation (CMS/HCC)    • Peripheral arterial disease (CMS/HCC)    • Pulmonary embolism (CMS/HCC)    • Thrombophilia (CMS/HCC)     Since age 25       Past Surgical History:   Procedure Laterality Date   • ANKLE SURGERY     • BREAST SURGERY  2015    removal of benign melanoma spot on nipple of left breast 7/2013   • COLONOSCOPY      Negative   • COLONOSCOPY N/A 9/6/2019    Procedure: COLONOSCOPY to cecum with biopsy / polypectomy and  APC cautery;  Surgeon: Alexsandra Ruiz MD;  Location: [facility] ENDOSCOPY;  Service: Gastroenterology   • ENDOSCOPY N/A 9/6/2019    Procedure: ESOPHAGOGASTRODUODENOSCOPY with biopsies;  Surgeon: Alexsandra Ruiz MD;  Location: [facility] ENDOSCOPY;  Service: Gastroenterology   • HERNIA REPAIR      Lumbar spine   • HYSTERECTOMY     • LUNG LOBECTOMY  2009   • OTHER SURGICAL HISTORY      Bypass of lower extremities   • OTHER SURGICAL HISTORY      IVC filter placed       Medications Prior to Admission   Medication Sig Dispense Refill Last Dose   • clotrimazole-betamethasone (LOTRISONE) 1-0.05 % cream Apply 1 application topically to the appropriate area as directed 2 (Two) Times a Day. Abdominal folds: Clean with soap and water, dry well, apply equal parts of Lotrisone and mupirocin, place abd pads in folds BID and PRN      • furosemide (LASIX) 20 MG tablet Take 20 mg by mouth Daily. Pt takes 40 mg in morning and 20 mg in afternoon (mid-day).      • furosemide (LASIX) 40 MG tablet Take 40 mg by mouth Every Morning. Pt takes 40 mg in morning and 20 mg in afternoon (mid-day).      • loperamide (IMODIUM) 2 MG capsule Take 2 mg by mouth 4 (Four)  Times a Day As Needed for Diarrhea. Administer after each loose stool      • mirtazapine (REMERON) 30 MG tablet Take 30 mg by mouth Every Night.      • mupirocin (BACTROBAN) 2 % ointment Apply 1 application topically to the appropriate area as directed 2 (Two) Times a Day. Abdominal folds: Clean with soap and water, dry well, apply equal parts of Lotrisone and mupirocin, place abd pads in folds BID and PRN      • rivaroxaban (XARELTO) 20 MG tablet Take 20 mg by mouth Daily With Dinner.      • acetaminophen (TYLENOL) 325 MG tablet Take 2 tablets by mouth Every 6 (Six) Hours As Needed for Mild Pain . 100 tablet 3    • atorvastatin (LIPITOR) 20 MG tablet Take 10 mg by mouth Every Evening.   Taking   • budesonide (PULMICORT) 0.5 MG/2ML nebulizer solution Take 2 mL by nebulization 2 (Two) Times a Day. 100 each 3    • carvedilol (COREG) 25 MG tablet Take 25 mg by mouth 2 (Two) Times a Day With Meals.      • cholecalciferol (VITAMIN D3) 1000 units tablet Take 1,000 Units by mouth Daily.      • esomeprazole (nexIUM) 40 MG capsule Take 40 mg by mouth Every Morning Before Breakfast.      • ferrous sulfate 325 (65 FE) MG tablet Take 1 tablet by mouth 2 (Two) Times a Day With Meals. 120 tablet 3    • ipratropium-albuterol (DUO-NEB) 0.5-2.5 mg/3 ml nebulizer Take 3 mL by nebulization 4 (Four) Times a Day. 360 mL 3    • levothyroxine (SYNTHROID, LEVOTHROID) 50 MCG tablet Take 1 tablet by mouth Daily. 120 tablet 3    • polyethylene glycol (MIRALAX) pack packet Take 17 g by mouth Daily As Needed (Constipation). 30 each 3    • potassium chloride (MICRO-K) 10 MEQ CR capsule Take 20 mEq by mouth Daily.      • promethazine (PHENERGAN) 12.5 MG tablet Take 1 tablet by mouth Every 6 (Six) Hours As Needed for Nausea or Vomiting. 60 tablet 3        Current Meds  Scheduled Meds:  atorvastatin 10 mg Oral Q PM   budesonide 0.5 mg Nebulization BID - RT   carvedilol 25 mg Oral BID With Meals   cholecalciferol 1,000 Units Oral Daily   ferrous  sulfate 325 mg Oral BID With Meals   furosemide 40 mg Intravenous Daily   [START ON 10/9/2019] influenza vaccine 0.5 mL Intramuscular Once   ipratropium-albuterol 3 mL Nebulization 4x Daily - RT   levothyroxine 50 mcg Oral Q AM   mirtazapine 30 mg Oral Nightly   pantoprazole 40 mg Oral QAM   rivaroxaban 20 mg Oral Daily With Dinner   sodium chloride 10 mL Intravenous Q12H     Continuous Infusions:   PRN Meds:.•  acetaminophen  •  nitroglycerin  •  polyethylene glycol  •  [COMPLETED] Insert peripheral IV **AND** sodium chloride  •  sodium chloride    Allergies as of 10/06/2019 - Reviewed 10/06/2019   Allergen Reaction Noted   • Adhesive tape  02/15/2017   • Augmentin [amoxicillin-pot clavulanate]  2016   • Cephalexin  2016   • Metoclopramide Unknown (See Comments) 2011   • Pentazocine  2016   • Simvastatin Unknown (See Comments) 2011   • Sucralfate Unknown (See Comments) 2011       Social History     Socioeconomic History   • Marital status:      Spouse name: Not on file   • Number of children: Not on file   • Years of education: High School   • Highest education level: Not on file   Occupational History   • Occupation: [place], retired     Employer: [employer] [place], [place]    Tobacco Use   • Smoking status: Former Smoker     Packs/day: 1.00     Years: 50.00     Pack years: 50.00     Types: Cigarettes     Start date:      Last attempt to quit:      Years since quittin.7   • Smokeless tobacco: Former User   Substance and Sexual Activity   • Alcohol use: No   • Drug use: No   • Sexual activity: Defer       Family History   Problem Relation Age of Onset   • Lung cancer Mother        REVIEW OF SYSTEMS:   12 point ROS was performed and is negative except as outlined in HPI         Objective:   Temp:  [98.3 °F (36.8 °C)-98.6 °F (37 °C)] 98.6 °F (37 °C)  Heart Rate:  [50-68] 62  Resp:  [16-22] 16  BP: (101-166)/(45-71) 166/71  Body mass index is  "30,184.76 kg/m².  Flowsheet Rows      First Filed Value   Admission Height  162.6 cm (64\") Documented at 10/06/2019 1140   Admission Weight  86.3 kg (190 lb 4.8 oz) Documented at 10/06/2019 1140        Vitals:    10/08/19 1526   BP:    Pulse: 62   Resp: 16   Temp:    SpO2: 96%       General Appearance:    Alert, cooperative, in no acute distress   Head:    Normocephalic, without obvious abnormality, atraumatic   Eyes:            Lids and lashes normal, conjunctivae and sclerae normal, no   icterus, no pallor, corneas clear,   Ears:    Ears appear intact with no abnormalities noted   Throat:   No oral lesions, no thrush, oral mucosa moist   Neck:   No adenopathy, supple, trachea midline, no thyromegaly, no   carotid bruit, no JVD   Back:     No kyphosis present, no scoliosis present, no skin lesions, erythema or scars, no tenderness to palpation, range of motion normal   Lungs:    Decreased throughout,respirations regular, even and unlabored    Heart:    irregular rhythm and normal rate, normal S1 and S2, no murmur, no gallop, no rub, no click   Chest Wall:    No abnormalities observed   Abdomen:     Normal bowel sounds, no masses, no organomegaly, soft, nontender, nondistended, no guarding, no rebound  tenderness   Extremities:   Moves all extremities well, no edema, no cyanosis, no redness   Pulses:   Pulses palpable and equal bilaterally. Normal radial, carotid, femoral, dorsalis pedis and posterior tibial pulses bilaterally.   Skin:  Psychiatric:   No bleeding, bruising or rash    Alert and oriented x 3, normal mood and affect                 Lab Review:    Results from last 7 days   Lab Units 10/08/19  0632  10/06/19  0913   SODIUM mmol/L 140   < > 137   POTASSIUM mmol/L 3.9   < > 4.2   CHLORIDE mmol/L 92*   < > 87*   CO2 mmol/L 42.2*   < > 44.4*   BUN mg/dL 16   < > 11   CREATININE mg/dL 0.61   < > 0.61   CALCIUM mg/dL 8.3*   < > 8.7   BILIRUBIN mg/dL  --   --  0.3   ALK PHOS U/L  --   --  82   ALT (SGPT) U/L  " --   --  17   AST (SGOT) U/L  --   --  14   GLUCOSE mg/dL 126*   < > 119*    < > = values in this interval not displayed.     Results from last 7 days   Lab Units 10/07/19  2020 10/07/19  0455 10/06/19  1232   TROPONIN T ng/mL <0.010 <0.010 <0.010       Results from last 7 days   Lab Units 10/08/19  0632 10/07/19  1045 10/06/19  0913   WBC 10*3/mm3 8.86 8.98 7.88   HEMOGLOBIN g/dL 9.0* 8.9* 9.9*   HEMATOCRIT % 29.6* 29.1* 31.3*   PLATELETS 10*3/mm3 244 251 265         Results from last 7 days   Lab Units 10/07/19  2020   MAGNESIUM mg/dL 1.8         I personally viewed and interpreted the patient's EKG/Telemetry data  )  Patient Active Problem List   Diagnosis   • Adenocarcinoma of right lung (CMS/HCC)   • Decreased blood pressure, not hypotension   • Cardiomyopathy (CMS/HCC)   • Cardiovascular disease   • Dizziness   • Dyspnea on exertion   • Nodule of left lung   • Peripheral arterial occlusive disease (CMS/HCC)   • Iron deficiency anemia   • Pulmonary emphysema (CMS/HCC)   • Pneumonitis, radiation (CMS/HCC)   • Palpitations   • Localized edema   • Weight gain   • Anemia   • Rectal bleeding   • Hypercapnia     Assessment and Plan:    1. COPD with acute on chronic respiratory failure  2. CHF, repeat echo.   3. Atrial fibrillation with left bundle branch block and slow heart rates.  She is on Xarelto.  4. AV block, Rythmol was discontinued due to this.  ZIO Patch done for 2 weeks 9/17/2019 showed no atrial fibrillation, one pause of 2.7 seconds.  5. History of blood loss anemia 9/11/2019, anticoagulation was stopped due to this.  6. History of pulmonary embolism with IVC filter in place.  was on chronic anticoagulation to this.    Continue diuresis.  Did discuss with her long-term goals.  She may need to consider comfort measures given the severity of her lung disease.  She is reluctant to use the BiPAP at night.    Increase Lasix to 40 mg IV every 12 and decrease Coreg 12.5 twice daily for bradycardia.  Start  lisinopril 20 mg daily for heart failure hypertension, will follow.    Nona Romano MD  10/08/19  4:18 PM.  Time spent in reviewing chart, discussion and examination:

## 2019-10-08 NOTE — PROGRESS NOTES
History:  This 79-year-old lady has severe COPD.  She also has dilated cardiomyopathy.  Patient was admitted with respiratory failure.  Patient is now off BiPAP.  Her CO2 levels have been elevated.  She gets intermittently confused if she is off her oxygen.  She is on between 4 and 6 L/min.  She had an episode last night where her blood pressure dropped and she had a wide-complex tachycardia.  It lasted for about 20 seconds and then spontaneously disappeared.  The patient states she never wants to go back on the positive pressure breathing apparatus.    Allergies  Adhesive tape; Augmentin [amoxicillin-pot clavulanate]; Cephalexin; Metoclopramide; Pentazocine; Simvastatin; and Sucralfate      Current Facility-Administered Medications:   •  acetaminophen (TYLENOL) tablet 650 mg, 650 mg, Oral, Q6H PRN, Harmeet Payne MD, 650 mg at 10/08/19 0459  •  atorvastatin (LIPITOR) tablet 10 mg, 10 mg, Oral, Q PM, Harmeet Payne MD, 10 mg at 10/07/19 1708  •  budesonide (PULMICORT) nebulizer solution 0.5 mg, 0.5 mg, Nebulization, BID - RT, Harmeet Payne MD, 0.5 mg at 10/08/19 0659  •  carvedilol (COREG) tablet 25 mg, 25 mg, Oral, BID With Meals, Harmeet Payne MD, 25 mg at 10/07/19 1708  •  cholecalciferol (VITAMIN D3) tablet 1,000 Units, 1,000 Units, Oral, Daily, Harmeet Payne MD, 1,000 Units at 10/07/19 0843  •  ferrous sulfate tablet 325 mg, 325 mg, Oral, BID With Meals, Harmeet Payne MD, 325 mg at 10/07/19 1708  •  furosemide (LASIX) injection 40 mg, 40 mg, Intravenous, Daily, Jhonatan Scott MD, 40 mg at 10/07/19 1230  •  ipratropium-albuterol (DUO-NEB) nebulizer solution 3 mL, 3 mL, Nebulization, 4x Daily - RT, Harmeet Payne MD, 3 mL at 10/08/19 0659  •  levothyroxine (SYNTHROID, LEVOTHROID) tablet 50 mcg, 50 mcg, Oral, Q AM, Harmeet Payne MD  •  mirtazapine (REMERON) tablet 30 mg, 30 mg, Oral, Nightly, Jhonatan Scott MD, 30 mg at 10/07/19 2109  •  nitroglycerin (NITROSTAT) SL tablet 0.4 mg, 0.4 mg,  "Sublingual, Q5 Min PRN, Harmeet Payne MD  •  pantoprazole (PROTONIX) EC tablet 40 mg, 40 mg, Oral, QAM, Harmeet Payne MD  •  polyethylene glycol 3350 powder (packet), 17 g, Oral, Daily PRN, Harmeet Payne MD  •  rivaroxaban (XARELTO) tablet 20 mg, 20 mg, Oral, Daily With Dinner, Harmeet Payne MD, 20 mg at 10/07/19 1708  •  [COMPLETED] Insert peripheral IV, , , Once **AND** sodium chloride 0.9 % flush 10 mL, 10 mL, Intravenous, PRN, Deisi Martines APRN  •  sodium chloride 0.9 % flush 10 mL, 10 mL, Intravenous, Q12H, Harmeet Payne MD, 10 mL at 10/07/19 2110  •  sodium chloride 0.9 % flush 10 mL, 10 mL, Intravenous, PRN, Harmeet Payne MD    /45   Pulse 62   Temp 98.3 °F (36.8 °C)   Resp 21   Ht 162.6 cm (64\")   Wt 81.6 kg (179 lb 14.3 oz)   LMP  (LMP Unknown)   SpO2 94%   BMI 30.88 kg/m²     Physical Exam   Appearance: Elderly obese  lady.  She is resting in bed.  Heart: Regular rate of about 60.  Patient is in atrial flutter.  Lungs: Very poor movement of air with rhonchi bilaterally.  Abdomen: Protuberant, benign  Extremities: No clubbing, cyanosis or edema.  Neurologic: Nonfocal.  Alert and oriented      Lab Results (last 24 hours)     Procedure Component Value Units Date/Time    Blood Gas, Arterial [[id number]]  (Abnormal) Collected:  10/07/19 1416    Specimen:  Arterial Blood Updated:  10/08/19 0734     Site Arterial: right radial     Ector's Test Positive     pH, Arterial 7.384 pH units      pCO2, Arterial 80.9 mm Hg      Comment: Critical:Notify Dr gil (07-Oct-19 14:19:27)Read back ok        pO2, Arterial 103.2 mm Hg      HCO3, Arterial 48.3 mmol/L      Base Excess, Arterial 18.8 mmol/L      O2 Saturation Calculated 97.3 %      Barometric Pressure for Blood Gas 755.4 mmHg      Modality HFNC     Flow Rate 8 lpm      Rate 20 Breaths/minute     Basic Metabolic Panel [[id number]]  (Abnormal) Collected:  10/08/19 0632    Specimen:  Blood Updated:  10/08/19 0728     Glucose 126 " mg/dL      BUN 16 mg/dL      Creatinine 0.61 mg/dL      Sodium 140 mmol/L      Potassium 3.9 mmol/L      Chloride 92 mmol/L      CO2 42.2 mmol/L      Calcium 8.3 mg/dL      eGFR Non African Amer 95 mL/min/1.73      BUN/Creatinine Ratio 26.2     Anion Gap 5.8 mmol/L     Narrative:       GFR Normal >60  Chronic Kidney Disease <60  Kidney Failure <15    CBC & Differential [[id number]] Collected:  10/08/19 0632    Specimen:  Blood Updated:  10/08/19 0655    Narrative:       The following orders were created for panel order CBC & Differential.  Procedure                               Abnormality         Status                     ---------                               -----------         ------                     CBC Auto Differential[[id number]]        Abnormal            Final result                 Please view results for these tests on the individual orders.    CBC Auto Differential [[id number]]  (Abnormal) Collected:  10/08/19 0632    Specimen:  Blood Updated:  10/08/19 0655     WBC 8.86 10*3/mm3      RBC 3.18 10*6/mm3      Hemoglobin 9.0 g/dL      Hematocrit 29.6 %      MCV 93.1 fL      MCH 28.3 pg      MCHC 30.4 g/dL      RDW 13.5 %      RDW-SD 46.1 fl      MPV 10.3 fL      Platelets 244 10*3/mm3      Neutrophil % 77.5 %      Lymphocyte % 9.9 %      Monocyte % 9.8 %      Eosinophil % 1.6 %      Basophil % 0.6 %      Immature Grans % 0.6 %      Neutrophils, Absolute 6.87 10*3/mm3      Lymphocytes, Absolute 0.88 10*3/mm3      Monocytes, Absolute 0.87 10*3/mm3      Eosinophils, Absolute 0.14 10*3/mm3      Basophils, Absolute 0.05 10*3/mm3      Immature Grans, Absolute 0.05 10*3/mm3      nRBC 0.0 /100 WBC     Potassium [[id number]]  (Normal) Collected:  10/07/19 2020    Specimen:  Blood Updated:  10/07/19 2106     Potassium 5.0 mmol/L     Troponin [[id number]]  (Normal) Collected:  10/07/19 2020    Specimen:  Blood Updated:  10/07/19 2055     Troponin T <0.010 ng/mL     Narrative:       Troponin T Reference Range:  <=  0.03 ng/mL-   Negative for AMI  >0.03 ng/mL-     Abnormal for myocardial necrosis.  Clinicians would have to utilize clinical acumen, EKG, Troponin and serial changes to determine if it is an Acute Myocardial Infarction or myocardial injury due to an underlying chronic condition.     Magnesium [[id number]]  (Normal) Collected:  10/07/19 2020    Specimen:  Blood Updated:  10/07/19 2049     Magnesium 1.8 mg/dL     Blood Gas, Arterial [[id number]]  (Abnormal) Collected:  10/06/19 1601    Specimen:  Arterial Blood Updated:  10/07/19 1144     Site Arterial: right brachial     Ector's Test Positive     pH, Arterial 7.368 pH units      pCO2, Arterial 83.5 mm Hg      Comment: Critical:Notify Dr Tommy Arriaga (06-Oct-19 16:04:15)Read back ok        pO2, Arterial 226.7 mm Hg      HCO3, Arterial 48.0 mmol/L      Base Excess, Arterial 19.0 mmol/L      O2 Saturation Calculated 99.7 %      A-a Gradiant 0.5 mmHg      Barometric Pressure for Blood Gas 752.6 mmHg      Modality BiPap     FIO2 75 %      Ventilator Mode NIV     Set Mech Resp Rate 24     Rate 24 Breaths/minute     Blood Gas, Arterial [[id number]]  (Abnormal) Collected:  10/06/19 1231    Specimen:  Arterial Blood Updated:  10/07/19 1144     Site Arterial: left brachial     Ector's Test N/A     pH, Arterial 7.228 pH units      Comment: Critical:Notify EDILSON cullen (06-Oct-19 12:36:16)Read back ok        pCO2, Arterial 112.0 mm Hg      Comment: Critical:Notify EDILSON cullen (06-Oct-19 12:36:16)Read back ok        pO2, Arterial 141.0 mm Hg      HCO3, Arterial 46.6 mmol/L      Base Excess, Arterial 15.1 mmol/L      O2 Saturation Calculated 98.3 %      A-a Gradiant 0.3 mmHg      Barometric Pressure for Blood Gas 750.9 mmHg      Modality BiPap     FIO2 75 %      Ventilator Mode avaps     Set Tidal Volume 323     Rate 22 Breaths/minute     Basic Metabolic Panel [[id number]]  (Abnormal) Collected:  10/07/19 1045    Specimen:  Blood from Hand, Right Updated:  10/07/19 112      Glucose 115 mg/dL      BUN 15 mg/dL      Creatinine 0.70 mg/dL      Sodium 137 mmol/L      Potassium 3.6 mmol/L      Chloride 89 mmol/L      CO2 40.9 mmol/L      Calcium 8.7 mg/dL      eGFR Non African Amer 81 mL/min/1.73      BUN/Creatinine Ratio 21.4     Anion Gap 7.1 mmol/L     Narrative:       GFR Normal >60  Chronic Kidney Disease <60  Kidney Failure <15    CBC & Differential [[id number]] Collected:  10/07/19 1045    Specimen:  Blood Updated:  10/07/19 1115    Narrative:       The following orders were created for panel order CBC & Differential.  Procedure                               Abnormality         Status                     ---------                               -----------         ------                     CBC Auto Differential[[id number]]        Abnormal            Final result                 Please view results for these tests on the individual orders.    CBC Auto Differential [[id number]]  (Abnormal) Collected:  10/07/19 1045    Specimen:  Blood from Hand, Right Updated:  10/07/19 1115     WBC 8.98 10*3/mm3      RBC 3.18 10*6/mm3      Hemoglobin 8.9 g/dL      Hematocrit 29.1 %      MCV 91.5 fL      MCH 28.0 pg      MCHC 30.6 g/dL      RDW 13.4 %      RDW-SD 44.5 fl      MPV 10.2 fL      Platelets 251 10*3/mm3      Neutrophil % 80.7 %      Lymphocyte % 9.1 %      Monocyte % 8.6 %      Eosinophil % 0.8 %      Basophil % 0.6 %      Immature Grans % 0.2 %      Neutrophils, Absolute 7.25 10*3/mm3      Lymphocytes, Absolute 0.82 10*3/mm3      Monocytes, Absolute 0.77 10*3/mm3      Eosinophils, Absolute 0.07 10*3/mm3      Basophils, Absolute 0.05 10*3/mm3      Immature Grans, Absolute 0.02 10*3/mm3      nRBC 0.0 /100 WBC     Procalcitonin [[id number]]  (Abnormal) Collected:  10/07/19 0455    Specimen:  Blood Updated:  10/07/19 1052     Procalcitonin <0.02 ng/mL     Narrative:       As a Marker for Sepsis (Non-Neonates):   1. <0.5 ng/mL represents a low risk of severe sepsis and/or septic shock.  1. >2 ng/mL  "represents a high risk of severe sepsis and/or septic shock.    As a Marker for Lower Respiratory Tract Infections that require antibiotic therapy:  PCT on Admission     Antibiotic Therapy             6-12 Hrs later  > 0.5                Strongly Recommended            >0.25 - <0.5         Recommended  0.1 - 0.25           Discouraged                   Remeasure/reassess PCT  <0.1                 Strongly Discouraged          Remeasure/reassess PCT      As 28 day mortality risk marker: \"Change in Procalcitonin Result\" (> 80 % or <=80 %) if Day 0 (or Day 1) and Day 4 values are available. Refer to http://[email or web address] Copct-calculator.com/   Change in PCT <=80 %   A decrease of PCT levels below or equal to 80 % defines a positive change in PCT test result representing a higher risk for 28-day all-cause mortality of patients diagnosed with severe sepsis or septic shock.  Change in PCT > 80 %   A decrease of PCT levels of more than 80 % defines a negative change in PCT result representing a lower risk for 28-day all-cause mortality of patients diagnosed with severe sepsis or septic shock.                      Imp:  1.  Acute on chronic respiratory failure.  In large part related to COPD but the patient has some dilated cardiomyopathy as well.    2.  Atrial flutter.  Likely nonsustained V. tach  3.  Generalized anxiety.  4.  Multiple comorbidities including adenocarcinoma of the lung x2, hypothyroid, hypertension, past DVT/PEs, peripheral vascular disease, recent lower GI bleed from angiodysplasia, multifactorial anemia        Plan:  This patient's prognosis is grim.  I do not believe there are any medical options which significantly changed her prognosis while maintaining any quality of life.  I spoke to the patient about palliative care.  Patient seemed receptive she wants to think about it.  The patient is going to be moved out of you to a stepdown bed when a bed is available.  She is going to be seen by cardiology this " morning.    Harmeet Payne MD  10/8/2019  8:00 AM

## 2019-10-08 NOTE — NURSING NOTE
Pt woke up confused and scared with nasal canula off. O2 not reading. Nasal canula put back on with 6L. Pt reoriented after 5 minutes of O2 therapy. O2 was reduced back to 4L when sats came back up to mid to high 90's and pt stable.  RN

## 2019-10-08 NOTE — PROGRESS NOTES
Discharge Planning Assessment  [facility]     Patient Name: Ale Narayan  MRN: [id number]  Today's Date: 10/8/2019    Admit Date: 10/6/2019    Discharge Needs Assessment     Row Name 10/08/19 0787       Living Environment    Lives With  alone    Current Living Arrangements  independent/assisted living facility    Primary Care Provided by  self    Family Caregiver if Needed  child(ramon), adult    Quality of Family Relationships  supportive    Able to Return to Prior Arrangements  yes       Resource/Environmental Concerns    Transportation Concerns  car, none       Transition Planning    Patient/Family Anticipates Transition to  home with family       Discharge Needs Assessment    Concerns to be Addressed  no discharge needs identified    Equipment Currently Used at Home  oxygen;walker, rolling    Anticipated Changes Related to Illness  none        Discharge Plan     Row Name 10/08/19 1359       Plan    Plan  Home with [facility]    Plan Comments  IMM noted.  CCP spoke to patient at bedside to discuss discharge planning.  CCP role explained.  Face sheet verified.   Pt  currently lives in a assisted living alone..  Pt PCP is Dr. Harmeet Payne.  She is independent with ADL's.  He uses a rolling walker to ambulate as needed.     Her emergency contact is her daughter Michelle [phone].  She no history of Home Health.  She was just released from rehab at [facility]. She obtains her medications from [facility] on [address].    Plan is home  [employer]  following from last visit CCP following        Destination      No service coordination in this encounter.      Durable Medical Equipment      No service coordination in this encounter.      Dialysis/Infusion      No service coordination in this encounter.      Home Medical Care      Service Provider Request Status Selected Services Address Phone Number Fax Number    [facility] [place] Accepted N/A [address]  [address] [phone]      Therapy      No service coordination in this encounter.      Community Resources      No service coordination in this encounter.          Demographic Summary    No documentation.       Functional Status    No documentation.       Psychosocial    No documentation.       Abuse/Neglect    No documentation.       Legal    No documentation.       Substance Abuse    No documentation.       Patient Forms    No documentation.           Nga Meza RN

## 2019-10-09 ENCOUNTER — APPOINTMENT (OUTPATIENT)
Dept: CARDIOLOGY | Facility: HOSPITAL | Age: 79
End: 2019-10-09

## 2019-10-09 LAB
ANION GAP SERPL CALCULATED.3IONS-SCNC: 11.1 MMOL/L (ref 5–15)
AORTIC DIMENSIONLESS INDEX: 0.8 (DI)
BASOPHILS # BLD AUTO: 0.06 10*3/MM3 (ref 0–0.2)
BASOPHILS NFR BLD AUTO: 0.7 % (ref 0–1.5)
BH CV ECHO MEAS - AO MAX PG (FULL): 3.6 MMHG
BH CV ECHO MEAS - AO MAX PG: 8.9 MMHG
BH CV ECHO MEAS - AO MEAN PG (FULL): 2 MMHG
BH CV ECHO MEAS - AO MEAN PG: 4 MMHG
BH CV ECHO MEAS - AO ROOT AREA (BSA CORRECTED): 1.9
BH CV ECHO MEAS - AO ROOT AREA: 9.6 CM^2
BH CV ECHO MEAS - AO ROOT DIAM: 3.5 CM
BH CV ECHO MEAS - AO V2 MAX: 149 CM/SEC
BH CV ECHO MEAS - AO V2 MEAN: 96.6 CM/SEC
BH CV ECHO MEAS - AO V2 VTI: 29.2 CM
BH CV ECHO MEAS - ASC AORTA: 3.4 CM
BH CV ECHO MEAS - AVA(I,A): 2.7 CM^2
BH CV ECHO MEAS - AVA(I,D): 2.7 CM^2
BH CV ECHO MEAS - AVA(V,A): 2.7 CM^2
BH CV ECHO MEAS - AVA(V,D): 2.7 CM^2
BH CV ECHO MEAS - BSA(HAYCOCK): 1.9 M^2
BH CV ECHO MEAS - BSA: 1.8 M^2
BH CV ECHO MEAS - BZI_BMI: 30.8 KILOGRAMS/M^2
BH CV ECHO MEAS - BZI_METRIC_HEIGHT: 160 CM
BH CV ECHO MEAS - BZI_METRIC_WEIGHT: 78.9 KG
BH CV ECHO MEAS - EDV(CUBED): 68.9 ML
BH CV ECHO MEAS - EDV(MOD-SP2): 64 ML
BH CV ECHO MEAS - EDV(MOD-SP4): 71 ML
BH CV ECHO MEAS - EDV(TEICH): 74.2 ML
BH CV ECHO MEAS - EF(CUBED): 52.5 %
BH CV ECHO MEAS - EF(MOD-BP): 64 %
BH CV ECHO MEAS - EF(MOD-SP2): 65.6 %
BH CV ECHO MEAS - EF(MOD-SP4): 63.4 %
BH CV ECHO MEAS - EF(TEICH): 44.8 %
BH CV ECHO MEAS - ESV(CUBED): 32.8 ML
BH CV ECHO MEAS - ESV(MOD-SP2): 22 ML
BH CV ECHO MEAS - ESV(MOD-SP4): 26 ML
BH CV ECHO MEAS - ESV(TEICH): 41 ML
BH CV ECHO MEAS - FS: 22 %
BH CV ECHO MEAS - IVS/LVPW: 1
BH CV ECHO MEAS - IVSD: 1.2 CM
BH CV ECHO MEAS - LAT PEAK E' VEL: 12.5 CM/SEC
BH CV ECHO MEAS - LV DIASTOLIC VOL/BSA (35-75): 39 ML/M^2
BH CV ECHO MEAS - LV MASS(C)D: 171.7 GRAMS
BH CV ECHO MEAS - LV MASS(C)DI: 94.2 GRAMS/M^2
BH CV ECHO MEAS - LV MAX PG: 5.3 MMHG
BH CV ECHO MEAS - LV MEAN PG: 2 MMHG
BH CV ECHO MEAS - LV SYSTOLIC VOL/BSA (12-30): 14.3 ML/M^2
BH CV ECHO MEAS - LV V1 MAX: 115 CM/SEC
BH CV ECHO MEAS - LV V1 MEAN: 69.2 CM/SEC
BH CV ECHO MEAS - LV V1 VTI: 22.8 CM
BH CV ECHO MEAS - LVIDD: 4.1 CM
BH CV ECHO MEAS - LVIDS: 3.2 CM
BH CV ECHO MEAS - LVLD AP2: 8.1 CM
BH CV ECHO MEAS - LVLD AP4: 8.2 CM
BH CV ECHO MEAS - LVLS AP2: 6 CM
BH CV ECHO MEAS - LVLS AP4: 6.7 CM
BH CV ECHO MEAS - LVOT AREA (M): 3.5 CM^2
BH CV ECHO MEAS - LVOT AREA: 3.5 CM^2
BH CV ECHO MEAS - LVOT DIAM: 2.1 CM
BH CV ECHO MEAS - LVPWD: 1.2 CM
BH CV ECHO MEAS - MED PEAK E' VEL: 4.4 CM/SEC
BH CV ECHO MEAS - MV A DUR: 141 SEC
BH CV ECHO MEAS - MV A MAX VEL: 45.1 CM/SEC
BH CV ECHO MEAS - MV DEC SLOPE: 494 CM/SEC^2
BH CV ECHO MEAS - MV DEC TIME: 211 SEC
BH CV ECHO MEAS - MV E MAX VEL: 104 CM/SEC
BH CV ECHO MEAS - MV E/A: 0.7
BH CV ECHO MEAS - PA ACC TIME: 0.02 SEC
BH CV ECHO MEAS - PA MAX PG (FULL): 6.6 MMHG
BH CV ECHO MEAS - PA MAX PG: 8.6 MMHG
BH CV ECHO MEAS - PA PR(ACCEL): 70.9 MMHG
BH CV ECHO MEAS - PA V2 MAX: 147 CM/SEC
BH CV ECHO MEAS - PVA(V,A): 3 CM^2
BH CV ECHO MEAS - PVA(V,D): 3 CM^2
BH CV ECHO MEAS - QP/QS: 0.94
BH CV ECHO MEAS - RAP SYSTOLE: 8 MMHG
BH CV ECHO MEAS - RV MAX PG: 2 MMHG
BH CV ECHO MEAS - RV MEAN PG: 1 MMHG
BH CV ECHO MEAS - RV V1 MAX: 70.9 CM/SEC
BH CV ECHO MEAS - RV V1 MEAN: 42.5 CM/SEC
BH CV ECHO MEAS - RV V1 VTI: 12.1 CM
BH CV ECHO MEAS - RVOT AREA: 6.2 CM^2
BH CV ECHO MEAS - RVOT DIAM: 2.8 CM
BH CV ECHO MEAS - RVSP: 30 MMHG
BH CV ECHO MEAS - SI(AO): 154.1 ML/M^2
BH CV ECHO MEAS - SI(CUBED): 19.8 ML/M^2
BH CV ECHO MEAS - SI(LVOT): 43.3 ML/M^2
BH CV ECHO MEAS - SI(MOD-SP2): 23 ML/M^2
BH CV ECHO MEAS - SI(MOD-SP4): 24.7 ML/M^2
BH CV ECHO MEAS - SI(TEICH): 18.2 ML/M^2
BH CV ECHO MEAS - SV(AO): 280.9 ML
BH CV ECHO MEAS - SV(CUBED): 36.2 ML
BH CV ECHO MEAS - SV(LVOT): 79 ML
BH CV ECHO MEAS - SV(MOD-SP2): 42 ML
BH CV ECHO MEAS - SV(MOD-SP4): 45 ML
BH CV ECHO MEAS - SV(RVOT): 74.5 ML
BH CV ECHO MEAS - SV(TEICH): 33.3 ML
BH CV ECHO MEAS - TAPSE (>1.6): 1.9 CM2
BH CV ECHO MEAS - TR MAX PG: 22
BH CV ECHO MEASUREMENTS AVERAGE E/E' RATIO: 12.31
BH CV XLRA - RV BASE: 4.06 CM
BH CV XLRA - TDI S': 12 CM/SEC
BUN BLD-MCNC: 16 MG/DL (ref 8–23)
BUN/CREAT SERPL: 23.2 (ref 7–25)
CALCIUM SPEC-SCNC: 8.6 MG/DL (ref 8.6–10.5)
CHLORIDE SERPL-SCNC: 91 MMOL/L (ref 98–107)
CO2 SERPL-SCNC: 41.9 MMOL/L (ref 22–29)
CREAT BLD-MCNC: 0.69 MG/DL (ref 0.57–1)
DEPRECATED RDW RBC AUTO: 43.4 FL (ref 37–54)
EOSINOPHIL # BLD AUTO: 0.2 10*3/MM3 (ref 0–0.4)
EOSINOPHIL NFR BLD AUTO: 2.2 % (ref 0.3–6.2)
ERYTHROCYTE [DISTWIDTH] IN BLOOD BY AUTOMATED COUNT: 13.1 % (ref 12.3–15.4)
GFR SERPL CREATININE-BSD FRML MDRD: 82 ML/MIN/1.73
GLUCOSE BLD-MCNC: 109 MG/DL (ref 65–99)
HCT VFR BLD AUTO: 30.3 % (ref 34–46.6)
HGB BLD-MCNC: 9.7 G/DL (ref 12–15.9)
IMM GRANULOCYTES # BLD AUTO: 0.03 10*3/MM3 (ref 0–0.05)
IMM GRANULOCYTES NFR BLD AUTO: 0.3 % (ref 0–0.5)
LEFT ATRIUM VOLUME INDEX: 29.7 ML/M2
LYMPHOCYTES # BLD AUTO: 1.1 10*3/MM3 (ref 0.7–3.1)
LYMPHOCYTES NFR BLD AUTO: 12 % (ref 19.6–45.3)
MAXIMAL PREDICTED HEART RATE: 143 BPM
MCH RBC QN AUTO: 29.2 PG (ref 26.6–33)
MCHC RBC AUTO-ENTMCNC: 32 G/DL (ref 31.5–35.7)
MCV RBC AUTO: 91.3 FL (ref 79–97)
MONOCYTES # BLD AUTO: 1.05 10*3/MM3 (ref 0.1–0.9)
MONOCYTES NFR BLD AUTO: 11.5 % (ref 5–12)
NEUTROPHILS # BLD AUTO: 6.71 10*3/MM3 (ref 1.7–7)
NEUTROPHILS NFR BLD AUTO: 73.3 % (ref 42.7–76)
NRBC BLD AUTO-RTO: 0 /100 WBC (ref 0–0.2)
PLATELET # BLD AUTO: 267 10*3/MM3 (ref 140–450)
PMV BLD AUTO: 10.3 FL (ref 6–12)
POTASSIUM BLD-SCNC: 3.6 MMOL/L (ref 3.5–5.2)
RBC # BLD AUTO: 3.32 10*6/MM3 (ref 3.77–5.28)
SODIUM BLD-SCNC: 144 MMOL/L (ref 136–145)
STRESS TARGET HR: 122 BPM
WBC NRBC COR # BLD: 9.15 10*3/MM3 (ref 3.4–10.8)

## 2019-10-09 PROCEDURE — 94799 UNLISTED PULMONARY SVC/PX: CPT

## 2019-10-09 PROCEDURE — 25010000002 INFLUENZA VAC SPLIT QUAD 0.5 ML SUSPENSION PREFILLED SYRINGE: Performed by: INTERNAL MEDICINE

## 2019-10-09 PROCEDURE — 93306 TTE W/DOPPLER COMPLETE: CPT | Performed by: INTERNAL MEDICINE

## 2019-10-09 PROCEDURE — 93306 TTE W/DOPPLER COMPLETE: CPT

## 2019-10-09 PROCEDURE — 90686 IIV4 VACC NO PRSV 0.5 ML IM: CPT | Performed by: INTERNAL MEDICINE

## 2019-10-09 PROCEDURE — 85025 COMPLETE CBC W/AUTO DIFF WBC: CPT | Performed by: INTERNAL MEDICINE

## 2019-10-09 PROCEDURE — 25010000002 FUROSEMIDE PER 20 MG: Performed by: INTERNAL MEDICINE

## 2019-10-09 PROCEDURE — G0008 ADMIN INFLUENZA VIRUS VAC: HCPCS | Performed by: INTERNAL MEDICINE

## 2019-10-09 PROCEDURE — 80048 BASIC METABOLIC PNL TOTAL CA: CPT | Performed by: INTERNAL MEDICINE

## 2019-10-09 RX ORDER — TRAMADOL HYDROCHLORIDE 50 MG/1
50 TABLET ORAL 2 TIMES DAILY PRN
COMMUNITY

## 2019-10-09 RX ORDER — CILOSTAZOL 100 MG/1
100 TABLET ORAL 2 TIMES DAILY
COMMUNITY
End: 2019-10-13 | Stop reason: HOSPADM

## 2019-10-09 RX ORDER — PROPAFENONE HYDROCHLORIDE 150 MG/1
150 TABLET, COATED ORAL EVERY 8 HOURS
COMMUNITY
End: 2019-10-13 | Stop reason: HOSPADM

## 2019-10-09 RX ORDER — POTASSIUM CHLORIDE 750 MG/1
20 TABLET, FILM COATED, EXTENDED RELEASE ORAL DAILY
COMMUNITY
End: 2019-10-13 | Stop reason: HOSPADM

## 2019-10-09 RX ORDER — ATORVASTATIN CALCIUM 10 MG/1
10 TABLET, FILM COATED ORAL DAILY
Status: ON HOLD | COMMUNITY
End: 2021-01-01

## 2019-10-09 RX ORDER — ZINC/PETROLATUM,WHITE
PASTE (GRAM) TOPICAL 2 TIMES DAILY
Status: DISCONTINUED | OUTPATIENT
Start: 2019-10-09 | End: 2019-10-13 | Stop reason: HOSPADM

## 2019-10-09 RX ADMIN — BUDESONIDE 0.5 MG: 0.5 INHALANT RESPIRATORY (INHALATION) at 19:28

## 2019-10-09 RX ADMIN — SKIN PROTECTANTS MISC - PASTE 1 APPLICATION: 58.3 PASTE at 20:45

## 2019-10-09 RX ADMIN — IPRATROPIUM BROMIDE AND ALBUTEROL SULFATE 3 ML: 2.5; .5 SOLUTION RESPIRATORY (INHALATION) at 17:35

## 2019-10-09 RX ADMIN — CARVEDILOL 12.5 MG: 12.5 TABLET, FILM COATED ORAL at 10:54

## 2019-10-09 RX ADMIN — FERROUS SULFATE TAB 325 MG (65 MG ELEMENTAL FE) 325 MG: 325 (65 FE) TAB at 10:54

## 2019-10-09 RX ADMIN — PANTOPRAZOLE SODIUM 40 MG: 40 TABLET, DELAYED RELEASE ORAL at 06:42

## 2019-10-09 RX ADMIN — POLYETHYLENE GLYCOL 3350 17 G: 17 POWDER, FOR SOLUTION ORAL at 11:20

## 2019-10-09 RX ADMIN — IPRATROPIUM BROMIDE AND ALBUTEROL SULFATE 3 ML: 2.5; .5 SOLUTION RESPIRATORY (INHALATION) at 08:33

## 2019-10-09 RX ADMIN — FUROSEMIDE 40 MG: 10 INJECTION, SOLUTION INTRAMUSCULAR; INTRAVENOUS at 17:20

## 2019-10-09 RX ADMIN — FERROUS SULFATE TAB 325 MG (65 MG ELEMENTAL FE) 325 MG: 325 (65 FE) TAB at 17:21

## 2019-10-09 RX ADMIN — ACETAMINOPHEN 650 MG: 325 TABLET, FILM COATED ORAL at 05:24

## 2019-10-09 RX ADMIN — RIVAROXABAN 20 MG: 20 TABLET, FILM COATED ORAL at 17:21

## 2019-10-09 RX ADMIN — BUDESONIDE 0.5 MG: 0.5 INHALANT RESPIRATORY (INHALATION) at 08:33

## 2019-10-09 RX ADMIN — CARVEDILOL 12.5 MG: 12.5 TABLET, FILM COATED ORAL at 17:21

## 2019-10-09 RX ADMIN — ATORVASTATIN CALCIUM 10 MG: 10 TABLET, FILM COATED ORAL at 17:21

## 2019-10-09 RX ADMIN — FUROSEMIDE 40 MG: 10 INJECTION, SOLUTION INTRAMUSCULAR; INTRAVENOUS at 10:54

## 2019-10-09 RX ADMIN — INFLUENZA A VIRUS A/BRISBANE/02/2018 IVR-190 (H1N1) ANTIGEN (PROPIOLACTONE INACTIVATED), INFLUENZA A VIRUS A/KANSAS/14/2017 X-327 (H3N2) ANTIGEN (PROPIOLACTONE INACTIVATED), INFLUENZA B VIRUS B/MARYLAND/15/2016 ANTIGEN (PROPIOLACTONE INACTIVATED), INFLUENZA B VIRUS B/PHUKET/3073/2013 BVR-1B ANTIGEN (PROPIOLACTONE INACTIVATED) 0.5 ML: 15; 15; 15; 15 INJECTION, SUSPENSION INTRAMUSCULAR at 13:11

## 2019-10-09 RX ADMIN — IPRATROPIUM BROMIDE AND ALBUTEROL SULFATE 3 ML: 2.5; .5 SOLUTION RESPIRATORY (INHALATION) at 12:26

## 2019-10-09 RX ADMIN — LEVOTHYROXINE SODIUM 50 MCG: 50 TABLET ORAL at 06:42

## 2019-10-09 RX ADMIN — SODIUM CHLORIDE, PRESERVATIVE FREE 10 ML: 5 INJECTION INTRAVENOUS at 20:30

## 2019-10-09 RX ADMIN — VITAMIN D, TAB 1000IU (100/BT) 1000 UNITS: 25 TAB at 10:54

## 2019-10-09 RX ADMIN — MIRTAZAPINE 30 MG: 30 TABLET, FILM COATED ORAL at 20:30

## 2019-10-09 RX ADMIN — LISINOPRIL 20 MG: 20 TABLET ORAL at 10:54

## 2019-10-09 RX ADMIN — IPRATROPIUM BROMIDE AND ALBUTEROL SULFATE 3 ML: 2.5; .5 SOLUTION RESPIRATORY (INHALATION) at 19:28

## 2019-10-09 NOTE — NURSING NOTE
CWOCN consult for gluteal skin.  Patient with partial thickness skin loss to inner left gluteal cleft approximately 3x2 cm; right gluteal cleft with erythema.  Skin damage likely related to moisture and friction.  Would recommend use of barrier cream; will request ilex from pharmacy.  Patient's mobility does not appear to be a factor.

## 2019-10-09 NOTE — PROGRESS NOTES
Jhonatan Scott MD                          [phone]      Patient ID:    Name:  Ale Narayan    MRN:  [id number]    1940   79 y.o.  female            Patient Care Team:  Harmeet Payne MD as PCP - General  Harmeet Payne MD as PCP - Claims Attributed  Yoan Cash MD as Consulting Physician (Hematology and Oncology)  Devon Lamb MD as Referring Physician (Thoracic Surgery)    CC/ Reason for visit: Respiratory failure metabolic encephalopathy    Subjective: Pt seen and examined this AM. No acute overnight events noted. Doing better.  Now states that she is okay to try the nocturnal noninvasive ventilator as long as we are able to make some adjustments regarding pressures and masks.  I have told her that she needs to be patient.  Discussed with RT    ROS: Denies any subjective fevers, syncope or presyncopal events, new neurological deficits, nausea or vomiting currently    Objective     Vital Signs past 24hrs    BP range: BP: (101-147)/(50-96) 101/52  Pulse range: Heart Rate:  [55-74] 73  Resp rate range: Resp:  [16-18] 18  Temp range: Temp (24hrs), Av °F (36.7 °C), Min:97.7 °F (36.5 °C), Max:98.3 °F (36.8 °C)      Ventilator/Non-Invasive Ventilation Settings (From admission, onward)    Start     Ordered    10/06/19 1130  NIPPV (CPAP or BIPAP)  Until Discontinued     Question Answer Comment   Indication: Acute Respiratory Failure    Type: AVAPS/PC/PS    NIPPV Mask Interface: Per Patient Preference    Backup Rate 14    Target VT (mL) 450    EPAP/PEEP (cm H2O) 5    Min Pressure (cm H2O) 10    Max Pressure (cm H2O) 20    Titrate for SPO2 90%        10/06/19 1130          Device (Oxygen Therapy): nasal cannula       79 kg (174 lb 2.6 oz); Body mass index is 31.85 kg/m².      Intake/Output Summary (Last 24 hours) at 10/9/2019 1622  Last data filed at 10/9/2019 1552  Gross per 24 hour   Intake 1090 ml   Output 1450 ml   Net -360 ml       PHYSICAL EXAM    Constitutional:  Elderly obese white female patient pt in bed, No acute respiratory distress, + accessory muscle use  Head: - NCAT  Eyes: No pallor, Anicteric conjunctiva, EOMI.  ENMT:  Mallampati 4, no oral thrush. Dry MM.   NECK: Trachea midline, No thyromegaly, no palpable cervical lymphadenopathy  Heart: RRR, no murmur. Trace pedal edema   Lungs: YANICK +, distant breath sounds, decreased at bases. no wheezes/ crackles heard    Abdomen: Soft. Obese, No tenderness, guarding or rigidity. No palpable masses  Extremities: Extremities warm and well perfused. No cyanosis/ clubbing  Neuro: Conscious, answers appropriately, no gross focal neuro deficits  Psych: Mood and affect appropriate    Scheduled meds:      atorvastatin 10 mg Oral Q PM   budesonide 0.5 mg Nebulization BID - RT   carvedilol 12.5 mg Oral BID With Meals   cholecalciferol 1,000 Units Oral Daily   ferrous sulfate 325 mg Oral BID With Meals   furosemide 40 mg Intravenous BID   ipratropium-albuterol 3 mL Nebulization 4x Daily - RT   levothyroxine 50 mcg Oral Q AM   lisinopril 20 mg Oral Q24H   mirtazapine 30 mg Oral Nightly   pantoprazole 40 mg Oral QAM   rivaroxaban 20 mg Oral Daily With Dinner   sodium chloride 10 mL Intravenous Q12H   white petrolatum-zinc  Topical BID       IV meds:                           Data Review:      Results from last 7 days   Lab Units 10/09/19  0354 10/08/19  0632 10/07/19  2020 10/07/19  1045 10/07/19  0455 10/06/19  0913   SODIUM mmol/L 144 140  --  137  --  137   POTASSIUM mmol/L 3.6 3.9 5.0 3.6  --  4.2   CHLORIDE mmol/L 91* 92*  --  89*  --  87*   CO2 mmol/L 41.9* 42.2*  --  40.9*  --  44.4*   BUN mg/dL 16 16  --  15  --  11   CREATININE mg/dL 0.69 0.61  --  0.70  --  0.61   CALCIUM mg/dL 8.6 8.3*  --  8.7  --  8.7   BILIRUBIN mg/dL  --   --   --   --   --  0.3   ALK PHOS U/L  --   --   --   --   --  82   ALT (SGPT) U/L  --   --   --   --   --  17   AST (SGOT) U/L  --   --   --   --   --  14   GLUCOSE mg/dL 109* 126*   --  115*  --  119*   WBC 10*3/mm3 9.15 8.86  --  8.98  --  7.88   HEMOGLOBIN g/dL 9.7* 9.0*  --  8.9*  --  9.9*   PLATELETS 10*3/mm3 267 244  --  251  --  265   PROBNP pg/mL  --   --   --   --   --  6,451.0*   PROCALCITONIN ng/mL  --   --   --   --  <0.02*  --        Lab Results   Component Value Date    CALCIUM 8.6 10/09/2019    PHOS 2.9 09/12/2015             Results from last 7 days   Lab Units 10/07/19  1416 10/06/19  1601 10/06/19  1231 10/06/19  1107   PH, ARTERIAL pH units 7.384 7.368 7.228* 7.179*   PO2 ART mm Hg 103.2* 226.7* 141.0* 82.3   PCO2, ARTERIAL mm Hg 80.9* 83.5* 112.0* 118.8*   HCO3 ART mmol/L 48.3* 48.0* 46.6* 44.3*        Results Review:    I have reviewed the relevant laboratory results and independently reviewed the chest imaging from this hospitalization including the available echocardiogram reports personally and summarized it if/ when appropriate below    Assessment    Acute metabolic encephalopathy  Acute on chronic hypoxic resp failure( 4L @ home)  Acute on undiagnosed chronic hypercapnic resp failure   Acute on chronic congestive heart failure  Abdominal pain - ? 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  I told her that she needs to be patient and we could make some changes to make it more comfortable.  Discussed with respiratory therapist.  Discussed with CCP with possible need for trilogy at discharge  Still requiring significant supplemental oxygen but not too far from baseline.  COPD not in exacerbation.  We will  continue with bronchodilators.  Cardiology now on board and managing heart failure issues  Continue anticoagulation for history of DVT/PE.    I have discussed my findings and recommendations with patient and nursing staff.     Jhonatan Scott MD  10/9/2019

## 2019-10-09 NOTE — PLAN OF CARE
Problem: Patient Care Overview  Goal: Plan of Care Review  Outcome: Ongoing (interventions implemented as appropriate)   10/09/19 2431   Coping/Psychosocial   Plan of Care Reviewed With patient   Plan of Care Review   Progress no change   OTHER   Outcome Summary pt vss, pt a&ox3, pt remains on 4-5LNC with episodes of SOA, pt recovers with rest, pt uses BSC to void and tolerating well, will cont to monitor.        Problem: Skin Injury Risk (Adult)  Goal: Identify Related Risk Factors and Signs and Symptoms  Outcome: Ongoing (interventions implemented as appropriate)    Goal: Skin Health and Integrity  Outcome: Ongoing (interventions implemented as appropriate)      Problem: Fall Risk (Adult)  Goal: Identify Related Risk Factors and Signs and Symptoms  Outcome: Ongoing (interventions implemented as appropriate)    Goal: Absence of Fall  Outcome: Ongoing (interventions implemented as appropriate)      Problem: Pain, Acute (Adult)  Goal: Identify Related Risk Factors and Signs and Symptoms  Outcome: Ongoing (interventions implemented as appropriate)    Goal: Acceptable Pain Control/Comfort Level  Outcome: Ongoing (interventions implemented as appropriate)      Problem: Breathing Pattern Ineffective (Adult)  Goal: Identify Related Risk Factors and Signs and Symptoms  Outcome: Ongoing (interventions implemented as appropriate)    Goal: Effective Oxygenation/Ventilation  Outcome: Ongoing (interventions implemented as appropriate)    Goal: Anxiety/Fear Reduction  Outcome: Ongoing (interventions implemented as appropriate)

## 2019-10-09 NOTE — PROGRESS NOTES
Patient Name: Ale Narayan  Patient : 1940        Date of Service:10/09/19  Provider of Service: Flavio Del Rosario MD  Place of Service: [facility]  Referral Provider: Harmeet Payne MD          Follow Up: dyspnea, CHF    Interval Hx: Significant diuresis yesterday.  Presently on intravenous furosemide.  Patient feels her breathing has improved.        OBJECTIVE  Temp:  [97.7 °F (36.5 °C)-98.6 °F (37 °C)] 98 °F (36.7 °C)  Heart Rate:  [55-69] 69  Resp:  [16-22] 16  BP: (130-166)/(50-96) 130/96     Intake/Output Summary (Last 24 hours) at 10/9/2019 0814  Last data filed at 10/8/2019 2351  Gross per 24 hour   Intake 610 ml   Output 2150 ml   Net -1540 ml     Body mass index is 29,346.54 kg/m².      10/08/19  0600 10/08/19  1449 10/09/19  0619   Weight: 81.6 kg (179 lb 14.3 oz) (174) 79.3 kg (174 lb 14.4 oz)         Physical Exam:   Physical Exam   Constitutional: She is oriented to person, place, and time. She appears well-developed and well-nourished.   HENT:   Head: Normocephalic.   Eyes: Pupils are equal, round, and reactive to light.   Neck: Normal range of motion. No JVD present. Carotid bruit is not present. No thyromegaly present.   Cardiovascular: Normal rate, regular rhythm, S1 normal, S2 normal, normal heart sounds and intact distal pulses. Exam reveals no gallop and no friction rub.   No murmur heard.  Pulmonary/Chest: Effort normal. She has decreased breath sounds.   Abdominal: Soft. Bowel sounds are normal.   Musculoskeletal: She exhibits no edema.   Neurological: She is alert and oriented to person, place, and time.   Skin: Skin is warm, dry and intact. No erythema.   Psychiatric: She has a normal mood and affect.   Vitals reviewed.        CURRENT MEDS    Scheduled Meds:  atorvastatin 10 mg Oral Q PM   budesonide 0.5 mg Nebulization BID - RT   carvedilol 12.5 mg Oral BID With Meals   cholecalciferol 1,000 Units Oral Daily   ferrous sulfate 325 mg Oral BID With Meals    furosemide 40 mg Intravenous BID   influenza vaccine 0.5 mL Intramuscular Once   ipratropium-albuterol 3 mL Nebulization 4x Daily - RT   levothyroxine 50 mcg Oral Q AM   lisinopril 20 mg Oral Q24H   mirtazapine 30 mg Oral Nightly   pantoprazole 40 mg Oral QAM   rivaroxaban 20 mg Oral Daily With Dinner   sodium chloride 10 mL Intravenous Q12H     Continuous Infusions:       Lab Review:   Results from last 7 days   Lab Units 10/09/19  0354 10/08/19  0632  10/06/19  0913   SODIUM mmol/L 144 140   < > 137   POTASSIUM mmol/L 3.6 3.9   < > 4.2   CHLORIDE mmol/L 91* 92*   < > 87*   CO2 mmol/L 41.9* 42.2*   < > 44.4*   BUN mg/dL 16 16   < > 11   CREATININE mg/dL 0.69 0.61   < > 0.61   GLUCOSE mg/dL 109* 126*   < > 119*   CALCIUM mg/dL 8.6 8.3*   < > 8.7   AST (SGOT) U/L  --   --   --  14   ALT (SGPT) U/L  --   --   --  17    < > = values in this interval not displayed.     Results from last 7 days   Lab Units 10/07/19  2020 10/07/19  0455 10/06/19  1232 10/06/19  0913   TROPONIN T ng/mL <0.010 <0.010 <0.010 <0.010     Results from last 7 days   Lab Units 10/09/19  0354 10/08/19  0632   WBC 10*3/mm3 9.15 8.86   HEMOGLOBIN g/dL 9.7* 9.0*   HEMATOCRIT % 30.3* 29.6*   PLATELETS 10*3/mm3 267 244         Results from last 7 days   Lab Units 10/07/19  2020 10/06/19  0913   MAGNESIUM mg/dL 1.8 1.9         Results from last 7 days   Lab Units 10/06/19  0913   PROBNP pg/mL 6,451.0*     Results from last 7 days   Lab Units 10/06/19  1232   TSH uIU/mL 0.722       I personally reviewed the patient's ECG and telemetry data    ASSESSMENT & PLAN    Hypercapnia    1.  COPD: Acute on chronic respiratory failure  2.  Congestive heart failure: Continuing on intravenous Lasix.  Echocardiogram pending for reevaluation  3.  Paroxysmal atrial fibrillation: Presently on rivaroxaban  4.  AV block: Remains in sinus rhythm  5.  History of pulmonary embolus with IVC filter placed.  Chronic anticoagulation.    Continue present course.  We will plan on  reviewing the echocardiogram.  Flavio Del Rosario MD  10/09/19

## 2019-10-09 NOTE — PROGRESS NOTES
Clinical Pharmacy Services: Medication History    Ale Narayan is a 79 y.o. female presenting to [facility] for Hypercapnia [R06.89]  Generalized abdominal pain [R10.84]  Acute on chronic respiratory failure with hypoxemia (CMS/HCC) [J96.21]    She  has a past medical history of Adenocarcinoma of lung (CMS/HCC), Asthma, Carotid artery disease (CMS/HCC), Cough, Deep vein thrombosis (CMS/HCC), Emphysema of lung (CMS/HCC), Hyperlipidemia, Hypertension, Hypothyroidism, Lung cancer (CMS/HCC), Osteoporosis, Paroxysmal atrial fibrillation (CMS/HCC), Peripheral arterial disease (CMS/HCC), Pulmonary embolism (CMS/HCC), and Thrombophilia (CMS/[facility]).    Allergies as of 10/06/2019 - Reviewed 10/06/2019   Allergen Reaction Noted   • Adhesive tape  02/15/2017   • Augmentin [amoxicillin-pot clavulanate]  07/27/2016   • Cephalexin  07/27/2016   • Metoclopramide Unknown (See Comments) 09/23/2011   • Pentazocine  01/20/2016   • Simvastatin Unknown (See Comments) 09/23/2011   • Sucralfate Unknown (See Comments) 09/23/2011       Medication information was obtained from: Patient     Prior to Admission Medications     Prescriptions Last Dose Informant Patient Reported? Taking?    acetaminophen (TYLENOL) 325 MG tablet 10/7/2019 Nursing Home No Yes    Take 2 tablets by mouth Every 6 (Six) Hours As Needed for Mild Pain .    atorvastatin (LIPITOR) 10 MG tablet  Self Yes Yes    Take 10 mg by mouth Daily.    budesonide (PULMICORT) 0.5 MG/2ML nebulizer solution 10/8/2019 Nursing Home No Yes    Take 2 mL by nebulization 2 (Two) Times a Day.    carvedilol (COREG) 25 MG tablet 10/8/2019 [facility] Yes    Take 25 mg by mouth 2 (Two) Times a Day With Meals.    cholecalciferol (VITAMIN D3) 1000 units tablet 10/8/2019 prison Yes Yes    Take 1,000 Units by mouth Daily.    cilostazol (PLETAL) 100 MG tablet  Self Yes Yes    Take 100 mg by mouth 2 (Two) Times a Day.    clotrimazole-betamethasone (LOTRISONE) 1-0.05 % cream  10/8/2019 Nursing Home Yes Yes    Apply 1 application topically to the appropriate area as directed 2 (Two) Times a Day. Abdominal folds: Clean with soap and water, dry well, apply equal parts of Lotrisone and mupirocin, place abd pads in folds BID and PRN    esomeprazole (nexIUM) 40 MG capsule 10/8/2019 Nursing Home Yes Yes    Take 40 mg by mouth Every Morning Before Breakfast.    ferrous sulfate 325 (65 FE) MG tablet 10/8/2019 [facility] No Yes    Take 1 tablet by mouth 2 (Two) Times a Day With Meals.    furosemide (LASIX) 20 MG tablet 10/8/2019 [facility] Yes Yes    Take 20 mg by mouth Daily. Pt takes 40 mg in morning and 20 mg in afternoon (mid-day).    furosemide (LASIX) 40 MG tablet 10/8/2019 [facility] Yes Yes    Take 40 mg by mouth Every Morning. Pt takes 40 mg in morning and 20 mg in afternoon (mid-day).    ipratropium-albuterol (DUO-NEB) 0.5-2.5 mg/3 ml nebulizer 10/8/2019 [facility] No Yes    Take 3 mL by nebulization 4 (Four) Times a Day.    levothyroxine (SYNTHROID, LEVOTHROID) 50 MCG tablet 10/8/2019 [facility] No Yes    Take 1 tablet by mouth Daily.    loperamide (IMODIUM) 2 MG capsule Past Week Nursing Home Yes Yes    Take 2 mg by mouth 4 (Four) Times a Day As Needed for Diarrhea. Administer after each loose stool    mirtazapine (REMERON) 30 MG tablet 10/7/2019 [facility] Yes Yes    Take 30 mg by mouth Every Night.    mupirocin (BACTROBAN) 2 % ointment  Nursing Home Yes Yes    Apply 1 application topically to the appropriate area as directed 2 (Two) Times a Day. Abdominal folds: Clean with soap and water, dry well, apply equal parts of Lotrisone and mupirocin, place abd pads in folds BID and PRN    polyethylene glycol (MIRALAX) pack packet Past Week Nursing Home No Yes    Take 17 g by mouth Daily As Needed (Constipation).    potassium chloride (K-DUR) 10 MEQ CR tablet  Nursing Home Yes Yes    Take 20 mEq by mouth Daily.    promethazine (PHENERGAN) 12.5 MG tablet Past Week Nursing Home No  Yes    Take 1 tablet by mouth Every 6 (Six) Hours As Needed for Nausea or Vomiting.    propafenone (RYTHMOL) 150 MG tablet  Self Yes Yes    Take 150 mg by mouth Every 8 (Eight) Hours.    rivaroxaban (XARELTO) 20 MG tablet 10/8/2019 prison Yes Yes    Take 20 mg by mouth Daily With Dinner.    traMADol (ULTRAM) 50 MG tablet  Self Yes Yes    Take 50 mg by mouth Every 8 (Eight) Hours As Needed for Moderate Pain .            Medication notes: added cilostazol, propafenone and tramadol    This medication list is complete to the best of my knowledge as of 10/9/2019    Please call if questions.    Yariel Dubois, Pharmacy Intern  10/9/2019 1:25 PM

## 2019-10-09 NOTE — PROGRESS NOTES
Continued Stay Note  [facility]     Patient Name: Ale Narayan  MRN: [id number]  Today's Date: 10/9/2019    Admit Date: 10/6/2019    Discharge Plan     Row Name 10/09/19 1246       Plan    Plan  Home with Worship     Patient/Family in Agreement with Plan  yes    Plan Comments  [facility] met with pt who confirms plan to return home with Worship  if needed and confirms her current O2 supply company is [employer], which she will use if she is discharged with a Trilogy vent (per Pulmonary MD, evaluation pending). [employer] ([facility]) updated and will follow to arrange if indicated. Eli Bah LCSW        Discharge Codes    No documentation.             Susu Bah LCSW

## 2019-10-09 NOTE — PLAN OF CARE
Problem: Patient Care Overview  Goal: Plan of Care Review  Outcome: Ongoing (interventions implemented as appropriate)   10/09/19 0441 10/09/19 1109 10/09/19 1336   Coping/Psychosocial   Plan of Care Reviewed With --  patient --    Plan of Care Review   Progress no change --  --    OTHER   Outcome Summary --  --  Pt a&ox4. VSS, Pt on 4Lo2 via nc o2 sat 93%. Pt up to bs commode and up to chair for meals with assist x1. Pt recieved flu vac per MD order. Will continue to monitor.     Goal: Individualization and Mutuality  Outcome: Ongoing (interventions implemented as appropriate)    Goal: Discharge Needs Assessment  Outcome: Ongoing (interventions implemented as appropriate)    Goal: Interprofessional Rounds/Family Conf  Outcome: Ongoing (interventions implemented as appropriate)      Problem: Skin Injury Risk (Adult)  Goal: Identify Related Risk Factors and Signs and Symptoms  Outcome: Ongoing (interventions implemented as appropriate)    Goal: Skin Health and Integrity  Outcome: Ongoing (interventions implemented as appropriate)      Problem: Fall Risk (Adult)  Goal: Identify Related Risk Factors and Signs and Symptoms  Outcome: Ongoing (interventions implemented as appropriate)    Goal: Absence of Fall  Outcome: Ongoing (interventions implemented as appropriate)      Problem: Pain, Acute (Adult)  Goal: Identify Related Risk Factors and Signs and Symptoms  Outcome: Ongoing (interventions implemented as appropriate)    Goal: Acceptable Pain Control/Comfort Level  Outcome: Ongoing (interventions implemented as appropriate)      Problem: Breathing Pattern Ineffective (Adult)  Goal: Identify Related Risk Factors and Signs and Symptoms  Outcome: Ongoing (interventions implemented as appropriate)    Goal: Effective Oxygenation/Ventilation  Outcome: Ongoing (interventions implemented as appropriate)    Goal: Anxiety/Fear Reduction  Outcome: Ongoing (interventions implemented as appropriate)      Problem: Chronic  Obstructive Pulmonary Disease (Adult)  Goal: Signs and Symptoms of Listed Potential Problems Will be Absent, Minimized or Managed (Chronic Obstructive Pulmonary Disease)  Outcome: Ongoing (interventions implemented as appropriate)

## 2019-10-09 NOTE — PROGRESS NOTES
History:  This 79-year-old lady is much more comfortable today.  She is sitting up in a chair.  She is having no chest pain.  No shortness of breath.  No palpitations and no nausea.  She did not use the positive pressure breathing apparatus last night.  She is not opposed to trying modification of BiPAP tonight.  Her heart failure medicines have been adjusted.    Allergies  Adhesive tape; Augmentin [amoxicillin-pot clavulanate]; Cephalexin; Metoclopramide; Pentazocine; Simvastatin; and Sucralfate      Current Facility-Administered Medications:   •  acetaminophen (TYLENOL) tablet 650 mg, 650 mg, Oral, Q6H PRN, Harmeet Payne MD, 650 mg at 10/09/19 0524  •  atorvastatin (LIPITOR) tablet 10 mg, 10 mg, Oral, Q PM, Harmeet Payne MD, 10 mg at 10/08/19 1735  •  budesonide (PULMICORT) nebulizer solution 0.5 mg, 0.5 mg, Nebulization, BID - RT, Harmeet Payne MD, 0.5 mg at 10/08/19 2123  •  carvedilol (COREG) tablet 12.5 mg, 12.5 mg, Oral, BID With Meals, Nona Romano MD, 12.5 mg at 10/08/19 1735  •  cholecalciferol (VITAMIN D3) tablet 1,000 Units, 1,000 Units, Oral, Daily, Harmeet Payne MD, 1,000 Units at 10/08/19 0942  •  ferrous sulfate tablet 325 mg, 325 mg, Oral, BID With Meals, Harmeet Payne MD, 325 mg at 10/08/19 1735  •  furosemide (LASIX) injection 40 mg, 40 mg, Intravenous, BID, Nona Romano MD, 40 mg at 10/08/19 1738  •  influenza vac split quad (FLUZONE,FLUARIX,AFLURIA) injection 0.5 mL, 0.5 mL, Intramuscular, Once, Harmeet Payne MD  •  ipratropium-albuterol (DUO-NEB) nebulizer solution 3 mL, 3 mL, Nebulization, 4x Daily - RT, Harmeet Payne MD, 3 mL at 10/08/19 2123  •  levothyroxine (SYNTHROID, LEVOTHROID) tablet 50 mcg, 50 mcg, Oral, Q AM, Harmeet Payne MD, 50 mcg at 10/09/19 0642  •  lisinopril (PRINIVIL,ZESTRIL) tablet 20 mg, 20 mg, Oral, Q24H, Nona Romano MD, 20 mg at 10/08/19 1936  •  mirtazapine (REMERON) tablet 30 mg, 30 mg, Oral, Nightly, Jhonatan Scott MD, 30  "mg at 10/08/19 2104  •  nitroglycerin (NITROSTAT) SL tablet 0.4 mg, 0.4 mg, Sublingual, Q5 Min PRN, Harmeet Payne MD  •  pantoprazole (PROTONIX) EC tablet 40 mg, 40 mg, Oral, QAM, Harmeet Payne MD, 40 mg at 10/09/19 0642  •  polyethylene glycol 3350 powder (packet), 17 g, Oral, Daily PRN, Harmeet Payne MD  •  rivaroxaban (XARELTO) tablet 20 mg, 20 mg, Oral, Daily With Dinner, Harmeet Payne MD, 20 mg at 10/08/19 1735  •  [COMPLETED] Insert peripheral IV, , , Once **AND** sodium chloride 0.9 % flush 10 mL, 10 mL, Intravenous, PRN, Deisi Martines APRN  •  sodium chloride 0.9 % flush 10 mL, 10 mL, Intravenous, Q12H, Harmeet Payne MD, 10 mL at 10/08/19 2104  •  sodium chloride 0.9 % flush 10 mL, 10 mL, Intravenous, PRN, Harmeet Payne MD    /69 (BP Location: Right arm, Patient Position: Lying)   Pulse 60   Temp 97.7 °F (36.5 °C) (Oral)   Resp 16   Ht (!) 5.2 cm (2.05\")   Wt 79.3 kg (174 lb 14.4 oz)   LMP  (LMP Unknown)   SpO2 96%   BMI 56292.54 kg/m²     Physical Exam  Appearance: Elderly obese  lady.    She is in no distress.  Heart: Regular rate of about 70.  Patient is in atrial flutter.  Lungs: Lungs were clear posteriorly but air movement was very low.  Abdomen: Protuberant, benign  Extremities: No clubbing, cyanosis or edema.  Neurologic: Nonfocal.  Alert and oriented    Lab Results (last 24 hours)     Procedure Component Value Units Date/Time    Basic Metabolic Panel [[id number]]  (Abnormal) Collected:  10/09/19 0354    Specimen:  Blood Updated:  10/09/19 0549     Glucose 109 mg/dL      BUN 16 mg/dL      Creatinine 0.69 mg/dL      Sodium 144 mmol/L      Potassium 3.6 mmol/L      Chloride 91 mmol/L      CO2 41.9 mmol/L      Calcium 8.6 mg/dL      eGFR Non African Amer 82 mL/min/1.73      BUN/Creatinine Ratio 23.2     Anion Gap 11.1 mmol/L     Narrative:       GFR Normal >60  Chronic Kidney Disease <60  Kidney Failure <15    CBC & Differential [[id number]] Collected:  10/09/19 0354    " Specimen:  Blood Updated:  10/09/19 0508    Narrative:       The following orders were created for panel order CBC & Differential.  Procedure                               Abnormality         Status                     ---------                               -----------         ------                     CBC Auto Differential[[id number]]        Abnormal            Final result                 Please view results for these tests on the individual orders.    CBC Auto Differential [[id number]]  (Abnormal) Collected:  10/09/19 0354    Specimen:  Blood Updated:  10/09/19 0508     WBC 9.15 10*3/mm3      RBC 3.32 10*6/mm3      Hemoglobin 9.7 g/dL      Hematocrit 30.3 %      MCV 91.3 fL      MCH 29.2 pg      MCHC 32.0 g/dL      RDW 13.1 %      RDW-SD 43.4 fl      MPV 10.3 fL      Platelets 267 10*3/mm3      Neutrophil % 73.3 %      Lymphocyte % 12.0 %      Monocyte % 11.5 %      Eosinophil % 2.2 %      Basophil % 0.7 %      Immature Grans % 0.3 %      Neutrophils, Absolute 6.71 10*3/mm3      Lymphocytes, Absolute 1.10 10*3/mm3      Monocytes, Absolute 1.05 10*3/mm3      Eosinophils, Absolute 0.20 10*3/mm3      Basophils, Absolute 0.06 10*3/mm3      Immature Grans, Absolute 0.03 10*3/mm3      nRBC 0.0 /100 WBC           Imp:  1.  Acute on chronic respiratory failure.  In large part related to COPD but the patient has some dilated cardiomyopathy as well.    2.  Atrial flutter.    3.  Generalized anxiety.  4.  Multiple comorbidities including adenocarcinoma of the lung x2, hypothyroid, hypertension, past DVT/PEs, peripheral vascular disease, recent lower GI bleed from angiodysplasia, multifactorial anemia        Plan:  Patient is doing much better this morning.  She agrees to trying BiPAP again.  She would like to try a different device.  I had a long discussion with the patient concerning her prognosis.  She understands the implications of her current condition.  She is open to palliative care.  She states her main goal is just  to get home for a couple weeks.    Harmeet Payne MD  10/9/2019  8:02 AM

## 2019-10-10 LAB
ANION GAP SERPL CALCULATED.3IONS-SCNC: 7.2 MMOL/L (ref 5–15)
ARTERIAL PATENCY WRIST A: ABNORMAL
ATMOSPHERIC PRESS: 749.1 MMHG
ATMOSPHERIC PRESS: 750 MMHG
BASE EXCESS BLDA CALC-SCNC: 21.2 MMOL/L (ref 0–2)
BASE EXCESS BLDV CALC-SCNC: 20.7 MMOL/L
BASOPHILS # BLD AUTO: 0.05 10*3/MM3 (ref 0–0.2)
BASOPHILS NFR BLD AUTO: 0.5 % (ref 0–1.5)
BDY SITE: ABNORMAL
BDY SITE: ABNORMAL
BUN BLD-MCNC: 21 MG/DL (ref 8–23)
BUN/CREAT SERPL: 28 (ref 7–25)
CALCIUM SPEC-SCNC: 8.4 MG/DL (ref 8.6–10.5)
CHLORIDE SERPL-SCNC: 89 MMOL/L (ref 98–107)
CO2 SERPL-SCNC: 44.8 MMOL/L (ref 22–29)
CREAT BLD-MCNC: 0.75 MG/DL (ref 0.57–1)
DEPRECATED RDW RBC AUTO: 45.5 FL (ref 37–54)
EOSINOPHIL # BLD AUTO: 0.23 10*3/MM3 (ref 0–0.4)
EOSINOPHIL NFR BLD AUTO: 2.3 % (ref 0.3–6.2)
ERYTHROCYTE [DISTWIDTH] IN BLOOD BY AUTOMATED COUNT: 13.6 % (ref 12.3–15.4)
GAS FLOW AIRWAY: 3 LPM
GAS FLOW AIRWAY: 4 LPM
GFR SERPL CREATININE-BSD FRML MDRD: 75 ML/MIN/1.73
GLUCOSE BLD-MCNC: 100 MG/DL (ref 65–99)
GLUCOSE BLDC GLUCOMTR-MCNC: 165 MG/DL (ref 70–130)
HCO3 BLDA-SCNC: 50.2 MMOL/L (ref 22–28)
HCO3 BLDV-SCNC: 49.1 MMOL/L (ref 22–28)
HCT VFR BLD AUTO: 33.3 % (ref 34–46.6)
HGB BLD-MCNC: 10.2 G/DL (ref 12–15.9)
IMM GRANULOCYTES # BLD AUTO: 0.03 10*3/MM3 (ref 0–0.05)
IMM GRANULOCYTES NFR BLD AUTO: 0.3 % (ref 0–0.5)
LYMPHOCYTES # BLD AUTO: 1.29 10*3/MM3 (ref 0.7–3.1)
LYMPHOCYTES NFR BLD AUTO: 12.8 % (ref 19.6–45.3)
MCH RBC QN AUTO: 28 PG (ref 26.6–33)
MCHC RBC AUTO-ENTMCNC: 30.6 G/DL (ref 31.5–35.7)
MCV RBC AUTO: 91.5 FL (ref 79–97)
MODALITY: ABNORMAL
MODALITY: ABNORMAL
MONOCYTES # BLD AUTO: 1.02 10*3/MM3 (ref 0.1–0.9)
MONOCYTES NFR BLD AUTO: 10.1 % (ref 5–12)
NEUTROPHILS # BLD AUTO: 7.45 10*3/MM3 (ref 1.7–7)
NEUTROPHILS NFR BLD AUTO: 74 % (ref 42.7–76)
NRBC BLD AUTO-RTO: 0 /100 WBC (ref 0–0.2)
PCO2 BLDA: 84.6 MM HG (ref 35–45)
PCO2 BLDV: 79 MM HG (ref 41–51)
PH BLDA: 7.38 PH UNITS (ref 7.35–7.45)
PH BLDV: 7.4 PH UNITS (ref 7.31–7.41)
PLATELET # BLD AUTO: 277 10*3/MM3 (ref 140–450)
PMV BLD AUTO: 10.5 FL (ref 6–12)
PO2 BLDA: 52.6 MM HG (ref 80–100)
PO2 BLDV: 43.1 MM HG (ref 35–45)
POTASSIUM BLD-SCNC: 3.2 MMOL/L (ref 3.5–5.2)
RBC # BLD AUTO: 3.64 10*6/MM3 (ref 3.77–5.28)
SAO2 % BLDCOA: 75.1 % (ref 92–99)
SAO2 % BLDCOA: 83.3 % (ref 92–99)
SET MECH RESP RATE: 20
SET MECH RESP RATE: 20
SODIUM BLD-SCNC: 141 MMOL/L (ref 136–145)
TOTAL RATE: 20 BREATHS/MINUTE
TOTAL RATE: 21 BREATHS/MINUTE
VENTILATOR MODE: ABNORMAL
VENTILATOR MODE: ABNORMAL
VT ON VENT VENT: 450 ML
WBC NRBC COR # BLD: 10.07 10*3/MM3 (ref 3.4–10.8)

## 2019-10-10 PROCEDURE — 82803 BLOOD GASES ANY COMBINATION: CPT

## 2019-10-10 PROCEDURE — 99232 SBSQ HOSP IP/OBS MODERATE 35: CPT | Performed by: INTERNAL MEDICINE

## 2019-10-10 PROCEDURE — 94799 UNLISTED PULMONARY SVC/PX: CPT

## 2019-10-10 PROCEDURE — 82962 GLUCOSE BLOOD TEST: CPT

## 2019-10-10 PROCEDURE — 36600 WITHDRAWAL OF ARTERIAL BLOOD: CPT

## 2019-10-10 PROCEDURE — 85025 COMPLETE CBC W/AUTO DIFF WBC: CPT | Performed by: INTERNAL MEDICINE

## 2019-10-10 PROCEDURE — 80048 BASIC METABOLIC PNL TOTAL CA: CPT | Performed by: INTERNAL MEDICINE

## 2019-10-10 RX ORDER — POTASSIUM CHLORIDE 750 MG/1
40 CAPSULE, EXTENDED RELEASE ORAL AS NEEDED
Status: DISCONTINUED | OUTPATIENT
Start: 2019-10-10 | End: 2019-10-13 | Stop reason: HOSPADM

## 2019-10-10 RX ORDER — POTASSIUM CHLORIDE 1.5 G/1.77G
40 POWDER, FOR SOLUTION ORAL AS NEEDED
Status: DISCONTINUED | OUTPATIENT
Start: 2019-10-10 | End: 2019-10-13 | Stop reason: HOSPADM

## 2019-10-10 RX ORDER — FUROSEMIDE 40 MG/1
40 TABLET ORAL
Status: DISCONTINUED | OUTPATIENT
Start: 2019-10-10 | End: 2019-10-13 | Stop reason: HOSPADM

## 2019-10-10 RX ADMIN — RIVAROXABAN 20 MG: 20 TABLET, FILM COATED ORAL at 18:04

## 2019-10-10 RX ADMIN — ATORVASTATIN CALCIUM 10 MG: 10 TABLET, FILM COATED ORAL at 18:04

## 2019-10-10 RX ADMIN — BUDESONIDE 0.5 MG: 0.5 INHALANT RESPIRATORY (INHALATION) at 19:35

## 2019-10-10 RX ADMIN — SKIN PROTECTANTS MISC - PASTE 1 APPLICATION: 58.3 PASTE at 20:30

## 2019-10-10 RX ADMIN — FERROUS SULFATE TAB 325 MG (65 MG ELEMENTAL FE) 325 MG: 325 (65 FE) TAB at 10:13

## 2019-10-10 RX ADMIN — ACETAMINOPHEN 650 MG: 325 TABLET, FILM COATED ORAL at 18:09

## 2019-10-10 RX ADMIN — CARVEDILOL 12.5 MG: 12.5 TABLET, FILM COATED ORAL at 10:13

## 2019-10-10 RX ADMIN — SODIUM CHLORIDE, PRESERVATIVE FREE 10 ML: 5 INJECTION INTRAVENOUS at 10:39

## 2019-10-10 RX ADMIN — LISINOPRIL 20 MG: 20 TABLET ORAL at 10:38

## 2019-10-10 RX ADMIN — BUDESONIDE 0.5 MG: 0.5 INHALANT RESPIRATORY (INHALATION) at 08:23

## 2019-10-10 RX ADMIN — PANTOPRAZOLE SODIUM 40 MG: 40 TABLET, DELAYED RELEASE ORAL at 06:48

## 2019-10-10 RX ADMIN — SKIN PROTECTANTS MISC - PASTE: 58.3 PASTE at 10:39

## 2019-10-10 RX ADMIN — FUROSEMIDE 40 MG: 40 TABLET ORAL at 10:13

## 2019-10-10 RX ADMIN — CARVEDILOL 12.5 MG: 12.5 TABLET, FILM COATED ORAL at 18:04

## 2019-10-10 RX ADMIN — POLYETHYLENE GLYCOL 3350 17 G: 17 POWDER, FOR SOLUTION ORAL at 10:13

## 2019-10-10 RX ADMIN — VITAMIN D, TAB 1000IU (100/BT) 1000 UNITS: 25 TAB at 10:13

## 2019-10-10 RX ADMIN — SODIUM CHLORIDE, PRESERVATIVE FREE 10 ML: 5 INJECTION INTRAVENOUS at 20:30

## 2019-10-10 RX ADMIN — LEVOTHYROXINE SODIUM 50 MCG: 50 TABLET ORAL at 06:48

## 2019-10-10 RX ADMIN — IPRATROPIUM BROMIDE AND ALBUTEROL SULFATE 3 ML: 2.5; .5 SOLUTION RESPIRATORY (INHALATION) at 08:22

## 2019-10-10 RX ADMIN — POTASSIUM CHLORIDE 40 MEQ: 750 CAPSULE, EXTENDED RELEASE ORAL at 10:13

## 2019-10-10 RX ADMIN — IPRATROPIUM BROMIDE AND ALBUTEROL SULFATE 3 ML: 2.5; .5 SOLUTION RESPIRATORY (INHALATION) at 15:52

## 2019-10-10 RX ADMIN — IPRATROPIUM BROMIDE AND ALBUTEROL SULFATE 3 ML: 2.5; .5 SOLUTION RESPIRATORY (INHALATION) at 19:35

## 2019-10-10 RX ADMIN — ACETAMINOPHEN 650 MG: 325 TABLET, FILM COATED ORAL at 10:13

## 2019-10-10 RX ADMIN — FUROSEMIDE 40 MG: 40 TABLET ORAL at 18:04

## 2019-10-10 RX ADMIN — FERROUS SULFATE TAB 325 MG (65 MG ELEMENTAL FE) 325 MG: 325 (65 FE) TAB at 18:04

## 2019-10-10 NOTE — CONSULTS
"Purpose of the visit was to evaluate for: goals of care/advanced care planning, support for patient/family and pain/symptom management. Spoke with RN as well as patient and discussed palliative care, goals of care and resuscitation status.      Assessment:  Patient is palliative care appropriate for inpatient care given respiratory failure, atrial fibrillation, lung cancer, peripheral vascular disease, and CHF. PPS: 40%. Psychosocial Acuity: 2-moderate complexity. Spiritual Acuity: 1-normal complexity.     Recommendations/Plan: Patient would like to talk with palliative care and her brother to discuss goals of care further.     Other Comments:   Palliative Care team met with patient at bedside to discuss GOC. Patient sitting in chair eating lunch. Patient denied symptoms aside from shortness of breath at this time. Patient acknowledged that she does not want \"to live like this.\" Patient reported that she is not ready to decide on palliative care because \"I have things I need to.\" Patient explained further that she needs to talk to her family and get \"things in order.\" Patient stated \"I am not going to go easy.\" Patient did express interest in learning more about palliative care for symptom management inpatient to help with possibly going back home to be comfortable. Patient noted that she lives alone and has limited support system with only her brother and daughter. Patient explained that her daughter has not been very involved due to her own busy life.     Discussed inpatient palliative care unit for symptom management and comfort to determine appropriate discharge disposition if possible. Discussed home with hospice vs nursing facility. Provided psychosocial support and left contact information for family to contact palliative care with time available to meet. Palliative care to have ongoing discussion about GOC as patient remains undecided.       1506 Patient's brother present at bedside. Discussed inpatient " palliative care services, [facility] program, [facility] hospice services, symptom management, and comfort care. Patient and patient's brother state goal is to go home. Family open to receiving symptom management prior to d/c on [facility] palliative care unit but wish to discuss with physician. Family also requests hospice consult to get information on services. RN updated of patient's request for hospice consult.

## 2019-10-10 NOTE — PROGRESS NOTES
Patient Name: Ale Narayan  Patient : 1940        Date of Service:10/10/19  Provider of Service: Flavio Del Rosario MD  Place of Service: [facility]  Referral Provider: Harmeet Payne MD          Follow Up: Patient breathing better today.    Interval Hx: Continues to have good urine output.  Will switch over to oral diuretics today.        OBJECTIVE  Temp:  [98 °F (36.7 °C)-98.3 °F (36.8 °C)] 98.3 °F (36.8 °C)  Heart Rate:  [53-98] 53  Resp:  [16-20] 20  BP: ()/(31-96) 111/50     Intake/Output Summary (Last 24 hours) at 10/10/2019 0801  Last data filed at 10/10/2019 0304  Gross per 24 hour   Intake 840 ml   Output 1765 ml   Net -925 ml     Body mass index is 31.95 kg/m².      10/09/19  0619 10/09/19  1024 10/10/19  0415   Weight: 79.3 kg (174 lb 14.4 oz) 79 kg (174 lb 2.6 oz) 79.2 kg (174 lb 11.2 oz)         Physical Exam:   Physical Exam   Constitutional: She is oriented to person, place, and time. She appears well-developed and well-nourished.   HENT:   Head: Normocephalic.   Eyes: Pupils are equal, round, and reactive to light.   Neck: Normal range of motion. No JVD present. Carotid bruit is not present. No thyromegaly present.   Cardiovascular: Normal rate, regular rhythm, S1 normal, S2 normal, normal heart sounds and intact distal pulses. Exam reveals no gallop and no friction rub.   No murmur heard.  Pulmonary/Chest: Effort normal. She has decreased breath sounds.   Abdominal: Soft. Bowel sounds are normal.   Musculoskeletal: She exhibits no edema.   Neurological: She is alert and oriented to person, place, and time.   Skin: Skin is warm, dry and intact. No erythema.   Psychiatric: She has a normal mood and affect.   Vitals reviewed.        CURRENT MEDS    Scheduled Meds:  atorvastatin 10 mg Oral Q PM   budesonide 0.5 mg Nebulization BID - RT   carvedilol 12.5 mg Oral BID With Meals   cholecalciferol 1,000 Units Oral Daily   ferrous sulfate 325 mg Oral BID With Meals    furosemide 40 mg Intravenous BID   ipratropium-albuterol 3 mL Nebulization 4x Daily - RT   levothyroxine 50 mcg Oral Q AM   lisinopril 20 mg Oral Q24H   mirtazapine 30 mg Oral Nightly   pantoprazole 40 mg Oral QAM   rivaroxaban 20 mg Oral Daily With Dinner   sodium chloride 10 mL Intravenous Q12H   white petrolatum-zinc  Topical BID     Continuous Infusions:       Lab Review:   Results from last 7 days   Lab Units 10/10/19  0353 10/09/19  0354  10/06/19  0913   SODIUM mmol/L 141 144   < > 137   POTASSIUM mmol/L 3.2* 3.6   < > 4.2   CHLORIDE mmol/L 89* 91*   < > 87*   CO2 mmol/L 44.8* 41.9*   < > 44.4*   BUN mg/dL 21 16   < > 11   CREATININE mg/dL 0.75 0.69   < > 0.61   GLUCOSE mg/dL 100* 109*   < > 119*   CALCIUM mg/dL 8.4* 8.6   < > 8.7   AST (SGOT) U/L  --   --   --  14   ALT (SGPT) U/L  --   --   --  17    < > = values in this interval not displayed.     Results from last 7 days   Lab Units 10/07/19  2020 10/07/19  0455 10/06/19  1232 10/06/19  0913   TROPONIN T ng/mL <0.010 <0.010 <0.010 <0.010     Results from last 7 days   Lab Units 10/10/19  0353 10/09/19  0354   WBC 10*3/mm3 10.07 9.15   HEMOGLOBIN g/dL 10.2* 9.7*   HEMATOCRIT % 33.3* 30.3*   PLATELETS 10*3/mm3 277 267         Results from last 7 days   Lab Units 10/07/19  2020 10/06/19  0913   MAGNESIUM mg/dL 1.8 1.9         Results from last 7 days   Lab Units 10/06/19  0913   PROBNP pg/mL 6,451.0*     Results from last 7 days   Lab Units 10/06/19  1232   TSH uIU/mL 0.722       I personally reviewed the patient's ECG and telemetry data    ASSESSMENT & PLAN    Hypercapnia    1.  COPD: Acute on chronic respiratory failure  2.  Congestive heart failure: Echocardiogram shows normal left ventricular systolic function.  She does have grade 1 diastolic dysfunction.  No mention of right ventricular dilatation or pulmonary hypertension.  We will plan to switch to oral diuretics today.  3.  Paroxysmal atrial fibrillation: Presently on rivaroxaban  4.  AV block:  Remains in sinus rhythm  5.  History of pulmonary embolus with IVC filter placed.  Chronic anticoagulation.        Flavio Del Rosario MD  10/10/19

## 2019-10-10 NOTE — PLAN OF CARE
Problem: Patient Care Overview  Goal: Plan of Care Review  Outcome: Ongoing (interventions implemented as appropriate)   10/10/19 5720   Coping/Psychosocial   Plan of Care Reviewed With patient   Plan of Care Review   Progress no change   OTHER   Outcome Summary a/ox 4 , bipap during w/ 4l , around 1 am pt was very lethargic and difficult to wake up ,,,VSS , RT was notified , ABGs taken - see results , Dr. Fernandes contacted and notified of results and patients status , total pt tolerated bipap on about 3 hrs , ... Currently off bipap and on NC 4 L , pt is alert and Oriented at this time and not lethargic doing much better at this time... Per RN judgement -- Patient would benefit from holding HS sleep pill this evening. - will cont to monitor

## 2019-10-10 NOTE — PROGRESS NOTES
History:  This 79-year-old lady had a difficult night last night.  She was lethargic in the early evening.  When the noninvasive ventilator was started the patient became more alert but then was very agitated and combative.  ABGs obtained at 2 in the morning demonstrated PCO2 to be 84.  PO2 was 52.  The patient finally was able to sleep with the BiPAP.  This morning she has very lethargic and cannot give a history.    Allergies  Adhesive tape; Augmentin [amoxicillin-pot clavulanate]; Cephalexin; Metoclopramide; Pentazocine; Simvastatin; and Sucralfate      Current Facility-Administered Medications:   •  acetaminophen (TYLENOL) tablet 650 mg, 650 mg, Oral, Q6H PRN, Harmeet Payne MD, 650 mg at 10/09/19 0524  •  atorvastatin (LIPITOR) tablet 10 mg, 10 mg, Oral, Q PM, Harmeet Payne MD, 10 mg at 10/09/19 1721  •  budesonide (PULMICORT) nebulizer solution 0.5 mg, 0.5 mg, Nebulization, BID - RT, Harmeet Payne MD, 0.5 mg at 10/09/19 1928  •  carvedilol (COREG) tablet 12.5 mg, 12.5 mg, Oral, BID With Meals, Nona Romano MD, 12.5 mg at 10/09/19 1721  •  cholecalciferol (VITAMIN D3) tablet 1,000 Units, 1,000 Units, Oral, Daily, Harmeet Payne MD, 1,000 Units at 10/09/19 1054  •  ferrous sulfate tablet 325 mg, 325 mg, Oral, BID With Meals, Harmeet Payne MD, 325 mg at 10/09/19 1721  •  furosemide (LASIX) tablet 40 mg, 40 mg, Oral, BID, Flavio Del Rosairo MD  •  ipratropium-albuterol (DUO-NEB) nebulizer solution 3 mL, 3 mL, Nebulization, 4x Daily - RT, Harmeet Payne MD, 3 mL at 10/09/19 1928  •  levothyroxine (SYNTHROID, LEVOTHROID) tablet 50 mcg, 50 mcg, Oral, Q AM, Harmeet Payne MD, 50 mcg at 10/10/19 0648  •  lisinopril (PRINIVIL,ZESTRIL) tablet 20 mg, 20 mg, Oral, Q24H, Nona Romano MD, 20 mg at 10/09/19 1054  •  mirtazapine (REMERON) tablet 30 mg, 30 mg, Oral, Nightly, Jhonatan Scott MD, 30 mg at 10/09/19 2030  •  nitroglycerin (NITROSTAT) SL tablet 0.4 mg, 0.4 mg, Sublingual, Q5 Min PRN,  "Harmeet Payne MD  •  pantoprazole (PROTONIX) EC tablet 40 mg, 40 mg, Oral, QAM, Harmeet Payne MD, 40 mg at 10/10/19 0648  •  polyethylene glycol 3350 powder (packet), 17 g, Oral, Daily PRN, Harmeet Payne MD, 17 g at 10/09/19 1120  •  potassium chloride (KLOR-CON) packet 40 mEq, 40 mEq, Oral, PRN, Harmeet Payne MD  •  potassium chloride (MICRO-K) CR capsule 40 mEq, 40 mEq, Oral, PRN, Harmeet Payne MD  •  rivaroxaban (XARELTO) tablet 20 mg, 20 mg, Oral, Daily With Dinner, Harmeet Payne MD, 20 mg at 10/09/19 1721  •  [COMPLETED] Insert peripheral IV, , , Once **AND** sodium chloride 0.9 % flush 10 mL, 10 mL, Intravenous, PRN, Deisi Martines APRN  •  sodium chloride 0.9 % flush 10 mL, 10 mL, Intravenous, Q12H, Harmeet Payne MD, 10 mL at 10/09/19 2030  •  sodium chloride 0.9 % flush 10 mL, 10 mL, Intravenous, PRN, Harmeet Payne MD  •  white petrolatum-zinc (ILEX SKIN PROTECTANT) 58.3 % cream, , Topical, BID, Harmeet Payne MD, 1 application at 10/09/19 2045    /50 (BP Location: Right arm, Patient Position: Lying)   Pulse 53   Temp 98.3 °F (36.8 °C) (Oral)   Resp 20   Ht 157.5 cm (62\")   Wt 79.2 kg (174 lb 11.2 oz)   LMP  (LMP Unknown)   SpO2 93%   BMI 31.95 kg/m²     Physical Exam  Appearance: Elderly obese  lady.     She is sleeping.  She is difficult to arouse.  Heart: Regular rate of about 60.    Irregularly irregular  Lungs: Lungs poor air movement with a few rhonchi.  Abdomen: Protuberant, benign  Extremities: No clubbing, cyanosis or edema.  Neurologic: Nonfocal.    Large    Lab Results (last 24 hours)     Procedure Component Value Units Date/Time    Blood Gas, Venous [[id number]]  (Abnormal) Collected:  10/10/19 0150    Specimen:  Venous Blood Updated:  10/10/19 0646     Site Arterial: right brachial     pH, Venous 7.401 pH Units      pCO2, Venous 79.0 mm Hg      pO2, Venous 43.1 mm Hg      HCO3, Venous 49.1 mmol/L      Base Excess, Venous 20.7 mmol/L      O2 Saturation Calculated " 75.1 %      Barometric Pressure for Blood Gas 749.1 mmHg      Modality BiPap     Flow Rate 3 lpm      Ventilator Mode NIV     Set Mech Resp Rate 20     Rate 20 Breaths/minute     Blood Gas, Arterial [[id number]]  (Abnormal) Collected:  10/10/19 0203    Specimen:  Arterial Blood Updated:  10/10/19 0645     Site Arterial: left brachial     Ector's Test N/A     pH, Arterial 7.382 pH units      pCO2, Arterial 84.6 mm Hg      Comment: Critical:        pO2, Arterial 52.6 mm Hg      Comment: Critical:        HCO3, Arterial 50.2 mmol/L      Base Excess, Arterial 21.2 mmol/L      O2 Saturation Calculated 83.3 %      Barometric Pressure for Blood Gas 750.0 mmHg      Modality BiPap     Flow Rate 4 lpm      Ventilator Mode NIV     Set Tidal Volume 450     Set Mech Resp Rate 20     Rate 21 Breaths/minute     Basic Metabolic Panel [[id number]]  (Abnormal) Collected:  10/10/19 0353    Specimen:  Blood Updated:  10/10/19 0541     Glucose 100 mg/dL      BUN 21 mg/dL      Creatinine 0.75 mg/dL      Sodium 141 mmol/L      Potassium 3.2 mmol/L      Chloride 89 mmol/L      CO2 44.8 mmol/L      Calcium 8.4 mg/dL      eGFR Non African Amer 75 mL/min/1.73      BUN/Creatinine Ratio 28.0     Anion Gap 7.2 mmol/L     Narrative:       GFR Normal >60  Chronic Kidney Disease <60  Kidney Failure <15    CBC & Differential [[id number]] Collected:  10/10/19 0353    Specimen:  Blood Updated:  10/10/19 0459    Narrative:       The following orders were created for panel order CBC & Differential.  Procedure                               Abnormality         Status                     ---------                               -----------         ------                     CBC Auto Differential[[id number]]        Abnormal            Final result                 Please view results for these tests on the individual orders.    CBC Auto Differential [[id number]]  (Abnormal) Collected:  10/10/19 0353    Specimen:  Blood Updated:  10/10/19 0459     WBC 10.07 10*3/mm3       RBC 3.64 10*6/mm3      Hemoglobin 10.2 g/dL      Hematocrit 33.3 %      MCV 91.5 fL      MCH 28.0 pg      MCHC 30.6 g/dL      RDW 13.6 %      RDW-SD 45.5 fl      MPV 10.5 fL      Platelets 277 10*3/mm3      Neutrophil % 74.0 %      Lymphocyte % 12.8 %      Monocyte % 10.1 %      Eosinophil % 2.3 %      Basophil % 0.5 %      Immature Grans % 0.3 %      Neutrophils, Absolute 7.45 10*3/mm3      Lymphocytes, Absolute 1.29 10*3/mm3      Monocytes, Absolute 1.02 10*3/mm3      Eosinophils, Absolute 0.23 10*3/mm3      Basophils, Absolute 0.05 10*3/mm3      Immature Grans, Absolute 0.03 10*3/mm3      nRBC 0.0 /100 WBC     POC Glucose Once [[id number]]  (Abnormal) Collected:  10/10/19 0119    Specimen:  Blood Updated:  10/10/19 0121     Glucose 165 mg/dL         Echocardiogram  Left ventricular systolic function is normal. Calculated EF = 64.0%. Estimated EF was in agreement with the calculated EF. Normal left ventricular cavity size noted. All left ventricular wall segments contract normally. Left ventricular wall thickness is consistent with mild concentric hypertrophy. Left ventricular diastolic dysfunction is noted (grade I) consistent with impaired relaxation.       Imp:  1.  Acute on chronic respiratory failure.  In large part related to COPD but the patient has some dilated cardiomyopathy as well.    2.  Atrial fibrillation with controlled ventricular rate.   3.  Generalized anxiety.  4.  Multiple comorbidities including adenocarcinoma of the lung x2, hypothyroid, hypertension, past DVT/PEs, peripheral vascular disease, recent lower GI bleed from angiodysplasia, multifactorial anemia          Plan:  Patient is hypokalemic this morning.  This will be addressed.  Patient's heart function seems to be at baseline.  Perhaps we can cut back on the diuretics.  The dilemma in this case is helping the patient to tolerate the positive pressure breathing.  I will discontinue her p.m. Remeron.    Harmeet Payne,  MD  10/10/2019  8:21 AM

## 2019-10-10 NOTE — PROGRESS NOTES
Jhonatan Scott MD                          [phone]      Patient ID:    Name:  Ale Narayan    MRN:  [id number]    1940   79 y.o.  female            Patient Care Team:  Harmeet Payne MD as PCP - General  Harmeet Payne MD as PCP - Claims Attributed  Yoan Cash MD as Consulting Physician (Hematology and Oncology)  Devon Lamb MD as Referring Physician (Thoracic Surgery)    CC/ Reason for visit: Respiratory failure metabolic encephalopathy    Subjective: Pt seen and examined this AM. No acute overnight events noted. Doing better.  Use the noninvasive ventilator overnight for a few hours and did well except when she got very drowsy early in the a.m.  ABG drawn and did not show any evidence of hypercapnia.  She had brief episode of hypoxemia when oxygen got discontinued by mistake which corrected with her back on the oxygen.  Sitting up in the chair and happy with the way she is doing and smiling.    ROS: Denies any subjective fevers, syncope or presyncopal events, new neurological deficits, nausea or vomiting currently    Objective     Vital Signs past 24hrs    BP range: BP: ()/(31-56) 106/54  Pulse range: Heart Rate:  [53-98] 61  Resp rate range: Resp:  [16-20] 20  Temp range: Temp (24hrs), Av °F (36.7 °C), Min:97.6 °F (36.4 °C), Max:98.3 °F (36.8 °C)      Ventilator/Non-Invasive Ventilation Settings (From admission, onward)    Start     Ordered    10/06/19 1130  NIPPV (CPAP or BIPAP)  Until Discontinued     Question Answer Comment   Indication: Acute Respiratory Failure    Type: AVAPS/PC/PS    NIPPV Mask Interface: Per Patient Preference    Backup Rate 14    Target VT (mL) 450    EPAP/PEEP (cm H2O) 5    Min Pressure (cm H2O) 10    Max Pressure (cm H2O) 20    Titrate for SPO2 90%        10/06/19 1130          Device (Oxygen Therapy): nasal cannula       79.2 kg (174 lb 11.2 oz); Body mass index is 31.95 kg/m².      Intake/Output Summary (Last 24  hours) at 10/10/2019 1656  Last data filed at 10/10/2019 0850  Gross per 24 hour   Intake 480 ml   Output 1065 ml   Net -585 ml       PHYSICAL EXAM   Constitutional:  Elderly obese white female patient pt in bed, No acute respiratory distress, + accessory muscle use  Head: - NCAT  Eyes: No pallor, Anicteric conjunctiva, EOMI.  ENMT:  Mallampati 4, no oral thrush. Dry MM.   NECK: Trachea midline, No thyromegaly, no palpable cervical lymphadenopathy  Heart: RRR, no murmur. Trace pedal edema   Lungs: YANICK +, distant breath sounds, decreased at bases. no wheezes/ crackles heard    Abdomen: Soft. Obese, No tenderness, guarding or rigidity. No palpable masses  Extremities: Extremities warm and well perfused. No cyanosis/ clubbing  Neuro: Conscious, answers appropriately, no gross focal neuro deficits  Psych: Mood and affect appropriate    Scheduled meds:      atorvastatin 10 mg Oral Q PM   budesonide 0.5 mg Nebulization BID - RT   carvedilol 12.5 mg Oral BID With Meals   cholecalciferol 1,000 Units Oral Daily   ferrous sulfate 325 mg Oral BID With Meals   furosemide 40 mg Oral BID   ipratropium-albuterol 3 mL Nebulization 4x Daily - RT   levothyroxine 50 mcg Oral Q AM   lisinopril 20 mg Oral Q24H   pantoprazole 40 mg Oral QAM   rivaroxaban 20 mg Oral Daily With Dinner   sodium chloride 10 mL Intravenous Q12H   white petrolatum-zinc  Topical BID       IV meds:                           Data Review:      Results from last 7 days   Lab Units 10/10/19  0353 10/09/19  0354 10/08/19  0632  10/07/19  0455 10/06/19  0913   SODIUM mmol/L 141 144 140   < >  --  137   POTASSIUM mmol/L 3.2* 3.6 3.9   < >  --  4.2   CHLORIDE mmol/L 89* 91* 92*   < >  --  87*   CO2 mmol/L 44.8* 41.9* 42.2*   < >  --  44.4*   BUN mg/dL 21 16 16   < >  --  11   CREATININE mg/dL 0.75 0.69 0.61   < >  --  0.61   CALCIUM mg/dL 8.4* 8.6 8.3*   < >  --  8.7   BILIRUBIN mg/dL  --   --   --   --   --  0.3   ALK PHOS U/L  --   --   --   --   --  82   ALT (SGPT)  U/L  --   --   --   --   --  17   AST (SGOT) U/L  --   --   --   --   --  14   GLUCOSE mg/dL 100* 109* 126*   < >  --  119*   WBC 10*3/mm3 10.07 9.15 8.86   < >  --  7.88   HEMOGLOBIN g/dL 10.2* 9.7* 9.0*   < >  --  9.9*   PLATELETS 10*3/mm3 277 267 244   < >  --  265   PROBNP pg/mL  --   --   --   --   --  6,451.0*   PROCALCITONIN ng/mL  --   --   --   --  <0.02*  --     < > = values in this interval not displayed.       Lab Results   Component Value Date    CALCIUM 8.4 (L) 10/10/2019    PHOS 2.9 09/12/2015             Results from last 7 days   Lab Units 10/10/19  0203 10/07/19  1416 10/06/19  1601 10/06/19  1231 10/06/19  1107   PH, ARTERIAL pH units 7.382 7.384 7.368 7.228* 7.179*   PO2 ART mm Hg 52.6* 103.2* 226.7* 141.0* 82.3   PCO2, ARTERIAL mm Hg 84.6* 80.9* 83.5* 112.0* 118.8*   HCO3 ART mmol/L 50.2* 48.3* 48.0* 46.6* 44.3*        Results Review:    I have reviewed the relevant laboratory results and independently reviewed the chest imaging from this hospitalization including the available echocardiogram reports personally and summarized it if/ when appropriate below    Assessment    Acute metabolic encephalopathy  Acute on chronic hypoxic resp failure( 4L @ home)  Acute on undiagnosed chronic hypercapnic resp failure   Acute on chronic congestive heart failure  Abdominal pain - ? 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  ABG did not show any evidence of hypercapnia.  Would rec c/w it. She wants to keep trying. If she does well we would rec dc on Trilogy @ home.    COPD not in exacerbation.  We will continue with bronchodilators.  Cardiology now on board and managing heart failure issues  Continue anticoagulation for history of DVT/PE.    I have discussed my findings and recommendations with patient and nursing staff.     Jhonatan Scott MD  10/10/2019

## 2019-10-10 NOTE — PLAN OF CARE
Problem: Patient Care Overview  Goal: Plan of Care Review   10/10/19 0443 10/10/19 0850 10/10/19 1337   Coping/Psychosocial   Plan of Care Reviewed With --  patient --    Plan of Care Review   Progress no change --  --    OTHER   Outcome Summary --  --  Pt a&ox4. o2sat 94% on 4Lo2 via nc. Pt up to bedside commode with assist x1. Pt in chair with family at bedside. Will continue to monitor Pt.      Goal: Individualization and Mutuality  Outcome: Ongoing (interventions implemented as appropriate)    Goal: Discharge Needs Assessment  Outcome: Ongoing (interventions implemented as appropriate)    Goal: Interprofessional Rounds/Family Conf  Outcome: Ongoing (interventions implemented as appropriate)      Problem: Skin Injury Risk (Adult)  Goal: Identify Related Risk Factors and Signs and Symptoms  Outcome: Ongoing (interventions implemented as appropriate)    Goal: Skin Health and Integrity  Outcome: Ongoing (interventions implemented as appropriate)      Problem: Fall Risk (Adult)  Goal: Identify Related Risk Factors and Signs and Symptoms  Outcome: Ongoing (interventions implemented as appropriate)    Goal: Absence of Fall  Outcome: Ongoing (interventions implemented as appropriate)      Problem: Pain, Acute (Adult)  Goal: Identify Related Risk Factors and Signs and Symptoms  Outcome: Ongoing (interventions implemented as appropriate)    Goal: Acceptable Pain Control/Comfort Level  Outcome: Ongoing (interventions implemented as appropriate)      Problem: Breathing Pattern Ineffective (Adult)  Goal: Identify Related Risk Factors and Signs and Symptoms  Outcome: Ongoing (interventions implemented as appropriate)    Goal: Effective Oxygenation/Ventilation  Outcome: Ongoing (interventions implemented as appropriate)    Goal: Anxiety/Fear Reduction  Outcome: Ongoing (interventions implemented as appropriate)

## 2019-10-11 LAB
ANION GAP SERPL CALCULATED.3IONS-SCNC: 10.5 MMOL/L (ref 5–15)
BASOPHILS # BLD AUTO: 0.04 10*3/MM3 (ref 0–0.2)
BASOPHILS NFR BLD AUTO: 0.5 % (ref 0–1.5)
BUN BLD-MCNC: 21 MG/DL (ref 8–23)
BUN/CREAT SERPL: 35 (ref 7–25)
CALCIUM SPEC-SCNC: 8.3 MG/DL (ref 8.6–10.5)
CHLORIDE SERPL-SCNC: 93 MMOL/L (ref 98–107)
CO2 SERPL-SCNC: 41.5 MMOL/L (ref 22–29)
CREAT BLD-MCNC: 0.6 MG/DL (ref 0.57–1)
DEPRECATED RDW RBC AUTO: 45.6 FL (ref 37–54)
EOSINOPHIL # BLD AUTO: 0.23 10*3/MM3 (ref 0–0.4)
EOSINOPHIL NFR BLD AUTO: 3 % (ref 0.3–6.2)
ERYTHROCYTE [DISTWIDTH] IN BLOOD BY AUTOMATED COUNT: 13.6 % (ref 12.3–15.4)
GFR SERPL CREATININE-BSD FRML MDRD: 97 ML/MIN/1.73
GLUCOSE BLD-MCNC: 95 MG/DL (ref 65–99)
HCT VFR BLD AUTO: 29.9 % (ref 34–46.6)
HGB BLD-MCNC: 9.3 G/DL (ref 12–15.9)
IMM GRANULOCYTES # BLD AUTO: 0.02 10*3/MM3 (ref 0–0.05)
IMM GRANULOCYTES NFR BLD AUTO: 0.3 % (ref 0–0.5)
LYMPHOCYTES # BLD AUTO: 1.53 10*3/MM3 (ref 0.7–3.1)
LYMPHOCYTES NFR BLD AUTO: 20.1 % (ref 19.6–45.3)
MCH RBC QN AUTO: 28.4 PG (ref 26.6–33)
MCHC RBC AUTO-ENTMCNC: 31.1 G/DL (ref 31.5–35.7)
MCV RBC AUTO: 91.4 FL (ref 79–97)
MONOCYTES # BLD AUTO: 0.71 10*3/MM3 (ref 0.1–0.9)
MONOCYTES NFR BLD AUTO: 9.3 % (ref 5–12)
NEUTROPHILS # BLD AUTO: 5.07 10*3/MM3 (ref 1.7–7)
NEUTROPHILS NFR BLD AUTO: 66.8 % (ref 42.7–76)
NRBC BLD AUTO-RTO: 0 /100 WBC (ref 0–0.2)
PLATELET # BLD AUTO: 260 10*3/MM3 (ref 140–450)
PMV BLD AUTO: 10.5 FL (ref 6–12)
POTASSIUM BLD-SCNC: 4 MMOL/L (ref 3.5–5.2)
RBC # BLD AUTO: 3.27 10*6/MM3 (ref 3.77–5.28)
SODIUM BLD-SCNC: 145 MMOL/L (ref 136–145)
WBC NRBC COR # BLD: 7.6 10*3/MM3 (ref 3.4–10.8)

## 2019-10-11 PROCEDURE — 97110 THERAPEUTIC EXERCISES: CPT | Performed by: PHYSICAL THERAPIST

## 2019-10-11 PROCEDURE — 94799 UNLISTED PULMONARY SVC/PX: CPT

## 2019-10-11 PROCEDURE — 97162 PT EVAL MOD COMPLEX 30 MIN: CPT | Performed by: PHYSICAL THERAPIST

## 2019-10-11 PROCEDURE — 99232 SBSQ HOSP IP/OBS MODERATE 35: CPT | Performed by: INTERNAL MEDICINE

## 2019-10-11 PROCEDURE — 80048 BASIC METABOLIC PNL TOTAL CA: CPT | Performed by: INTERNAL MEDICINE

## 2019-10-11 PROCEDURE — 85025 COMPLETE CBC W/AUTO DIFF WBC: CPT | Performed by: INTERNAL MEDICINE

## 2019-10-11 PROCEDURE — 94660 CPAP INITIATION&MGMT: CPT

## 2019-10-11 RX ORDER — POTASSIUM CHLORIDE 1.5 G/1.77G
20 POWDER, FOR SOLUTION ORAL DAILY
Status: DISCONTINUED | OUTPATIENT
Start: 2019-10-11 | End: 2019-10-13 | Stop reason: HOSPADM

## 2019-10-11 RX ADMIN — ACETAMINOPHEN 650 MG: 325 TABLET, FILM COATED ORAL at 09:34

## 2019-10-11 RX ADMIN — SKIN PROTECTANTS MISC - PASTE 1 APPLICATION: 58.3 PASTE at 21:02

## 2019-10-11 RX ADMIN — POTASSIUM CHLORIDE 40 MEQ: 750 CAPSULE, EXTENDED RELEASE ORAL at 09:34

## 2019-10-11 RX ADMIN — FERROUS SULFATE TAB 325 MG (65 MG ELEMENTAL FE) 325 MG: 325 (65 FE) TAB at 17:08

## 2019-10-11 RX ADMIN — LEVOTHYROXINE SODIUM 50 MCG: 50 TABLET ORAL at 06:23

## 2019-10-11 RX ADMIN — IPRATROPIUM BROMIDE AND ALBUTEROL SULFATE 3 ML: 2.5; .5 SOLUTION RESPIRATORY (INHALATION) at 12:09

## 2019-10-11 RX ADMIN — IPRATROPIUM BROMIDE AND ALBUTEROL SULFATE 3 ML: 2.5; .5 SOLUTION RESPIRATORY (INHALATION) at 20:25

## 2019-10-11 RX ADMIN — CARVEDILOL 12.5 MG: 12.5 TABLET, FILM COATED ORAL at 09:18

## 2019-10-11 RX ADMIN — BUDESONIDE 0.5 MG: 0.5 INHALANT RESPIRATORY (INHALATION) at 20:25

## 2019-10-11 RX ADMIN — FERROUS SULFATE TAB 325 MG (65 MG ELEMENTAL FE) 325 MG: 325 (65 FE) TAB at 09:18

## 2019-10-11 RX ADMIN — SKIN PROTECTANTS MISC - PASTE: 58.3 PASTE at 09:29

## 2019-10-11 RX ADMIN — ATORVASTATIN CALCIUM 10 MG: 10 TABLET, FILM COATED ORAL at 17:08

## 2019-10-11 RX ADMIN — IPRATROPIUM BROMIDE AND ALBUTEROL SULFATE 3 ML: 2.5; .5 SOLUTION RESPIRATORY (INHALATION) at 08:13

## 2019-10-11 RX ADMIN — IPRATROPIUM BROMIDE AND ALBUTEROL SULFATE 3 ML: 2.5; .5 SOLUTION RESPIRATORY (INHALATION) at 15:59

## 2019-10-11 RX ADMIN — CARVEDILOL 12.5 MG: 12.5 TABLET, FILM COATED ORAL at 17:08

## 2019-10-11 RX ADMIN — VITAMIN D, TAB 1000IU (100/BT) 1000 UNITS: 25 TAB at 09:18

## 2019-10-11 RX ADMIN — FUROSEMIDE 40 MG: 40 TABLET ORAL at 17:08

## 2019-10-11 RX ADMIN — BUDESONIDE 0.5 MG: 0.5 INHALANT RESPIRATORY (INHALATION) at 08:14

## 2019-10-11 RX ADMIN — LISINOPRIL 20 MG: 20 TABLET ORAL at 09:19

## 2019-10-11 RX ADMIN — RIVAROXABAN 20 MG: 20 TABLET, FILM COATED ORAL at 17:08

## 2019-10-11 RX ADMIN — SODIUM CHLORIDE, PRESERVATIVE FREE 10 ML: 5 INJECTION INTRAVENOUS at 21:02

## 2019-10-11 RX ADMIN — PANTOPRAZOLE SODIUM 40 MG: 40 TABLET, DELAYED RELEASE ORAL at 06:23

## 2019-10-11 RX ADMIN — FUROSEMIDE 40 MG: 40 TABLET ORAL at 09:34

## 2019-10-11 NOTE — PROGRESS NOTES
Continued Stay Note  [facility]     Patient Name: Ale Narayan  MRN: [id number]  Today's Date: 10/11/2019    Admit Date: 10/6/2019    Discharge Plan     Row Name 10/11/19 1145       Plan    Plan  Home, [facility]  following    Patient/Family in Agreement with Plan  yes    Plan Comments  CCP reviewed Palliative SW note and spoke with [facility] RN who both recommend [facility] consult to review services with pt. CCP spoke with Dr. Payne who approves consult. RN to enter. Eli Bah LCSW        Discharge Codes    No documentation.             Susu Bah LCSW

## 2019-10-11 NOTE — PROGRESS NOTES
Adult Nutrition  Assessment/PES    Patient Name:  Ale Narayan  YOB: 1940  MRN: [id number]  Admit Date:  10/6/2019    Assessment Date:  10/11/2019    Comments:  Assessment triggered by chart skin report.  Patient with stage II pressure injury to bilateral coccyx.  Palliative is following patient and plans for [facility] consult.      Patient reports good appetite, says she likes the food here.  Agrees to Ramana BID to help promote wound healing.    Also recommend addition of MVI with minerals to help with wound healing.    RD to continue to follow.    Reason for Assessment     Row Name 10/11/19 1316          Reason for Assessment    Reason For Assessment  identified at risk by screening criteria     Diagnosis  pulmonary disease;cardiac disease;endocrine conditions;gastrointestinal disease;psychosocial COPD, CMY, chronic respiratory failure, Afib, DVT, PE, hypothyroid, PVD, HTN, GI bleed, anemia, anxiety     Identified At Risk by Screening Criteria  large or nonhealing wound, burn or pressure injury         Nutrition/Diet History     Row Name 10/11/19 1317          Nutrition/Diet History    Typical Food/Fluid Intake  reports appetite doing pretty good     Supplemental Drinks/Foods/Additives  agrees to Ramana BID          Anthropometrics     Row Name 10/11/19 1318 10/11/19 0545       Anthropometrics    Weight  --  84.9 kg (187 lb 2.7 oz)       Admit Weight    Admit Weight  -- 187# 10/11  --       Body Mass Index (BMI)    BMI Assessment  BMI 30-34.9: obesity grade I  --        Labs/Tests/Procedures/Meds     Row Name 10/11/19 1318          Labs/Procedures/Meds    Lab Results Reviewed  reviewed, pertinent     Lab Results Comments  Ca, Hgb, Hct        Diagnostic Tests/Procedures    Diagnostic Test/Procedure Reviewed  reviewed, pertinent        Medications    Pertinent Medications Reviewed  reviewed, pertinent     Pertinent Medications Comments  vit D3, lasix, FeSO4, synthroid, protonix, KCl          Physical Findings     Row Name 10/11/19 1319          Physical Findings    Overall Physical Appearance  obese     Skin  pressure injury st II bilateral coccyx, B=17         Estimated/Assessed Needs     Row Name 10/11/19 1319          Calculation Measurements    Weight Used For Calculations  49.9 kg (110 lb) IBW        Estimated/Assessed Needs       KCAL/KG    KCAL/KG  30 Kcal/Kg (kcal);35 Kcal/Kg (kcal)     30 Kcal/Kg (kcal)  1496.88     35 Kcal/Kg (kcal)  1746.36        Protein Requirements    Weight Used For Protein Calculations  49.9 kg (110 lb) IBW     Est Protein Requirement Amount (gms/kg)  1.5 gm protein     Estimated Protein Requirements (gms/day)  74.84        Fluid Requirements    Estimated Fluid Requirements (mL/day)  1497         Nutrition Prescription Ordered     Row Name 10/11/19 1319          Nutrition Prescription PO    Current PO Diet  Regular     Common Modifiers  Cardiac         Evaluation of Received Nutrient/Fluid Intake     Row Name 10/11/19 1320          PO Evaluation    Number of Meals  2     % PO Intake  63         Problem/Interventions:  Problem 1     Row Name 10/11/19 1321          Nutrition Diagnoses Problem 1    Problem 1  Increased Nutrient Needs     Macronutrient  Kcal;Protein     Etiology (related to)  Medical Diagnosis     Skin  Pressure injury     Signs/Symptoms (evidenced by)  Report/Observation         Intervention Goal     Row Name 10/11/19 1321          Intervention Goal    General  Maintain nutrition;Reduce/improve symptoms;Disease management/therapy     PO  Maintain intake     Weight  Appropriate weight loss         Nutrition Intervention     Row Name 10/11/19 1322          Nutrition Intervention    RD/Tech Action  Follow Tx progress;Care plan reviewd;Encourage intake;Recommend/ordered     Recommended/Ordered  Supplement         Nutrition Prescription     Row Name 10/11/19 1322          Nutrition Prescription PO    PO Prescription  Begin/change supplement     Supplement   Ramana     Supplement Frequency  2 times a day     New PO Prescription Ordered?  Yes         Education/Evaluation     Row Name 10/11/19 2922          Education    Education  Will Instruct as appropriate        Monitor/Evaluation    Monitor  Per protocol;PO intake;Supplement intake;Pertinent labs;Weight;Skin status;Symptoms           Electronically signed by:  Ninfa Murcia RD  10/11/19 1:23 PM

## 2019-10-11 NOTE — PROGRESS NOTES
Patient Name: Ale Narayan  Patient : 1940        Date of Service:10/11/19  Provider of Service: Flavio Del Rosario MD  Place of Service: [facility]  Referral Provider: Harmeet Payne MD          Follow Up: CHF, atrial fib    Interval Hx: Diuresis has stabilized.  Potassium being started for hypokalemia.  In atrial fibrillation with controlled rate.  Hemodynamics stable.        OBJECTIVE  Temp:  [97.6 °F (36.4 °C)-98 °F (36.7 °C)] 98 °F (36.7 °C)  Heart Rate:  [57-75] 68  Resp:  [16-20] 16  BP: (106-125)/(49-88) 120/88     Intake/Output Summary (Last 24 hours) at 10/11/2019 1058  Last data filed at 10/11/2019 0714  Gross per 24 hour   Intake 440 ml   Output --   Net 440 ml     Body mass index is 34.23 kg/m².      10/09/19  1024 10/10/19  0415 10/11/19  0545   Weight: 79 kg (174 lb 2.6 oz) 79.2 kg (174 lb 11.2 oz) 84.9 kg (187 lb 2.7 oz)         Physical Exam:   Physical Exam   Constitutional: She is oriented to person, place, and time. She appears well-developed and well-nourished.   HENT:   Head: Normocephalic.   Eyes: Pupils are equal, round, and reactive to light.   Neck: Normal range of motion. No JVD present. Carotid bruit is not present. No thyromegaly present.   Cardiovascular: Normal rate, S1 normal, S2 normal, normal heart sounds and intact distal pulses. An irregularly irregular rhythm present. Exam reveals no gallop and no friction rub.   No murmur heard.  Pulmonary/Chest: Effort normal and breath sounds normal.   Abdominal: Soft. Bowel sounds are normal.   Musculoskeletal: She exhibits no edema.   Neurological: She is alert and oriented to person, place, and time.   Skin: Skin is warm, dry and intact. No erythema.   Psychiatric: She has a normal mood and affect.   Vitals reviewed.        CURRENT MEDS    Scheduled Meds:  atorvastatin 10 mg Oral Q PM   budesonide 0.5 mg Nebulization BID - RT   carvedilol 12.5 mg Oral BID With Meals   cholecalciferol 1,000 Units Oral  Daily   ferrous sulfate 325 mg Oral BID With Meals   furosemide 40 mg Oral BID   ipratropium-albuterol 3 mL Nebulization 4x Daily - RT   levothyroxine 50 mcg Oral Q AM   lisinopril 20 mg Oral Q24H   pantoprazole 40 mg Oral QAM   potassium chloride 20 mEq Oral Daily   rivaroxaban 20 mg Oral Daily With Dinner   sodium chloride 10 mL Intravenous Q12H   white petrolatum-zinc  Topical BID     Continuous Infusions:       Lab Review:   Results from last 7 days   Lab Units 10/11/19  0333 10/10/19  0353  10/06/19  0913   SODIUM mmol/L 145 141   < > 137   POTASSIUM mmol/L 4.0 3.2*   < > 4.2   CHLORIDE mmol/L 93* 89*   < > 87*   CO2 mmol/L 41.5* 44.8*   < > 44.4*   BUN mg/dL 21 21   < > 11   CREATININE mg/dL 0.60 0.75   < > 0.61   GLUCOSE mg/dL 95 100*   < > 119*   CALCIUM mg/dL 8.3* 8.4*   < > 8.7   AST (SGOT) U/L  --   --   --  14   ALT (SGPT) U/L  --   --   --  17    < > = values in this interval not displayed.     Results from last 7 days   Lab Units 10/07/19  2020 10/07/19  0455 10/06/19  1232 10/06/19  0913   TROPONIN T ng/mL <0.010 <0.010 <0.010 <0.010     Results from last 7 days   Lab Units 10/11/19  0333 10/10/19  0353   WBC 10*3/mm3 7.60 10.07   HEMOGLOBIN g/dL 9.3* 10.2*   HEMATOCRIT % 29.9* 33.3*   PLATELETS 10*3/mm3 260 277         Results from last 7 days   Lab Units 10/07/19  2020 10/06/19  0913   MAGNESIUM mg/dL 1.8 1.9         Results from last 7 days   Lab Units 10/06/19  0913   PROBNP pg/mL 6,451.0*     Results from last 7 days   Lab Units 10/06/19  1232   TSH uIU/mL 0.722       I personally reviewed the patient's ECG and telemetry data    ASSESSMENT & PLAN    Hypercapnia    1.  COPD: Acute on chronic respiratory failure  2.  Congestive heart failure: Normal left ventricular systolic function.  No obvious pulmonary hypertension.  On oral diuretics.  3.  Paroxysmal atrial fibrillation: Presently on rivaroxaban.  Rate controlled  4.  AV block: Back in atrial fibrillation with controlled rate  5.  History of  pulmonary embolus with IVC filter placed.  Chronic anticoagulation.    At this point from a cardiac standpoint she appears to be stable.  We will be happy to see her again here in the hospital if needed.  Otherwise I will see her back in the office at some point.  I suggest after discharge that she make an appointment to see me before the end of the year.  Thank you    Flavio Del Rosario MD  10/11/19

## 2019-10-11 NOTE — PROGRESS NOTES
Continued Stay Note  [facility]     Patient Name: Ale Narayan  MRN: [id number]  Today's Date: 10/11/2019    Admit Date: 10/6/2019    Discharge Plan     Row Name 10/11/19 1453       Plan    Plan  Home with Trilogy vent via [facility] following    Patient/Family in Agreement with Plan  yes    Plan Comments  Per pulmonary MD, pt to d/c with Trilogy ventilator. [facility] ([facility]) updated and has arranged for Trilogy to be delivered to pt's room later today. Eli Bah LCSW        Discharge Codes    No documentation.             Susu Bah LCSW

## 2019-10-11 NOTE — THERAPY EVALUATION
Patient Name: Ale Narayan  : 1940    MRN: [id number]                              Today's Date: 10/11/2019       Admit Date: 10/6/2019    Visit Dx:     ICD-10-CM ICD-9-CM   1. Hypercapnia R06.89 786.09   2. Generalized abdominal pain R10.84 789.07   3. Acute on chronic respiratory failure with hypoxemia (CMS/HCC) J96.21 518.84     Patient Active Problem List   Diagnosis   • Adenocarcinoma of right lung (CMS/HCC)   • Decreased blood pressure, not hypotension   • Cardiomyopathy (CMS/HCC)   • Cardiovascular disease   • Dizziness   • Dyspnea on exertion   • Nodule of left lung   • Peripheral arterial occlusive disease (CMS/HCC)   • Iron deficiency anemia   • Pulmonary emphysema (CMS/HCC)   • Pneumonitis, radiation (CMS/HCC)   • Palpitations   • Localized edema   • Weight gain   • Anemia   • Rectal bleeding   • Hypercapnia     Past Medical History:   Diagnosis Date   • Adenocarcinoma of lung (CMS/HCC)    • Asthma    • Carotid artery disease (CMS/HCC)    • Cough    • Deep vein thrombosis (CMS/HCC)    • Emphysema of lung (CMS/HCC)    • Hyperlipidemia    • Hypertension    • Hypothyroidism    • Lung cancer (CMS/HCC)    • Osteoporosis    • Paroxysmal atrial fibrillation (CMS/HCC)    • Peripheral arterial disease (CMS/HCC)    • Pulmonary embolism (CMS/HCC)    • Thrombophilia (CMS/HCC)     Since age 25     Past Surgical History:   Procedure Laterality Date   • ANKLE SURGERY     • BREAST SURGERY      removal of benign melanoma spot on nipple of left breast 2013   • COLONOSCOPY      Negative   • COLONOSCOPY N/A 2019    Procedure: COLONOSCOPY to cecum with biopsy / polypectomy and  APC cautery;  Surgeon: Alexsandra Ruiz MD;  Location: [facility] ENDOSCOPY;  Service: Gastroenterology   • ENDOSCOPY N/A 2019    Procedure: ESOPHAGOGASTRODUODENOSCOPY with biopsies;  Surgeon: Alexsandra Ruiz MD;  Location: [facility] ENDOSCOPY;  Service: Gastroenterology   • HERNIA REPAIR      Lumbar spine   • HYSTERECTOMY     •  LUNG LOBECTOMY  2009   • OTHER SURGICAL HISTORY      Bypass of lower extremities   • OTHER SURGICAL HISTORY      IVC filter placed     General Information     Row Name 10/11/19 1150          PT Evaluation Time/Intention    Document Type  evaluation  -     Mode of Treatment  individual therapy;physical therapy  -[facility] Name 10/11/19 1150          General Information    Prior Level of Function  independent:  -     Existing Precautions/Restrictions  fall;oxygen therapy device and L/min  -     Barriers to Rehab  previous functional deficit  -     Row Name 10/11/19 1150          Relationship/Environment    Lives With  alone  -[facility] Name 10/11/19 1150          Resource/Environmental Concerns    Current Living Arrangements  independent/assisted living facility  -     Row Name 10/11/19 1150          Cognitive Assessment/Intervention- PT/OT    Orientation Status (Cognition)  oriented x 4  -[facility] Name 10/11/19 1150          Safety Issues, Functional Mobility    Impairments Affecting Function (Mobility)  balance;endurance/activity tolerance;strength  -       User Key  (r) = Recorded By, (t) = Taken By, (c) = Cosigned By    Initials Name Provider Type     Angie Lynn, PT Physical Therapist        Mobility     Row Name 10/11/19 1150          Bed Mobility Assessment/Treatment    Comment (Bed Mobility)  not testd- up in chair  -[facility] Name 10/11/19 1150          Sit-Stand Transfer    Sit-Stand [place] (Transfers)  contact guard  -     Assistive Device (Sit-Stand Transfers)  walker, front-wheeled  -[facility] Name 10/11/19 1150          Gait/Stairs Assessment/Training    [place] Level (Gait)  contact guard  -     Assistive Device (Gait)  walker, front-wheeled  -     Distance in Feet (Gait)  5, 25, 20, 30, 20  with seated rest breaks in between  -     Deviations/Abnormal Patterns (Gait)  gait speed decreased  -       User Key  (r) = Recorded By, (t) = Taken By, (c) =  Cosigned By    Initials Name Provider Type    Angie Baltazar, PT Physical Therapist        Obj/Interventions     Row Name 10/11/19 1153          General ROM    GENERAL ROM COMMENTS  BLE WFL  -KH     Row Name 10/11/19 1153          MMT (Manual Muscle Testing)    General MMT Comments  BLE WFL  -KH     Row Name 10/11/19 1153          Therapeutic Exercise    Comment (Therapeutic Exercise)  BLE AP, LAQ, seated marching x 10 reps  -KH       User Key  (r) = Recorded By, (t) = Taken By, (c) = Cosigned By    Initials Name Provider Type    Angie Baltazar, PT Physical Therapist        Goals/Plan     Row Name 10/11/19 1155          Bed Mobility Goal 1 (PT)    Activity/Assistive Device (Bed Mobility Goal 1, PT)  bed mobility activities, all  -KH     [place] Level/Cues Needed (Bed Mobility Goal 1, PT)  independent  -KH     Time Frame (Bed Mobility Goal 1, PT)  1 week  -KH     Row Name 10/11/19 1155          Transfer Goal 1 (PT)    Activity/Assistive Device (Transfer Goal 1, PT)  transfers, all;walker, rolling  -KH     [place] Level/Cues Needed (Transfer Goal 1, PT)  independent  -KH     Time Frame (Transfer Goal 1, PT)  1 week  -KH     Row Name 10/11/19 1155          Gait Training Goal 1 (PT)    Activity/Assistive Device (Gait Training Goal 1, PT)  gait (walking locomotion);walker, rolling  -KH     [place] Level (Gait Training Goal 1, PT)  independent  -KH     Distance (Gait Goal 1, PT)  150  -KH     Time Frame (Gait Training Goal 1, PT)  1 week  -KH     Row Name 10/11/19 1155          Patient Education Goal (PT)    Activity (Patient Education Goal, PT)  HEP  -KH     [place]/Cues/Accuracy (Memory Goal 2, PT)  demonstrates adequately  -KH     Time Frame (Patient Education Goal, PT)  1 week  -KH       User Key  (r) = Recorded By, (t) = Taken By, (c) = Cosigned By    Initials Name Provider Type    Angie Baltazar, PT Physical Therapist        Clinical Impression     Row Name  10/11/19 1154          Pain Assessment    Additional Documentation  Pain Scale: Numbers Pre/Post-Treatment (Group)  -     Row Name 10/11/19 1154          Pain Scale: Numbers Pre/Post-Treatment    Pain Scale: Numbers, Pretreatment  0/10 - no pain  -     Pain Scale: Numbers, Post-Treatment  0/10 - no pain  -     Row Name 10/11/19 1154          Plan of Care Review    Plan of Care Reviewed With  patient  -     Row Name 10/11/19 1154          Physical Therapy Clinical Impression    Patient/Family Goals Statement (PT Clinical Impression)  return to PLOF, open to rehab  -     Criteria for Skilled Interventions Met (PT Clinical Impression)  yes;treatment indicated  -     Rehab Potential (PT Clinical Summary)  good, to achieve stated therapy goals  -     Row Name 10/11/19 1154          Vital Signs    Pre SpO2 (%)  94  -KH     O2 Delivery Pre Treatment  supplemental O2  -KH     O2 Delivery Intra Treatment  supplemental O2  -KH     Post SpO2 (%)  92  -KH     O2 Delivery Post Treatment  supplemental O2  -KH     Row Name 10/11/19 1154          Positioning and Restraints    Pre-Treatment Position  sitting in chair/recliner no alarm  -KH     Post Treatment Position  chair  -KH     In Chair  sitting;call light within reach;encouraged to call for assist;notified [facility]       User Key  (r) = Recorded By, (t) = Taken By, (c) = Cosigned By    Initials Name Provider Type    Angie Baltazar, PT Physical Therapist        Outcome Measures     Row Name 10/11/19 1156          How much help from another person do you currently need...    Turning from your back to your side while in flat bed without using bedrails?  3  -KH     Moving from lying on back to sitting on the side of a flat bed without bedrails?  3  -KH     Moving to and from a bed to a chair (including a wheelchair)?  3  -KH     Standing up from a chair using your arms (e.g., wheelchair, bedside chair)?  3  -KH     Climbing 3-5 steps with a railing?  2   -     To walk in hospital room?  3  -     AM-PAC 6 Clicks Score (PT)  17  -     Row Name 10/11/19 1159          Functional Assessment    Outcome Measure Options  AM-PAC 6 Clicks Basic Mobility (PT)  -       User Key  (r) = Recorded By, (t) = Taken By, (c) = Cosigned By    Initials Name Provider Type    Angie Baltazar, PT Physical Therapist        Physical Therapy Education     Title: PT OT SLP Therapies (Done)     Topic: Physical Therapy (Done)     Point: Mobility training (Done)     Learning Progress Summary           Patient Acceptance, E,D, VU,NR by  at 10/11/2019 11:56 AM                   Point: Home exercise program (Done)     Learning Progress Summary           Patient Acceptance, E,D, VU,NR by  at 10/11/2019 11:56 AM                   Point: Body mechanics (Done)     Learning Progress Summary           Patient Acceptance, E,D, VU,NR by  at 10/11/2019 11:56 AM                   Point: Precautions (Done)     Learning Progress Summary           Patient Acceptance, E,D, VU,NR by  at 10/11/2019 11:56 AM                               User Key     Initials Effective Dates Name Provider Type [facility] 02/05/19 -  Angie Lynn, PT Physical Therapist PT              PT Recommendation and Plan  Planned Therapy Interventions (PT Eval): balance training, bed mobility training, gait training, home exercise program, strengthening, transfer training, patient/family education  Outcome Summary/Treatment Plan (PT)  Anticipated Discharge Disposition (PT): skilled nursing facility  Plan of Care Reviewed With: patient  Outcome Summary: PT admitted from subacute rehab facility with hypercapnia and COPD. Pt reports she ambualted with a Rwx on O2 and was progressing well at the rehab facility. Today she presents with general deconditioning and ALLISON. She ambulated 20-30ft several times with seated rest breaks required. Staff followed with a chair. She would benefit from PT to address her  impairments and work towards the outlined goals.      Time Calculation:   PT Charges     Row Name 10/11/19 1149             Time Calculation    Start Time  1115  -      Stop Time  1135  -      Time Calculation (min)  20 min  -      PT Received On  10/11/19  -      PT - Next Appointment  10/12/19  -      PT Goal Re-Cert Due Date  10/18/19  -        User Key  (r) = Recorded By, (t) = Taken By, (c) = Cosigned By    Initials Name Provider Type    Angie Baltazar, PT Physical Therapist        Therapy Charges for Today     Code Description Service Date Service Provider Modifiers Qty    69298485131 HC PT EVAL MOD COMPLEXITY 2 10/11/2019 Angie Lynn, PT GP 1    23601530466 HC PT THER PROC EA 15 MIN 10/11/2019 Angie Lynn, PT GP 1          PT G-Codes  Outcome Measure Options: AM-PAC 6 Clicks Basic Mobility (PT)  AM-PAC 6 Clicks Score (PT): 17    Angie Lynn, PT  10/11/2019

## 2019-10-11 NOTE — PROGRESS NOTES
Jhonatan Scott MD                          [phone]      Patient ID:    Name:  Ale Narayan    MRN:  [id number]    1940   79 y.o.  female            Patient Care Team:  Harmeet Payne MD as PCP - General  Harmeet Payne MD as PCP - Claims Attributed  Yoan Cash MD as Consulting Physician (Hematology and Oncology)  Devon Lamb MD as Referring Physician (Thoracic Surgery)    CC/ Reason for visit: Respiratory failure metabolic encephalopathy    Subjective: Pt seen and examined this AM. No acute overnight events noted.  Use the ventilator yesterday night without issues and states that she is feeling better.  She is in high spirits and sitting up at the edge of the bed.  States that she does not like the ventilator much but wants to and will use it if she needs it.     ROS: Denies any subjective fevers, syncope or presyncopal events, new neurological deficits, nausea or vomiting currently    Objective     Vital Signs past 24hrs    BP range: BP: (116-125)/(48-88) 122/48  Pulse range: Heart Rate:  [57-75] 70  Resp rate range: Resp:  [16-20] 16  Temp range: Temp (24hrs), Av.9 °F (36.6 °C), Min:97.8 °F (36.6 °C), Max:98 °F (36.7 °C)      Ventilator/Non-Invasive Ventilation Settings (From admission, onward)    Start     Ordered    10/06/19 1130  NIPPV (CPAP or BIPAP)  Until Discontinued     Question Answer Comment   Indication: Acute Respiratory Failure    Type: AVAPS/PC/PS    NIPPV Mask Interface: Per Patient Preference    Backup Rate 14    Target VT (mL) 450    EPAP/PEEP (cm H2O) 5    Min Pressure (cm H2O) 10    Max Pressure (cm H2O) 20    Titrate for SPO2 90%        10/06/19 1130          Device (Oxygen Therapy): nasal cannula       84.9 kg (187 lb 2.7 oz); Body mass index is 34.23 kg/m².      Intake/Output Summary (Last 24 hours) at 10/11/2019 1450  Last data filed at 10/11/2019 1400  Gross per 24 hour   Intake 560 ml   Output --   Net 560 ml        PHYSICAL EXAM   Constitutional:  Elderly obese white female patient pt in bed, No acute respiratory distress, + accessory muscle use  Head: - NCAT  Eyes: No pallor, Anicteric conjunctiva, EOMI.  ENMT:  Mallampati 4, no oral thrush. Dry MM.   NECK: Trachea midline, No thyromegaly, no palpable cervical lymphadenopathy  Heart: RRR, no murmur. Trace pedal edema   Lungs: YANICK +, distant breath sounds, decreased at bases. no wheezes/ crackles heard    Abdomen: Soft. Obese, No tenderness, guarding or rigidity. No palpable masses  Extremities: Extremities warm and well perfused. No cyanosis/ clubbing  Neuro: Conscious, answers appropriately, no gross focal neuro deficits  Psych: Mood and affect appropriate    Scheduled meds:      atorvastatin 10 mg Oral Q PM   budesonide 0.5 mg Nebulization BID - RT   carvedilol 12.5 mg Oral BID With Meals   cholecalciferol 1,000 Units Oral Daily   ferrous sulfate 325 mg Oral BID With Meals   furosemide 40 mg Oral BID   ipratropium-albuterol 3 mL Nebulization 4x Daily - RT   levothyroxine 50 mcg Oral Q AM   lisinopril 20 mg Oral Q24H   pantoprazole 40 mg Oral QAM   potassium chloride 20 mEq Oral Daily   rivaroxaban 20 mg Oral Daily With Dinner   sodium chloride 10 mL Intravenous Q12H   white petrolatum-zinc  Topical BID       IV meds:                           Data Review:      Results from last 7 days   Lab Units 10/11/19  0333 10/10/19  0353 10/09/19  0354  10/07/19  0455 10/06/19  0913   SODIUM mmol/L 145 141 144   < >  --  137   POTASSIUM mmol/L 4.0 3.2* 3.6   < >  --  4.2   CHLORIDE mmol/L 93* 89* 91*   < >  --  87*   CO2 mmol/L 41.5* 44.8* 41.9*   < >  --  44.4*   BUN mg/dL 21 21 16   < >  --  11   CREATININE mg/dL 0.60 0.75 0.69   < >  --  0.61   CALCIUM mg/dL 8.3* 8.4* 8.6   < >  --  8.7   BILIRUBIN mg/dL  --   --   --   --   --  0.3   ALK PHOS U/L  --   --   --   --   --  82   ALT (SGPT) U/L  --   --   --   --   --  17   AST (SGOT) U/L  --   --   --   --   --  14   GLUCOSE  mg/dL 95 100* 109*   < >  --  119*   WBC 10*3/mm3 7.60 10.07 9.15   < >  --  7.88   HEMOGLOBIN g/dL 9.3* 10.2* 9.7*   < >  --  9.9*   PLATELETS 10*3/mm3 260 277 267   < >  --  265   PROBNP pg/mL  --   --   --   --   --  6,451.0*   PROCALCITONIN ng/mL  --   --   --   --  <0.02*  --     < > = values in this interval not displayed.       Lab Results   Component Value Date    CALCIUM 8.3 (L) 10/11/2019    PHOS 2.9 09/12/2015             Results from last 7 days   Lab Units 10/10/19  0203 10/07/19  1416 10/06/19  1601 10/06/19  1231 10/06/19  1107   PH, ARTERIAL pH units 7.382 7.384 7.368 7.228* 7.179*   PO2 ART mm Hg 52.6* 103.2* 226.7* 141.0* 82.3   PCO2, ARTERIAL mm Hg 84.6* 80.9* 83.5* 112.0* 118.8*   HCO3 ART mmol/L 50.2* 48.3* 48.0* 46.6* 44.3*        Results Review:    I have reviewed the relevant laboratory results and independently reviewed the chest imaging from this hospitalization including the available echocardiogram reports personally and summarized it if/ when appropriate below    Assessment    Acute metabolic encephalopathy  Acute on chronic hypoxic resp failure( 4L @ home)  Acute on undiagnosed chronic hypercapnic resp failure   Acute on chronic congestive heart failure  Abdominal pain - ? constipation   Severe COPD not in exacerbation  Chronic interstitial lung disease  H/o adenocarcinoma of the lung x2, 2009 and 2016 s/p Lobectomy and palliative XRT  Cardiomyopathy EF - 40%  Mild MR/AAS  Suspected undiagnosed LISSETTE/OHS  Angiodysplasia with chronic GI bleed  Chronic anemia related to GI blood loss  H/o DVT/PE on AC s/p IVC filter    Peripheral vascular disease  Hypothyroidism  DNR    PLAN:  Patient was on noninvasive ventilator overnight and was able to tolerate it. BiPAP was tried previously and felt to be not sufficient given the need for and assured volume to help with managing her chronic hypercapnia and encephalopathy with recurrent respiratory failures related to it.  At home ventilator would be  needed to manage this current situation and I feel will prevent the above issues.     COPD not in exacerbation.  We will continue with bronchodilators.  Continue anticoagulation for history of DVT/PE.    Should be ok to dc once we can setup home vent. Will need close f/u o/p. She is ok using the machine and is tolerating it but needs a little reinforcement.     I have discussed my findings and recommendations with patient and nursing staff.     Jhonatan Scott MD  10/11/2019

## 2019-10-11 NOTE — PLAN OF CARE
Problem: Skin Injury Risk (Adult)  Goal: Identify Related Risk Factors and Signs and Symptoms  Outcome: Ongoing (interventions implemented as appropriate)   10/11/19 1440   Skin Injury Risk (Adult)   Related Risk Factors (Skin Injury Risk) advanced age     Goal: Skin Health and Integrity  Outcome: Ongoing (interventions implemented as appropriate)

## 2019-10-11 NOTE — PLAN OF CARE
Problem: Patient Care Overview  Goal: Plan of Care Review   10/11/19 1156   OTHER   Outcome Summary PT admitted from subacute rehab facility with hypercapnia and COPD. Pt reports she ambualted with a Rwx on O2 and was progressing well at the rehab facility. Today she presents with general deconditioning and ALLISON. She ambulated 20-30ft several times with seated rest breaks required. Staff followed with a chair. She would benefit from PT to address her impairments and work towards the outlined goals.

## 2019-10-11 NOTE — PROGRESS NOTES
DISCUSSED NIPPV WITH PT. PT IS VERY CONCERNED WITH NEEDING TO WEAR A BIPAP EVERY NIGHT. PT STATES SHE DOES NOT WANT TO WEAR THE MASK ANYMORE AND DOES NOT WANT TO HAVE A MACHINE AT HOME. SHE FEELS SHE DOES NOT NEED TO WEAR THE MASK EVERY NIGHT. EXPLAINED IN DETAIL TO THE PT WHY WEARING THE BIPAP WOULD BE BENEFICIAL. PT AGREED TO WEAR TONIGHT AND IS TOLERATING WELL. SHE WISHES TO DISCUSS WITH PULMONARY MD WHY SHE HAS TO WEAR.

## 2019-10-11 NOTE — PLAN OF CARE
Problem: Pain, Acute (Adult)  Goal: Identify Related Risk Factors and Signs and Symptoms  Outcome: Ongoing (interventions implemented as appropriate)   10/11/19 1439   Pain, Acute (Adult)   Related Risk Factors (Acute Pain) disease process   Signs and Symptoms (Acute Pain) fatigue/weakness     Goal: Acceptable Pain Control/Comfort Level  Outcome: Ongoing (interventions implemented as appropriate)      Problem: Breathing Pattern Ineffective (Adult)  Goal: Identify Related Risk Factors and Signs and Symptoms  Outcome: Ongoing (interventions implemented as appropriate)    Goal: Effective Oxygenation/Ventilation  Outcome: Ongoing (interventions implemented as appropriate)    Goal: Anxiety/Fear Reduction  Outcome: Ongoing (interventions implemented as appropriate)

## 2019-10-11 NOTE — PLAN OF CARE
Problem: Patient Care Overview  Goal: Plan of Care Review  Outcome: Ongoing (interventions implemented as appropriate)   10/11/19 0407   Coping/Psychosocial   Plan of Care Reviewed With patient   Plan of Care Review   Progress no change   OTHER   Outcome Summary Pt did well on NC at 4L. Currently asleep on CPAP and tolerating it well. a/o x4. O2 sats have maintained upper 80s to low 90s tonight. Barrier cream applied to wound on bottom. Up to bedside commode with x1 assist. No new complaints or changes. Will continue to monitor.     Goal: Individualization and Mutuality  Outcome: Ongoing (interventions implemented as appropriate)    Goal: Discharge Needs Assessment  Outcome: Ongoing (interventions implemented as appropriate)    Goal: Interprofessional Rounds/Family Conf  Outcome: Ongoing (interventions implemented as appropriate)      Problem: Skin Injury Risk (Adult)  Goal: Identify Related Risk Factors and Signs and Symptoms  Outcome: Ongoing (interventions implemented as appropriate)    Goal: Skin Health and Integrity  Outcome: Ongoing (interventions implemented as appropriate)      Problem: Fall Risk (Adult)  Goal: Identify Related Risk Factors and Signs and Symptoms  Outcome: Ongoing (interventions implemented as appropriate)    Goal: Absence of Fall  Outcome: Ongoing (interventions implemented as appropriate)      Problem: Pain, Acute (Adult)  Goal: Identify Related Risk Factors and Signs and Symptoms  Outcome: Ongoing (interventions implemented as appropriate)    Goal: Acceptable Pain Control/Comfort Level  Outcome: Ongoing (interventions implemented as appropriate)      Problem: Breathing Pattern Ineffective (Adult)  Goal: Identify Related Risk Factors and Signs and Symptoms  Outcome: Ongoing (interventions implemented as appropriate)    Goal: Effective Oxygenation/Ventilation  Outcome: Ongoing (interventions implemented as appropriate)    Goal: Anxiety/Fear Reduction  Outcome: Ongoing (interventions  implemented as appropriate)

## 2019-10-11 NOTE — PROGRESS NOTES
History:  This 79-year-old lady has end-stage COPD with CO2 retention.  She also has a cardiomyopathy.  She used her positive pressure breathing apparatus last night and was able to sleep through the night.  I had a long discussion with her today and she except the fact that she is going to need to use this at home.  Hopefully she will be able to go home with a trilogy vice    Patient has no chest pain.  No GI symptoms.  She is very weak and has not been doing any ambulation since admission to the hospital.    Allergies  Adhesive tape; Augmentin [amoxicillin-pot clavulanate]; Cephalexin; Metoclopramide; Pentazocine; Simvastatin; and Sucralfate      Current Facility-Administered Medications:   •  acetaminophen (TYLENOL) tablet 650 mg, 650 mg, Oral, Q6H PRN, Harmeet Payne MD, 650 mg at 10/10/19 1809  •  atorvastatin (LIPITOR) tablet 10 mg, 10 mg, Oral, Q PM, Harmeet Payne MD, 10 mg at 10/10/19 1804  •  budesonide (PULMICORT) nebulizer solution 0.5 mg, 0.5 mg, Nebulization, BID - RT, Harmeet Payne MD, 0.5 mg at 10/10/19 1935  •  carvedilol (COREG) tablet 12.5 mg, 12.5 mg, Oral, BID With Meals, Nona Romano MD, 12.5 mg at 10/10/19 1804  •  cholecalciferol (VITAMIN D3) tablet 1,000 Units, 1,000 Units, Oral, Daily, Harmeet Payne MD, 1,000 Units at 10/10/19 1013  •  ferrous sulfate tablet 325 mg, 325 mg, Oral, BID With Meals, Harmeet Payne MD, 325 mg at 10/10/19 1804  •  furosemide (LASIX) tablet 40 mg, 40 mg, Oral, BID, Flavio Del Rosario MD, 40 mg at 10/10/19 1804  •  ipratropium-albuterol (DUO-NEB) nebulizer solution 3 mL, 3 mL, Nebulization, 4x Daily - RT, Harmeet Payne MD, 3 mL at 10/10/19 1935  •  levothyroxine (SYNTHROID, LEVOTHROID) tablet 50 mcg, 50 mcg, Oral, Q AM, Harmeet Payne MD, 50 mcg at 10/11/19 0623  •  lisinopril (PRINIVIL,ZESTRIL) tablet 20 mg, 20 mg, Oral, Q24H, Nona Romano MD, 20 mg at 10/10/19 1038  •  nitroglycerin (NITROSTAT) SL tablet 0.4 mg, 0.4 mg, Sublingual, Q5 Min  "PRN, Harmeet Payne MD  •  pantoprazole (PROTONIX) EC tablet 40 mg, 40 mg, Oral, QAM, Harmeet Payne MD, 40 mg at 10/11/19 0623  •  polyethylene glycol 3350 powder (packet), 17 g, Oral, Daily PRN, Harmeet Payne MD, 17 g at 10/10/19 1013  •  potassium chloride (KLOR-CON) packet 40 mEq, 40 mEq, Oral, PRN, Harmeet Payne MD  •  potassium chloride (MICRO-K) CR capsule 40 mEq, 40 mEq, Oral, PRN, Harmeet Payne MD, 40 mEq at 10/10/19 1013  •  rivaroxaban (XARELTO) tablet 20 mg, 20 mg, Oral, Daily With Dinner, Harmeet Payne MD, 20 mg at 10/10/19 1804  •  [COMPLETED] Insert peripheral IV, , , Once **AND** sodium chloride 0.9 % flush 10 mL, 10 mL, Intravenous, PRN, Deisi Martines APRN  •  sodium chloride 0.9 % flush 10 mL, 10 mL, Intravenous, Q12H, Harmeet Payne MD, 10 mL at 10/10/19 2030  •  sodium chloride 0.9 % flush 10 mL, 10 mL, Intravenous, PRN, Harmeet Payne MD  •  white petrolatum-zinc (ILEX SKIN PROTECTANT) 58.3 % cream, , Topical, BID, Harmeet Payne MD, 1 application at 10/10/19 2030    /88 (BP Location: Left arm, Patient Position: Sitting)   Pulse 75   Temp 98 °F (36.7 °C) (Oral)   Resp 16   Ht 157.5 cm (62\")   Wt 84.9 kg (187 lb 2.7 oz)   LMP  (LMP Unknown)   SpO2 (!) 89%   BMI 34.23 kg/m²     Physical Exam  Appearance: Elderly  lady with central obesity.  She is sitting in bed.  She is in no distress.  Heart: Irregular regular rate around 65.  Lungs: Diminished breath sounds.  A few rales in the right base.  Abdomen: Protuberant, benign.  No rebound, tenderness or guarding.  Extremities: Without clubbing, cyanosis or edema.  Neurologic: Nonfocal    Lab Results (last 24 hours)     Procedure Component Value Units Date/Time    Basic Metabolic Panel [[id number]]  (Abnormal) Collected:  10/11/19 0333    Specimen:  Blood Updated:  10/11/19 0506     Glucose 95 mg/dL      BUN 21 mg/dL      Creatinine 0.60 mg/dL      Sodium 145 mmol/L      Potassium 4.0 mmol/L      Chloride 93 mmol/L      " CO2 41.5 mmol/L      Calcium 8.3 mg/dL      eGFR Non African Amer 97 mL/min/1.73      BUN/Creatinine Ratio 35.0     Anion Gap 10.5 mmol/L     Narrative:       GFR Normal >60  Chronic Kidney Disease <60  Kidney Failure <15    CBC & Differential [[id number]] Collected:  10/11/19 0333    Specimen:  Blood Updated:  10/11/19 0446    Narrative:       The following orders were created for panel order CBC & Differential.  Procedure                               Abnormality         Status                     ---------                               -----------         ------                     CBC Auto Differential[[id number]]        Abnormal            Final result                 Please view results for these tests on the individual orders.    CBC Auto Differential [[id number]]  (Abnormal) Collected:  10/11/19 0333    Specimen:  Blood Updated:  10/11/19 0446     WBC 7.60 10*3/mm3      RBC 3.27 10*6/mm3      Hemoglobin 9.3 g/dL      Hematocrit 29.9 %      MCV 91.4 fL      MCH 28.4 pg      MCHC 31.1 g/dL      RDW 13.6 %      RDW-SD 45.6 fl      MPV 10.5 fL      Platelets 260 10*3/mm3      Neutrophil % 66.8 %      Lymphocyte % 20.1 %      Monocyte % 9.3 %      Eosinophil % 3.0 %      Basophil % 0.5 %      Immature Grans % 0.3 %      Neutrophils, Absolute 5.07 10*3/mm3      Lymphocytes, Absolute 1.53 10*3/mm3      Monocytes, Absolute 0.71 10*3/mm3      Eosinophils, Absolute 0.23 10*3/mm3      Basophils, Absolute 0.04 10*3/mm3      Immature Grans, Absolute 0.02 10*3/mm3      nRBC 0.0 /100 WBC           Imp:  1.  Acute on chronic respiratory failure.  This is in large part related to COPD.  Patient also has a dilated cardiomyopathy.  2.  Atrial fibrillation with controlled ventricular rate.  3.  History of adenocarcinoma the lung x2.  4.  Past DVT/PEs  5.  Hypothyroidism  6.  Essential hypertension.  7.  Peripheral vascular disease.  8.  Recent lower GI bleed related to angiodysplasia.  9.  Anemia related to recent blood loss and  chronic inflammation   10.  Generalized anxiety disorder.      Plan:  I am going to start the patient on a small amount of potassium.  I am going to start physical therapy  We will continue other treatment.  Hopefully patient can go home with help within a day or 2    Harmeet Payne MD  10/11/2019  8:10 AM

## 2019-10-12 LAB
ANION GAP SERPL CALCULATED.3IONS-SCNC: 10.6 MMOL/L (ref 5–15)
BASOPHILS # BLD AUTO: 0.06 10*3/MM3 (ref 0–0.2)
BASOPHILS NFR BLD AUTO: 0.6 % (ref 0–1.5)
BUN BLD-MCNC: 18 MG/DL (ref 8–23)
BUN/CREAT SERPL: 25.7 (ref 7–25)
CALCIUM SPEC-SCNC: 9 MG/DL (ref 8.6–10.5)
CHLORIDE SERPL-SCNC: 96 MMOL/L (ref 98–107)
CO2 SERPL-SCNC: 39.4 MMOL/L (ref 22–29)
CREAT BLD-MCNC: 0.7 MG/DL (ref 0.57–1)
DEPRECATED RDW RBC AUTO: 45.7 FL (ref 37–54)
EOSINOPHIL # BLD AUTO: 0.27 10*3/MM3 (ref 0–0.4)
EOSINOPHIL NFR BLD AUTO: 2.9 % (ref 0.3–6.2)
ERYTHROCYTE [DISTWIDTH] IN BLOOD BY AUTOMATED COUNT: 13.7 % (ref 12.3–15.4)
GFR SERPL CREATININE-BSD FRML MDRD: 81 ML/MIN/1.73
GLUCOSE BLD-MCNC: 111 MG/DL (ref 65–99)
HCT VFR BLD AUTO: 32.4 % (ref 34–46.6)
HGB BLD-MCNC: 9.8 G/DL (ref 12–15.9)
IMM GRANULOCYTES # BLD AUTO: 0.04 10*3/MM3 (ref 0–0.05)
IMM GRANULOCYTES NFR BLD AUTO: 0.4 % (ref 0–0.5)
LYMPHOCYTES # BLD AUTO: 1.28 10*3/MM3 (ref 0.7–3.1)
LYMPHOCYTES NFR BLD AUTO: 13.5 % (ref 19.6–45.3)
MCH RBC QN AUTO: 28.1 PG (ref 26.6–33)
MCHC RBC AUTO-ENTMCNC: 30.2 G/DL (ref 31.5–35.7)
MCV RBC AUTO: 92.8 FL (ref 79–97)
MONOCYTES # BLD AUTO: 0.77 10*3/MM3 (ref 0.1–0.9)
MONOCYTES NFR BLD AUTO: 8.1 % (ref 5–12)
NEUTROPHILS # BLD AUTO: 7.03 10*3/MM3 (ref 1.7–7)
NEUTROPHILS NFR BLD AUTO: 74.5 % (ref 42.7–76)
NRBC BLD AUTO-RTO: 0 /100 WBC (ref 0–0.2)
PLATELET # BLD AUTO: 269 10*3/MM3 (ref 140–450)
PMV BLD AUTO: 10.1 FL (ref 6–12)
POTASSIUM BLD-SCNC: 4.2 MMOL/L (ref 3.5–5.2)
RBC # BLD AUTO: 3.49 10*6/MM3 (ref 3.77–5.28)
SODIUM BLD-SCNC: 146 MMOL/L (ref 136–145)
WBC NRBC COR # BLD: 9.45 10*3/MM3 (ref 3.4–10.8)

## 2019-10-12 PROCEDURE — 94799 UNLISTED PULMONARY SVC/PX: CPT

## 2019-10-12 PROCEDURE — 80048 BASIC METABOLIC PNL TOTAL CA: CPT | Performed by: INTERNAL MEDICINE

## 2019-10-12 PROCEDURE — 85025 COMPLETE CBC W/AUTO DIFF WBC: CPT | Performed by: INTERNAL MEDICINE

## 2019-10-12 RX ORDER — ONDANSETRON 4 MG/1
4 TABLET, FILM COATED ORAL EVERY 8 HOURS PRN
Status: DISCONTINUED | OUTPATIENT
Start: 2019-10-12 | End: 2019-10-13 | Stop reason: HOSPADM

## 2019-10-12 RX ADMIN — FUROSEMIDE 40 MG: 40 TABLET ORAL at 17:31

## 2019-10-12 RX ADMIN — IPRATROPIUM BROMIDE AND ALBUTEROL SULFATE 3 ML: 2.5; .5 SOLUTION RESPIRATORY (INHALATION) at 08:34

## 2019-10-12 RX ADMIN — IPRATROPIUM BROMIDE AND ALBUTEROL SULFATE 3 ML: 2.5; .5 SOLUTION RESPIRATORY (INHALATION) at 12:28

## 2019-10-12 RX ADMIN — PANTOPRAZOLE SODIUM 40 MG: 40 TABLET, DELAYED RELEASE ORAL at 06:12

## 2019-10-12 RX ADMIN — FERROUS SULFATE TAB 325 MG (65 MG ELEMENTAL FE) 325 MG: 325 (65 FE) TAB at 09:41

## 2019-10-12 RX ADMIN — LISINOPRIL 20 MG: 20 TABLET ORAL at 09:41

## 2019-10-12 RX ADMIN — VITAMIN D, TAB 1000IU (100/BT) 1000 UNITS: 25 TAB at 09:41

## 2019-10-12 RX ADMIN — BUDESONIDE 0.5 MG: 0.5 INHALANT RESPIRATORY (INHALATION) at 20:05

## 2019-10-12 RX ADMIN — FERROUS SULFATE TAB 325 MG (65 MG ELEMENTAL FE) 325 MG: 325 (65 FE) TAB at 17:31

## 2019-10-12 RX ADMIN — SKIN PROTECTANTS MISC - PASTE 1 APPLICATION: 58.3 PASTE at 20:02

## 2019-10-12 RX ADMIN — BUDESONIDE 0.5 MG: 0.5 INHALANT RESPIRATORY (INHALATION) at 08:34

## 2019-10-12 RX ADMIN — LEVOTHYROXINE SODIUM 50 MCG: 50 TABLET ORAL at 06:12

## 2019-10-12 RX ADMIN — IPRATROPIUM BROMIDE AND ALBUTEROL SULFATE 3 ML: 2.5; .5 SOLUTION RESPIRATORY (INHALATION) at 20:05

## 2019-10-12 RX ADMIN — SODIUM CHLORIDE, PRESERVATIVE FREE 10 ML: 5 INJECTION INTRAVENOUS at 19:54

## 2019-10-12 RX ADMIN — CARVEDILOL 12.5 MG: 12.5 TABLET, FILM COATED ORAL at 09:41

## 2019-10-12 RX ADMIN — SKIN PROTECTANTS MISC - PASTE: 58.3 PASTE at 09:47

## 2019-10-12 RX ADMIN — ONDANSETRON 4 MG: 4 TABLET, FILM COATED ORAL at 15:26

## 2019-10-12 RX ADMIN — ATORVASTATIN CALCIUM 10 MG: 10 TABLET, FILM COATED ORAL at 17:31

## 2019-10-12 RX ADMIN — POTASSIUM CHLORIDE 20 MEQ: 1.5 POWDER, FOR SOLUTION ORAL at 09:41

## 2019-10-12 RX ADMIN — FUROSEMIDE 40 MG: 40 TABLET ORAL at 09:41

## 2019-10-12 RX ADMIN — CARVEDILOL 12.5 MG: 12.5 TABLET, FILM COATED ORAL at 17:31

## 2019-10-12 RX ADMIN — RIVAROXABAN 20 MG: 20 TABLET, FILM COATED ORAL at 19:53

## 2019-10-12 NOTE — PROGRESS NOTES
History:  This 79-year-old lady did well with the [employer] machine last night.  Today she is alert and oriented and in no distress.  She has a bit of dysuria and wonders if she is getting urinary tract infection.  She denies chest pain, shortness of breath or palpitations.    Allergies  Adhesive tape; Augmentin [amoxicillin-pot clavulanate]; Cephalexin; Metoclopramide; Pentazocine; Simvastatin; and Sucralfate      Current Facility-Administered Medications:   •  acetaminophen (TYLENOL) tablet 650 mg, 650 mg, Oral, Q6H PRN, Harmeet Payne MD, 650 mg at 10/11/19 0934  •  atorvastatin (LIPITOR) tablet 10 mg, 10 mg, Oral, Q PM, Harmeet Payne MD, 10 mg at 10/11/19 1708  •  budesonide (PULMICORT) nebulizer solution 0.5 mg, 0.5 mg, Nebulization, BID - RT, Harmeet Payne MD, 0.5 mg at 10/11/19 2025  •  carvedilol (COREG) tablet 12.5 mg, 12.5 mg, Oral, BID With Meals, Nona Romano MD, 12.5 mg at 10/11/19 1708  •  cholecalciferol (VITAMIN D3) tablet 1,000 Units, 1,000 Units, Oral, Daily, Harmeet Payne MD, 1,000 Units at 10/11/19 0918  •  ferrous sulfate tablet 325 mg, 325 mg, Oral, BID With Meals, Harmeet Payne MD, 325 mg at 10/11/19 1708  •  furosemide (LASIX) tablet 40 mg, 40 mg, Oral, BID, Flavio Del Rosario MD, 40 mg at 10/11/19 1708  •  ipratropium-albuterol (DUO-NEB) nebulizer solution 3 mL, 3 mL, Nebulization, 4x Daily - RT, Harmeet Payne MD, 3 mL at 10/11/19 2025  •  levothyroxine (SYNTHROID, LEVOTHROID) tablet 50 mcg, 50 mcg, Oral, Q AM, Harmeet Payne MD, 50 mcg at 10/12/19 0612  •  lisinopril (PRINIVIL,ZESTRIL) tablet 20 mg, 20 mg, Oral, Q24H, Nona Romano MD, 20 mg at 10/11/19 0919  •  nitroglycerin (NITROSTAT) SL tablet 0.4 mg, 0.4 mg, Sublingual, Q5 Min PRN, Harmeet Payne MD  •  pantoprazole (PROTONIX) EC tablet 40 mg, 40 mg, Oral, QAM, Harmeet Payne MD, 40 mg at 10/12/19 0612  •  polyethylene glycol 3350 powder (packet), 17 g, Oral, Daily PRN, Harmeet Payne MD, 17 g at 10/10/19 1013  •   "potassium chloride (KLOR-CON) packet 20 mEq, 20 mEq, Oral, Daily, Harmeet Payne MD  •  potassium chloride (KLOR-CON) packet 40 mEq, 40 mEq, Oral, PRN, Harmeet Payne MD  •  potassium chloride (MICRO-K) CR capsule 40 mEq, 40 mEq, Oral, PRN, Harmeet Payne MD, 40 mEq at 10/11/19 0934  •  rivaroxaban (XARELTO) tablet 20 mg, 20 mg, Oral, Daily With Dinner, Harmeet Payne MD, 20 mg at 10/11/19 1708  •  [COMPLETED] Insert peripheral IV, , , Once **AND** sodium chloride 0.9 % flush 10 mL, 10 mL, Intravenous, PRN, Deisi Martines APRN  •  sodium chloride 0.9 % flush 10 mL, 10 mL, Intravenous, Q12H, Harmeet Payne MD, 10 mL at 10/11/19 2102  •  sodium chloride 0.9 % flush 10 mL, 10 mL, Intravenous, PRN, Harmeet Payne MD  •  white petrolatum-zinc (ILEX SKIN PROTECTANT) 58.3 % cream, , Topical, BID, Harmeet Payne MD, 1 application at 10/11/19 2102    /72 (BP Location: Right arm, Patient Position: Lying)   Pulse 69   Temp 97.9 °F (36.6 °C) (Oral)   Resp 16   Ht 157.5 cm (62\")   Wt 84.9 kg (187 lb 2.7 oz)   LMP  (LMP Unknown)   SpO2 93%   BMI 34.23 kg/m²     Physical Exam   Appearance: Elderly  lady with central obesity.    She is in no distress.  Heart: Irregular regular rate.  Distant tones  Lungs: Diminished breath sounds.  A few rales in the right base.  Abdomen: Protuberant, benign.  No rebound, tenderness or guarding.  Extremities: Without clubbing, cyanosis or edema.  Neurologic: Nonfocal      Lab Results (last 24 hours)     Procedure Component Value Units Date/Time    Basic Metabolic Panel [[id number]] Collected:  10/12/19 0539    Specimen:  Blood Updated:  10/12/19 0623    CBC & Differential [[id number]] Collected:  10/12/19 0539    Specimen:  Blood Updated:  10/12/19 0621    Narrative:       The following orders were created for panel order CBC & Differential.  Procedure                               Abnormality         Status                     ---------                               " -----------         ------                     CBC Auto Differential[[id number]]        Abnormal            Final result                 Please view results for these tests on the individual orders.    CBC Auto Differential [[id number]]  (Abnormal) Collected:  10/12/19 0539    Specimen:  Blood Updated:  10/12/19 0621     WBC 9.45 10*3/mm3      RBC 3.49 10*6/mm3      Hemoglobin 9.8 g/dL      Hematocrit 32.4 %      MCV 92.8 fL      MCH 28.1 pg      MCHC 30.2 g/dL      RDW 13.7 %      RDW-SD 45.7 fl      MPV 10.1 fL      Platelets 269 10*3/mm3      Neutrophil % 74.5 %      Lymphocyte % 13.5 %      Monocyte % 8.1 %      Eosinophil % 2.9 %      Basophil % 0.6 %      Immature Grans % 0.4 %      Neutrophils, Absolute 7.03 10*3/mm3      Lymphocytes, Absolute 1.28 10*3/mm3      Monocytes, Absolute 0.77 10*3/mm3      Eosinophils, Absolute 0.27 10*3/mm3      Basophils, Absolute 0.06 10*3/mm3      Immature Grans, Absolute 0.04 10*3/mm3      nRBC 0.0 /100 WBC           Imp:  1.  Acute on chronic respiratory failure.  This is in large part related to COPD.  Patient also has a dilated cardiomyopathy.  2.  Atrial fibrillation with controlled ventricular rate.  3.  History of adenocarcinoma the lung x2.  4.  Past DVT/PEs  5.  Hypothyroidism  6.  Essential hypertension.  7.  Peripheral vascular disease.  8.  Recent lower GI bleed related to angiodysplasia.  9.  Anemia related to recent blood loss and chronic inflammation   10.  Generalized anxiety disorder.        Plan:  Plan to discharge tomorrow with help at home if the patient continues to do well.  We will check a urinalysis.    Harmeet Payne MD  10/12/2019  6:53 AM

## 2019-10-12 NOTE — SIGNIFICANT NOTE
Notified dr kyler muñoz related to paitient's nausea. md will not order med related to past history.

## 2019-10-12 NOTE — PLAN OF CARE
Problem: Patient Care Overview  Goal: Plan of Care Review  Outcome: Ongoing (interventions implemented as appropriate)   10/12/19 0627   Coping/Psychosocial   Plan of Care Reviewed With patient   Plan of Care Review   Progress improving   OTHER   Outcome Summary No significant changes over night. Pt tolerated trilogy well. O2 sats remained WNL. VSS. a/o x4. Will continue to monitor.

## 2019-10-12 NOTE — PLAN OF CARE
Problem: Breathing Pattern Ineffective (Adult)  Goal: Effective Oxygenation/Ventilation  Outcome: Ongoing (interventions implemented as appropriate)   10/12/19 1617   Breathing Pattern Ineffective (Adult)   Effective Oxygenation/Ventilation making progress toward outcome     Goal: Anxiety/Fear Reduction  Outcome: Ongoing (interventions implemented as appropriate)

## 2019-10-12 NOTE — PLAN OF CARE
Problem: Skin Injury Risk (Adult)  Goal: Skin Health and Integrity  Outcome: Ongoing (interventions implemented as appropriate)   10/12/19 8907   Skin Injury Risk (Adult)   Skin Health and Integrity making progress toward outcome

## 2019-10-12 NOTE — PROGRESS NOTES
Continued Stay Note  [facility]     Patient Name: Ale Narayan  MRN: [id number]  Today's Date: 10/12/2019    Admit Date: 10/6/2019    Discharge Plan     Row Name 10/12/19 1835       Plan    Plan Comments  CCP consult noted. Reviewed palliative care notes and they signed off on Friday. Plan at this time is to go home with Trilogy vent from [place] and [facility] to follow. Placed [facility] contact info in patient follow ups in case pt/family want further explanation of [facility] benefits once at home. CCP to follow.............JW        Discharge Codes    No documentation.             Ghislaine Castellanos RN

## 2019-10-12 NOTE — SIGNIFICANT NOTE
Pt approached again in the PT. Pt was in bed and not agreeable to PT due c/o constant nausea. Pt stated she has been up in the chair since 07:00 hours and got back in bed at 13:00 hours. Pt is awaiting medication for nausea at the current time.

## 2019-10-12 NOTE — PLAN OF CARE
Problem: Pain, Acute (Adult)  Goal: Acceptable Pain Control/Comfort Level  Outcome: Ongoing (interventions implemented as appropriate)   10/12/19 3388   Pain, Acute (Adult)   Acceptable Pain Control/Comfort Level making progress toward outcome       Problem: Breathing Pattern Ineffective (Adult)  Goal: Identify Related Risk Factors and Signs and Symptoms  Outcome: Ongoing (interventions implemented as appropriate)    Goal: Effective Oxygenation/Ventilation  Outcome: Ongoing (interventions implemented as appropriate)    Goal: Anxiety/Fear Reduction  Outcome: Ongoing (interventions implemented as appropriate)

## 2019-10-13 VITALS
TEMPERATURE: 98.1 F | SYSTOLIC BLOOD PRESSURE: 127 MMHG | OXYGEN SATURATION: 94 % | DIASTOLIC BLOOD PRESSURE: 47 MMHG | RESPIRATION RATE: 24 BRPM | HEART RATE: 68 BPM | HEIGHT: 62 IN | BODY MASS INDEX: 32.68 KG/M2 | WEIGHT: 177.6 LBS

## 2019-10-13 PROCEDURE — 94799 UNLISTED PULMONARY SVC/PX: CPT

## 2019-10-13 RX ORDER — LISINOPRIL 20 MG/1
20 TABLET ORAL
Qty: 90 TABLET | Refills: 3 | Status: SHIPPED | OUTPATIENT
Start: 2019-10-13

## 2019-10-13 RX ORDER — FUROSEMIDE 40 MG/1
40 TABLET ORAL 2 TIMES DAILY
Qty: 360 TABLET | Refills: 3 | Status: SHIPPED | OUTPATIENT
Start: 2019-10-13 | End: 2021-01-01 | Stop reason: HOSPADM

## 2019-10-13 RX ORDER — CARVEDILOL 12.5 MG/1
12.5 TABLET ORAL 2 TIMES DAILY WITH MEALS
Qty: 180 TABLET | Refills: 3 | Status: SHIPPED | OUTPATIENT
Start: 2019-10-13 | End: 2021-01-01 | Stop reason: HOSPADM

## 2019-10-13 RX ORDER — POTASSIUM CHLORIDE 1500 MG/1
20 TABLET, FILM COATED, EXTENDED RELEASE ORAL DAILY
Qty: 90 TABLET | Refills: 3 | Status: SHIPPED | OUTPATIENT
Start: 2019-10-13 | End: 2021-01-01 | Stop reason: HOSPADM

## 2019-10-13 RX ADMIN — BUDESONIDE 0.5 MG: 0.5 INHALANT RESPIRATORY (INHALATION) at 08:14

## 2019-10-13 RX ADMIN — CARVEDILOL 12.5 MG: 12.5 TABLET, FILM COATED ORAL at 07:53

## 2019-10-13 RX ADMIN — VITAMIN D, TAB 1000IU (100/BT) 1000 UNITS: 25 TAB at 07:55

## 2019-10-13 RX ADMIN — SKIN PROTECTANTS MISC - PASTE: 58.3 PASTE at 08:38

## 2019-10-13 RX ADMIN — PANTOPRAZOLE SODIUM 40 MG: 40 TABLET, DELAYED RELEASE ORAL at 06:10

## 2019-10-13 RX ADMIN — FUROSEMIDE 40 MG: 40 TABLET ORAL at 07:55

## 2019-10-13 RX ADMIN — LEVOTHYROXINE SODIUM 50 MCG: 50 TABLET ORAL at 06:10

## 2019-10-13 RX ADMIN — IPRATROPIUM BROMIDE AND ALBUTEROL SULFATE 3 ML: 2.5; .5 SOLUTION RESPIRATORY (INHALATION) at 08:13

## 2019-10-13 RX ADMIN — POTASSIUM CHLORIDE 20 MEQ: 1.5 POWDER, FOR SOLUTION ORAL at 07:55

## 2019-10-13 RX ADMIN — IPRATROPIUM BROMIDE AND ALBUTEROL SULFATE 3 ML: 2.5; .5 SOLUTION RESPIRATORY (INHALATION) at 12:09

## 2019-10-13 RX ADMIN — FERROUS SULFATE TAB 325 MG (65 MG ELEMENTAL FE) 325 MG: 325 (65 FE) TAB at 07:53

## 2019-10-13 RX ADMIN — LISINOPRIL 20 MG: 20 TABLET ORAL at 07:55

## 2019-10-13 RX ADMIN — SODIUM CHLORIDE, PRESERVATIVE FREE 10 ML: 5 INJECTION INTRAVENOUS at 08:37

## 2019-10-13 NOTE — PLAN OF CARE
Problem: Fall Risk (Adult)  Goal: Identify Related Risk Factors and Signs and Symptoms  Outcome: Ongoing (interventions implemented as appropriate)   10/13/19 1149   Fall Risk (Adult)   Related Risk Factors (Fall Risk) age-related changes;fatigue/slow reaction;environment unfamiliar     Goal: Absence of Fall  Outcome: Ongoing (interventions implemented as appropriate)

## 2019-10-13 NOTE — DISCHARGE SUMMARY
Date of admission: October 6, 2019  Date of discharge: October 13, 2019    Discharge diagnosis:  1.  Acute on chronic respiratory failure.  This is in large part related to COPD.  Patient also has a dilated cardiomyopathy.  2.  Atrial fibrillation with controlled ventricular rate.  3.  History of adenocarcinoma the lung x2.  4.  Past DVT/PEs  5.  Hypothyroidism  6.  Essential hypertension.  7.  Peripheral vascular disease.  8.  Recent lower GI bleed related to angiodysplasia.  9.  Anemia related to recent blood loss and chronic inflammation   10.  Generalized anxiety disorder.    Procedures performed during this admission: None    Consulting physicians:  Pulmonologist Dr. Fernandes  Cardiologist: Dr. Stone    History of present illness:   History of present illness:  This is a very nice 79-year-old lady.  She has a history of COPD, dilated cardiomyopathy with ejection fraction of about 40, past DVT/PE/atrial fibrillation and past lung cancer (adenocarcinoma of the lung diagnosed 2009, 2016) patient was admitted to the hospital on September 5 and discharged on September 17.  She was treated for lower GI bleed related to angiodysplasia.  She developed anemia related to this bleed and chronic disease.     Patient had been rehabbing at [facility].  She  complained of nausea and some constipation.  She  developed some swelling in her feet and ankles and some rales in the right base.  I noticed her CO2 was slowly rising.  Patient was placed on an increased dose of furosemide and started on doxycycline 3 days ago.     On the day of admission, the patient presented to the emergency room complaining of abdominal discomfort and constipation.  She was sent to radiology for CT scan of the abdomen pelvis.  Patient came back from radiology on 9 L of oxygen.  She substernally had a respiratory arrest.  She stopped breathing and became unresponsive.  Patient was placed on a nonrebreather and her mental status  improved where she could follow simple directions and her respiratory efforts were better.    Physical exam at the time of discharge:  Vital signs: Blood pressure 127/47, pulse 58, respirate 24, temperature 98.1.  Appearance: Elderly  lady with central obesity.      She is sitting in a chair.  She is in no distress.  HEENT: Normocephalic and atraumatic.  Scalp unremarkable.  Pupils are round and equal.  Pharynx clear mucous membranes moist.  Neck: Decreased range of motion.  No lymphadenopathy, JVD or thyromegaly.  Lungs: Decreased breath sounds.      And rales in the right base.  Heart: PMI not palpable.    Rate between 60 and 70.  Irregularly irregular  Peripheral vascular exam: Radial pulses difficult to palpate.  Good brachial pulses.  Good popliteal pulses.  Dorsal needle and posterior tibial pulses not palpable.  Abdomen: Protuberant with a large fat pad.    Proactive active bowel sounds.  No rebound, tenderness or guarding.  Extremities: Patient had lower lower extremity edema No clubbing, cyanosis or edema.  There was atrophy in the muscles of her arms and legs.  Neurologic: Nonfocal.    Alert and oriented x3.    Significant laboratory studies:  ABGs prior to discharge pH 7.38, CO2 84, PO2 52 these were done on 4 L.  Troponin less than 0.010  BMP prior to discharge glucose 111, sodium 146, potassium 4.2, CO2 39, chloride 96, creatinine 0.7, BUN 18, calcium 9, estimated GFR 81  CBC prior to discharge White count 9.45, hemoglobin 9.8, hematocrit 32.4, MCV 92, platelet count was 296  EKG prior to discharge atrial fib, ventricular rate about 60.  Interventricular conduction delay.  Urinalysis was within normal limits.    Echocardiogram:  Left ventricular systolic function is normal. Calculated EF = 64.0%. Estimated EF was in agreement with the calculated EF. Normal left ventricular cavity size noted. All left ventricular wall segments contract normally. Left ventricular wall thickness is consistent with  mild concentric hypertrophy. Left ventricular diastolic dysfunction is noted (grade I) consistent with impaired relaxation.     CT scan abdomen pelvis:  The CT scan was performed as an emergency procedure through  the abdomen and pelvis with intravenous contrast and compared to the  previous CT scan of the abdomen and pelvis dated 2019 and  demonstrates the followin. There is some minimal pleural calcification at the right lung base  posteriorly as well as some bibasilar scarring that is unchanged. The  liver, spleen, pancreas, both adrenal glands, and both kidneys were  unremarkable and unchanged. The gallbladder shows no gallstones or wall  thickening.  2. There is prominent calcification of the abdominal aorta with some  associated luminal narrowing that is unchanged. There is no aortic  aneurysm or retroperitoneal lymphadenopathy. The large and small bowel  loops are normal in caliber and show no inflammatory change. There is a  moderate amount of semisolid stool scattered throughout the colon and no  evidence of colonic distention or fecal impaction.  3. In the pelvis the urinary bladder has a smooth contour.    Chest x-ray:  There is cardiomegaly with mild vascular congestion and  probable very tiny pleural effusions and the appearance is quite similar  to a previous chest x-ray dated 2019. This may relate to an  element of mild congestive heart failure. I cannot completely exclude a  component of superimposed minimal infiltrate.    Hospital course:   The patient was admitted from the emergency room to the ICU.  She was diagnosed with hypercapnic respiratory failure and severe COPD.  She was placed on positive pressure breathing apparatus and she improved fairly quickly.  Patient had difficulty tolerating the positive pressure device.  Different modalities were used.  She finally accepted use of the trilogy device.    The patient had been in sinus rhythm but went into atrial fib shortly after  admission.  The patient more is already on Xarelto.  Her rate was controlled.  She had some diastolic heart failure.  She was seen by cardiology.  Due to the heart failure the patient was placed on an ACE inhibitor and her diuretics were increased.    This patient has a very poor prognosis.  Hospice has been consulted to assist this patient.  Patient really wants to go back home.  She understands her grave medical situation.    Patient will be discharged on October 13.  The patient will need a healthy heart diet.  The patient was seen by home health nurses and physical therapist.  The patient will use a trilogy device at night.  Patient is on 4 L of oxygen.    Discharge medications:  Tylenol 650 p.o. every 6 hours as needed.  Atorvastatin 10 mg daily  Pulmicort via nebulizer 0.5 mg twice daily.  Coreg 12.5 twice daily  Vitamin D 1000 IUs daily  Ferrous sulfate 325 twice daily  Lasix 40 mg twice daily  Duo nebs 4 times a day as needed  Levothyroxine 50 mcg a day  Lisinopril 20 mg a day  Prilosec 40 mg a day  MiraLAX as needed  Potassium chloride 20 mg a day.  Xarelto 20 mg daily.  Toradol 50 mg every 8 as needed    I will see the patient for house call with and for 5 days.  The patient will follow up with pulmonology in a few weeks.    This patient is a no code.

## 2019-10-13 NOTE — PROGRESS NOTES
Continued Stay Note  [facility]     Patient Name: Ale Narayan  MRN: [id number]  Today's Date: 10/13/2019    Admit Date: 10/6/2019    Discharge Plan     Row Name 10/13/19 1041       Plan    Plan  Home with [facility] to follow, Trilogy from [employer] and current with O2 from [employer]. Brother will transport home.........Bijan WAGGONER    Patient/Family in Agreement with Plan  yes    Plan Comments  Call from RN stating patient will DC home today. S/W Patient, introduced self and role. Pt. states her brother will transport home and has her portable O2 tanks. Pt. has Trilogy which was delivered by [employer]. Patient verified plan remains to return home with [facility] to follow, Veronica/[facility] notified of pending DC and they will follow at home after DC. Patient denies further needs/concerns related to DC..........Bijan WAGGONER         Discharge Codes    No documentation.       Expected Discharge Date and Time     Expected Discharge Date Expected Discharge Time    Oct 13, 2019             Susu Herring, EDILSON

## 2019-10-13 NOTE — PROGRESS NOTES
Continued Stay Note  [facility]     Patient Name: Ale Narayan  MRN: [id number]  Today's Date: 10/13/2019    Admit Date: 10/6/2019    Discharge Plan     Row Name 10/13/19 1330       Plan    Plan  Home later today via private auto; RT from [facility] will visit pt and her brother Dung at home for a brief update on use of Trilogy at home; [facility] to follow    Patient/Family in Agreement with Plan  yes spoke with pt and her brother Akbar on the phone    Plan Comments  Inbound call from staff RN expressing concerns that pt will not be able to use her Trilogy at home on her own. She called CCP back per the request of the RT on the unit. Called and spoke with Veronica/[facility] and their nurses do not assist pt with the Trilogy vent b/c they are not trained or inserviced to do so. Called and spoke with Curly/William. Per Curly, the RT at [facility] has an appt set up with pt on Monday for a full in-home education session on the Trilogy vent and pt was also educated on the Trilogy vent on Friday at time of equipment delivery at the hospital. If further teaching is needed today she will have to discuss this with Jad Crain. Inbound call from Jad Crain--explained to him the nurse and RT's concerns. Provided Jad with contact information for pt's brother Dung who will be driving pt home later today. Jad states he will call to speak with Ms. Narayan and her brother Dung. Return call from Dayron and he states he spoke with pt's brother Dung. Jad has also spoken with the [facility] RT James. Sally RT will make a brief visit with pt and her brother later today when she is home from the hospital to answer any immediate questions they have regarding the use of the Trilogy vent at home tonight. RT Sally will still do his full teaching session with pt at home on Monday as scheduled. Jad encouraged staff at [facility] to make sure pt takes all of her O2 supplies and Trilogy supplies home with her from the hospital. Called in  to pt's room and spoke with her brother Akbar on the phone per pt's request. Akbar states Dung will be in with the portable O2 tank to drive pt home at 2PM today. He will make sure all her tubing and supplies are taken home. Called and updated the staff RN on the plan for pt to have an extra visit from William ALANIZ today for brief education on Trilogy vent use. Pt's brother Dugn is going to call Jad on the way home to let him know when pt gets home and he will have James /William come and see them. CCP to follow..............JW        Discharge Codes    No documentation.       Expected Discharge Date and Time     Expected Discharge Date Expected Discharge Time    Oct 13, 2019             Ghislaine Castellanos, EDILSON

## 2019-10-13 NOTE — NURSING NOTE
Notified case management , home health on call nurse related to patient's discharge to home. Patient to be sent to home. Home health will follow up with patient when patient is home. Patient aware. Brother to take patient home.

## 2019-10-13 NOTE — SIGNIFICANT NOTE
Notified  levon related patient being discharged home with home health and trilogy. Respiratory requested me to call case management again to inform them that patient will need someone to be in the home environment to oversee trilogy being turned on for patient correctly. Trilogy staff will not be at patient's home until Monday. Rui supervisor aware.

## 2019-10-13 NOTE — PLAN OF CARE
Problem: Breathing Pattern Ineffective (Adult)  Goal: Identify Related Risk Factors and Signs and Symptoms  Outcome: Ongoing (interventions implemented as appropriate)   10/13/19 1149   Breathing Pattern Ineffective (Adult)   Related Risk Factors (Breathing Pattern Ineffective) advanced age   Signs and Symptoms (Breathing Pattern Ineffective) activity intolerance     Goal: Anxiety/Fear Reduction  Outcome: Ongoing (interventions implemented as appropriate)

## 2019-10-13 NOTE — PROGRESS NOTES
Moon Hartley MD                          [phone]      Patient ID:    Name:  Ale Narayan    MRN:  [id number]    1940   79 y.o.  female            Patient Care Team:  Harmeet Payne MD as PCP - General  Harmeet Payne MD as PCP - Claims Attributed  Yoan Cash MD as Consulting Physician (Hematology and Oncology)  Devon Lamb MD as Referring Physician (Thoracic Surgery)    CC/ Reason for visit: Respiratory failure, COPD, encephalopathy.    HPI:  Former heavy smoker with history of COPD and chronic respiratory failure on oxygen 3-4 L at baseline.  She has a history of lung cancer in .  Admitted on 10/6/2019 for abdominal discomfort and constipation.  Oxygen requirement increased shortly after admission.    I reviewed the admission note, progress notes, PMH, PSH, Family hx, social history, imagings and prior records on this admission, summarized the finding in my note and formulated a transition of care plan.     Subjective:   Received a trilogy ventilator yesterday.  She used that last night with a full facemask and she was able to sleep with it throughout the night.  She stated that she would like to try a nasal mask.  She continues to have some cough but not producing much phlegm.  She feels like her ears breathing has overall improved.    ROS:   Constitutional: No fever or chills.  Respiratory: No shortness of breath at rest.  Cardiovascular: No chest pain or palpitation.  Gastrointestinal: Denies abdominal pain, nausea or vomiting.  Neuro/psych: Reported issues sleeping and she feels like she does not sleep throughout the night.  She stays in her bed most of the time and watches TV.    Objective     Vital Signs past 24hrs    BP range: BP: (110-120)/(45-72) 110/50  Pulse range: Heart Rate:  [64-72] 65  Resp rate range: Resp:  [16-18] 18  Temp range: Temp (24hrs), Av.1 °F (36.7 °C), Min:97.9 °F (36.6 °C), Max:98.4 °F (36.9 °C)      Ventilator/Non-Invasive  Ventilation Settings (From admission, onward)    Start     Ordered    10/06/19 1130  NIPPV (CPAP or BIPAP)  Until Discontinued     Question Answer Comment   Indication: Acute Respiratory Failure    Type: AVAPS/PC/PS    NIPPV Mask Interface: Per Patient Preference    Backup Rate 14    Target VT (mL) 450    EPAP/PEEP (cm H2O) 5    Min Pressure (cm H2O) 10    Max Pressure (cm H2O) 20    Titrate for SPO2 90%        10/06/19 1130          Device (Oxygen Therapy): nasal cannula       84.9 kg (187 lb 2.7 oz); Body mass index is 34.23 kg/m².      Intake/Output Summary (Last 24 hours) at 10/12/2019 2011  Last data filed at 10/12/2019 1231  Gross per 24 hour   Intake 420 ml   Output 601 ml   Net -181 ml       PHYSICAL EXAM   Constitutional:  Elderly obese white female patient pt in bed, No acute respiratory distress, + accessory muscle use  Head: - NCAT  Eyes: No pallor, Anicteric conjunctiva, EOMI.  ENMT:  Mallampati 4, no oral thrush. Dry MM.   NECK: Trachea midline, No thyromegaly, no palpable cervical lymphadenopathy  Heart: RRR, no murmur. Trace pedal edema   Lungs: YANICK +, distant breath sounds, decreased at bases. no wheezes/ crackles heard    Abdomen: Soft. Obese, No tenderness, guarding or rigidity. No palpable masses  Extremities: Extremities warm and well perfused. No cyanosis/ clubbing  Neuro: Conscious, answers appropriately, no gross focal neuro deficits  Psych: Mood and affect appropriate    Scheduled meds:      atorvastatin 10 mg Oral Q PM   budesonide 0.5 mg Nebulization BID - RT   carvedilol 12.5 mg Oral BID With Meals   cholecalciferol 1,000 Units Oral Daily   ferrous sulfate 325 mg Oral BID With Meals   furosemide 40 mg Oral BID   ipratropium-albuterol 3 mL Nebulization 4x Daily - RT   levothyroxine 50 mcg Oral Q AM   lisinopril 20 mg Oral Q24H   pantoprazole 40 mg Oral QAM   potassium chloride 20 mEq Oral Daily   rivaroxaban 20 mg Oral Daily With Dinner   sodium chloride 10 mL Intravenous Q12H   white  petrolatum-zinc  Topical BID       IV meds:                           Data Review:      Results from last 7 days   Lab Units 10/12/19  0539 10/11/19  0333 10/10/19  0353  10/07/19  0455 10/06/19  0913   SODIUM mmol/L 146* 145 141   < >  --  137   POTASSIUM mmol/L 4.2 4.0 3.2*   < >  --  4.2   CHLORIDE mmol/L 96* 93* 89*   < >  --  87*   CO2 mmol/L 39.4* 41.5* 44.8*   < >  --  44.4*   BUN mg/dL 18 21 21   < >  --  11   CREATININE mg/dL 0.70 0.60 0.75   < >  --  0.61   CALCIUM mg/dL 9.0 8.3* 8.4*   < >  --  8.7   BILIRUBIN mg/dL  --   --   --   --   --  0.3   ALK PHOS U/L  --   --   --   --   --  82   ALT (SGPT) U/L  --   --   --   --   --  17   AST (SGOT) U/L  --   --   --   --   --  14   GLUCOSE mg/dL 111* 95 100*   < >  --  119*   WBC 10*3/mm3 9.45 7.60 10.07   < >  --  7.88   HEMOGLOBIN g/dL 9.8* 9.3* 10.2*   < >  --  9.9*   PLATELETS 10*3/mm3 269 260 277   < >  --  265   PROBNP pg/mL  --   --   --   --   --  6,451.0*   PROCALCITONIN ng/mL  --   --   --   --  <0.02*  --     < > = values in this interval not displayed.       Lab Results   Component Value Date    CALCIUM 9.0 10/12/2019    PHOS 2.9 09/12/2015             Results from last 7 days   Lab Units 10/10/19  0203 10/07/19  1416 10/06/19  1601 10/06/19  1231 10/06/19  1107   PH, ARTERIAL pH units 7.382 7.384 7.368 7.228* 7.179*   PO2 ART mm Hg 52.6* 103.2* 226.7* 141.0* 82.3   PCO2, ARTERIAL mm Hg 84.6* 80.9* 83.5* 112.0* 118.8*   HCO3 ART mmol/L 50.2* 48.3* 48.0* 46.6* 44.3*      Diagnostic imaging:  I personally and Independently reviewed and interpreted the following images:  CXR 10/6/2019, CT chest 5/28/2019,  Severe emphysematous changes with bilateral scarring on CT imaging.      Assessment    1. Acute metabolic encephalopathy  2. Acute on chronic hypoxic resp failure( 4L @ home)  3. Acute on undiagnosed chronic hypercapnic resp failure   4. Acute on chronic congestive heart failure  5. Abdominal pain - ? constipation   6. Severe COPD not in  exacerbation  7. Chronic interstitial lung disease  8. H/o adenocarcinoma of the lung x2, 2009 and 2016 s/p Lobectomy and palliative XRT  9. Cardiomyopathy EF - 40%  10. Mild MR/AAS  11. Suspected undiagnosed LISSETTE/OHS  12. Angiodysplasia with chronic GI bleed  13. Chronic anemia related to GI blood loss  14. 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 Peripheral vascular disease  16. Hypothyroidism  17. DNR  18. Inadequate sleep hygiene    All problems new to me    PLAN:  · Use through the ventilator tonight.  She can have any kind of mask she wants.  · Oxygen 4 L/min which is her baseline requirement  · Continue DuoNeb for COPD.  · Continue anticoagulation for history of DVT/PE.  · Had a long discussion with her about using the trilogy ventilator throughout the night.  She kept asking how many hours she has to use and explained that she has to use it whenever she sleeps.  She claims that she does not sleep for more than couple of hours broken throughout the night.  · Counseled against watching TV while in bed.    Follow-up with Dr. Fernandes in about 2-4 weeks after discharge.    I have discussed my findings and recommendations with patient.     Moon Hartley MD  10/12/2019

## 2019-10-13 NOTE — PROGRESS NOTES
Case Management Discharge Note    Final Note: pt dc'd to home today via private auto; [facility] to follow; William following for Trilogy vent    Destination      No service has been selected for the patient.      Durable Medical Equipment - Selection Complete      Service Provider Request Status Selected Services Address Phone Number Fax Number    [facility] Selected Durable Medical Equipment [address], [facility] [id number] [phone]      Dialysis/Infusion      No service has been selected for the patient.      Home Medical Care      Service Provider Request Status Selected Services Address Phone Number Fax Number    [facility] [place] Selected Home Health Services [address] [address] [address] [phone]      Therapy      No service has been selected for the patient.      Community Resources      No service has been selected for the patient.        Transportation Services  Private: Car    Final Discharge Disposition Code: 06 - home with home health care

## 2019-10-13 NOTE — PLAN OF CARE
Problem: Patient Care Overview  Goal: Plan of Care Review  Outcome: Ongoing (interventions implemented as appropriate)   10/13/19 3061   Coping/Psychosocial   Plan of Care Reviewed With patient   Plan of Care Review   Progress improving   OTHER   Outcome Summary Pt remained on trilogy for about 4 hours tonight. o2 has been WNL. a/o x4. Up to bedside commode x1 assist. VSS. Refuses q2 turn despite RN education. Plan for discharge today. Will continue to monitor.      Goal: Individualization and Mutuality  Outcome: Ongoing (interventions implemented as appropriate)    Goal: Discharge Needs Assessment  Outcome: Ongoing (interventions implemented as appropriate)    Goal: Interprofessional Rounds/Family Conf  Outcome: Ongoing (interventions implemented as appropriate)      Problem: Fall Risk (Adult)  Goal: Identify Related Risk Factors and Signs and Symptoms  Outcome: Ongoing (interventions implemented as appropriate)    Goal: Absence of Fall  Outcome: Ongoing (interventions implemented as appropriate)      Problem: Breathing Pattern Ineffective (Adult)  Goal: Identify Related Risk Factors and Signs and Symptoms  Outcome: Ongoing (interventions implemented as appropriate)    Goal: Anxiety/Fear Reduction  Outcome: Ongoing (interventions implemented as appropriate)

## 2019-10-14 ENCOUNTER — READMISSION MANAGEMENT (OUTPATIENT)
Dept: CALL CENTER | Facility: HOSPITAL | Age: 79
End: 2019-10-14

## 2019-10-14 NOTE — OUTREACH NOTE
Prep Survey      Responses   Facility patient discharged from?  [place]   Is patient eligible?  Yes   Discharge diagnosis  Acute on chronic respiratory failure.  This is in large part related to COPD   Does the patient have one of the following disease processes/diagnoses(primary or secondary)?  COPD/Pneumonia   Does the patient have Home health ordered?  Yes   What is the Home health agency?   [facility]    Is there a DME ordered?  Yes   What DME was ordered?  [employer]   Prep survey completed?  Yes          Antonia Vale RN

## 2019-10-15 ENCOUNTER — READMISSION MANAGEMENT (OUTPATIENT)
Dept: CALL CENTER | Facility: HOSPITAL | Age: 79
End: 2019-10-15

## 2019-10-15 NOTE — OUTREACH NOTE
COPD/PN Week 1 Survey      Responses   Facility patient discharged from?  [place]   Does the patient have one of the following disease processes/diagnoses(primary or secondary)?  COPD/Pneumonia   Is there a successful TCM telephone encounter documented?  No   Was the primary reason for admission:  COPD exacerbation   Week 1 attempt successful?  Yes   Call start time  1339   Call end time  1342   Discharge diagnosis  Acute on chronic respiratory failure.  This is in large part related to COPD   Is patient permission given to speak with other caregiver?  Yes   List who call center can speak with  Dung    [facility] reviewed with patient/caregiver?  Yes   Is the patient having any side effects they believe may be caused by any medication additions or changes?  No   Does the patient have all medications ordered at discharge?  Yes   Is the patient taking all medications as directed (includes completed medication regime)?  Yes   Does the patient have a primary care provider?   Yes   Does the patient have an appointment with their PCP or pulmonologist within 7 days of discharge?  Yes   Has the patient kept scheduled appointments due by today?  N/A   Has home health visited the patient within 72 hours of discharge?  Yes   What DME was ordered?  [employer] per [facility]   Has all DME been delivered?  Yes   Psychosocial issues?  No   Did the patient receive a copy of their discharge instructions?  Yes   Nursing interventions  Reviewed instructions with patient   What is the patient's perception of their health status since discharge?  Same   Nursing Interventions  Nurse provided patient education   Are the patient's immunizations up to date?   Yes   Nursing interventions  Educated on importance of maintaining up to date immunizations as advised by provider   Is the patient/caregiver able to teach back the hierarchy of who to call/visit for symptoms/problems? PCP, Specialist, Home health nurse, Urgent Care, ED, 911  Yes   Is  the patient able to teach back COPD zones?  Yes   Nursing interventions  Education provided on various zones   Patient reports what zone on this call?  Green Zone   Green Zone  Reports doing well, Breathing without shortness of breath, Usual activity and exercise level, Usual amount of phlegm/mucus without difficulty coughing up, Sleeping well, Appetite is good   Green Zone interventions:  Take daily medications, Continue regular exercise/diet plan, Use oxygen as prescribed, Avoid indoor/outdoor triggers   Week 1 call completed?  Yes          Makayla Chery RN

## 2019-10-22 ENCOUNTER — READMISSION MANAGEMENT (OUTPATIENT)
Dept: CALL CENTER | Facility: HOSPITAL | Age: 79
End: 2019-10-22

## 2019-10-22 NOTE — OUTREACH NOTE
COPD/PN Week 2 Survey      Responses   Facility patient discharged from?  [place]   Does the patient have one of the following disease processes/diagnoses(primary or secondary)?  COPD/Pneumonia   Was the primary reason for admission:  COPD exacerbation   Week 2 attempt successful?  Yes   Call start time  1521   Call end time  1521   Discharge diagnosis  Acute on chronic respiratory failure.  This is in large part related to COPD   Meds reviewed with patient/caregiver?  Yes   Is the patient having any side effects they believe may be caused by any medication additions or changes?  No   Does the patient have all medications ordered at discharge?  Yes   Is the patient taking all medications as directed (includes completed medication regime)?  Yes   Does the patient have a primary care provider?   Yes   Does the patient have an appointment with their PCP or pulmonologist within 7 days of discharge?  Yes   Has the patient kept scheduled appointments due by today?  N/A   What is the Home health agency?   [facility]    Has home health visited the patient within 72 hours of discharge?  Yes   What DME was ordered?  [employer] per [facility]   Has all DME been delivered?  Yes   Psychosocial issues?  No   Did the patient receive a copy of their discharge instructions?  Yes   Nursing interventions  Reviewed instructions with patient   What is the patient's perception of their health status since discharge?  Same   Nursing Interventions  Nurse provided patient education   Are the patient's immunizations up to date?   Yes   Nursing interventions  Educated on importance of maintaining up to date immunizations as advised by provider   Is the patient/caregiver able to teach back the hierarchy of who to call/visit for symptoms/problems? PCP, Specialist, Home health nurse, Urgent Care, ED, 911  Yes   Is the patient able to teach back COPD zones?  Yes   Nursing interventions  Education provided on various zones   Patient reports what zone on  this call?  Green Zone   Green [facility]  Reports doing well, Breathing without shortness of breath, Usual activity and exercise level, Usual amount of phlegm/mucus without difficulty coughing up, Sleeping well, Appetite is good   [facility] interventions:  Take daily medications, Continue regular exercise/diet plan, Use oxygen as prescribed, Avoid indoor/outdoor triggers   Week 2 call completed?  Yes          Vicente Prabhakar RN

## 2019-10-29 ENCOUNTER — READMISSION MANAGEMENT (OUTPATIENT)
Dept: CALL CENTER | Facility: HOSPITAL | Age: 79
End: 2019-10-29

## 2019-10-29 NOTE — OUTREACH NOTE
COPD/PN Week 3 Survey      Responses   Facility patient discharged from?  [place]   Does the patient have one of the following disease processes/diagnoses(primary or secondary)?  COPD/Pneumonia   Was the primary reason for admission:  COPD exacerbation   Week 3 attempt successful?  No   Unsuccessful attempts  Attempt 1          Kaitlynn Maher RN

## 2019-10-31 ENCOUNTER — READMISSION MANAGEMENT (OUTPATIENT)
Dept: CALL CENTER | Facility: HOSPITAL | Age: 79
End: 2019-10-31

## 2019-10-31 NOTE — OUTREACH NOTE
COPD/PN Week 3 Survey      Responses   Facility patient discharged from?  [place]   Does the patient have one of the following disease processes/diagnoses(primary or secondary)?  COPD/Pneumonia   Was the primary reason for admission:  COPD exacerbation   Week 3 attempt successful?  Yes   Call start time  1647   Call end time  1648   Discharge diagnosis  Acute on chronic respiratory failure.  This is in large part related to COPD   Meds reviewed with patient/caregiver?  Yes   Is the patient having any side effects they believe may be caused by any medication additions or changes?  No   Does the patient have all medications ordered at discharge?  Yes   Is the patient taking all medications as directed (includes completed medication regime)?  Yes   Does the patient have a primary care provider?   Yes   Does the patient have an appointment with their PCP or pulmonologist within 7 days of discharge?  Yes   Has the patient kept scheduled appointments due by today?  Yes   What is the Home health agency?   [facility]    Has home health visited the patient within 72 hours of discharge?  Yes   Psychosocial issues?  No   Did the patient receive a copy of their discharge instructions?  Yes   Nursing interventions  Reviewed instructions with patient   What is the patient's perception of their health status since discharge?  Improving   Nursing Interventions  Nurse provided patient education   Are the patient's immunizations up to date?   Yes   Nursing interventions  Educated on importance of maintaining up to date immunizations as advised by provider   Is the patient/caregiver able to teach back the hierarchy of who to call/visit for symptoms/problems? PCP, Specialist, Home health nurse, Urgent Care, ED, 911  Yes   Additional teach back comments  She is doing better.  Still on Trilogy   Is the patient able to teach back COPD zones?  Yes   Nursing interventions  Education provided on various zones   Patient reports what zone on this  call?  Green Zone   Green Zone  Reports doing well, Breathing without shortness of breath, Usual activity and exercise level, Usual amount of phlegm/mucus without difficulty coughing up, Sleeping well, Appetite is good   Green Zone interventions:  Take daily medications   Week 3 call completed?  Yes          Britney Zepeda RN

## 2019-11-08 ENCOUNTER — READMISSION MANAGEMENT (OUTPATIENT)
Dept: CALL CENTER | Facility: HOSPITAL | Age: 79
End: 2019-11-08

## 2019-11-08 NOTE — OUTREACH NOTE
COPD/PN Week 4 Survey      Responses   Facility patient discharged from?  [place]   Does the patient have one of the following disease processes/diagnoses(primary or secondary)?  COPD/Pneumonia   Was the primary reason for admission:  COPD exacerbation   Week 4 attempt successful?  Yes   Call start time  1341   Revoke  Decline to participate   Call end time  1342          Leela Casey RN

## 2019-11-13 ENCOUNTER — LAB REQUISITION (OUTPATIENT)
Dept: LAB | Facility: HOSPITAL | Age: 79
End: 2019-11-13

## 2019-11-13 DIAGNOSIS — J44.9 CHRONIC OBSTRUCTIVE PULMONARY DISEASE, UNSPECIFIED (HCC): ICD-10-CM

## 2019-11-13 DIAGNOSIS — D64.9 ANEMIA, UNSPECIFIED: ICD-10-CM

## 2019-11-13 LAB
ANION GAP SERPL CALCULATED.3IONS-SCNC: 8.3 MMOL/L (ref 5–15)
BASOPHILS # BLD AUTO: 0.05 10*3/MM3 (ref 0–0.2)
BASOPHILS NFR BLD AUTO: 0.6 % (ref 0–1.5)
BUN BLD-MCNC: 17 MG/DL (ref 8–23)
BUN/CREAT SERPL: 23.9 (ref 7–25)
CALCIUM SPEC-SCNC: 9.3 MG/DL (ref 8.6–10.5)
CHLORIDE SERPL-SCNC: 103 MMOL/L (ref 98–107)
CO2 SERPL-SCNC: 35.7 MMOL/L (ref 22–29)
CREAT BLD-MCNC: 0.71 MG/DL (ref 0.57–1)
DEPRECATED RDW RBC AUTO: 42.4 FL (ref 37–54)
EOSINOPHIL # BLD AUTO: 0.12 10*3/MM3 (ref 0–0.4)
EOSINOPHIL NFR BLD AUTO: 1.4 % (ref 0.3–6.2)
ERYTHROCYTE [DISTWIDTH] IN BLOOD BY AUTOMATED COUNT: 13 % (ref 12.3–15.4)
GFR SERPL CREATININE-BSD FRML MDRD: 80 ML/MIN/1.73
GLUCOSE BLD-MCNC: 129 MG/DL (ref 65–99)
HCT VFR BLD AUTO: 35.3 % (ref 34–46.6)
HGB BLD-MCNC: 11.1 G/DL (ref 12–15.9)
IMM GRANULOCYTES # BLD AUTO: 0.04 10*3/MM3 (ref 0–0.05)
IMM GRANULOCYTES NFR BLD AUTO: 0.5 % (ref 0–0.5)
LYMPHOCYTES # BLD AUTO: 1.04 10*3/MM3 (ref 0.7–3.1)
LYMPHOCYTES NFR BLD AUTO: 12.6 % (ref 19.6–45.3)
MCH RBC QN AUTO: 28.4 PG (ref 26.6–33)
MCHC RBC AUTO-ENTMCNC: 31.4 G/DL (ref 31.5–35.7)
MCV RBC AUTO: 90.3 FL (ref 79–97)
MONOCYTES # BLD AUTO: 0.54 10*3/MM3 (ref 0.1–0.9)
MONOCYTES NFR BLD AUTO: 6.5 % (ref 5–12)
NEUTROPHILS # BLD AUTO: 6.49 10*3/MM3 (ref 1.7–7)
NEUTROPHILS NFR BLD AUTO: 78.4 % (ref 42.7–76)
NRBC BLD AUTO-RTO: 0 /100 WBC (ref 0–0.2)
PLATELET # BLD AUTO: 227 10*3/MM3 (ref 140–450)
PMV BLD AUTO: 11.4 FL (ref 6–12)
POTASSIUM BLD-SCNC: 4.6 MMOL/L (ref 3.5–5.2)
RBC # BLD AUTO: 3.91 10*6/MM3 (ref 3.77–5.28)
SODIUM BLD-SCNC: 147 MMOL/L (ref 136–145)
WBC NRBC COR # BLD: 8.28 10*3/MM3 (ref 3.4–10.8)

## 2019-11-13 PROCEDURE — 80048 BASIC METABOLIC PNL TOTAL CA: CPT | Performed by: INTERNAL MEDICINE

## 2019-11-13 PROCEDURE — 85025 COMPLETE CBC W/AUTO DIFF WBC: CPT | Performed by: INTERNAL MEDICINE

## 2020-03-13 ENCOUNTER — LAB REQUISITION (OUTPATIENT)
Dept: LAB | Facility: HOSPITAL | Age: 80
End: 2020-03-13

## 2020-03-13 DIAGNOSIS — Z00.00 ENCOUNTER FOR GENERAL ADULT MEDICAL EXAMINATION WITHOUT ABNORMAL FINDINGS: ICD-10-CM

## 2020-03-13 LAB
ANION GAP SERPL CALCULATED.3IONS-SCNC: 12.7 MMOL/L (ref 5–15)
BASOPHILS # BLD AUTO: 0.07 10*3/MM3 (ref 0–0.2)
BASOPHILS NFR BLD AUTO: 0.8 % (ref 0–1.5)
BUN BLD-MCNC: 26 MG/DL (ref 8–23)
BUN/CREAT SERPL: 32.9 (ref 7–25)
CALCIUM SPEC-SCNC: 9.2 MG/DL (ref 8.6–10.5)
CHLORIDE SERPL-SCNC: 100 MMOL/L (ref 98–107)
CO2 SERPL-SCNC: 33.3 MMOL/L (ref 22–29)
CREAT BLD-MCNC: 0.79 MG/DL (ref 0.57–1)
DEPRECATED RDW RBC AUTO: 45.5 FL (ref 37–54)
EOSINOPHIL # BLD AUTO: 0.22 10*3/MM3 (ref 0–0.4)
EOSINOPHIL NFR BLD AUTO: 2.5 % (ref 0.3–6.2)
ERYTHROCYTE [DISTWIDTH] IN BLOOD BY AUTOMATED COUNT: 13.2 % (ref 12.3–15.4)
GFR SERPL CREATININE-BSD FRML MDRD: 71 ML/MIN/1.73
GLUCOSE BLD-MCNC: 87 MG/DL (ref 65–99)
HCT VFR BLD AUTO: 32.3 % (ref 34–46.6)
HGB BLD-MCNC: 10.3 G/DL (ref 12–15.9)
IMM GRANULOCYTES # BLD AUTO: 0.02 10*3/MM3 (ref 0–0.05)
IMM GRANULOCYTES NFR BLD AUTO: 0.2 % (ref 0–0.5)
LYMPHOCYTES # BLD AUTO: 1.25 10*3/MM3 (ref 0.7–3.1)
LYMPHOCYTES NFR BLD AUTO: 14.3 % (ref 19.6–45.3)
MCH RBC QN AUTO: 29.9 PG (ref 26.6–33)
MCHC RBC AUTO-ENTMCNC: 31.9 G/DL (ref 31.5–35.7)
MCV RBC AUTO: 93.9 FL (ref 79–97)
MONOCYTES # BLD AUTO: 0.71 10*3/MM3 (ref 0.1–0.9)
MONOCYTES NFR BLD AUTO: 8.1 % (ref 5–12)
NEUTROPHILS # BLD AUTO: 6.5 10*3/MM3 (ref 1.7–7)
NEUTROPHILS NFR BLD AUTO: 74.1 % (ref 42.7–76)
NRBC BLD AUTO-RTO: 0 /100 WBC (ref 0–0.2)
PLATELET # BLD AUTO: 235 10*3/MM3 (ref 140–450)
PMV BLD AUTO: 10.7 FL (ref 6–12)
POTASSIUM BLD-SCNC: 4.5 MMOL/L (ref 3.5–5.2)
RBC # BLD AUTO: 3.44 10*6/MM3 (ref 3.77–5.28)
SODIUM BLD-SCNC: 146 MMOL/L (ref 136–145)
WBC NRBC COR # BLD: 8.77 10*3/MM3 (ref 3.4–10.8)

## 2020-03-13 PROCEDURE — 80048 BASIC METABOLIC PNL TOTAL CA: CPT | Performed by: INTERNAL MEDICINE

## 2020-03-13 PROCEDURE — 85025 COMPLETE CBC W/AUTO DIFF WBC: CPT | Performed by: INTERNAL MEDICINE

## 2020-04-30 RX ORDER — FERROUS SULFATE 325(65) MG
TABLET ORAL
Qty: 30 TABLET | Refills: 1 | OUTPATIENT
Start: 2020-04-30

## 2020-08-01 ENCOUNTER — LAB REQUISITION (OUTPATIENT)
Dept: LAB | Facility: HOSPITAL | Age: 80
End: 2020-08-01

## 2020-08-01 DIAGNOSIS — Z00.00 ENCOUNTER FOR GENERAL ADULT MEDICAL EXAMINATION WITHOUT ABNORMAL FINDINGS: ICD-10-CM

## 2020-08-01 LAB
ALBUMIN SERPL-MCNC: 3.8 G/DL (ref 3.5–5.2)
ALBUMIN/GLOB SERPL: 1.2 G/DL
ALP SERPL-CCNC: 68 U/L (ref 39–117)
ALT SERPL W P-5'-P-CCNC: 21 U/L (ref 1–33)
ANION GAP SERPL CALCULATED.3IONS-SCNC: 9.7 MMOL/L (ref 5–15)
AST SERPL-CCNC: 16 U/L (ref 1–32)
BASOPHILS # BLD AUTO: 0.07 10*3/MM3 (ref 0–0.2)
BASOPHILS NFR BLD AUTO: 0.9 % (ref 0–1.5)
BILIRUB SERPL-MCNC: 0.3 MG/DL (ref 0–1.2)
BUN SERPL-MCNC: 24 MG/DL (ref 8–23)
BUN/CREAT SERPL: 29.3 (ref 7–25)
CALCIUM SPEC-SCNC: 9.4 MG/DL (ref 8.6–10.5)
CHLORIDE SERPL-SCNC: 100 MMOL/L (ref 98–107)
CO2 SERPL-SCNC: 33.3 MMOL/L (ref 22–29)
CREAT SERPL-MCNC: 0.82 MG/DL (ref 0.57–1)
DEPRECATED RDW RBC AUTO: 45.1 FL (ref 37–54)
EOSINOPHIL # BLD AUTO: 0.14 10*3/MM3 (ref 0–0.4)
EOSINOPHIL NFR BLD AUTO: 1.8 % (ref 0.3–6.2)
ERYTHROCYTE [DISTWIDTH] IN BLOOD BY AUTOMATED COUNT: 13.2 % (ref 12.3–15.4)
GFR SERPL CREATININE-BSD FRML MDRD: 68 ML/MIN/1.73
GLOBULIN UR ELPH-MCNC: 3.2 GM/DL
GLUCOSE SERPL-MCNC: 114 MG/DL (ref 65–99)
HCT VFR BLD AUTO: 34.2 % (ref 34–46.6)
HGB BLD-MCNC: 10.6 G/DL (ref 12–15.9)
IMM GRANULOCYTES # BLD AUTO: 0.02 10*3/MM3 (ref 0–0.05)
IMM GRANULOCYTES NFR BLD AUTO: 0.3 % (ref 0–0.5)
LYMPHOCYTES # BLD AUTO: 0.88 10*3/MM3 (ref 0.7–3.1)
LYMPHOCYTES NFR BLD AUTO: 11.6 % (ref 19.6–45.3)
MCH RBC QN AUTO: 29 PG (ref 26.6–33)
MCHC RBC AUTO-ENTMCNC: 31 G/DL (ref 31.5–35.7)
MCV RBC AUTO: 93.7 FL (ref 79–97)
MONOCYTES # BLD AUTO: 0.55 10*3/MM3 (ref 0.1–0.9)
MONOCYTES NFR BLD AUTO: 7.3 % (ref 5–12)
NEUTROPHILS NFR BLD AUTO: 5.92 10*3/MM3 (ref 1.7–7)
NEUTROPHILS NFR BLD AUTO: 78.1 % (ref 42.7–76)
NRBC BLD AUTO-RTO: 0 /100 WBC (ref 0–0.2)
PLATELET # BLD AUTO: 233 10*3/MM3 (ref 140–450)
PMV BLD AUTO: 10.5 FL (ref 6–12)
POTASSIUM SERPL-SCNC: 4.6 MMOL/L (ref 3.5–5.2)
PROT SERPL-MCNC: 7 G/DL (ref 6–8.5)
RBC # BLD AUTO: 3.65 10*6/MM3 (ref 3.77–5.28)
SODIUM SERPL-SCNC: 143 MMOL/L (ref 136–145)
TSH SERPL DL<=0.05 MIU/L-ACNC: 3.14 UIU/ML (ref 0.27–4.2)
WBC # BLD AUTO: 7.58 10*3/MM3 (ref 3.4–10.8)

## 2020-08-01 PROCEDURE — 80053 COMPREHEN METABOLIC PANEL: CPT | Performed by: INTERNAL MEDICINE

## 2020-08-01 PROCEDURE — 84443 ASSAY THYROID STIM HORMONE: CPT | Performed by: INTERNAL MEDICINE

## 2020-08-01 PROCEDURE — 85025 COMPLETE CBC W/AUTO DIFF WBC: CPT | Performed by: INTERNAL MEDICINE

## 2020-09-03 ENCOUNTER — LAB REQUISITION (OUTPATIENT)
Dept: LAB | Facility: HOSPITAL | Age: 80
End: 2020-09-03

## 2020-09-03 DIAGNOSIS — J44.9 CHRONIC OBSTRUCTIVE PULMONARY DISEASE, UNSPECIFIED (HCC): ICD-10-CM

## 2020-09-03 DIAGNOSIS — I87.303 CHRONIC VENOUS HYPERTENSION (IDIOPATHIC) WITHOUT COMPLICATIONS OF BILATERAL LOWER EXTREMITY: ICD-10-CM

## 2020-09-03 LAB
ANION GAP SERPL CALCULATED.3IONS-SCNC: 7.6 MMOL/L (ref 5–15)
BASOPHILS # BLD AUTO: 0.03 10*3/MM3 (ref 0–0.2)
BASOPHILS NFR BLD AUTO: 0.3 % (ref 0–1.5)
BUN SERPL-MCNC: 25 MG/DL (ref 8–23)
BUN/CREAT SERPL: 33.3 (ref 7–25)
CALCIUM SPEC-SCNC: 9.8 MG/DL (ref 8.6–10.5)
CHLORIDE SERPL-SCNC: 99 MMOL/L (ref 98–107)
CO2 SERPL-SCNC: 34.4 MMOL/L (ref 22–29)
CREAT SERPL-MCNC: 0.75 MG/DL (ref 0.57–1)
DEPRECATED RDW RBC AUTO: 45.3 FL (ref 37–54)
EOSINOPHIL # BLD AUTO: 0.01 10*3/MM3 (ref 0–0.4)
EOSINOPHIL NFR BLD AUTO: 0.1 % (ref 0.3–6.2)
ERYTHROCYTE [DISTWIDTH] IN BLOOD BY AUTOMATED COUNT: 13 % (ref 12.3–15.4)
GFR SERPL CREATININE-BSD FRML MDRD: 75 ML/MIN/1.73
GLUCOSE SERPL-MCNC: 94 MG/DL (ref 65–99)
HCT VFR BLD AUTO: 37.5 % (ref 34–46.6)
HGB BLD-MCNC: 11.7 G/DL (ref 12–15.9)
IMM GRANULOCYTES # BLD AUTO: 0.04 10*3/MM3 (ref 0–0.05)
IMM GRANULOCYTES NFR BLD AUTO: 0.3 % (ref 0–0.5)
LYMPHOCYTES # BLD AUTO: 0.64 10*3/MM3 (ref 0.7–3.1)
LYMPHOCYTES NFR BLD AUTO: 5.5 % (ref 19.6–45.3)
MCH RBC QN AUTO: 29.8 PG (ref 26.6–33)
MCHC RBC AUTO-ENTMCNC: 31.2 G/DL (ref 31.5–35.7)
MCV RBC AUTO: 95.4 FL (ref 79–97)
MONOCYTES # BLD AUTO: 0.49 10*3/MM3 (ref 0.1–0.9)
MONOCYTES NFR BLD AUTO: 4.2 % (ref 5–12)
NEUTROPHILS NFR BLD AUTO: 10.36 10*3/MM3 (ref 1.7–7)
NEUTROPHILS NFR BLD AUTO: 89.6 % (ref 42.7–76)
NRBC BLD AUTO-RTO: 0 /100 WBC (ref 0–0.2)
PLATELET # BLD AUTO: 276 10*3/MM3 (ref 140–450)
PMV BLD AUTO: 10.7 FL (ref 6–12)
POTASSIUM SERPL-SCNC: 4.6 MMOL/L (ref 3.5–5.2)
RBC # BLD AUTO: 3.93 10*6/MM3 (ref 3.77–5.28)
SODIUM SERPL-SCNC: 141 MMOL/L (ref 136–145)
TSH SERPL DL<=0.05 MIU/L-ACNC: 1.92 UIU/ML (ref 0.27–4.2)
WBC # BLD AUTO: 11.57 10*3/MM3 (ref 3.4–10.8)

## 2020-09-03 PROCEDURE — 80048 BASIC METABOLIC PNL TOTAL CA: CPT | Performed by: INTERNAL MEDICINE

## 2020-09-03 PROCEDURE — 84443 ASSAY THYROID STIM HORMONE: CPT | Performed by: INTERNAL MEDICINE

## 2020-09-03 PROCEDURE — 85025 COMPLETE CBC W/AUTO DIFF WBC: CPT | Performed by: INTERNAL MEDICINE

## 2020-10-30 ENCOUNTER — LAB REQUISITION (OUTPATIENT)
Dept: LAB | Facility: HOSPITAL | Age: 80
End: 2020-10-30

## 2020-10-30 DIAGNOSIS — I10 ESSENTIAL (PRIMARY) HYPERTENSION: ICD-10-CM

## 2020-10-30 DIAGNOSIS — J44.9 CHRONIC OBSTRUCTIVE PULMONARY DISEASE, UNSPECIFIED (HCC): ICD-10-CM

## 2020-10-30 DIAGNOSIS — I51.81 TAKOTSUBO SYNDROME: ICD-10-CM

## 2020-10-30 DIAGNOSIS — I48.0 PAROXYSMAL ATRIAL FIBRILLATION (HCC): ICD-10-CM

## 2020-10-30 LAB
ALBUMIN SERPL-MCNC: 3.8 G/DL (ref 3.5–5.2)
ALBUMIN/GLOB SERPL: 1.3 G/DL
ALP SERPL-CCNC: 74 U/L (ref 39–117)
ALT SERPL W P-5'-P-CCNC: 14 U/L (ref 1–33)
ANION GAP SERPL CALCULATED.3IONS-SCNC: 6.9 MMOL/L (ref 5–15)
AST SERPL-CCNC: 16 U/L (ref 1–32)
BASOPHILS # BLD AUTO: 0.07 10*3/MM3 (ref 0–0.2)
BASOPHILS NFR BLD AUTO: 0.8 % (ref 0–1.5)
BILIRUB SERPL-MCNC: 0.3 MG/DL (ref 0–1.2)
BUN SERPL-MCNC: 17 MG/DL (ref 8–23)
BUN/CREAT SERPL: 22.7 (ref 7–25)
CALCIUM SPEC-SCNC: 9.6 MG/DL (ref 8.6–10.5)
CHLORIDE SERPL-SCNC: 99 MMOL/L (ref 98–107)
CO2 SERPL-SCNC: 38.1 MMOL/L (ref 22–29)
CREAT SERPL-MCNC: 0.75 MG/DL (ref 0.57–1)
DEPRECATED RDW RBC AUTO: 45.1 FL (ref 37–54)
EOSINOPHIL # BLD AUTO: 0.15 10*3/MM3 (ref 0–0.4)
EOSINOPHIL NFR BLD AUTO: 1.7 % (ref 0.3–6.2)
ERYTHROCYTE [DISTWIDTH] IN BLOOD BY AUTOMATED COUNT: 13.2 % (ref 12.3–15.4)
GFR SERPL CREATININE-BSD FRML MDRD: 75 ML/MIN/1.73
GLOBULIN UR ELPH-MCNC: 3 GM/DL
GLUCOSE SERPL-MCNC: 110 MG/DL (ref 65–99)
HCT VFR BLD AUTO: 38 % (ref 34–46.6)
HGB BLD-MCNC: 12 G/DL (ref 12–15.9)
IMM GRANULOCYTES # BLD AUTO: 0.01 10*3/MM3 (ref 0–0.05)
IMM GRANULOCYTES NFR BLD AUTO: 0.1 % (ref 0–0.5)
LYMPHOCYTES # BLD AUTO: 1.11 10*3/MM3 (ref 0.7–3.1)
LYMPHOCYTES NFR BLD AUTO: 12.8 % (ref 19.6–45.3)
MCH RBC QN AUTO: 29.1 PG (ref 26.6–33)
MCHC RBC AUTO-ENTMCNC: 31.6 G/DL (ref 31.5–35.7)
MCV RBC AUTO: 92.2 FL (ref 79–97)
MONOCYTES # BLD AUTO: 0.74 10*3/MM3 (ref 0.1–0.9)
MONOCYTES NFR BLD AUTO: 8.5 % (ref 5–12)
NEUTROPHILS NFR BLD AUTO: 6.59 10*3/MM3 (ref 1.7–7)
NEUTROPHILS NFR BLD AUTO: 76.1 % (ref 42.7–76)
NRBC BLD AUTO-RTO: 0 /100 WBC (ref 0–0.2)
PLATELET # BLD AUTO: 266 10*3/MM3 (ref 140–450)
PMV BLD AUTO: 10.9 FL (ref 6–12)
POTASSIUM SERPL-SCNC: 4.4 MMOL/L (ref 3.5–5.2)
PROT SERPL-MCNC: 6.8 G/DL (ref 6–8.5)
RBC # BLD AUTO: 4.12 10*6/MM3 (ref 3.77–5.28)
SODIUM SERPL-SCNC: 144 MMOL/L (ref 136–145)
TSH SERPL DL<=0.05 MIU/L-ACNC: 1.99 UIU/ML (ref 0.27–4.2)
WBC # BLD AUTO: 8.67 10*3/MM3 (ref 3.4–10.8)

## 2020-10-30 PROCEDURE — 85025 COMPLETE CBC W/AUTO DIFF WBC: CPT | Performed by: INTERNAL MEDICINE

## 2020-10-30 PROCEDURE — 80053 COMPREHEN METABOLIC PANEL: CPT | Performed by: INTERNAL MEDICINE

## 2020-10-30 PROCEDURE — 84443 ASSAY THYROID STIM HORMONE: CPT | Performed by: INTERNAL MEDICINE

## 2020-12-11 ENCOUNTER — LAB REQUISITION (OUTPATIENT)
Dept: LAB | Facility: HOSPITAL | Age: 80
End: 2020-12-11

## 2020-12-11 DIAGNOSIS — Z00.00 ENCOUNTER FOR GENERAL ADULT MEDICAL EXAMINATION WITHOUT ABNORMAL FINDINGS: ICD-10-CM

## 2020-12-11 LAB
ALBUMIN SERPL-MCNC: 3.7 G/DL (ref 3.5–5.2)
ALBUMIN/GLOB SERPL: 1.2 G/DL
ALP SERPL-CCNC: 79 U/L (ref 39–117)
ALT SERPL W P-5'-P-CCNC: 16 U/L (ref 1–33)
ANION GAP SERPL CALCULATED.3IONS-SCNC: 3.9 MMOL/L (ref 5–15)
AST SERPL-CCNC: 15 U/L (ref 1–32)
BILIRUB SERPL-MCNC: 0.4 MG/DL (ref 0–1.2)
BUN SERPL-MCNC: 29 MG/DL (ref 8–23)
BUN/CREAT SERPL: 33 (ref 7–25)
CALCIUM SPEC-SCNC: 9.4 MG/DL (ref 8.6–10.5)
CHLORIDE SERPL-SCNC: 94 MMOL/L (ref 98–107)
CO2 SERPL-SCNC: 41.1 MMOL/L (ref 22–29)
CREAT SERPL-MCNC: 0.88 MG/DL (ref 0.57–1)
DEPRECATED RDW RBC AUTO: 46.6 FL (ref 37–54)
ERYTHROCYTE [DISTWIDTH] IN BLOOD BY AUTOMATED COUNT: 13.8 % (ref 12.3–15.4)
GFR SERPL CREATININE-BSD FRML MDRD: 62 ML/MIN/1.73
GLOBULIN UR ELPH-MCNC: 3.1 GM/DL
GLUCOSE SERPL-MCNC: 118 MG/DL (ref 65–99)
HCT VFR BLD AUTO: 33.9 % (ref 34–46.6)
HGB BLD-MCNC: 10.8 G/DL (ref 12–15.9)
LIPASE SERPL-CCNC: 26 U/L (ref 13–60)
MCH RBC QN AUTO: 29 PG (ref 26.6–33)
MCHC RBC AUTO-ENTMCNC: 31.9 G/DL (ref 31.5–35.7)
MCV RBC AUTO: 91.1 FL (ref 79–97)
PLATELET # BLD AUTO: 206 10*3/MM3 (ref 140–450)
PMV BLD AUTO: 10.5 FL (ref 6–12)
POTASSIUM SERPL-SCNC: 4 MMOL/L (ref 3.5–5.2)
PROT SERPL-MCNC: 6.8 G/DL (ref 6–8.5)
RBC # BLD AUTO: 3.72 10*6/MM3 (ref 3.77–5.28)
SODIUM SERPL-SCNC: 139 MMOL/L (ref 136–145)
WBC # BLD AUTO: 8.43 10*3/MM3 (ref 3.4–10.8)

## 2020-12-11 PROCEDURE — 83690 ASSAY OF LIPASE: CPT | Performed by: INTERNAL MEDICINE

## 2020-12-11 PROCEDURE — 85027 COMPLETE CBC AUTOMATED: CPT | Performed by: INTERNAL MEDICINE

## 2020-12-11 PROCEDURE — 80053 COMPREHEN METABOLIC PANEL: CPT | Performed by: INTERNAL MEDICINE

## 2021-01-01 ENCOUNTER — APPOINTMENT (OUTPATIENT)
Dept: GENERAL RADIOLOGY | Facility: HOSPITAL | Age: 81
End: 2021-01-01

## 2021-01-01 ENCOUNTER — HOME HEALTH ADMISSION (OUTPATIENT)
Dept: HOME HEALTH SERVICES | Facility: HOME HEALTHCARE | Age: 81
End: 2021-01-01

## 2021-01-01 ENCOUNTER — READMISSION MANAGEMENT (OUTPATIENT)
Dept: CALL CENTER | Facility: HOSPITAL | Age: 81
End: 2021-01-01

## 2021-01-01 ENCOUNTER — HOSPITAL ENCOUNTER (INPATIENT)
Facility: HOSPITAL | Age: 81
LOS: 4 days | Discharge: HOME HEALTH | End: 2021-04-03
Attending: EMERGENCY MEDICINE | Admitting: INTERNAL MEDICINE

## 2021-01-01 ENCOUNTER — APPOINTMENT (OUTPATIENT)
Dept: ULTRASOUND IMAGING | Facility: HOSPITAL | Age: 81
End: 2021-01-01

## 2021-01-01 ENCOUNTER — HOSPITAL ENCOUNTER (INPATIENT)
Facility: HOSPITAL | Age: 81
LOS: 13 days | End: 2022-01-03
Attending: INTERNAL MEDICINE | Admitting: INTERNAL MEDICINE

## 2021-01-01 ENCOUNTER — TRANSCRIBE ORDERS (OUTPATIENT)
Dept: HOME HEALTH SERVICES | Facility: HOME HEALTHCARE | Age: 81
End: 2021-01-01

## 2021-01-01 ENCOUNTER — HOSPITAL ENCOUNTER (INPATIENT)
Facility: HOSPITAL | Age: 81
LOS: 3 days | Discharge: HOME | End: 2021-01-12
Attending: EMERGENCY MEDICINE | Admitting: INTERNAL MEDICINE

## 2021-01-01 ENCOUNTER — APPOINTMENT (OUTPATIENT)
Dept: CARDIOLOGY | Facility: HOSPITAL | Age: 81
End: 2021-01-01

## 2021-01-01 ENCOUNTER — APPOINTMENT (OUTPATIENT)
Dept: VACCINE CLINIC | Facility: HOSPITAL | Age: 81
End: 2021-01-01

## 2021-01-01 ENCOUNTER — LAB REQUISITION (OUTPATIENT)
Dept: LAB | Facility: HOSPITAL | Age: 81
End: 2021-01-01

## 2021-01-01 ENCOUNTER — APPOINTMENT (OUTPATIENT)
Dept: CT IMAGING | Facility: HOSPITAL | Age: 81
End: 2021-01-01

## 2021-01-01 ENCOUNTER — HOSPITAL ENCOUNTER (EMERGENCY)
Facility: HOSPITAL | Age: 81
End: 2021-01-01

## 2021-01-01 VITALS
RESPIRATION RATE: 16 BRPM | HEART RATE: 88 BPM | TEMPERATURE: 97.1 F | OXYGEN SATURATION: 92 % | SYSTOLIC BLOOD PRESSURE: 104 MMHG | DIASTOLIC BLOOD PRESSURE: 66 MMHG

## 2021-01-01 VITALS
TEMPERATURE: 97.9 F | DIASTOLIC BLOOD PRESSURE: 58 MMHG | HEIGHT: 62 IN | HEART RATE: 55 BPM | OXYGEN SATURATION: 100 % | WEIGHT: 164.24 LBS | SYSTOLIC BLOOD PRESSURE: 155 MMHG | BODY MASS INDEX: 30.22 KG/M2 | RESPIRATION RATE: 16 BRPM

## 2021-01-01 VITALS
BODY MASS INDEX: 29.44 KG/M2 | HEART RATE: 55 BPM | WEIGHT: 160 LBS | OXYGEN SATURATION: 98 % | DIASTOLIC BLOOD PRESSURE: 47 MMHG | HEIGHT: 62 IN | SYSTOLIC BLOOD PRESSURE: 130 MMHG | RESPIRATION RATE: 18 BRPM | TEMPERATURE: 97.9 F

## 2021-01-01 DIAGNOSIS — J44.9 OBSTRUCTIVE CHRONIC BRONCHITIS WITHOUT EXACERBATION (HCC): Primary | ICD-10-CM

## 2021-01-01 DIAGNOSIS — R06.00 DYSPNEA, UNSPECIFIED TYPE: Primary | ICD-10-CM

## 2021-01-01 DIAGNOSIS — Z00.00 ENCOUNTER FOR GENERAL ADULT MEDICAL EXAMINATION WITHOUT ABNORMAL FINDINGS: ICD-10-CM

## 2021-01-01 DIAGNOSIS — I50.9 ACUTE ON CHRONIC CONGESTIVE HEART FAILURE, UNSPECIFIED HEART FAILURE TYPE (HCC): ICD-10-CM

## 2021-01-01 DIAGNOSIS — J96.22 ACUTE ON CHRONIC RESPIRATORY FAILURE WITH HYPERCAPNIA (HCC): ICD-10-CM

## 2021-01-01 DIAGNOSIS — D64.9 ACUTE ANEMIA: ICD-10-CM

## 2021-01-01 DIAGNOSIS — R06.09 DYSPNEA ON EXERTION: ICD-10-CM

## 2021-01-01 DIAGNOSIS — R06.89 HYPERCAPNIA: ICD-10-CM

## 2021-01-01 DIAGNOSIS — Z87.09 HISTORY OF COPD: ICD-10-CM

## 2021-01-01 DIAGNOSIS — R06.02 SHORTNESS OF BREATH: Primary | ICD-10-CM

## 2021-01-01 DIAGNOSIS — J44.1 COPD EXACERBATION (HCC): ICD-10-CM

## 2021-01-01 DIAGNOSIS — R06.09 OTHER FORM OF DYSPNEA: ICD-10-CM

## 2021-01-01 LAB
ABO GROUP BLD: NORMAL
ADV 40+41 DNA STL QL NAA+NON-PROBE: NOT DETECTED
ALBUMIN SERPL-MCNC: 3.3 G/DL (ref 3.5–5.2)
ALBUMIN SERPL-MCNC: 3.5 G/DL (ref 3.5–5.2)
ALBUMIN SERPL-MCNC: 3.6 G/DL (ref 3.5–5.2)
ALBUMIN SERPL-MCNC: 3.8 G/DL (ref 3.5–5.2)
ALBUMIN/GLOB SERPL: 0.9 G/DL
ALBUMIN/GLOB SERPL: 1.1 G/DL
ALBUMIN/GLOB SERPL: 1.2 G/DL
ALBUMIN/GLOB SERPL: 1.2 G/DL
ALP SERPL-CCNC: 68 U/L (ref 39–117)
ALP SERPL-CCNC: 72 U/L (ref 39–117)
ALP SERPL-CCNC: 86 U/L (ref 39–117)
ALP SERPL-CCNC: 97 U/L (ref 39–117)
ALT SERPL W P-5'-P-CCNC: 17 U/L (ref 1–33)
ALT SERPL W P-5'-P-CCNC: 18 U/L (ref 1–33)
ALT SERPL W P-5'-P-CCNC: 8 U/L (ref 1–33)
ALT SERPL W P-5'-P-CCNC: 9 U/L (ref 1–33)
ANION GAP SERPL CALCULATED.3IONS-SCNC: 10.1 MMOL/L (ref 5–15)
ANION GAP SERPL CALCULATED.3IONS-SCNC: 10.5 MMOL/L (ref 5–15)
ANION GAP SERPL CALCULATED.3IONS-SCNC: 11.1 MMOL/L (ref 5–15)
ANION GAP SERPL CALCULATED.3IONS-SCNC: 11.1 MMOL/L (ref 5–15)
ANION GAP SERPL CALCULATED.3IONS-SCNC: 12.3 MMOL/L (ref 5–15)
ANION GAP SERPL CALCULATED.3IONS-SCNC: 13.1 MMOL/L (ref 5–15)
ANION GAP SERPL CALCULATED.3IONS-SCNC: 13.5 MMOL/L (ref 5–15)
ANION GAP SERPL CALCULATED.3IONS-SCNC: 6.6 MMOL/L (ref 5–15)
ANION GAP SERPL CALCULATED.3IONS-SCNC: 6.8 MMOL/L (ref 5–15)
ANION GAP SERPL CALCULATED.3IONS-SCNC: 6.9 MMOL/L (ref 5–15)
ANION GAP SERPL CALCULATED.3IONS-SCNC: 8.4 MMOL/L (ref 5–15)
ANION GAP SERPL CALCULATED.3IONS-SCNC: 8.7 MMOL/L (ref 5–15)
ANION GAP SERPL CALCULATED.3IONS-SCNC: 8.8 MMOL/L (ref 5–15)
ANION GAP SERPL CALCULATED.3IONS-SCNC: 9 MMOL/L (ref 5–15)
ANION GAP SERPL CALCULATED.3IONS-SCNC: 9.1 MMOL/L (ref 5–15)
ANION GAP SERPL CALCULATED.3IONS-SCNC: 9.6 MMOL/L (ref 5–15)
ANION GAP SERPL CALCULATED.3IONS-SCNC: 9.9 MMOL/L (ref 5–15)
ARTERIAL PATENCY WRIST A: ABNORMAL
ARTERIAL PATENCY WRIST A: ABNORMAL
ARTERIAL PATENCY WRIST A: POSITIVE
ARTERIAL PATENCY WRIST A: POSITIVE
ASCENDING AORTA: 2.8 CM
AST SERPL-CCNC: 10 U/L (ref 1–32)
AST SERPL-CCNC: 13 U/L (ref 1–32)
AST SERPL-CCNC: 19 U/L (ref 1–32)
AST SERPL-CCNC: 19 U/L (ref 1–32)
ASTRO TYP 1-8 RNA STL QL NAA+NON-PROBE: NOT DETECTED
ATMOSPHERIC PRESS: 743.8 MMHG
ATMOSPHERIC PRESS: 749.4 MMHG
ATMOSPHERIC PRESS: 757.4 MMHG
ATMOSPHERIC PRESS: 757.4 MMHG
B PARAPERT DNA SPEC QL NAA+PROBE: NOT DETECTED
B PARAPERT DNA SPEC QL NAA+PROBE: NOT DETECTED
B PERT DNA SPEC QL NAA+PROBE: NOT DETECTED
B PERT DNA SPEC QL NAA+PROBE: NOT DETECTED
BACTERIA SPEC AEROBE CULT: ABNORMAL
BACTERIA UR QL AUTO: ABNORMAL /HPF
BASE EXCESS BLDA CALC-SCNC: -3.2 MMOL/L (ref 0–2)
BASE EXCESS BLDA CALC-SCNC: 6.3 MMOL/L (ref 0–2)
BASE EXCESS BLDA CALC-SCNC: 6.9 MMOL/L (ref 0–2)
BASE EXCESS BLDA CALC-SCNC: 7.5 MMOL/L (ref 0–2)
BASOPHILS # BLD AUTO: 0.02 10*3/MM3 (ref 0–0.2)
BASOPHILS # BLD AUTO: 0.05 10*3/MM3 (ref 0–0.2)
BASOPHILS # BLD AUTO: 0.06 10*3/MM3 (ref 0–0.2)
BASOPHILS # BLD AUTO: 0.07 10*3/MM3 (ref 0–0.2)
BASOPHILS # BLD AUTO: 0.08 10*3/MM3 (ref 0–0.2)
BASOPHILS # BLD AUTO: 0.08 10*3/MM3 (ref 0–0.2)
BASOPHILS # BLD AUTO: 0.09 10*3/MM3 (ref 0–0.2)
BASOPHILS # BLD AUTO: 0.1 10*3/MM3 (ref 0–0.2)
BASOPHILS NFR BLD AUTO: 0.3 % (ref 0–1.5)
BASOPHILS NFR BLD AUTO: 0.4 % (ref 0–1.5)
BASOPHILS NFR BLD AUTO: 0.5 % (ref 0–1.5)
BASOPHILS NFR BLD AUTO: 0.5 % (ref 0–1.5)
BASOPHILS NFR BLD AUTO: 0.6 % (ref 0–1.5)
BASOPHILS NFR BLD AUTO: 0.7 % (ref 0–1.5)
BASOPHILS NFR BLD AUTO: 0.8 % (ref 0–1.5)
BASOPHILS NFR BLD AUTO: 0.9 % (ref 0–1.5)
BASOPHILS NFR BLD AUTO: 0.9 % (ref 0–1.5)
BASOPHILS NFR BLD AUTO: 1.1 % (ref 0–1.5)
BDY SITE: ABNORMAL
BH BB BLOOD EXPIRATION DATE: NORMAL
BH BB BLOOD TYPE BARCODE: 6200
BH BB DISPENSE STATUS: NORMAL
BH BB PRODUCT CODE: NORMAL
BH BB UNIT NUMBER: NORMAL
BH CV ECHO MEAS - ACS: 1.9 CM
BH CV ECHO MEAS - AO MAX PG (FULL): 3.7 MMHG
BH CV ECHO MEAS - AO MAX PG: 8.1 MMHG
BH CV ECHO MEAS - AO MEAN PG (FULL): 2 MMHG
BH CV ECHO MEAS - AO MEAN PG: 4 MMHG
BH CV ECHO MEAS - AO ROOT AREA (BSA CORRECTED): 2
BH CV ECHO MEAS - AO ROOT AREA: 9.6 CM^2
BH CV ECHO MEAS - AO ROOT DIAM: 3.5 CM
BH CV ECHO MEAS - AO V2 MAX: 142 CM/SEC
BH CV ECHO MEAS - AO V2 MEAN: 90.2 CM/SEC
BH CV ECHO MEAS - AO V2 VTI: 30 CM
BH CV ECHO MEAS - ASC AORTA: 2.8 CM
BH CV ECHO MEAS - AVA(I,A): 2.1 CM^2
BH CV ECHO MEAS - AVA(I,D): 2.1 CM^2
BH CV ECHO MEAS - AVA(V,A): 2.3 CM^2
BH CV ECHO MEAS - AVA(V,D): 2.3 CM^2
BH CV ECHO MEAS - BSA(HAYCOCK): 1.9 M^2
BH CV ECHO MEAS - BSA: 1.8 M^2
BH CV ECHO MEAS - BZI_BMI: 31.3 KILOGRAMS/M^2
BH CV ECHO MEAS - BZI_METRIC_HEIGHT: 157.5 CM
BH CV ECHO MEAS - BZI_METRIC_WEIGHT: 77.6 KG
BH CV ECHO MEAS - EDV(MOD-SP2): 136 ML
BH CV ECHO MEAS - EDV(MOD-SP4): 69 ML
BH CV ECHO MEAS - EDV(TEICH): 87.7 ML
BH CV ECHO MEAS - EF(CUBED): 65 %
BH CV ECHO MEAS - EF(MOD-BP): 68 %
BH CV ECHO MEAS - EF(MOD-SP2): 66.2 %
BH CV ECHO MEAS - EF(MOD-SP4): 71 %
BH CV ECHO MEAS - EF(TEICH): 56.8 %
BH CV ECHO MEAS - ESV(MOD-SP2): 46 ML
BH CV ECHO MEAS - ESV(MOD-SP4): 20 ML
BH CV ECHO MEAS - ESV(TEICH): 37.9 ML
BH CV ECHO MEAS - FS: 29.5 %
BH CV ECHO MEAS - IVS/LVPW: 1
BH CV ECHO MEAS - IVSD: 1.1 CM
BH CV ECHO MEAS - LAT PEAK E' VEL: 11.4 CM/SEC
BH CV ECHO MEAS - LV DIASTOLIC VOL/BSA (35-75): 38.6 ML/M^2
BH CV ECHO MEAS - LV MASS(C)D: 168.9 GRAMS
BH CV ECHO MEAS - LV MASS(C)DI: 94.5 GRAMS/M^2
BH CV ECHO MEAS - LV MAX PG: 4.4 MMHG
BH CV ECHO MEAS - LV MEAN PG: 2 MMHG
BH CV ECHO MEAS - LV SYSTOLIC VOL/BSA (12-30): 11.2 ML/M^2
BH CV ECHO MEAS - LV V1 MAX: 105 CM/SEC
BH CV ECHO MEAS - LV V1 MEAN: 71.5 CM/SEC
BH CV ECHO MEAS - LV V1 VTI: 20.3 CM
BH CV ECHO MEAS - LVIDD: 4.4 CM
BH CV ECHO MEAS - LVIDS: 3.1 CM
BH CV ECHO MEAS - LVLD AP2: 7.8 CM
BH CV ECHO MEAS - LVLD AP4: 7.1 CM
BH CV ECHO MEAS - LVLS AP2: 6.8 CM
BH CV ECHO MEAS - LVLS AP4: 5.7 CM
BH CV ECHO MEAS - LVOT AREA (M): 3.1 CM^2
BH CV ECHO MEAS - LVOT AREA: 3.1 CM^2
BH CV ECHO MEAS - LVOT DIAM: 2 CM
BH CV ECHO MEAS - LVPWD: 1.1 CM
BH CV ECHO MEAS - MED PEAK E' VEL: 7 CM/SEC
BH CV ECHO MEAS - MR MAX PG: 75.3 MMHG
BH CV ECHO MEAS - MR MAX VEL: 434 CM/SEC
BH CV ECHO MEAS - MV DEC SLOPE: 692.5 CM/SEC^2
BH CV ECHO MEAS - MV DEC TIME: 0.18 SEC
BH CV ECHO MEAS - MV E MAX VEL: 93.6 CM/SEC
BH CV ECHO MEAS - MV MAX PG: 4.8 MMHG
BH CV ECHO MEAS - MV MEAN PG: 2 MMHG
BH CV ECHO MEAS - MV P1/2T MAX VEL: 120.5 CM/SEC
BH CV ECHO MEAS - MV P1/2T: 51 MSEC
BH CV ECHO MEAS - MV V2 MAX: 110 CM/SEC
BH CV ECHO MEAS - MV V2 MEAN: 53.2 CM/SEC
BH CV ECHO MEAS - MV V2 VTI: 16.4 CM
BH CV ECHO MEAS - MVA P1/2T LCG: 1.8 CM^2
BH CV ECHO MEAS - MVA(P1/2T): 4.3 CM^2
BH CV ECHO MEAS - MVA(VTI): 3.9 CM^2
BH CV ECHO MEAS - PA ACC TIME: 0.11 SEC
BH CV ECHO MEAS - PA MAX PG (FULL): 3.6 MMHG
BH CV ECHO MEAS - PA MAX PG: 6.7 MMHG
BH CV ECHO MEAS - PA PR(ACCEL): 31.3 MMHG
BH CV ECHO MEAS - PA V2 MAX: 129 CM/SEC
BH CV ECHO MEAS - PULM DIAS VEL: 38.8 CM/SEC
BH CV ECHO MEAS - PULM S/D: 1.1
BH CV ECHO MEAS - PULM SYS VEL: 41.1 CM/SEC
BH CV ECHO MEAS - PVA(V,A): 2.8 CM^2
BH CV ECHO MEAS - PVA(V,D): 2.8 CM^2
BH CV ECHO MEAS - QP/QS: 0.93
BH CV ECHO MEAS - RAP SYSTOLE: 15 MMHG
BH CV ECHO MEAS - RV MAX PG: 3 MMHG
BH CV ECHO MEAS - RV MEAN PG: 1 MMHG
BH CV ECHO MEAS - RV V1 MAX: 86.7 CM/SEC
BH CV ECHO MEAS - RV V1 MEAN: 54.8 CM/SEC
BH CV ECHO MEAS - RV V1 VTI: 14.2 CM
BH CV ECHO MEAS - RVOT AREA: 4.2 CM^2
BH CV ECHO MEAS - RVOT DIAM: 2.3 CM
BH CV ECHO MEAS - RVSP: 65.7 MMHG
BH CV ECHO MEAS - SI(AO): 161.4 ML/M^2
BH CV ECHO MEAS - SI(CUBED): 31 ML/M^2
BH CV ECHO MEAS - SI(LVOT): 35.7 ML/M^2
BH CV ECHO MEAS - SI(MOD-SP2): 50.3 ML/M^2
BH CV ECHO MEAS - SI(MOD-SP4): 27.4 ML/M^2
BH CV ECHO MEAS - SI(TEICH): 27.8 ML/M^2
BH CV ECHO MEAS - SV(AO): 288.6 ML
BH CV ECHO MEAS - SV(CUBED): 55.4 ML
BH CV ECHO MEAS - SV(LVOT): 63.8 ML
BH CV ECHO MEAS - SV(MOD-SP2): 90 ML
BH CV ECHO MEAS - SV(MOD-SP4): 49 ML
BH CV ECHO MEAS - SV(RVOT): 59 ML
BH CV ECHO MEAS - SV(TEICH): 49.8 ML
BH CV ECHO MEAS - TAPSE (>1.6): 1.7 CM
BH CV ECHO MEAS - TR MAX VEL: 356 CM/SEC
BH CV ECHO MEASUREMENTS AVERAGE E/E' RATIO: 10.17
BH CV VAS SMA 1ST PP TIME: 15 MIN
BH CV VAS SMA 2ND PP TIME: 30 MIN
BH CV VAS SMA 3RD PP TIME: 45 MIN
BH CV VAS SMA AORTA PSV: 124 CM/S
BH CV VAS SMA CELIAC DIST EDV: 23 CM/S
BH CV VAS SMA CELIAC DIST PSV: 220 CM/S
BH CV VAS SMA CELIAC MID EDV: 50.1 CM/S
BH CV VAS SMA CELIAC MID PSV: 483 CM/S
BH CV VAS SMA CELIAC ORIGIN EDV: 72.7 CM/S
BH CV VAS SMA CELIAC ORIGIN PSV: 553 CM/S
BH CV VAS SMA CELIAC PROX EDV: 97.2 CM/S
BH CV VAS SMA CELIAC PROX PSV: 503 CM/S
BH CV VAS SMA HEPATIC EDV: 22.3 CM/S
BH CV VAS SMA HEPATIC PSV: 167 CM/S
BH CV VAS SMA SPLENIC EDV: 20.9 CM/S
BH CV VAS SMA SPLENIC PSV: 109 CM/S
BH CV XLRA - TDI S': 12.9 CM/SEC
BILIRUB SERPL-MCNC: 0.2 MG/DL (ref 0–1.2)
BILIRUB SERPL-MCNC: 0.2 MG/DL (ref 0–1.2)
BILIRUB SERPL-MCNC: 0.3 MG/DL (ref 0–1.2)
BILIRUB SERPL-MCNC: 0.3 MG/DL (ref 0–1.2)
BILIRUB UR QL STRIP: NEGATIVE
BLD GP AB SCN SERPL QL: NEGATIVE
BUN SERPL-MCNC: 16 MG/DL (ref 8–23)
BUN SERPL-MCNC: 23 MG/DL (ref 8–23)
BUN SERPL-MCNC: 24 MG/DL (ref 8–23)
BUN SERPL-MCNC: 27 MG/DL (ref 8–23)
BUN SERPL-MCNC: 29 MG/DL (ref 8–23)
BUN SERPL-MCNC: 29 MG/DL (ref 8–23)
BUN SERPL-MCNC: 30 MG/DL (ref 8–23)
BUN SERPL-MCNC: 30 MG/DL (ref 8–23)
BUN SERPL-MCNC: 32 MG/DL (ref 8–23)
BUN SERPL-MCNC: 41 MG/DL (ref 8–23)
BUN SERPL-MCNC: 48 MG/DL (ref 8–23)
BUN SERPL-MCNC: 54 MG/DL (ref 8–23)
BUN SERPL-MCNC: 55 MG/DL (ref 8–23)
BUN SERPL-MCNC: 55 MG/DL (ref 8–23)
BUN SERPL-MCNC: 56 MG/DL (ref 8–23)
BUN SERPL-MCNC: 58 MG/DL (ref 8–23)
BUN SERPL-MCNC: 62 MG/DL (ref 8–23)
BUN/CREAT SERPL: 15.5 (ref 7–25)
BUN/CREAT SERPL: 19 (ref 7–25)
BUN/CREAT SERPL: 19.3 (ref 7–25)
BUN/CREAT SERPL: 20.4 (ref 7–25)
BUN/CREAT SERPL: 20.4 (ref 7–25)
BUN/CREAT SERPL: 22.5 (ref 7–25)
BUN/CREAT SERPL: 23.4 (ref 7–25)
BUN/CREAT SERPL: 25.7 (ref 7–25)
BUN/CREAT SERPL: 26.2 (ref 7–25)
BUN/CREAT SERPL: 27.1 (ref 7–25)
BUN/CREAT SERPL: 28 (ref 7–25)
BUN/CREAT SERPL: 29.7 (ref 7–25)
BUN/CREAT SERPL: 32.2 (ref 7–25)
BUN/CREAT SERPL: 36 (ref 7–25)
BUN/CREAT SERPL: 36.1 (ref 7–25)
BUN/CREAT SERPL: 36.6 (ref 7–25)
BUN/CREAT SERPL: 37.2 (ref 7–25)
C CAYETANENSIS DNA STL QL NAA+NON-PROBE: NOT DETECTED
C COLI+JEJ+UPSA DNA STL QL NAA+NON-PROBE: NOT DETECTED
C PNEUM DNA NPH QL NAA+NON-PROBE: NOT DETECTED
C PNEUM DNA NPH QL NAA+NON-PROBE: NOT DETECTED
CALCIUM SPEC-SCNC: 8.6 MG/DL (ref 8.6–10.5)
CALCIUM SPEC-SCNC: 8.7 MG/DL (ref 8.6–10.5)
CALCIUM SPEC-SCNC: 8.7 MG/DL (ref 8.6–10.5)
CALCIUM SPEC-SCNC: 8.9 MG/DL (ref 8.6–10.5)
CALCIUM SPEC-SCNC: 9 MG/DL (ref 8.6–10.5)
CALCIUM SPEC-SCNC: 9 MG/DL (ref 8.6–10.5)
CALCIUM SPEC-SCNC: 9.1 MG/DL (ref 8.6–10.5)
CALCIUM SPEC-SCNC: 9.4 MG/DL (ref 8.6–10.5)
CHLORIDE SERPL-SCNC: 100 MMOL/L (ref 98–107)
CHLORIDE SERPL-SCNC: 102 MMOL/L (ref 98–107)
CHLORIDE SERPL-SCNC: 103 MMOL/L (ref 98–107)
CHLORIDE SERPL-SCNC: 104 MMOL/L (ref 98–107)
CHLORIDE SERPL-SCNC: 104 MMOL/L (ref 98–107)
CHLORIDE SERPL-SCNC: 105 MMOL/L (ref 98–107)
CHLORIDE SERPL-SCNC: 106 MMOL/L (ref 98–107)
CHLORIDE SERPL-SCNC: 106 MMOL/L (ref 98–107)
CHLORIDE SERPL-SCNC: 107 MMOL/L (ref 98–107)
CHLORIDE SERPL-SCNC: 107 MMOL/L (ref 98–107)
CHLORIDE SERPL-SCNC: 91 MMOL/L (ref 98–107)
CHLORIDE SERPL-SCNC: 93 MMOL/L (ref 98–107)
CHLORIDE SERPL-SCNC: 95 MMOL/L (ref 98–107)
CHLORIDE SERPL-SCNC: 99 MMOL/L (ref 98–107)
CHLORIDE SERPL-SCNC: 99 MMOL/L (ref 98–107)
CLARITY UR: ABNORMAL
CLARITY UR: CLEAR
CLARITY UR: CLEAR
CO2 SERPL-SCNC: 21.9 MMOL/L (ref 22–29)
CO2 SERPL-SCNC: 22.1 MMOL/L (ref 22–29)
CO2 SERPL-SCNC: 23.7 MMOL/L (ref 22–29)
CO2 SERPL-SCNC: 24.2 MMOL/L (ref 22–29)
CO2 SERPL-SCNC: 24.4 MMOL/L (ref 22–29)
CO2 SERPL-SCNC: 24.5 MMOL/L (ref 22–29)
CO2 SERPL-SCNC: 25.9 MMOL/L (ref 22–29)
CO2 SERPL-SCNC: 28.9 MMOL/L (ref 22–29)
CO2 SERPL-SCNC: 31.1 MMOL/L (ref 22–29)
CO2 SERPL-SCNC: 34.2 MMOL/L (ref 22–29)
CO2 SERPL-SCNC: 34.9 MMOL/L (ref 22–29)
CO2 SERPL-SCNC: 35.4 MMOL/L (ref 22–29)
CO2 SERPL-SCNC: 35.5 MMOL/L (ref 22–29)
CO2 SERPL-SCNC: 35.6 MMOL/L (ref 22–29)
CO2 SERPL-SCNC: 35.9 MMOL/L (ref 22–29)
CO2 SERPL-SCNC: 37 MMOL/L (ref 22–29)
CO2 SERPL-SCNC: 37.3 MMOL/L (ref 22–29)
COLOR UR: YELLOW
CREAT SERPL-MCNC: 0.78 MG/DL (ref 0.57–1)
CREAT SERPL-MCNC: 0.82 MG/DL (ref 0.57–1)
CREAT SERPL-MCNC: 0.83 MG/DL (ref 0.57–1)
CREAT SERPL-MCNC: 0.89 MG/DL (ref 0.57–1)
CREAT SERPL-MCNC: 0.9 MG/DL (ref 0.57–1)
CREAT SERPL-MCNC: 1.01 MG/DL (ref 0.57–1)
CREAT SERPL-MCNC: 1.03 MG/DL (ref 0.57–1)
CREAT SERPL-MCNC: 1.05 MG/DL (ref 0.57–1)
CREAT SERPL-MCNC: 1.12 MG/DL (ref 0.57–1)
CREAT SERPL-MCNC: 1.26 MG/DL (ref 0.57–1)
CREAT SERPL-MCNC: 1.83 MG/DL (ref 0.57–1)
CREAT SERPL-MCNC: 2.03 MG/DL (ref 0.57–1)
CREAT SERPL-MCNC: 2.39 MG/DL (ref 0.57–1)
CREAT SERPL-MCNC: 2.4 MG/DL (ref 0.57–1)
CREAT SERPL-MCNC: 2.7 MG/DL (ref 0.57–1)
CREAT SERPL-MCNC: 2.84 MG/DL (ref 0.57–1)
CREAT SERPL-MCNC: 3.22 MG/DL (ref 0.57–1)
CROSSMATCH INTERPRETATION: NORMAL
CRP SERPL-MCNC: 0.99 MG/DL (ref 0–0.5)
CRYPTOSP DNA STL QL NAA+NON-PROBE: NOT DETECTED
D DIMER PPP FEU-MCNC: 0.83 MCGFEU/ML (ref 0–0.49)
D DIMER PPP FEU-MCNC: 2 MCGFEU/ML (ref 0–0.49)
D-LACTATE SERPL-SCNC: 0.9 MMOL/L (ref 0.5–2)
D-LACTATE SERPL-SCNC: 1.9 MMOL/L (ref 0.5–2)
DEPRECATED RDW RBC AUTO: 40.3 FL (ref 37–54)
DEPRECATED RDW RBC AUTO: 40.4 FL (ref 37–54)
DEPRECATED RDW RBC AUTO: 44.1 FL (ref 37–54)
DEPRECATED RDW RBC AUTO: 44.1 FL (ref 37–54)
DEPRECATED RDW RBC AUTO: 44.5 FL (ref 37–54)
DEPRECATED RDW RBC AUTO: 44.9 FL (ref 37–54)
DEPRECATED RDW RBC AUTO: 45.1 FL (ref 37–54)
DEPRECATED RDW RBC AUTO: 45.2 FL (ref 37–54)
DEPRECATED RDW RBC AUTO: 46.6 FL (ref 37–54)
DEPRECATED RDW RBC AUTO: 46.8 FL (ref 37–54)
DEPRECATED RDW RBC AUTO: 48.5 FL (ref 37–54)
E HISTOLYT DNA STL QL NAA+NON-PROBE: NOT DETECTED
EAEC PAA PLAS AGGR+AATA ST NAA+NON-PRB: NOT DETECTED
EC STX1+STX2 GENES STL QL NAA+NON-PROBE: NOT DETECTED
EOSINOPHIL # BLD AUTO: 0 10*3/MM3 (ref 0–0.4)
EOSINOPHIL # BLD AUTO: 0.01 10*3/MM3 (ref 0–0.4)
EOSINOPHIL # BLD AUTO: 0.01 10*3/MM3 (ref 0–0.4)
EOSINOPHIL # BLD AUTO: 0.06 10*3/MM3 (ref 0–0.4)
EOSINOPHIL # BLD AUTO: 0.09 10*3/MM3 (ref 0–0.4)
EOSINOPHIL # BLD AUTO: 0.09 10*3/MM3 (ref 0–0.4)
EOSINOPHIL # BLD AUTO: 0.1 10*3/MM3 (ref 0–0.4)
EOSINOPHIL # BLD AUTO: 0.16 10*3/MM3 (ref 0–0.4)
EOSINOPHIL # BLD AUTO: 0.19 10*3/MM3 (ref 0–0.4)
EOSINOPHIL # BLD AUTO: 0.24 10*3/MM3 (ref 0–0.4)
EOSINOPHIL NFR BLD AUTO: 0 % (ref 0.3–6.2)
EOSINOPHIL NFR BLD AUTO: 0.1 % (ref 0.3–6.2)
EOSINOPHIL NFR BLD AUTO: 0.1 % (ref 0.3–6.2)
EOSINOPHIL NFR BLD AUTO: 0.5 % (ref 0.3–6.2)
EOSINOPHIL NFR BLD AUTO: 0.8 % (ref 0.3–6.2)
EOSINOPHIL NFR BLD AUTO: 1.1 % (ref 0.3–6.2)
EOSINOPHIL NFR BLD AUTO: 1.2 % (ref 0.3–6.2)
EOSINOPHIL NFR BLD AUTO: 1.7 % (ref 0.3–6.2)
EOSINOPHIL NFR BLD AUTO: 2.2 % (ref 0.3–6.2)
EOSINOPHIL NFR BLD AUTO: 2.5 % (ref 0.3–6.2)
EPEC EAE GENE STL QL NAA+NON-PROBE: NOT DETECTED
ERYTHROCYTE [DISTWIDTH] IN BLOOD BY AUTOMATED COUNT: 12.2 % (ref 12.3–15.4)
ERYTHROCYTE [DISTWIDTH] IN BLOOD BY AUTOMATED COUNT: 12.2 % (ref 12.3–15.4)
ERYTHROCYTE [DISTWIDTH] IN BLOOD BY AUTOMATED COUNT: 13.5 % (ref 12.3–15.4)
ERYTHROCYTE [DISTWIDTH] IN BLOOD BY AUTOMATED COUNT: 13.8 % (ref 12.3–15.4)
ERYTHROCYTE [DISTWIDTH] IN BLOOD BY AUTOMATED COUNT: 13.9 % (ref 12.3–15.4)
ERYTHROCYTE [DISTWIDTH] IN BLOOD BY AUTOMATED COUNT: 14.5 % (ref 12.3–15.4)
ERYTHROCYTE [DISTWIDTH] IN BLOOD BY AUTOMATED COUNT: 14.6 % (ref 12.3–15.4)
ERYTHROCYTE [DISTWIDTH] IN BLOOD BY AUTOMATED COUNT: 14.6 % (ref 12.3–15.4)
ERYTHROCYTE [DISTWIDTH] IN BLOOD BY AUTOMATED COUNT: 14.7 % (ref 12.3–15.4)
ERYTHROCYTE [DISTWIDTH] IN BLOOD BY AUTOMATED COUNT: 14.8 % (ref 12.3–15.4)
ERYTHROCYTE [DISTWIDTH] IN BLOOD BY AUTOMATED COUNT: 14.9 % (ref 12.3–15.4)
ETEC LTA+ST1A+ST1B TOX ST NAA+NON-PROBE: NOT DETECTED
FERRITIN SERPL-MCNC: 27.1 NG/ML (ref 13–150)
FERRITIN SERPL-MCNC: 35.3 NG/ML (ref 13–150)
FLUAV SUBTYP SPEC NAA+PROBE: NOT DETECTED
FLUAV SUBTYP SPEC NAA+PROBE: NOT DETECTED
FLUBV RNA ISLT QL NAA+PROBE: NOT DETECTED
FLUBV RNA ISLT QL NAA+PROBE: NOT DETECTED
FOLATE BLD-MCNC: 413 NG/ML
FOLATE RBC-MCNC: 1530 NG/ML
G LAMBLIA DNA STL QL NAA+NON-PROBE: NOT DETECTED
GAS FLOW AIRWAY: 2 LPM
GAS FLOW AIRWAY: 4 LPM
GFR SERPL CREATININE-BSD FRML MDRD: 14 ML/MIN/1.73
GFR SERPL CREATININE-BSD FRML MDRD: 16 ML/MIN/1.73
GFR SERPL CREATININE-BSD FRML MDRD: 17 ML/MIN/1.73
GFR SERPL CREATININE-BSD FRML MDRD: 19 ML/MIN/1.73
GFR SERPL CREATININE-BSD FRML MDRD: 20 ML/MIN/1.73
GFR SERPL CREATININE-BSD FRML MDRD: 24 ML/MIN/1.73
GFR SERPL CREATININE-BSD FRML MDRD: 27 ML/MIN/1.73
GFR SERPL CREATININE-BSD FRML MDRD: 41 ML/MIN/1.73
GFR SERPL CREATININE-BSD FRML MDRD: 47 ML/MIN/1.73
GFR SERPL CREATININE-BSD FRML MDRD: 51 ML/MIN/1.73
GFR SERPL CREATININE-BSD FRML MDRD: 52 ML/MIN/1.73
GFR SERPL CREATININE-BSD FRML MDRD: 53 ML/MIN/1.73
GFR SERPL CREATININE-BSD FRML MDRD: 61 ML/MIN/1.73
GFR SERPL CREATININE-BSD FRML MDRD: 61 ML/MIN/1.73
GFR SERPL CREATININE-BSD FRML MDRD: 66 ML/MIN/1.73
GFR SERPL CREATININE-BSD FRML MDRD: 67 ML/MIN/1.73
GFR SERPL CREATININE-BSD FRML MDRD: 71 ML/MIN/1.73
GFR SERPL CREATININE-BSD FRML MDRD: ABNORMAL ML/MIN/{1.73_M2}
GLOBULIN UR ELPH-MCNC: 3 GM/DL
GLOBULIN UR ELPH-MCNC: 3.1 GM/DL
GLOBULIN UR ELPH-MCNC: 3.1 GM/DL
GLOBULIN UR ELPH-MCNC: 3.8 GM/DL
GLUCOSE BLDC GLUCOMTR-MCNC: 120 MG/DL (ref 70–130)
GLUCOSE SERPL-MCNC: 100 MG/DL (ref 65–99)
GLUCOSE SERPL-MCNC: 102 MG/DL (ref 65–99)
GLUCOSE SERPL-MCNC: 112 MG/DL (ref 65–99)
GLUCOSE SERPL-MCNC: 116 MG/DL (ref 65–99)
GLUCOSE SERPL-MCNC: 118 MG/DL (ref 65–99)
GLUCOSE SERPL-MCNC: 118 MG/DL (ref 65–99)
GLUCOSE SERPL-MCNC: 131 MG/DL (ref 65–99)
GLUCOSE SERPL-MCNC: 135 MG/DL (ref 65–99)
GLUCOSE SERPL-MCNC: 150 MG/DL (ref 65–99)
GLUCOSE SERPL-MCNC: 168 MG/DL (ref 65–99)
GLUCOSE SERPL-MCNC: 84 MG/DL (ref 65–99)
GLUCOSE SERPL-MCNC: 88 MG/DL (ref 65–99)
GLUCOSE SERPL-MCNC: 89 MG/DL (ref 65–99)
GLUCOSE SERPL-MCNC: 96 MG/DL (ref 65–99)
GLUCOSE SERPL-MCNC: 97 MG/DL (ref 65–99)
GLUCOSE SERPL-MCNC: 97 MG/DL (ref 65–99)
GLUCOSE SERPL-MCNC: 98 MG/DL (ref 65–99)
GLUCOSE UR STRIP-MCNC: NEGATIVE MG/DL
HADV DNA SPEC NAA+PROBE: NOT DETECTED
HADV DNA SPEC NAA+PROBE: NOT DETECTED
HCO3 BLDA-SCNC: 22.6 MMOL/L (ref 22–28)
HCO3 BLDA-SCNC: 33.7 MMOL/L (ref 22–28)
HCO3 BLDA-SCNC: 35.6 MMOL/L (ref 22–28)
HCO3 BLDA-SCNC: 36.8 MMOL/L (ref 22–28)
HCOV 229E RNA SPEC QL NAA+PROBE: NOT DETECTED
HCOV 229E RNA SPEC QL NAA+PROBE: NOT DETECTED
HCOV HKU1 RNA SPEC QL NAA+PROBE: NOT DETECTED
HCOV HKU1 RNA SPEC QL NAA+PROBE: NOT DETECTED
HCOV NL63 RNA SPEC QL NAA+PROBE: NOT DETECTED
HCOV NL63 RNA SPEC QL NAA+PROBE: NOT DETECTED
HCOV OC43 RNA SPEC QL NAA+PROBE: NOT DETECTED
HCOV OC43 RNA SPEC QL NAA+PROBE: NOT DETECTED
HCT VFR BLD AUTO: 27 % (ref 34–46.6)
HCT VFR BLD AUTO: 28.3 % (ref 34–46.6)
HCT VFR BLD AUTO: 28.4 % (ref 34–46.6)
HCT VFR BLD AUTO: 28.6 % (ref 34–46.6)
HCT VFR BLD AUTO: 28.8 % (ref 34–46.6)
HCT VFR BLD AUTO: 29.1 % (ref 34–46.6)
HCT VFR BLD AUTO: 30.2 % (ref 34–46.6)
HCT VFR BLD AUTO: 30.4 % (ref 34–46.6)
HCT VFR BLD AUTO: 31 % (ref 34–46.6)
HCT VFR BLD AUTO: 31.1 % (ref 34–46.6)
HCT VFR BLD AUTO: 31.3 % (ref 34–46.6)
HCT VFR BLD AUTO: 33.9 % (ref 34–46.6)
HCYS SERPL-MCNC: 11.7 UMOL/L (ref 0–15)
HEMOCCULT STL QL: POSITIVE
HGB BLD-MCNC: 10.2 G/DL (ref 12–15.9)
HGB BLD-MCNC: 11 G/DL (ref 12–15.9)
HGB BLD-MCNC: 8.2 G/DL (ref 12–15.9)
HGB BLD-MCNC: 8.4 G/DL (ref 12–15.9)
HGB BLD-MCNC: 8.5 G/DL (ref 12–15.9)
HGB BLD-MCNC: 8.7 G/DL (ref 12–15.9)
HGB BLD-MCNC: 8.9 G/DL (ref 12–15.9)
HGB BLD-MCNC: 8.9 G/DL (ref 12–15.9)
HGB BLD-MCNC: 9.7 G/DL (ref 12–15.9)
HGB BLD-MCNC: 9.8 G/DL (ref 12–15.9)
HGB BLD-MCNC: 9.9 G/DL (ref 12–15.9)
HGB UR QL STRIP.AUTO: ABNORMAL
HGB UR QL STRIP.AUTO: NEGATIVE
HGB UR QL STRIP.AUTO: NEGATIVE
HMPV RNA NPH QL NAA+NON-PROBE: NOT DETECTED
HMPV RNA NPH QL NAA+NON-PROBE: NOT DETECTED
HOLD SPECIMEN: NORMAL
HOLD SPECIMEN: NORMAL
HPIV1 RNA SPEC QL NAA+PROBE: NOT DETECTED
HPIV1 RNA SPEC QL NAA+PROBE: NOT DETECTED
HPIV2 RNA SPEC QL NAA+PROBE: NOT DETECTED
HPIV2 RNA SPEC QL NAA+PROBE: NOT DETECTED
HPIV3 RNA NPH QL NAA+PROBE: NOT DETECTED
HPIV3 RNA NPH QL NAA+PROBE: NOT DETECTED
HPIV4 P GENE NPH QL NAA+PROBE: NOT DETECTED
HPIV4 P GENE NPH QL NAA+PROBE: NOT DETECTED
HYALINE CASTS UR QL AUTO: ABNORMAL /LPF
IMM GRANULOCYTES # BLD AUTO: 0.01 10*3/MM3 (ref 0–0.05)
IMM GRANULOCYTES # BLD AUTO: 0.02 10*3/MM3 (ref 0–0.05)
IMM GRANULOCYTES # BLD AUTO: 0.03 10*3/MM3 (ref 0–0.05)
IMM GRANULOCYTES # BLD AUTO: 0.03 10*3/MM3 (ref 0–0.05)
IMM GRANULOCYTES # BLD AUTO: 0.04 10*3/MM3 (ref 0–0.05)
IMM GRANULOCYTES # BLD AUTO: 0.07 10*3/MM3 (ref 0–0.05)
IMM GRANULOCYTES # BLD AUTO: 0.1 10*3/MM3 (ref 0–0.05)
IMM GRANULOCYTES NFR BLD AUTO: 0.1 % (ref 0–0.5)
IMM GRANULOCYTES NFR BLD AUTO: 0.2 % (ref 0–0.5)
IMM GRANULOCYTES NFR BLD AUTO: 0.3 % (ref 0–0.5)
IMM GRANULOCYTES NFR BLD AUTO: 0.3 % (ref 0–0.5)
IMM GRANULOCYTES NFR BLD AUTO: 0.4 % (ref 0–0.5)
IMM GRANULOCYTES NFR BLD AUTO: 0.5 % (ref 0–0.5)
INR PPP: 2.4 (ref 0.9–1.1)
IRON 24H UR-MRATE: 22 MCG/DL (ref 37–145)
IRON 24H UR-MRATE: 37 MCG/DL (ref 37–145)
IRON SATN MFR SERPL: 10 % (ref 20–50)
IRON SATN MFR SERPL: 5 % (ref 20–50)
KETONES UR QL STRIP: NEGATIVE
LDH SERPL-CCNC: 169 U/L (ref 135–214)
LEFT ATRIUM VOLUME INDEX: 34 ML/M2
LEUKOCYTE ESTERASE UR QL STRIP.AUTO: ABNORMAL
LYMPHOCYTES # BLD AUTO: 0.68 10*3/MM3 (ref 0.7–3.1)
LYMPHOCYTES # BLD AUTO: 0.69 10*3/MM3 (ref 0.7–3.1)
LYMPHOCYTES # BLD AUTO: 0.69 10*3/MM3 (ref 0.7–3.1)
LYMPHOCYTES # BLD AUTO: 0.83 10*3/MM3 (ref 0.7–3.1)
LYMPHOCYTES # BLD AUTO: 0.83 10*3/MM3 (ref 0.7–3.1)
LYMPHOCYTES # BLD AUTO: 1 10*3/MM3 (ref 0.7–3.1)
LYMPHOCYTES # BLD AUTO: 1.07 10*3/MM3 (ref 0.7–3.1)
LYMPHOCYTES # BLD AUTO: 1.13 10*3/MM3 (ref 0.7–3.1)
LYMPHOCYTES # BLD AUTO: 1.26 10*3/MM3 (ref 0.7–3.1)
LYMPHOCYTES # BLD AUTO: 1.43 10*3/MM3 (ref 0.7–3.1)
LYMPHOCYTES NFR BLD AUTO: 11.1 % (ref 19.6–45.3)
LYMPHOCYTES NFR BLD AUTO: 12.1 % (ref 19.6–45.3)
LYMPHOCYTES NFR BLD AUTO: 12.2 % (ref 19.6–45.3)
LYMPHOCYTES NFR BLD AUTO: 14.3 % (ref 19.6–45.3)
LYMPHOCYTES NFR BLD AUTO: 14.7 % (ref 19.6–45.3)
LYMPHOCYTES NFR BLD AUTO: 4.4 % (ref 19.6–45.3)
LYMPHOCYTES NFR BLD AUTO: 4.8 % (ref 19.6–45.3)
LYMPHOCYTES NFR BLD AUTO: 6.1 % (ref 19.6–45.3)
LYMPHOCYTES NFR BLD AUTO: 7.6 % (ref 19.6–45.3)
LYMPHOCYTES NFR BLD AUTO: 9.4 % (ref 19.6–45.3)
M PNEUMO IGG SER IA-ACNC: NOT DETECTED
M PNEUMO IGG SER IA-ACNC: NOT DETECTED
MAGNESIUM SERPL-MCNC: 1.2 MG/DL (ref 1.6–2.4)
MAGNESIUM SERPL-MCNC: 1.8 MG/DL (ref 1.6–2.4)
MAGNESIUM SERPL-MCNC: 2 MG/DL (ref 1.6–2.4)
MAGNESIUM SERPL-MCNC: 2 MG/DL (ref 1.6–2.4)
MAXIMAL PREDICTED HEART RATE: 141 BPM
MAXIMAL PREDICTED HEART RATE: 142 BPM
MCH RBC QN AUTO: 24.6 PG (ref 26.6–33)
MCH RBC QN AUTO: 24.6 PG (ref 26.6–33)
MCH RBC QN AUTO: 24.7 PG (ref 26.6–33)
MCH RBC QN AUTO: 24.9 PG (ref 26.6–33)
MCH RBC QN AUTO: 25.1 PG (ref 26.6–33)
MCH RBC QN AUTO: 25.1 PG (ref 26.6–33)
MCH RBC QN AUTO: 29.6 PG (ref 26.6–33)
MCH RBC QN AUTO: 29.8 PG (ref 26.6–33)
MCH RBC QN AUTO: 29.9 PG (ref 26.6–33)
MCH RBC QN AUTO: 30.3 PG (ref 26.6–33)
MCH RBC QN AUTO: 30.5 PG (ref 26.6–33)
MCHC RBC AUTO-ENTMCNC: 29 G/DL (ref 31.5–35.7)
MCHC RBC AUTO-ENTMCNC: 29.3 G/DL (ref 31.5–35.7)
MCHC RBC AUTO-ENTMCNC: 29.5 G/DL (ref 31.5–35.7)
MCHC RBC AUTO-ENTMCNC: 29.6 G/DL (ref 31.5–35.7)
MCHC RBC AUTO-ENTMCNC: 29.7 G/DL (ref 31.5–35.7)
MCHC RBC AUTO-ENTMCNC: 30.2 G/DL (ref 31.5–35.7)
MCHC RBC AUTO-ENTMCNC: 31.6 G/DL (ref 31.5–35.7)
MCHC RBC AUTO-ENTMCNC: 31.6 G/DL (ref 31.5–35.7)
MCHC RBC AUTO-ENTMCNC: 32.4 G/DL (ref 31.5–35.7)
MCHC RBC AUTO-ENTMCNC: 32.8 G/DL (ref 31.5–35.7)
MCHC RBC AUTO-ENTMCNC: 33.3 G/DL (ref 31.5–35.7)
MCV RBC AUTO: 83.2 FL (ref 79–97)
MCV RBC AUTO: 83.5 FL (ref 79–97)
MCV RBC AUTO: 83.9 FL (ref 79–97)
MCV RBC AUTO: 84 FL (ref 79–97)
MCV RBC AUTO: 84.7 FL (ref 79–97)
MCV RBC AUTO: 85.1 FL (ref 79–97)
MCV RBC AUTO: 90.9 FL (ref 79–97)
MCV RBC AUTO: 91.1 FL (ref 79–97)
MCV RBC AUTO: 91.5 FL (ref 79–97)
MCV RBC AUTO: 94.6 FL (ref 79–97)
MCV RBC AUTO: 96 FL (ref 79–97)
MODALITY: ABNORMAL
MONOCYTES # BLD AUTO: 0.33 10*3/MM3 (ref 0.1–0.9)
MONOCYTES # BLD AUTO: 0.58 10*3/MM3 (ref 0.1–0.9)
MONOCYTES # BLD AUTO: 0.7 10*3/MM3 (ref 0.1–0.9)
MONOCYTES # BLD AUTO: 0.77 10*3/MM3 (ref 0.1–0.9)
MONOCYTES # BLD AUTO: 0.82 10*3/MM3 (ref 0.1–0.9)
MONOCYTES # BLD AUTO: 0.83 10*3/MM3 (ref 0.1–0.9)
MONOCYTES # BLD AUTO: 0.85 10*3/MM3 (ref 0.1–0.9)
MONOCYTES # BLD AUTO: 0.87 10*3/MM3 (ref 0.1–0.9)
MONOCYTES # BLD AUTO: 1 10*3/MM3 (ref 0.1–0.9)
MONOCYTES # BLD AUTO: 1.1 10*3/MM3 (ref 0.1–0.9)
MONOCYTES NFR BLD AUTO: 10.3 % (ref 5–12)
MONOCYTES NFR BLD AUTO: 4.6 % (ref 5–12)
MONOCYTES NFR BLD AUTO: 4.9 % (ref 5–12)
MONOCYTES NFR BLD AUTO: 5.8 % (ref 5–12)
MONOCYTES NFR BLD AUTO: 6.6 % (ref 5–12)
MONOCYTES NFR BLD AUTO: 6.9 % (ref 5–12)
MONOCYTES NFR BLD AUTO: 7.8 % (ref 5–12)
MONOCYTES NFR BLD AUTO: 8.8 % (ref 5–12)
MONOCYTES NFR BLD AUTO: 9.5 % (ref 5–12)
MONOCYTES NFR BLD AUTO: 9.6 % (ref 5–12)
NEUTROPHILS NFR BLD AUTO: 11.1 10*3/MM3 (ref 1.7–7)
NEUTROPHILS NFR BLD AUTO: 12.71 10*3/MM3 (ref 1.7–7)
NEUTROPHILS NFR BLD AUTO: 16.76 10*3/MM3 (ref 1.7–7)
NEUTROPHILS NFR BLD AUTO: 5.89 10*3/MM3 (ref 1.7–7)
NEUTROPHILS NFR BLD AUTO: 6.16 10*3/MM3 (ref 1.7–7)
NEUTROPHILS NFR BLD AUTO: 6.52 10*3/MM3 (ref 1.7–7)
NEUTROPHILS NFR BLD AUTO: 6.57 10*3/MM3 (ref 1.7–7)
NEUTROPHILS NFR BLD AUTO: 6.97 10*3/MM3 (ref 1.7–7)
NEUTROPHILS NFR BLD AUTO: 7.08 10*3/MM3 (ref 1.7–7)
NEUTROPHILS NFR BLD AUTO: 71.5 % (ref 42.7–76)
NEUTROPHILS NFR BLD AUTO: 73.9 % (ref 42.7–76)
NEUTROPHILS NFR BLD AUTO: 74.7 % (ref 42.7–76)
NEUTROPHILS NFR BLD AUTO: 76 % (ref 42.7–76)
NEUTROPHILS NFR BLD AUTO: 79.1 % (ref 42.7–76)
NEUTROPHILS NFR BLD AUTO: 84.5 % (ref 42.7–76)
NEUTROPHILS NFR BLD AUTO: 85 % (ref 42.7–76)
NEUTROPHILS NFR BLD AUTO: 85.3 % (ref 42.7–76)
NEUTROPHILS NFR BLD AUTO: 88.8 % (ref 42.7–76)
NEUTROPHILS NFR BLD AUTO: 89.2 % (ref 42.7–76)
NEUTROPHILS NFR BLD AUTO: 9.54 10*3/MM3 (ref 1.7–7)
NITRITE UR QL STRIP: NEGATIVE
NOROVIRUS GI+II RNA STL QL NAA+NON-PROBE: NOT DETECTED
NRBC BLD AUTO-RTO: 0 /100 WBC (ref 0–0.2)
NRBC BLD AUTO-RTO: 0.1 /100 WBC (ref 0–0.2)
NT-PROBNP SERPL-MCNC: 1575 PG/ML (ref 0–1800)
NT-PROBNP SERPL-MCNC: 1600 PG/ML (ref 0–1800)
NT-PROBNP SERPL-MCNC: 3167 PG/ML (ref 0–1800)
NT-PROBNP SERPL-MCNC: 4585 PG/ML (ref 0–1800)
P SHIGELLOIDES DNA STL QL NAA+NON-PROBE: NOT DETECTED
PCO2 BLDA: 43 MM HG (ref 35–45)
PCO2 BLDA: 57.9 MM HG (ref 35–45)
PCO2 BLDA: 74.7 MM HG (ref 35–45)
PCO2 BLDA: 77.9 MM HG (ref 35–45)
PH BLDA: 7.27 PH UNITS (ref 7.35–7.45)
PH BLDA: 7.3 PH UNITS (ref 7.35–7.45)
PH BLDA: 7.33 PH UNITS (ref 7.35–7.45)
PH BLDA: 7.37 PH UNITS (ref 7.35–7.45)
PH UR STRIP.AUTO: 6 [PH] (ref 5–8)
PH UR STRIP.AUTO: <=5 [PH] (ref 5–8)
PH UR STRIP.AUTO: <=5 [PH] (ref 5–8)
PHOSPHATE SERPL-MCNC: 3.8 MG/DL (ref 2.5–4.5)
PHOSPHATE SERPL-MCNC: 5 MG/DL (ref 2.5–4.5)
PLATELET # BLD AUTO: 191 10*3/MM3 (ref 140–450)
PLATELET # BLD AUTO: 193 10*3/MM3 (ref 140–450)
PLATELET # BLD AUTO: 244 10*3/MM3 (ref 140–450)
PLATELET # BLD AUTO: 255 10*3/MM3 (ref 140–450)
PLATELET # BLD AUTO: 282 10*3/MM3 (ref 140–450)
PLATELET # BLD AUTO: 313 10*3/MM3 (ref 140–450)
PLATELET # BLD AUTO: 346 10*3/MM3 (ref 140–450)
PLATELET # BLD AUTO: 355 10*3/MM3 (ref 140–450)
PLATELET # BLD AUTO: 358 10*3/MM3 (ref 140–450)
PLATELET # BLD AUTO: 362 10*3/MM3 (ref 140–450)
PLATELET # BLD AUTO: 368 10*3/MM3 (ref 140–450)
PMV BLD AUTO: 10 FL (ref 6–12)
PMV BLD AUTO: 10 FL (ref 6–12)
PMV BLD AUTO: 10.1 FL (ref 6–12)
PMV BLD AUTO: 10.2 FL (ref 6–12)
PMV BLD AUTO: 10.2 FL (ref 6–12)
PMV BLD AUTO: 10.3 FL (ref 6–12)
PMV BLD AUTO: 10.4 FL (ref 6–12)
PMV BLD AUTO: 10.5 FL (ref 6–12)
PMV BLD AUTO: 10.6 FL (ref 6–12)
PMV BLD AUTO: 11.2 FL (ref 6–12)
PMV BLD AUTO: 9.9 FL (ref 6–12)
PO2 BLDA: 130.6 MM HG (ref 80–100)
PO2 BLDA: 218.7 MM HG (ref 80–100)
PO2 BLDA: 54.9 MM HG (ref 80–100)
PO2 BLDA: 63 MM HG (ref 80–100)
POTASSIUM SERPL-SCNC: 3.7 MMOL/L (ref 3.5–5.2)
POTASSIUM SERPL-SCNC: 3.8 MMOL/L (ref 3.5–5.2)
POTASSIUM SERPL-SCNC: 3.8 MMOL/L (ref 3.5–5.2)
POTASSIUM SERPL-SCNC: 4.1 MMOL/L (ref 3.5–5.2)
POTASSIUM SERPL-SCNC: 4.1 MMOL/L (ref 3.5–5.2)
POTASSIUM SERPL-SCNC: 4.2 MMOL/L (ref 3.5–5.2)
POTASSIUM SERPL-SCNC: 4.2 MMOL/L (ref 3.5–5.2)
POTASSIUM SERPL-SCNC: 4.7 MMOL/L (ref 3.5–5.2)
POTASSIUM SERPL-SCNC: 4.9 MMOL/L (ref 3.5–5.2)
POTASSIUM SERPL-SCNC: 5 MMOL/L (ref 3.5–5.2)
POTASSIUM SERPL-SCNC: 5 MMOL/L (ref 3.5–5.2)
POTASSIUM SERPL-SCNC: 5.1 MMOL/L (ref 3.5–5.2)
POTASSIUM SERPL-SCNC: 5.2 MMOL/L (ref 3.5–5.2)
POTASSIUM SERPL-SCNC: 5.2 MMOL/L (ref 3.5–5.2)
POTASSIUM SERPL-SCNC: 5.4 MMOL/L (ref 3.5–5.2)
PROCALCITONIN SERPL-MCNC: 0.11 NG/ML (ref 0–0.25)
PROCALCITONIN SERPL-MCNC: 0.18 NG/ML (ref 0–0.25)
PROT SERPL-MCNC: 6.4 G/DL (ref 6–8.5)
PROT SERPL-MCNC: 6.5 G/DL (ref 6–8.5)
PROT SERPL-MCNC: 6.9 G/DL (ref 6–8.5)
PROT SERPL-MCNC: 7.4 G/DL (ref 6–8.5)
PROT UR QL STRIP: ABNORMAL
PROT UR QL STRIP: NEGATIVE
PROT UR QL STRIP: NEGATIVE
PROTHROMBIN TIME: 25.9 SECONDS (ref 11.7–14.2)
QT INTERVAL: 400 MS
QT INTERVAL: 434 MS
QT INTERVAL: 507 MS
QT INTERVAL: 511 MS
QT INTERVAL: 560 MS
QT INTERVAL: 584 MS
RBC # BLD AUTO: 3.18 10*6/MM3 (ref 3.77–5.28)
RBC # BLD AUTO: 3.23 10*6/MM3 (ref 3.77–5.28)
RBC # BLD AUTO: 3.31 10*6/MM3 (ref 3.77–5.28)
RBC # BLD AUTO: 3.34 10*6/MM3 (ref 3.77–5.28)
RBC # BLD AUTO: 3.4 10*6/MM3 (ref 3.77–5.28)
RBC # BLD AUTO: 3.41 10*6/MM3 (ref 3.77–5.28)
RBC # BLD AUTO: 3.42 10*6/MM3 (ref 3.77–5.28)
RBC # BLD AUTO: 3.46 10*6/MM3 (ref 3.77–5.28)
RBC # BLD AUTO: 3.55 10*6/MM3 (ref 3.77–5.28)
RBC # BLD AUTO: 3.62 10*6/MM3 (ref 3.77–5.28)
RBC # BLD AUTO: 3.72 10*6/MM3 (ref 3.77–5.28)
RBC # UR STRIP: ABNORMAL /HPF
RBC # UR STRIP: ABNORMAL /HPF
RBC # UR: ABNORMAL /HPF
REF LAB TEST METHOD: ABNORMAL
RETICS # AUTO: 0.03 10*6/MM3 (ref 0.02–0.13)
RETICS # AUTO: 0.07 10*6/MM3 (ref 0.02–0.13)
RETICS/RBC NFR AUTO: 1.01 % (ref 0.7–1.9)
RETICS/RBC NFR AUTO: 2.13 % (ref 0.7–1.9)
RH BLD: POSITIVE
RHINOVIRUS RNA SPEC NAA+PROBE: NOT DETECTED
RHINOVIRUS RNA SPEC NAA+PROBE: NOT DETECTED
RSV RNA NPH QL NAA+NON-PROBE: NOT DETECTED
RSV RNA NPH QL NAA+NON-PROBE: NOT DETECTED
RVA RNA STL QL NAA+NON-PROBE: NOT DETECTED
S ENT+BONG DNA STL QL NAA+NON-PROBE: NOT DETECTED
SAO2 % BLDCOA: 85.8 % (ref 92–99)
SAO2 % BLDCOA: 90.4 % (ref 92–99)
SAO2 % BLDCOA: 98.4 % (ref 92–99)
SAO2 % BLDCOA: 99.6 % (ref 92–99)
SAPO I+II+IV+V RNA STL QL NAA+NON-PROBE: NOT DETECTED
SARS-COV-2 ORF1AB RESP QL NAA+PROBE: NOT DETECTED
SARS-COV-2 RNA NPH QL NAA+NON-PROBE: NOT DETECTED
SARS-COV-2 RNA NPH QL NAA+NON-PROBE: NOT DETECTED
SHIGELLA SP+EIEC IPAH ST NAA+NON-PROBE: NOT DETECTED
SINUS: 3.3 CM
SODIUM SERPL-SCNC: 133 MMOL/L (ref 136–145)
SODIUM SERPL-SCNC: 137 MMOL/L (ref 136–145)
SODIUM SERPL-SCNC: 138 MMOL/L (ref 136–145)
SODIUM SERPL-SCNC: 139 MMOL/L (ref 136–145)
SODIUM SERPL-SCNC: 139 MMOL/L (ref 136–145)
SODIUM SERPL-SCNC: 140 MMOL/L (ref 136–145)
SODIUM SERPL-SCNC: 142 MMOL/L (ref 136–145)
SODIUM SERPL-SCNC: 143 MMOL/L (ref 136–145)
SODIUM SERPL-SCNC: 144 MMOL/L (ref 136–145)
SODIUM SERPL-SCNC: 146 MMOL/L (ref 136–145)
SODIUM SERPL-SCNC: 152 MMOL/L (ref 136–145)
SP GR UR STRIP: 1.01 (ref 1–1.03)
SP GR UR STRIP: 1.01 (ref 1–1.03)
SP GR UR STRIP: 1.02 (ref 1–1.03)
SQUAMOUS #/AREA URNS HPF: ABNORMAL /HPF
STJ: 2.4 CM
STRESS TARGET HR: 120 BPM
STRESS TARGET HR: 121 BPM
T&S EXPIRATION DATE: NORMAL
T4 FREE SERPL-MCNC: 1.04 NG/DL (ref 0.93–1.7)
TIBC SERPL-MCNC: 370 MCG/DL (ref 298–536)
TIBC SERPL-MCNC: 428 MCG/DL (ref 298–536)
TOTAL RATE: 20 BREATHS/MINUTE
TOTAL RATE: 24 BREATHS/MINUTE
TOTAL RATE: 24 BREATHS/MINUTE
TRANSFERRIN SERPL-MCNC: 248 MG/DL (ref 200–360)
TRANSFERRIN SERPL-MCNC: 287 MG/DL (ref 200–360)
TROPONIN T SERPL-MCNC: 0.01 NG/ML (ref 0–0.03)
TROPONIN T SERPL-MCNC: 0.02 NG/ML (ref 0–0.03)
TROPONIN T SERPL-MCNC: 0.02 NG/ML (ref 0–0.03)
TROPONIN T SERPL-MCNC: <0.01 NG/ML (ref 0–0.03)
TSH SERPL DL<=0.05 MIU/L-ACNC: 0.1 UIU/ML (ref 0.27–4.2)
TSH SERPL DL<=0.05 MIU/L-ACNC: 0.98 UIU/ML (ref 0.27–4.2)
TSH SERPL DL<=0.05 MIU/L-ACNC: 1.58 UIU/ML (ref 0.27–4.2)
TSH SERPL DL<=0.05 MIU/L-ACNC: 1.91 UIU/ML (ref 0.27–4.2)
UNIT  ABO: NORMAL
UNIT  RH: NORMAL
UROBILINOGEN UR QL STRIP: ABNORMAL
V CHOL+PARA+VUL DNA STL QL NAA+NON-PROBE: NOT DETECTED
V CHOLERAE DNA STL QL NAA+NON-PROBE: NOT DETECTED
VIT B12 BLD-MCNC: 1037 PG/ML (ref 211–946)
VIT B12 BLD-MCNC: 1665 PG/ML (ref 211–946)
WBC # BLD AUTO: 7.22 10*3/MM3 (ref 3.4–10.8)
WBC # BLD AUTO: 7.45 10*3/MM3 (ref 3.4–10.8)
WBC # BLD AUTO: 8.65 10*3/MM3 (ref 3.4–10.8)
WBC # BLD AUTO: 8.83 10*3/MM3 (ref 3.4–10.8)
WBC # BLD AUTO: 9.74 10*3/MM3 (ref 3.4–10.8)
WBC # UR STRIP: ABNORMAL /HPF
WBC # UR STRIP: ABNORMAL /HPF
WBC NRBC COR # BLD: 11.22 10*3/MM3 (ref 3.4–10.8)
WBC NRBC COR # BLD: 13.14 10*3/MM3 (ref 3.4–10.8)
WBC NRBC COR # BLD: 14.25 10*3/MM3 (ref 3.4–10.8)
WBC NRBC COR # BLD: 18.87 10*3/MM3 (ref 3.4–10.8)
WBC NRBC COR # BLD: 8.78 10*3/MM3 (ref 3.4–10.8)
WBC NRBC COR # BLD: 9.32 10*3/MM3 (ref 3.4–10.8)
WBC UR QL AUTO: ABNORMAL /HPF
WHOLE BLOOD HOLD SPECIMEN: NORMAL
WHOLE BLOOD HOLD SPECIMEN: NORMAL
Y ENTEROCOL DNA STL QL NAA+NON-PROBE: NOT DETECTED

## 2021-01-01 PROCEDURE — 94799 UNLISTED PULMONARY SVC/PX: CPT

## 2021-01-01 PROCEDURE — 80048 BASIC METABOLIC PNL TOTAL CA: CPT | Performed by: INTERNAL MEDICINE

## 2021-01-01 PROCEDURE — 63710000001 ONDANSETRON PER 8 MG: Performed by: INTERNAL MEDICINE

## 2021-01-01 PROCEDURE — 85014 HEMATOCRIT: CPT | Performed by: INTERNAL MEDICINE

## 2021-01-01 PROCEDURE — 84145 PROCALCITONIN (PCT): CPT | Performed by: EMERGENCY MEDICINE

## 2021-01-01 PROCEDURE — 83880 ASSAY OF NATRIURETIC PEPTIDE: CPT | Performed by: EMERGENCY MEDICINE

## 2021-01-01 PROCEDURE — 93010 ELECTROCARDIOGRAM REPORT: CPT | Performed by: INTERNAL MEDICINE

## 2021-01-01 PROCEDURE — 94640 AIRWAY INHALATION TREATMENT: CPT

## 2021-01-01 PROCEDURE — 99231 SBSQ HOSP IP/OBS SF/LOW 25: CPT | Performed by: INTERNAL MEDICINE

## 2021-01-01 PROCEDURE — 99232 SBSQ HOSP IP/OBS MODERATE 35: CPT | Performed by: INTERNAL MEDICINE

## 2021-01-01 PROCEDURE — 36415 COLL VENOUS BLD VENIPUNCTURE: CPT | Performed by: INTERNAL MEDICINE

## 2021-01-01 PROCEDURE — 25010000002 PERFLUTREN (DEFINITY) 8.476 MG IN SODIUM CHLORIDE 0.9 % 10 ML INJECTION: Performed by: INTERNAL MEDICINE

## 2021-01-01 PROCEDURE — 99285 EMERGENCY DEPT VISIT HI MDM: CPT

## 2021-01-01 PROCEDURE — P9016 RBC LEUKOCYTES REDUCED: HCPCS

## 2021-01-01 PROCEDURE — 82272 OCCULT BLD FECES 1-3 TESTS: CPT | Performed by: INTERNAL MEDICINE

## 2021-01-01 PROCEDURE — 97535 SELF CARE MNGMENT TRAINING: CPT

## 2021-01-01 PROCEDURE — 83735 ASSAY OF MAGNESIUM: CPT | Performed by: INTERNAL MEDICINE

## 2021-01-01 PROCEDURE — 63710000001 PROMETHAZINE PER 25 MG: Performed by: INTERNAL MEDICINE

## 2021-01-01 PROCEDURE — 82747 ASSAY OF FOLIC ACID RBC: CPT | Performed by: INTERNAL MEDICINE

## 2021-01-01 PROCEDURE — 25010000002 ONDANSETRON PER 1 MG: Performed by: INTERNAL MEDICINE

## 2021-01-01 PROCEDURE — 76775 US EXAM ABDO BACK WALL LIM: CPT

## 2021-01-01 PROCEDURE — 94664 DEMO&/EVAL PT USE INHALER: CPT

## 2021-01-01 PROCEDURE — 82607 VITAMIN B-12: CPT | Performed by: INTERNAL MEDICINE

## 2021-01-01 PROCEDURE — 87086 URINE CULTURE/COLONY COUNT: CPT | Performed by: INTERNAL MEDICINE

## 2021-01-01 PROCEDURE — 74176 CT ABD & PELVIS W/O CONTRAST: CPT

## 2021-01-01 PROCEDURE — 85379 FIBRIN DEGRADATION QUANT: CPT | Performed by: INTERNAL MEDICINE

## 2021-01-01 PROCEDURE — 86900 BLOOD TYPING SEROLOGIC ABO: CPT

## 2021-01-01 PROCEDURE — 80053 COMPREHEN METABOLIC PANEL: CPT | Performed by: EMERGENCY MEDICINE

## 2021-01-01 PROCEDURE — 93005 ELECTROCARDIOGRAM TRACING: CPT | Performed by: EMERGENCY MEDICINE

## 2021-01-01 PROCEDURE — 84100 ASSAY OF PHOSPHORUS: CPT | Performed by: INTERNAL MEDICINE

## 2021-01-01 PROCEDURE — 85379 FIBRIN DEGRADATION QUANT: CPT | Performed by: EMERGENCY MEDICINE

## 2021-01-01 PROCEDURE — 86140 C-REACTIVE PROTEIN: CPT | Performed by: EMERGENCY MEDICINE

## 2021-01-01 PROCEDURE — 94660 CPAP INITIATION&MGMT: CPT

## 2021-01-01 PROCEDURE — 85025 COMPLETE CBC W/AUTO DIFF WBC: CPT | Performed by: INTERNAL MEDICINE

## 2021-01-01 PROCEDURE — 97110 THERAPEUTIC EXERCISES: CPT

## 2021-01-01 PROCEDURE — 25010000002 FUROSEMIDE PER 20 MG: Performed by: INTERNAL MEDICINE

## 2021-01-01 PROCEDURE — 94761 N-INVAS EAR/PLS OXIMETRY MLT: CPT

## 2021-01-01 PROCEDURE — 80053 COMPREHEN METABOLIC PANEL: CPT | Performed by: INTERNAL MEDICINE

## 2021-01-01 PROCEDURE — 36600 WITHDRAWAL OF ARTERIAL BLOOD: CPT

## 2021-01-01 PROCEDURE — U0005 INFEC AGEN DETEC AMPLI PROBE: HCPCS | Performed by: INTERNAL MEDICINE

## 2021-01-01 PROCEDURE — 99231 SBSQ HOSP IP/OBS SF/LOW 25: CPT | Performed by: NURSE PRACTITIONER

## 2021-01-01 PROCEDURE — 63710000001 PROMETHAZINE PER 12.5 MG: Performed by: INTERNAL MEDICINE

## 2021-01-01 PROCEDURE — 83090 ASSAY OF HOMOCYSTEINE: CPT | Performed by: INTERNAL MEDICINE

## 2021-01-01 PROCEDURE — 25010000002 MAGNESIUM SULFATE 2 GM/50ML SOLUTION: Performed by: INTERNAL MEDICINE

## 2021-01-01 PROCEDURE — 83880 ASSAY OF NATRIURETIC PEPTIDE: CPT | Performed by: INTERNAL MEDICINE

## 2021-01-01 PROCEDURE — 97530 THERAPEUTIC ACTIVITIES: CPT

## 2021-01-01 PROCEDURE — 83605 ASSAY OF LACTIC ACID: CPT | Performed by: INTERNAL MEDICINE

## 2021-01-01 PROCEDURE — 81001 URINALYSIS AUTO W/SCOPE: CPT | Performed by: INTERNAL MEDICINE

## 2021-01-01 PROCEDURE — 83540 ASSAY OF IRON: CPT | Performed by: INTERNAL MEDICINE

## 2021-01-01 PROCEDURE — 93005 ELECTROCARDIOGRAM TRACING: CPT | Performed by: INTERNAL MEDICINE

## 2021-01-01 PROCEDURE — 86923 COMPATIBILITY TEST ELECTRIC: CPT

## 2021-01-01 PROCEDURE — 83615 LACTATE (LD) (LDH) ENZYME: CPT | Performed by: EMERGENCY MEDICINE

## 2021-01-01 PROCEDURE — 84443 ASSAY THYROID STIM HORMONE: CPT | Performed by: INTERNAL MEDICINE

## 2021-01-01 PROCEDURE — 25010000002 ENOXAPARIN PER 10 MG: Performed by: INTERNAL MEDICINE

## 2021-01-01 PROCEDURE — 99222 1ST HOSP IP/OBS MODERATE 55: CPT | Performed by: NURSE PRACTITIONER

## 2021-01-01 PROCEDURE — 83605 ASSAY OF LACTIC ACID: CPT | Performed by: EMERGENCY MEDICINE

## 2021-01-01 PROCEDURE — 0202U NFCT DS 22 TRGT SARS-COV-2: CPT | Performed by: EMERGENCY MEDICINE

## 2021-01-01 PROCEDURE — 71045 X-RAY EXAM CHEST 1 VIEW: CPT

## 2021-01-01 PROCEDURE — 86850 RBC ANTIBODY SCREEN: CPT | Performed by: INTERNAL MEDICINE

## 2021-01-01 PROCEDURE — 97165 OT EVAL LOW COMPLEX 30 MIN: CPT

## 2021-01-01 PROCEDURE — 94760 N-INVAS EAR/PLS OXIMETRY 1: CPT

## 2021-01-01 PROCEDURE — 25010000002 LEVOFLOXACIN PER 250 MG: Performed by: INTERNAL MEDICINE

## 2021-01-01 PROCEDURE — 85025 COMPLETE CBC W/AUTO DIFF WBC: CPT | Performed by: EMERGENCY MEDICINE

## 2021-01-01 PROCEDURE — 93306 TTE W/DOPPLER COMPLETE: CPT

## 2021-01-01 PROCEDURE — 82803 BLOOD GASES ANY COMBINATION: CPT

## 2021-01-01 PROCEDURE — 86900 BLOOD TYPING SEROLOGIC ABO: CPT | Performed by: INTERNAL MEDICINE

## 2021-01-01 PROCEDURE — 71046 X-RAY EXAM CHEST 2 VIEWS: CPT

## 2021-01-01 PROCEDURE — G0378 HOSPITAL OBSERVATION PER HR: HCPCS

## 2021-01-01 PROCEDURE — 85027 COMPLETE CBC AUTOMATED: CPT | Performed by: INTERNAL MEDICINE

## 2021-01-01 PROCEDURE — 25010000002 NA FERRIC GLUC CPLX PER 12.5 MG: Performed by: INTERNAL MEDICINE

## 2021-01-01 PROCEDURE — 84145 PROCALCITONIN (PCT): CPT | Performed by: INTERNAL MEDICINE

## 2021-01-01 PROCEDURE — 82962 GLUCOSE BLOOD TEST: CPT

## 2021-01-01 PROCEDURE — 0097U HC BIOFIRE FILMARRAY GI PANEL: CPT | Performed by: INTERNAL MEDICINE

## 2021-01-01 PROCEDURE — 82728 ASSAY OF FERRITIN: CPT | Performed by: INTERNAL MEDICINE

## 2021-01-01 PROCEDURE — 97161 PT EVAL LOW COMPLEX 20 MIN: CPT

## 2021-01-01 PROCEDURE — 97162 PT EVAL MOD COMPLEX 30 MIN: CPT

## 2021-01-01 PROCEDURE — 82728 ASSAY OF FERRITIN: CPT | Performed by: EMERGENCY MEDICINE

## 2021-01-01 PROCEDURE — 84439 ASSAY OF FREE THYROXINE: CPT | Performed by: EMERGENCY MEDICINE

## 2021-01-01 PROCEDURE — 84484 ASSAY OF TROPONIN QUANT: CPT | Performed by: INTERNAL MEDICINE

## 2021-01-01 PROCEDURE — 84484 ASSAY OF TROPONIN QUANT: CPT | Performed by: EMERGENCY MEDICINE

## 2021-01-01 PROCEDURE — 83735 ASSAY OF MAGNESIUM: CPT | Performed by: EMERGENCY MEDICINE

## 2021-01-01 PROCEDURE — 93975 VASCULAR STUDY: CPT

## 2021-01-01 PROCEDURE — 84466 ASSAY OF TRANSFERRIN: CPT | Performed by: INTERNAL MEDICINE

## 2021-01-01 PROCEDURE — 85610 PROTHROMBIN TIME: CPT | Performed by: EMERGENCY MEDICINE

## 2021-01-01 PROCEDURE — 84443 ASSAY THYROID STIM HORMONE: CPT | Performed by: EMERGENCY MEDICINE

## 2021-01-01 PROCEDURE — 74022 RADEX COMPL AQT ABD SERIES: CPT

## 2021-01-01 PROCEDURE — 86901 BLOOD TYPING SEROLOGIC RH(D): CPT | Performed by: INTERNAL MEDICINE

## 2021-01-01 PROCEDURE — 99222 1ST HOSP IP/OBS MODERATE 55: CPT | Performed by: INTERNAL MEDICINE

## 2021-01-01 PROCEDURE — 36430 TRANSFUSION BLD/BLD COMPNT: CPT

## 2021-01-01 PROCEDURE — 85045 AUTOMATED RETICULOCYTE COUNT: CPT | Performed by: INTERNAL MEDICINE

## 2021-01-01 PROCEDURE — 93306 TTE W/DOPPLER COMPLETE: CPT | Performed by: INTERNAL MEDICINE

## 2021-01-01 PROCEDURE — 87077 CULTURE AEROBIC IDENTIFY: CPT | Performed by: INTERNAL MEDICINE

## 2021-01-01 PROCEDURE — U0004 COV-19 TEST NON-CDC HGH THRU: HCPCS | Performed by: INTERNAL MEDICINE

## 2021-01-01 RX ORDER — LEVOTHYROXINE SODIUM 0.03 MG/1
25 TABLET ORAL DAILY
Status: DISCONTINUED | OUTPATIENT
Start: 2021-01-01 | End: 2022-01-01 | Stop reason: HOSPADM

## 2021-01-01 RX ORDER — IPRATROPIUM BROMIDE AND ALBUTEROL SULFATE 2.5; .5 MG/3ML; MG/3ML
3 SOLUTION RESPIRATORY (INHALATION) EVERY 6 HOURS PRN
Qty: 360 ML | Refills: 3 | Status: SHIPPED | OUTPATIENT
Start: 2021-01-01

## 2021-01-01 RX ORDER — LISINOPRIL 20 MG/1
20 TABLET ORAL
Status: DISCONTINUED | OUTPATIENT
Start: 2021-01-01 | End: 2022-01-01 | Stop reason: HOSPADM

## 2021-01-01 RX ORDER — LORAZEPAM 0.5 MG/1
0.5 TABLET ORAL 2 TIMES DAILY PRN
Qty: 60 TABLET | Refills: 0 | Status: SHIPPED | OUTPATIENT
Start: 2021-01-01 | End: 2021-01-01 | Stop reason: HOSPADM

## 2021-01-01 RX ORDER — MORPHINE SULFATE 20 MG/ML
20 SOLUTION ORAL
Status: DISCONTINUED | OUTPATIENT
Start: 2021-01-01 | End: 2022-01-01

## 2021-01-01 RX ORDER — ONDANSETRON 4 MG/1
4 TABLET, FILM COATED ORAL EVERY 6 HOURS
Status: DISCONTINUED | OUTPATIENT
Start: 2021-01-01 | End: 2021-01-01

## 2021-01-01 RX ORDER — LEVOTHYROXINE SODIUM 0.05 MG/1
50 TABLET ORAL DAILY
Status: DISCONTINUED | OUTPATIENT
Start: 2021-01-01 | End: 2021-01-01

## 2021-01-01 RX ORDER — TRAMADOL HYDROCHLORIDE 50 MG/1
50 TABLET ORAL EVERY 8 HOURS PRN
Status: DISCONTINUED | OUTPATIENT
Start: 2021-01-01 | End: 2021-01-01 | Stop reason: HOSPADM

## 2021-01-01 RX ORDER — SODIUM CHLORIDE 0.9 % (FLUSH) 0.9 %
10 SYRINGE (ML) INJECTION AS NEEDED
Status: DISCONTINUED | OUTPATIENT
Start: 2021-01-01 | End: 2021-01-01 | Stop reason: HOSPADM

## 2021-01-01 RX ORDER — PROPAFENONE HYDROCHLORIDE 150 MG/1
150 TABLET, COATED ORAL EVERY 8 HOURS SCHEDULED
Status: DISCONTINUED | OUTPATIENT
Start: 2021-01-01 | End: 2021-01-01 | Stop reason: HOSPADM

## 2021-01-01 RX ORDER — FERROUS SULFATE 325(65) MG
325 TABLET ORAL 3 TIMES WEEKLY
Qty: 90 TABLET | Refills: 1 | Status: SHIPPED | OUTPATIENT
Start: 2021-01-01

## 2021-01-01 RX ORDER — SODIUM CHLORIDE 0.9 % (FLUSH) 0.9 %
10 SYRINGE (ML) INJECTION EVERY 12 HOURS SCHEDULED
Status: DISCONTINUED | OUTPATIENT
Start: 2021-01-01 | End: 2021-01-01 | Stop reason: HOSPADM

## 2021-01-01 RX ORDER — MORPHINE SULFATE 20 MG/ML
5 SOLUTION ORAL
Status: DISCONTINUED | OUTPATIENT
Start: 2021-01-01 | End: 2022-01-01

## 2021-01-01 RX ORDER — CHOLECALCIFEROL (VITAMIN D3) 125 MCG
5 CAPSULE ORAL NIGHTLY
COMMUNITY

## 2021-01-01 RX ORDER — BUDESONIDE 0.5 MG/2ML
0.5 INHALANT ORAL
Status: DISCONTINUED | OUTPATIENT
Start: 2021-01-01 | End: 2022-01-01 | Stop reason: HOSPADM

## 2021-01-01 RX ORDER — FUROSEMIDE 10 MG/ML
40 INJECTION INTRAMUSCULAR; INTRAVENOUS ONCE
Status: COMPLETED | OUTPATIENT
Start: 2021-01-01 | End: 2021-01-01

## 2021-01-01 RX ORDER — ONDANSETRON 4 MG/1
4 TABLET, FILM COATED ORAL EVERY 12 HOURS PRN
Status: DISCONTINUED | OUTPATIENT
Start: 2021-01-01 | End: 2021-01-01

## 2021-01-01 RX ORDER — PANTOPRAZOLE SODIUM 40 MG/1
40 TABLET, DELAYED RELEASE ORAL
Status: DISCONTINUED | OUTPATIENT
Start: 2021-01-01 | End: 2021-01-01 | Stop reason: HOSPADM

## 2021-01-01 RX ORDER — LISINOPRIL 20 MG/1
20 TABLET ORAL NIGHTLY
Status: DISCONTINUED | OUTPATIENT
Start: 2021-01-01 | End: 2021-01-01 | Stop reason: HOSPADM

## 2021-01-01 RX ORDER — LORAZEPAM 0.5 MG/1
0.25 TABLET ORAL 3 TIMES DAILY PRN
Status: DISCONTINUED | OUTPATIENT
Start: 2021-01-01 | End: 2021-01-01

## 2021-01-01 RX ORDER — ALBUTEROL SULFATE 2.5 MG/3ML
2.5 SOLUTION RESPIRATORY (INHALATION) EVERY 4 HOURS PRN
COMMUNITY
End: 2021-01-01 | Stop reason: HOSPADM

## 2021-01-01 RX ORDER — DOCUSATE SODIUM 100 MG/1
100 CAPSULE, LIQUID FILLED ORAL DAILY
Status: DISCONTINUED | OUTPATIENT
Start: 2022-01-01 | End: 2022-01-01 | Stop reason: HOSPADM

## 2021-01-01 RX ORDER — AMLODIPINE BESYLATE 5 MG/1
5 TABLET ORAL
Status: DISCONTINUED | OUTPATIENT
Start: 2021-01-01 | End: 2021-01-01 | Stop reason: HOSPADM

## 2021-01-01 RX ORDER — MELATONIN
1000 DAILY
Status: DISCONTINUED | OUTPATIENT
Start: 2021-01-01 | End: 2021-01-01 | Stop reason: HOSPADM

## 2021-01-01 RX ORDER — FORMOTEROL FUMARATE DIHYDRATE 20 UG/2ML
20 SOLUTION RESPIRATORY (INHALATION)
COMMUNITY
End: 2021-01-01 | Stop reason: HOSPADM

## 2021-01-01 RX ORDER — PROMETHAZINE HYDROCHLORIDE 25 MG/1
12.5 TABLET ORAL EVERY 6 HOURS PRN
Status: DISCONTINUED | OUTPATIENT
Start: 2021-01-01 | End: 2022-01-01 | Stop reason: HOSPADM

## 2021-01-01 RX ORDER — NITROGLYCERIN 0.4 MG/1
0.4 TABLET SUBLINGUAL
Status: DISCONTINUED | OUTPATIENT
Start: 2021-01-01 | End: 2021-01-01 | Stop reason: HOSPADM

## 2021-01-01 RX ORDER — ACETAMINOPHEN 500 MG
500 TABLET ORAL EVERY 6 HOURS PRN
Status: DISCONTINUED | OUTPATIENT
Start: 2021-01-01 | End: 2021-01-01 | Stop reason: HOSPADM

## 2021-01-01 RX ORDER — SODIUM CHLORIDE, SODIUM LACTATE, POTASSIUM CHLORIDE, CALCIUM CHLORIDE 600; 310; 30; 20 MG/100ML; MG/100ML; MG/100ML; MG/100ML
50 INJECTION, SOLUTION INTRAVENOUS CONTINUOUS
Status: DISCONTINUED | OUTPATIENT
Start: 2021-01-01 | End: 2021-01-01

## 2021-01-01 RX ORDER — POLYETHYLENE GLYCOL 3350 17 G/17G
17 POWDER, FOR SOLUTION ORAL DAILY PRN
Status: DISCONTINUED | OUTPATIENT
Start: 2021-01-01 | End: 2021-01-01 | Stop reason: HOSPADM

## 2021-01-01 RX ORDER — ARFORMOTEROL TARTRATE 15 UG/2ML
15 SOLUTION RESPIRATORY (INHALATION)
Status: DISCONTINUED | OUTPATIENT
Start: 2021-01-01 | End: 2022-01-01 | Stop reason: HOSPADM

## 2021-01-01 RX ORDER — NITROGLYCERIN 0.4 MG/1
0.4 TABLET SUBLINGUAL
Status: DISCONTINUED | OUTPATIENT
Start: 2021-01-01 | End: 2022-01-01 | Stop reason: HOSPADM

## 2021-01-01 RX ORDER — FERROUS SULFATE 325(65) MG
325 TABLET ORAL
Status: DISCONTINUED | OUTPATIENT
Start: 2021-01-01 | End: 2021-01-01 | Stop reason: HOSPADM

## 2021-01-01 RX ORDER — CARVEDILOL 3.12 MG/1
3.12 TABLET ORAL 2 TIMES DAILY WITH MEALS
Status: DISCONTINUED | OUTPATIENT
Start: 2021-01-01 | End: 2021-01-01 | Stop reason: HOSPADM

## 2021-01-01 RX ORDER — IPRATROPIUM BROMIDE AND ALBUTEROL SULFATE 2.5; .5 MG/3ML; MG/3ML
3 SOLUTION RESPIRATORY (INHALATION) EVERY 6 HOURS PRN
Status: DISCONTINUED | OUTPATIENT
Start: 2021-01-01 | End: 2022-01-01 | Stop reason: HOSPADM

## 2021-01-01 RX ORDER — ACETAMINOPHEN 325 MG/1
650 TABLET ORAL EVERY 6 HOURS PRN
Status: DISCONTINUED | OUTPATIENT
Start: 2021-01-01 | End: 2021-01-01 | Stop reason: HOSPADM

## 2021-01-01 RX ORDER — ONDANSETRON 4 MG/1
4 TABLET, FILM COATED ORAL EVERY 6 HOURS
Status: DISPENSED | OUTPATIENT
Start: 2021-01-01 | End: 2021-01-01

## 2021-01-01 RX ORDER — ONDANSETRON 4 MG/1
4 TABLET, FILM COATED ORAL EVERY 12 HOURS PRN
COMMUNITY

## 2021-01-01 RX ORDER — SCOLOPAMINE TRANSDERMAL SYSTEM 1 MG/1
1 PATCH, EXTENDED RELEASE TRANSDERMAL
Status: DISCONTINUED | OUTPATIENT
Start: 2021-01-01 | End: 2022-01-01 | Stop reason: HOSPADM

## 2021-01-01 RX ORDER — PROPAFENONE HYDROCHLORIDE 150 MG/1
150 TABLET, COATED ORAL EVERY 8 HOURS SCHEDULED
Status: DISCONTINUED | OUTPATIENT
Start: 2021-01-01 | End: 2021-01-01

## 2021-01-01 RX ORDER — ARFORMOTEROL TARTRATE 15 UG/2ML
15 SOLUTION RESPIRATORY (INHALATION)
Qty: 120 ML | Refills: 3 | Status: SHIPPED | OUTPATIENT
Start: 2021-01-01

## 2021-01-01 RX ORDER — FUROSEMIDE 10 MG/ML
80 INJECTION INTRAMUSCULAR; INTRAVENOUS ONCE
Status: COMPLETED | OUTPATIENT
Start: 2021-01-01 | End: 2021-01-01

## 2021-01-01 RX ORDER — CARVEDILOL 6.25 MG/1
6.25 TABLET ORAL 2 TIMES DAILY WITH MEALS
Status: DISCONTINUED | OUTPATIENT
Start: 2021-01-01 | End: 2021-01-01

## 2021-01-01 RX ORDER — PANTOPRAZOLE SODIUM 40 MG/1
40 TABLET, DELAYED RELEASE ORAL EVERY MORNING
Status: DISCONTINUED | OUTPATIENT
Start: 2021-01-01 | End: 2022-01-01 | Stop reason: HOSPADM

## 2021-01-01 RX ORDER — CHOLECALCIFEROL (VITAMIN D3) 125 MCG
5 CAPSULE ORAL NIGHTLY
Status: DISCONTINUED | OUTPATIENT
Start: 2021-01-01 | End: 2022-01-01 | Stop reason: HOSPADM

## 2021-01-01 RX ORDER — SODIUM CHLORIDE, SODIUM LACTATE, POTASSIUM CHLORIDE, CALCIUM CHLORIDE 600; 310; 30; 20 MG/100ML; MG/100ML; MG/100ML; MG/100ML
100 INJECTION, SOLUTION INTRAVENOUS CONTINUOUS
Status: DISCONTINUED | OUTPATIENT
Start: 2021-01-01 | End: 2021-01-01

## 2021-01-01 RX ORDER — FUROSEMIDE 40 MG/1
40 TABLET ORAL DAILY
Status: DISCONTINUED | OUTPATIENT
Start: 2021-01-01 | End: 2021-01-01 | Stop reason: HOSPADM

## 2021-01-01 RX ORDER — LORAZEPAM 0.5 MG/1
0.5 TABLET ORAL EVERY 8 HOURS PRN
Status: DISCONTINUED | OUTPATIENT
Start: 2021-01-01 | End: 2021-01-01 | Stop reason: HOSPADM

## 2021-01-01 RX ORDER — POTASSIUM CHLORIDE 750 MG/1
20 TABLET, FILM COATED, EXTENDED RELEASE ORAL DAILY
Status: DISCONTINUED | OUTPATIENT
Start: 2021-01-01 | End: 2021-01-01

## 2021-01-01 RX ORDER — CARVEDILOL 12.5 MG/1
12.5 TABLET ORAL 2 TIMES DAILY WITH MEALS
Status: DISCONTINUED | OUTPATIENT
Start: 2021-01-01 | End: 2021-01-01

## 2021-01-01 RX ORDER — IPRATROPIUM BROMIDE AND ALBUTEROL SULFATE 2.5; .5 MG/3ML; MG/3ML
3 SOLUTION RESPIRATORY (INHALATION)
Status: DISCONTINUED | OUTPATIENT
Start: 2021-01-01 | End: 2021-01-01

## 2021-01-01 RX ORDER — PROPAFENONE HYDROCHLORIDE 150 MG/1
150 TABLET, COATED ORAL EVERY 8 HOURS SCHEDULED
Qty: 360 TABLET | Refills: 3 | Status: SHIPPED | OUTPATIENT
Start: 2021-01-01 | End: 2021-01-01 | Stop reason: HOSPADM

## 2021-01-01 RX ORDER — SULFAMETHOXAZOLE AND TRIMETHOPRIM 800; 160 MG/1; MG/1
1 TABLET ORAL 2 TIMES DAILY
COMMUNITY

## 2021-01-01 RX ORDER — UREA 10 %
2 LOTION (ML) TOPICAL NIGHTLY
Qty: 90 TABLET | Refills: 3 | Status: ON HOLD | OUTPATIENT
Start: 2021-01-01 | End: 2021-01-01

## 2021-01-01 RX ORDER — LEVOFLOXACIN 5 MG/ML
500 INJECTION, SOLUTION INTRAVENOUS
Status: DISCONTINUED | OUTPATIENT
Start: 2021-01-01 | End: 2021-01-01

## 2021-01-01 RX ORDER — BUDESONIDE 0.5 MG/2ML
0.5 INHALANT ORAL
Status: DISCONTINUED | OUTPATIENT
Start: 2021-01-01 | End: 2021-01-01 | Stop reason: HOSPADM

## 2021-01-01 RX ORDER — ACETAMINOPHEN 500 MG
500 TABLET ORAL EVERY 6 HOURS PRN
Status: DISCONTINUED | OUTPATIENT
Start: 2021-01-01 | End: 2022-01-01 | Stop reason: HOSPADM

## 2021-01-01 RX ORDER — LEVOTHYROXINE SODIUM 0.05 MG/1
50 TABLET ORAL
Status: DISCONTINUED | OUTPATIENT
Start: 2021-01-01 | End: 2021-01-01 | Stop reason: HOSPADM

## 2021-01-01 RX ORDER — SODIUM CHLORIDE FOR INHALATION 7 %
4 VIAL, NEBULIZER (ML) INHALATION
Status: DISCONTINUED | OUTPATIENT
Start: 2021-01-01 | End: 2021-01-01

## 2021-01-01 RX ORDER — LISINOPRIL 20 MG/1
20 TABLET ORAL
Status: DISCONTINUED | OUTPATIENT
Start: 2021-01-01 | End: 2021-01-01 | Stop reason: HOSPADM

## 2021-01-01 RX ORDER — SULFAMETHOXAZOLE AND TRIMETHOPRIM 800; 160 MG/1; MG/1
0.5 TABLET ORAL EVERY 12 HOURS SCHEDULED
Status: DISCONTINUED | OUTPATIENT
Start: 2021-01-01 | End: 2021-01-01

## 2021-01-01 RX ORDER — POTASSIUM CHLORIDE 750 MG/1
40 TABLET, FILM COATED, EXTENDED RELEASE ORAL DAILY
Status: DISCONTINUED | OUTPATIENT
Start: 2021-01-01 | End: 2021-01-01 | Stop reason: HOSPADM

## 2021-01-01 RX ORDER — SODIUM CHLORIDE 0.9 % (FLUSH) 0.9 %
10 SYRINGE (ML) INJECTION EVERY 12 HOURS SCHEDULED
Status: DISCONTINUED | OUTPATIENT
Start: 2021-01-01 | End: 2022-01-01 | Stop reason: HOSPADM

## 2021-01-01 RX ORDER — FUROSEMIDE 40 MG/1
40 TABLET ORAL DAILY
Status: DISCONTINUED | OUTPATIENT
Start: 2021-01-01 | End: 2022-01-01 | Stop reason: HOSPADM

## 2021-01-01 RX ORDER — CARVEDILOL 3.12 MG/1
3.12 TABLET ORAL 2 TIMES DAILY WITH MEALS
Status: DISCONTINUED | OUTPATIENT
Start: 2021-01-01 | End: 2022-01-01 | Stop reason: HOSPADM

## 2021-01-01 RX ORDER — CARVEDILOL 3.12 MG/1
3.12 TABLET ORAL 2 TIMES DAILY WITH MEALS
Qty: 180 TABLET | Refills: 3 | Status: SHIPPED | OUTPATIENT
Start: 2021-01-01

## 2021-01-01 RX ORDER — IPRATROPIUM BROMIDE AND ALBUTEROL SULFATE 2.5; .5 MG/3ML; MG/3ML
3 SOLUTION RESPIRATORY (INHALATION) EVERY 6 HOURS PRN
Status: DISCONTINUED | OUTPATIENT
Start: 2021-01-01 | End: 2021-01-01 | Stop reason: HOSPADM

## 2021-01-01 RX ORDER — POTASSIUM CHLORIDE 20 MEQ/1
20 TABLET, EXTENDED RELEASE ORAL 2 TIMES DAILY
Qty: 180 TABLET | Refills: 3 | Status: SHIPPED | OUTPATIENT
Start: 2021-01-01

## 2021-01-01 RX ORDER — FUROSEMIDE 80 MG
80 TABLET ORAL DAILY
Status: DISCONTINUED | OUTPATIENT
Start: 2021-01-01 | End: 2021-01-01

## 2021-01-01 RX ORDER — FUROSEMIDE 80 MG
80 TABLET ORAL DAILY
Qty: 90 TABLET | Refills: 3 | Status: SHIPPED | OUTPATIENT
Start: 2021-01-01

## 2021-01-01 RX ORDER — PANTOPRAZOLE SODIUM 40 MG/1
40 TABLET, DELAYED RELEASE ORAL EVERY MORNING
Status: DISCONTINUED | OUTPATIENT
Start: 2021-01-01 | End: 2021-01-01 | Stop reason: HOSPADM

## 2021-01-01 RX ORDER — NYSTATIN 100000 [USP'U]/G
POWDER TOPICAL EVERY 12 HOURS SCHEDULED
Status: DISCONTINUED | OUTPATIENT
Start: 2021-01-01 | End: 2022-01-01 | Stop reason: HOSPADM

## 2021-01-01 RX ORDER — ONDANSETRON 2 MG/ML
4 INJECTION INTRAMUSCULAR; INTRAVENOUS EVERY 6 HOURS PRN
Status: DISCONTINUED | OUTPATIENT
Start: 2021-01-01 | End: 2021-01-01 | Stop reason: HOSPADM

## 2021-01-01 RX ORDER — BISACODYL 10 MG
10 SUPPOSITORY, RECTAL RECTAL DAILY PRN
Status: DISCONTINUED | OUTPATIENT
Start: 2021-01-01 | End: 2021-01-01 | Stop reason: HOSPADM

## 2021-01-01 RX ORDER — LEVOFLOXACIN 5 MG/ML
250 INJECTION, SOLUTION INTRAVENOUS
Status: DISCONTINUED | OUTPATIENT
Start: 2021-01-01 | End: 2021-01-01

## 2021-01-01 RX ORDER — POTASSIUM CHLORIDE 1.5 G/1.77G
20 POWDER, FOR SOLUTION ORAL DAILY
Status: DISCONTINUED | OUTPATIENT
Start: 2021-01-01 | End: 2021-01-01 | Stop reason: HOSPADM

## 2021-01-01 RX ORDER — ONDANSETRON 4 MG/1
8 TABLET, FILM COATED ORAL EVERY 12 HOURS PRN
COMMUNITY
End: 2021-01-01 | Stop reason: HOSPADM

## 2021-01-01 RX ORDER — MORPHINE SULFATE 20 MG/ML
10 SOLUTION ORAL
Status: DISCONTINUED | OUTPATIENT
Start: 2021-01-01 | End: 2022-01-01

## 2021-01-01 RX ORDER — ACETAMINOPHEN 500 MG
500 TABLET ORAL EVERY 6 HOURS PRN
COMMUNITY

## 2021-01-01 RX ORDER — CARVEDILOL 6.25 MG/1
6.25 TABLET ORAL 2 TIMES DAILY WITH MEALS
Status: DISCONTINUED | OUTPATIENT
Start: 2021-01-01 | End: 2021-01-01 | Stop reason: HOSPADM

## 2021-01-01 RX ORDER — IPRATROPIUM BROMIDE AND ALBUTEROL SULFATE 2.5; .5 MG/3ML; MG/3ML
3 SOLUTION RESPIRATORY (INHALATION)
Status: DISCONTINUED | OUTPATIENT
Start: 2021-01-01 | End: 2021-01-01 | Stop reason: HOSPADM

## 2021-01-01 RX ORDER — LORAZEPAM 0.5 MG/1
0.25 TABLET ORAL EVERY 6 HOURS PRN
Status: DISCONTINUED | OUTPATIENT
Start: 2021-01-01 | End: 2022-01-01

## 2021-01-01 RX ORDER — AMLODIPINE BESYLATE 5 MG/1
5 TABLET ORAL
Qty: 90 TABLET | Refills: 3 | Status: SHIPPED | OUTPATIENT
Start: 2021-01-01

## 2021-01-01 RX ORDER — SODIUM CHLORIDE 0.9 % (FLUSH) 0.9 %
10 SYRINGE (ML) INJECTION AS NEEDED
Status: DISCONTINUED | OUTPATIENT
Start: 2021-01-01 | End: 2022-01-01 | Stop reason: HOSPADM

## 2021-01-01 RX ORDER — ATORVASTATIN CALCIUM 20 MG/1
10 TABLET, FILM COATED ORAL DAILY
Status: DISCONTINUED | OUTPATIENT
Start: 2021-01-01 | End: 2021-01-01 | Stop reason: HOSPADM

## 2021-01-01 RX ORDER — LEVOTHYROXINE SODIUM 0.05 MG/1
50 TABLET ORAL DAILY
Status: DISCONTINUED | OUTPATIENT
Start: 2021-01-01 | End: 2021-01-01 | Stop reason: HOSPADM

## 2021-01-01 RX ORDER — UREA 10 %
2 LOTION (ML) TOPICAL NIGHTLY
Status: DISCONTINUED | OUTPATIENT
Start: 2021-01-01 | End: 2021-01-01 | Stop reason: HOSPADM

## 2021-01-01 RX ORDER — MAGNESIUM SULFATE HEPTAHYDRATE 40 MG/ML
2 INJECTION, SOLUTION INTRAVENOUS ONCE
Status: COMPLETED | OUTPATIENT
Start: 2021-01-01 | End: 2021-01-01

## 2021-01-01 RX ORDER — ARFORMOTEROL TARTRATE 15 UG/2ML
15 SOLUTION RESPIRATORY (INHALATION)
Status: DISCONTINUED | OUTPATIENT
Start: 2021-01-01 | End: 2021-01-01 | Stop reason: HOSPADM

## 2021-01-01 RX ORDER — FUROSEMIDE 80 MG
80 TABLET ORAL DAILY
Status: DISCONTINUED | OUTPATIENT
Start: 2021-01-01 | End: 2021-01-01 | Stop reason: HOSPADM

## 2021-01-01 RX ADMIN — MAGNESIUM SULFATE HEPTAHYDRATE 2 G: 40 INJECTION, SOLUTION INTRAVENOUS at 09:35

## 2021-01-01 RX ADMIN — LEVOTHYROXINE SODIUM 50 MCG: 0.05 TABLET ORAL at 09:05

## 2021-01-01 RX ADMIN — NYSTATIN: 100000 POWDER TOPICAL at 22:53

## 2021-01-01 RX ADMIN — FUROSEMIDE 80 MG: 80 TABLET ORAL at 09:05

## 2021-01-01 RX ADMIN — ONDANSETRON HYDROCHLORIDE 4 MG: 4 TABLET, FILM COATED ORAL at 01:07

## 2021-01-01 RX ADMIN — RIVAROXABAN 20 MG: 20 TABLET, FILM COATED ORAL at 18:25

## 2021-01-01 RX ADMIN — LORAZEPAM 0.5 MG: 0.5 TABLET ORAL at 21:23

## 2021-01-01 RX ADMIN — CARVEDILOL 6.25 MG: 6.25 TABLET, FILM COATED ORAL at 17:07

## 2021-01-01 RX ADMIN — BUDESONIDE 0.5 MG: 0.5 INHALANT ORAL at 20:33

## 2021-01-01 RX ADMIN — ONDANSETRON HYDROCHLORIDE 4 MG: 4 TABLET, FILM COATED ORAL at 20:31

## 2021-01-01 RX ADMIN — LORAZEPAM 0.25 MG: 0.5 TABLET ORAL at 17:57

## 2021-01-01 RX ADMIN — ONDANSETRON 4 MG: 2 INJECTION INTRAMUSCULAR; INTRAVENOUS at 05:46

## 2021-01-01 RX ADMIN — CARVEDILOL 3.12 MG: 3.12 TABLET, FILM COATED ORAL at 09:37

## 2021-01-01 RX ADMIN — LORAZEPAM 0.25 MG: 0.5 TABLET ORAL at 22:20

## 2021-01-01 RX ADMIN — IPRATROPIUM BROMIDE AND ALBUTEROL SULFATE 3 ML: 2.5; .5 SOLUTION RESPIRATORY (INHALATION) at 23:43

## 2021-01-01 RX ADMIN — ACETAMINOPHEN 500 MG: 325 TABLET, FILM COATED ORAL at 08:37

## 2021-01-01 RX ADMIN — LORAZEPAM 0.25 MG: 0.5 TABLET ORAL at 21:02

## 2021-01-01 RX ADMIN — ATORVASTATIN CALCIUM 10 MG: 20 TABLET, FILM COATED ORAL at 09:02

## 2021-01-01 RX ADMIN — PROPAFENONE HYDROCHLORIDE 150 MG: 150 TABLET, COATED ORAL at 21:20

## 2021-01-01 RX ADMIN — ACETAMINOPHEN 500 MG: 325 TABLET, FILM COATED ORAL at 20:32

## 2021-01-01 RX ADMIN — CARVEDILOL 3.12 MG: 3.12 TABLET, FILM COATED ORAL at 17:53

## 2021-01-01 RX ADMIN — ARFORMOTEROL TARTRATE 15 MCG: 15 SOLUTION RESPIRATORY (INHALATION) at 06:58

## 2021-01-01 RX ADMIN — Medication 5 MG: at 21:50

## 2021-01-01 RX ADMIN — POTASSIUM CHLORIDE 40 MEQ: 750 TABLET, EXTENDED RELEASE ORAL at 09:25

## 2021-01-01 RX ADMIN — SODIUM CHLORIDE 4 ML: 7 NEBU SOLN,3 % NEBU at 08:59

## 2021-01-01 RX ADMIN — PROPAFENONE HYDROCHLORIDE 150 MG: 150 TABLET, COATED ORAL at 14:22

## 2021-01-01 RX ADMIN — CARVEDILOL 3.12 MG: 3.12 TABLET, FILM COATED ORAL at 08:12

## 2021-01-01 RX ADMIN — ARFORMOTEROL TARTRATE 15 MCG: 15 SOLUTION RESPIRATORY (INHALATION) at 09:09

## 2021-01-01 RX ADMIN — PROMETHAZINE HYDROCHLORIDE 12.5 MG: 12.5 TABLET ORAL at 04:32

## 2021-01-01 RX ADMIN — SERTRALINE HYDROCHLORIDE 50 MG: 50 TABLET, FILM COATED ORAL at 22:44

## 2021-01-01 RX ADMIN — FERROUS SULFATE TAB 325 MG (65 MG ELEMENTAL FE) 325 MG: 325 (65 FE) TAB at 08:44

## 2021-01-01 RX ADMIN — BUDESONIDE 0.5 MG: 0.5 SUSPENSION RESPIRATORY (INHALATION) at 06:48

## 2021-01-01 RX ADMIN — ARFORMOTEROL TARTRATE 15 MCG: 15 SOLUTION RESPIRATORY (INHALATION) at 08:53

## 2021-01-01 RX ADMIN — PROPAFENONE HYDROCHLORIDE 150 MG: 150 TABLET, COATED ORAL at 22:26

## 2021-01-01 RX ADMIN — CARVEDILOL 3.12 MG: 3.12 TABLET, FILM COATED ORAL at 08:57

## 2021-01-01 RX ADMIN — PROPAFENONE HYDROCHLORIDE 150 MG: 150 TABLET, COATED ORAL at 21:17

## 2021-01-01 RX ADMIN — BUDESONIDE 0.5 MG: 0.5 INHALANT ORAL at 09:26

## 2021-01-01 RX ADMIN — IPRATROPIUM BROMIDE AND ALBUTEROL SULFATE 3 ML: 2.5; .5 SOLUTION RESPIRATORY (INHALATION) at 15:51

## 2021-01-01 RX ADMIN — BUDESONIDE 0.5 MG: 0.5 INHALANT ORAL at 08:01

## 2021-01-01 RX ADMIN — POTASSIUM CHLORIDE 20 MEQ: 1.5 POWDER, FOR SOLUTION ORAL at 08:28

## 2021-01-01 RX ADMIN — PERFLUTREN 1 ML: 6.52 INJECTION, SUSPENSION INTRAVENOUS at 14:40

## 2021-01-01 RX ADMIN — ONDANSETRON HYDROCHLORIDE 4 MG: 4 TABLET, FILM COATED ORAL at 04:26

## 2021-01-01 RX ADMIN — NYSTATIN: 100000 POWDER TOPICAL at 20:00

## 2021-01-01 RX ADMIN — SERTRALINE HYDROCHLORIDE 50 MG: 50 TABLET, FILM COATED ORAL at 20:21

## 2021-01-01 RX ADMIN — NYSTATIN: 100000 POWDER TOPICAL at 09:21

## 2021-01-01 RX ADMIN — BUDESONIDE 0.5 MG: 0.5 INHALANT ORAL at 20:27

## 2021-01-01 RX ADMIN — SERTRALINE HYDROCHLORIDE 50 MG: 50 TABLET, FILM COATED ORAL at 20:19

## 2021-01-01 RX ADMIN — BUDESONIDE 0.5 MG: 0.5 INHALANT ORAL at 06:58

## 2021-01-01 RX ADMIN — IPRATROPIUM BROMIDE AND ALBUTEROL SULFATE 3 ML: 2.5; .5 SOLUTION RESPIRATORY (INHALATION) at 03:19

## 2021-01-01 RX ADMIN — SODIUM CHLORIDE 4 ML: 7 NEBU SOLN,3 % NEBU at 19:25

## 2021-01-01 RX ADMIN — ONDANSETRON HYDROCHLORIDE 4 MG: 4 TABLET, FILM COATED ORAL at 15:18

## 2021-01-01 RX ADMIN — BUDESONIDE 0.5 MG: 0.5 INHALANT ORAL at 19:45

## 2021-01-01 RX ADMIN — NYSTATIN: 100000 POWDER TOPICAL at 20:21

## 2021-01-01 RX ADMIN — LEVOTHYROXINE SODIUM 25 MCG: 0.03 TABLET ORAL at 06:38

## 2021-01-01 RX ADMIN — ARFORMOTEROL TARTRATE 15 MCG: 15 SOLUTION RESPIRATORY (INHALATION) at 19:54

## 2021-01-01 RX ADMIN — POLYETHYLENE GLYCOL 3350 17 G: 17 POWDER, FOR SOLUTION ORAL at 16:15

## 2021-01-01 RX ADMIN — ARFORMOTEROL TARTRATE 15 MCG: 15 SOLUTION RESPIRATORY (INHALATION) at 19:22

## 2021-01-01 RX ADMIN — PROPAFENONE HYDROCHLORIDE 150 MG: 150 TABLET, COATED ORAL at 13:02

## 2021-01-01 RX ADMIN — SODIUM CHLORIDE, PRESERVATIVE FREE 10 ML: 5 INJECTION INTRAVENOUS at 20:32

## 2021-01-01 RX ADMIN — CARVEDILOL 3.12 MG: 3.12 TABLET, FILM COATED ORAL at 18:18

## 2021-01-01 RX ADMIN — SODIUM CHLORIDE, PRESERVATIVE FREE 10 ML: 5 INJECTION INTRAVENOUS at 17:59

## 2021-01-01 RX ADMIN — LORAZEPAM 0.25 MG: 0.5 TABLET ORAL at 23:39

## 2021-01-01 RX ADMIN — Medication 5 MG: at 20:26

## 2021-01-01 RX ADMIN — SODIUM CHLORIDE, PRESERVATIVE FREE 10 ML: 5 INJECTION INTRAVENOUS at 21:06

## 2021-01-01 RX ADMIN — BUDESONIDE 0.5 MG: 0.5 INHALANT ORAL at 07:57

## 2021-01-01 RX ADMIN — LORAZEPAM 0.25 MG: 0.5 TABLET ORAL at 14:33

## 2021-01-01 RX ADMIN — FUROSEMIDE 40 MG: 40 TABLET ORAL at 09:59

## 2021-01-01 RX ADMIN — CARVEDILOL 12.5 MG: 12.5 TABLET, FILM COATED ORAL at 23:38

## 2021-01-01 RX ADMIN — SODIUM CHLORIDE, PRESERVATIVE FREE 10 ML: 5 INJECTION INTRAVENOUS at 21:51

## 2021-01-01 RX ADMIN — SODIUM CHLORIDE, PRESERVATIVE FREE 10 ML: 5 INJECTION INTRAVENOUS at 09:33

## 2021-01-01 RX ADMIN — POLYETHYLENE GLYCOL 3350 17 G: 17 POWDER, FOR SOLUTION ORAL at 12:39

## 2021-01-01 RX ADMIN — SERTRALINE HYDROCHLORIDE 50 MG: 50 TABLET, FILM COATED ORAL at 23:36

## 2021-01-01 RX ADMIN — IPRATROPIUM BROMIDE AND ALBUTEROL SULFATE 3 ML: 2.5; .5 SOLUTION RESPIRATORY (INHALATION) at 11:39

## 2021-01-01 RX ADMIN — ACETAMINOPHEN 500 MG: 325 TABLET, FILM COATED ORAL at 07:38

## 2021-01-01 RX ADMIN — PROMETHAZINE HYDROCHLORIDE 12.5 MG: 12.5 TABLET ORAL at 22:10

## 2021-01-01 RX ADMIN — ACETAMINOPHEN 500 MG: 325 TABLET, FILM COATED ORAL at 16:08

## 2021-01-01 RX ADMIN — ONDANSETRON HYDROCHLORIDE 4 MG: 4 TABLET, FILM COATED ORAL at 21:50

## 2021-01-01 RX ADMIN — BUDESONIDE 0.5 MG: 0.5 INHALANT ORAL at 22:11

## 2021-01-01 RX ADMIN — SERTRALINE HYDROCHLORIDE 50 MG: 50 TABLET, FILM COATED ORAL at 20:30

## 2021-01-01 RX ADMIN — NYSTATIN: 100000 POWDER TOPICAL at 20:30

## 2021-01-01 RX ADMIN — CARVEDILOL 6.25 MG: 12.5 TABLET, FILM COATED ORAL at 18:21

## 2021-01-01 RX ADMIN — LORAZEPAM 0.5 MG: 0.5 TABLET ORAL at 13:01

## 2021-01-01 RX ADMIN — SODIUM CHLORIDE, POTASSIUM CHLORIDE, SODIUM LACTATE AND CALCIUM CHLORIDE 100 ML/HR: 600; 310; 30; 20 INJECTION, SOLUTION INTRAVENOUS at 20:19

## 2021-01-01 RX ADMIN — SODIUM CHLORIDE, PRESERVATIVE FREE 10 ML: 5 INJECTION INTRAVENOUS at 20:19

## 2021-01-01 RX ADMIN — FERROUS SULFATE TAB 325 MG (65 MG ELEMENTAL FE) 325 MG: 325 (65 FE) TAB at 13:01

## 2021-01-01 RX ADMIN — IPRATROPIUM BROMIDE AND ALBUTEROL SULFATE 3 ML: 2.5; .5 SOLUTION RESPIRATORY (INHALATION) at 07:21

## 2021-01-01 RX ADMIN — IPRATROPIUM BROMIDE AND ALBUTEROL SULFATE 3 ML: 2.5; .5 SOLUTION RESPIRATORY (INHALATION) at 06:48

## 2021-01-01 RX ADMIN — SODIUM CHLORIDE, PRESERVATIVE FREE 10 ML: 5 INJECTION INTRAVENOUS at 23:38

## 2021-01-01 RX ADMIN — CARVEDILOL 3.12 MG: 3.12 TABLET, FILM COATED ORAL at 08:38

## 2021-01-01 RX ADMIN — LORAZEPAM 0.5 MG: 0.5 TABLET ORAL at 21:25

## 2021-01-01 RX ADMIN — ARFORMOTEROL TARTRATE 15 MCG: 15 SOLUTION RESPIRATORY (INHALATION) at 20:32

## 2021-01-01 RX ADMIN — LISINOPRIL 20 MG: 20 TABLET ORAL at 21:20

## 2021-01-01 RX ADMIN — ENOXAPARIN SODIUM 30 MG: 30 INJECTION SUBCUTANEOUS at 14:58

## 2021-01-01 RX ADMIN — NYSTATIN: 100000 POWDER TOPICAL at 21:51

## 2021-01-01 RX ADMIN — Medication 5 MG: at 21:15

## 2021-01-01 RX ADMIN — ONDANSETRON HYDROCHLORIDE 4 MG: 4 TABLET, FILM COATED ORAL at 05:38

## 2021-01-01 RX ADMIN — LISINOPRIL 20 MG: 20 TABLET ORAL at 08:57

## 2021-01-01 RX ADMIN — PANTOPRAZOLE SODIUM 40 MG: 40 TABLET, DELAYED RELEASE ORAL at 06:02

## 2021-01-01 RX ADMIN — RIVAROXABAN 20 MG: 20 TABLET, FILM COATED ORAL at 17:07

## 2021-01-01 RX ADMIN — Medication 2 MG: at 22:26

## 2021-01-01 RX ADMIN — BUDESONIDE 0.5 MG: 0.5 SUSPENSION RESPIRATORY (INHALATION) at 21:28

## 2021-01-01 RX ADMIN — LISINOPRIL 20 MG: 20 TABLET ORAL at 09:26

## 2021-01-01 RX ADMIN — LEVOTHYROXINE SODIUM 50 MCG: 50 TABLET ORAL at 06:32

## 2021-01-01 RX ADMIN — ONDANSETRON HYDROCHLORIDE 4 MG: 4 TABLET, FILM COATED ORAL at 21:15

## 2021-01-01 RX ADMIN — ARFORMOTEROL TARTRATE 15 MCG: 15 SOLUTION RESPIRATORY (INHALATION) at 11:13

## 2021-01-01 RX ADMIN — ENOXAPARIN SODIUM 30 MG: 30 INJECTION SUBCUTANEOUS at 17:47

## 2021-01-01 RX ADMIN — NYSTATIN: 100000 POWDER TOPICAL at 21:15

## 2021-01-01 RX ADMIN — IPRATROPIUM BROMIDE AND ALBUTEROL SULFATE 3 ML: 2.5; .5 SOLUTION RESPIRATORY (INHALATION) at 21:28

## 2021-01-01 RX ADMIN — SODIUM CHLORIDE, POTASSIUM CHLORIDE, SODIUM LACTATE AND CALCIUM CHLORIDE 75 ML/HR: 600; 310; 30; 20 INJECTION, SOLUTION INTRAVENOUS at 21:17

## 2021-01-01 RX ADMIN — POTASSIUM CHLORIDE 40 MEQ: 750 TABLET, EXTENDED RELEASE ORAL at 09:04

## 2021-01-01 RX ADMIN — PROPAFENONE HYDROCHLORIDE 150 MG: 150 TABLET, COATED ORAL at 21:25

## 2021-01-01 RX ADMIN — PANTOPRAZOLE SODIUM 40 MG: 40 TABLET, DELAYED RELEASE ORAL at 06:33

## 2021-01-01 RX ADMIN — BUDESONIDE 0.5 MG: 0.5 SUSPENSION RESPIRATORY (INHALATION) at 20:03

## 2021-01-01 RX ADMIN — CARVEDILOL 3.12 MG: 3.12 TABLET, FILM COATED ORAL at 09:10

## 2021-01-01 RX ADMIN — Medication 5 MG: at 19:59

## 2021-01-01 RX ADMIN — ARFORMOTEROL TARTRATE 15 MCG: 15 SOLUTION RESPIRATORY (INHALATION) at 09:00

## 2021-01-01 RX ADMIN — CARVEDILOL 3.12 MG: 3.12 TABLET, FILM COATED ORAL at 09:05

## 2021-01-01 RX ADMIN — MORPHINE SULFATE 5 MG: 100 SOLUTION ORAL at 14:30

## 2021-01-01 RX ADMIN — SODIUM CHLORIDE, PRESERVATIVE FREE 10 ML: 5 INJECTION INTRAVENOUS at 20:38

## 2021-01-01 RX ADMIN — TRAMADOL HYDROCHLORIDE 50 MG: 50 TABLET, FILM COATED ORAL at 20:28

## 2021-01-01 RX ADMIN — ONDANSETRON 4 MG: 2 INJECTION INTRAMUSCULAR; INTRAVENOUS at 11:03

## 2021-01-01 RX ADMIN — FUROSEMIDE 80 MG: 10 INJECTION, SOLUTION INTRAMUSCULAR; INTRAVENOUS at 09:04

## 2021-01-01 RX ADMIN — SODIUM CHLORIDE, PRESERVATIVE FREE 10 ML: 5 INJECTION INTRAVENOUS at 20:01

## 2021-01-01 RX ADMIN — ARFORMOTEROL TARTRATE 15 MCG: 15 SOLUTION RESPIRATORY (INHALATION) at 09:02

## 2021-01-01 RX ADMIN — LORAZEPAM 0.25 MG: 0.5 TABLET ORAL at 16:23

## 2021-01-01 RX ADMIN — BUDESONIDE 0.5 MG: 0.5 INHALANT ORAL at 09:12

## 2021-01-01 RX ADMIN — LEVOTHYROXINE SODIUM 25 MCG: 0.03 TABLET ORAL at 06:11

## 2021-01-01 RX ADMIN — CARVEDILOL 3.12 MG: 3.12 TABLET, FILM COATED ORAL at 17:27

## 2021-01-01 RX ADMIN — ACETAMINOPHEN 500 MG: 500 TABLET ORAL at 20:38

## 2021-01-01 RX ADMIN — SERTRALINE HYDROCHLORIDE 50 MG: 50 TABLET, FILM COATED ORAL at 21:06

## 2021-01-01 RX ADMIN — LISINOPRIL 20 MG: 20 TABLET ORAL at 08:12

## 2021-01-01 RX ADMIN — ACETAMINOPHEN 500 MG: 325 TABLET, FILM COATED ORAL at 23:39

## 2021-01-01 RX ADMIN — Medication 1000 UNITS: at 08:57

## 2021-01-01 RX ADMIN — BUDESONIDE 0.5 MG: 0.5 INHALANT ORAL at 22:34

## 2021-01-01 RX ADMIN — ARFORMOTEROL TARTRATE 15 MCG: 15 SOLUTION RESPIRATORY (INHALATION) at 08:24

## 2021-01-01 RX ADMIN — ATORVASTATIN CALCIUM 10 MG: 20 TABLET, FILM COATED ORAL at 08:57

## 2021-01-01 RX ADMIN — BUDESONIDE 0.5 MG: 0.5 INHALANT ORAL at 19:22

## 2021-01-01 RX ADMIN — SERTRALINE HYDROCHLORIDE 50 MG: 50 TABLET, FILM COATED ORAL at 21:20

## 2021-01-01 RX ADMIN — SERTRALINE HYDROCHLORIDE 50 MG: 50 TABLET, FILM COATED ORAL at 20:26

## 2021-01-01 RX ADMIN — FUROSEMIDE 40 MG: 40 TABLET ORAL at 09:21

## 2021-01-01 RX ADMIN — CARVEDILOL 3.12 MG: 3.12 TABLET, FILM COATED ORAL at 09:04

## 2021-01-01 RX ADMIN — FUROSEMIDE 40 MG: 10 INJECTION, SOLUTION INTRAVENOUS at 08:04

## 2021-01-01 RX ADMIN — ONDANSETRON 4 MG: 2 INJECTION INTRAMUSCULAR; INTRAVENOUS at 11:21

## 2021-01-01 RX ADMIN — LISINOPRIL 20 MG: 20 TABLET ORAL at 09:33

## 2021-01-01 RX ADMIN — ACETAMINOPHEN 500 MG: 325 TABLET, FILM COATED ORAL at 12:08

## 2021-01-01 RX ADMIN — SODIUM CHLORIDE, PRESERVATIVE FREE 10 ML: 5 INJECTION INTRAVENOUS at 20:15

## 2021-01-01 RX ADMIN — ATORVASTATIN CALCIUM 10 MG: 20 TABLET, FILM COATED ORAL at 09:04

## 2021-01-01 RX ADMIN — ACETAMINOPHEN 500 MG: 325 TABLET, FILM COATED ORAL at 02:04

## 2021-01-01 RX ADMIN — PANTOPRAZOLE SODIUM 40 MG: 40 TABLET, DELAYED RELEASE ORAL at 06:13

## 2021-01-01 RX ADMIN — IPRATROPIUM BROMIDE AND ALBUTEROL SULFATE 3 ML: 2.5; .5 SOLUTION RESPIRATORY (INHALATION) at 10:30

## 2021-01-01 RX ADMIN — LORAZEPAM 0.25 MG: 0.5 TABLET ORAL at 21:50

## 2021-01-01 RX ADMIN — SODIUM CHLORIDE, PRESERVATIVE FREE 10 ML: 5 INJECTION INTRAVENOUS at 08:57

## 2021-01-01 RX ADMIN — LISINOPRIL 20 MG: 20 TABLET ORAL at 21:25

## 2021-01-01 RX ADMIN — ONDANSETRON 4 MG: 2 INJECTION INTRAMUSCULAR; INTRAVENOUS at 00:38

## 2021-01-01 RX ADMIN — PANTOPRAZOLE SODIUM 40 MG: 40 TABLET, DELAYED RELEASE ORAL at 06:09

## 2021-01-01 RX ADMIN — ACETAMINOPHEN 500 MG: 325 TABLET, FILM COATED ORAL at 21:03

## 2021-01-01 RX ADMIN — IPRATROPIUM BROMIDE AND ALBUTEROL SULFATE 3 ML: 2.5; .5 SOLUTION RESPIRATORY (INHALATION) at 07:27

## 2021-01-01 RX ADMIN — LISINOPRIL 20 MG: 20 TABLET ORAL at 09:59

## 2021-01-01 RX ADMIN — ARFORMOTEROL TARTRATE 15 MCG: 15 SOLUTION RESPIRATORY (INHALATION) at 22:15

## 2021-01-01 RX ADMIN — Medication 5 MG: at 20:19

## 2021-01-01 RX ADMIN — BUDESONIDE 0.5 MG: 0.5 INHALANT ORAL at 09:03

## 2021-01-01 RX ADMIN — FUROSEMIDE 40 MG: 10 INJECTION, SOLUTION INTRAMUSCULAR; INTRAVENOUS at 23:36

## 2021-01-01 RX ADMIN — BUDESONIDE 0.5 MG: 0.5 INHALANT ORAL at 07:39

## 2021-01-01 RX ADMIN — LISINOPRIL 20 MG: 20 TABLET ORAL at 09:05

## 2021-01-01 RX ADMIN — BUDESONIDE 0.5 MG: 0.5 INHALANT ORAL at 07:55

## 2021-01-01 RX ADMIN — SODIUM CHLORIDE, PRESERVATIVE FREE 10 ML: 5 INJECTION INTRAVENOUS at 21:25

## 2021-01-01 RX ADMIN — SERTRALINE HYDROCHLORIDE 50 MG: 50 TABLET, FILM COATED ORAL at 19:59

## 2021-01-01 RX ADMIN — LORAZEPAM 0.25 MG: 0.5 TABLET ORAL at 08:42

## 2021-01-01 RX ADMIN — NYSTATIN: 100000 POWDER TOPICAL at 09:33

## 2021-01-01 RX ADMIN — ONDANSETRON HYDROCHLORIDE 4 MG: 4 TABLET, FILM COATED ORAL at 03:37

## 2021-01-01 RX ADMIN — BUDESONIDE 0.5 MG: 0.5 INHALANT ORAL at 07:30

## 2021-01-01 RX ADMIN — BUDESONIDE 0.5 MG: 0.5 INHALANT ORAL at 19:54

## 2021-01-01 RX ADMIN — LISINOPRIL 20 MG: 20 TABLET ORAL at 09:10

## 2021-01-01 RX ADMIN — SODIUM CHLORIDE, PRESERVATIVE FREE 10 ML: 5 INJECTION INTRAVENOUS at 21:16

## 2021-01-01 RX ADMIN — LEVOFLOXACIN 250 MG: 250 INJECTION, SOLUTION INTRAVENOUS at 21:14

## 2021-01-01 RX ADMIN — CARVEDILOL 6.25 MG: 6.25 TABLET, FILM COATED ORAL at 08:28

## 2021-01-01 RX ADMIN — AMLODIPINE BESYLATE 5 MG: 5 TABLET ORAL at 08:40

## 2021-01-01 RX ADMIN — RIVAROXABAN 20 MG: 20 TABLET, FILM COATED ORAL at 18:18

## 2021-01-01 RX ADMIN — BUDESONIDE 0.5 MG: 0.5 INHALANT ORAL at 11:17

## 2021-01-01 RX ADMIN — SODIUM CHLORIDE, PRESERVATIVE FREE 10 ML: 5 INJECTION INTRAVENOUS at 09:07

## 2021-01-01 RX ADMIN — RIVAROXABAN 20 MG: 20 TABLET, FILM COATED ORAL at 17:29

## 2021-01-01 RX ADMIN — IPRATROPIUM BROMIDE AND ALBUTEROL SULFATE 3 ML: 2.5; .5 SOLUTION RESPIRATORY (INHALATION) at 07:17

## 2021-01-01 RX ADMIN — Medication 5 MG: at 21:06

## 2021-01-01 RX ADMIN — SODIUM CHLORIDE, PRESERVATIVE FREE 10 ML: 5 INJECTION INTRAVENOUS at 08:58

## 2021-01-01 RX ADMIN — SERTRALINE HYDROCHLORIDE 50 MG: 50 TABLET, FILM COATED ORAL at 21:15

## 2021-01-01 RX ADMIN — PANTOPRAZOLE SODIUM 40 MG: 40 TABLET, DELAYED RELEASE ORAL at 05:53

## 2021-01-01 RX ADMIN — ACETAMINOPHEN 500 MG: 325 TABLET, FILM COATED ORAL at 20:40

## 2021-01-01 RX ADMIN — ACETAMINOPHEN 500 MG: 325 TABLET, FILM COATED ORAL at 09:21

## 2021-01-01 RX ADMIN — ENOXAPARIN SODIUM 30 MG: 30 INJECTION SUBCUTANEOUS at 15:13

## 2021-01-01 RX ADMIN — PROPAFENONE HYDROCHLORIDE 150 MG: 150 TABLET, COATED ORAL at 06:08

## 2021-01-01 RX ADMIN — ARFORMOTEROL TARTRATE 15 MCG: 15 SOLUTION RESPIRATORY (INHALATION) at 20:22

## 2021-01-01 RX ADMIN — LEVOFLOXACIN 500 MG: 500 INJECTION, SOLUTION INTRAVENOUS at 15:13

## 2021-01-01 RX ADMIN — RIVAROXABAN 20 MG: 20 TABLET, FILM COATED ORAL at 18:28

## 2021-01-01 RX ADMIN — PANTOPRAZOLE SODIUM 40 MG: 40 TABLET, DELAYED RELEASE ORAL at 06:27

## 2021-01-01 RX ADMIN — BUDESONIDE 0.5 MG: 0.5 SUSPENSION RESPIRATORY (INHALATION) at 07:17

## 2021-01-01 RX ADMIN — FUROSEMIDE 40 MG: 40 TABLET ORAL at 08:38

## 2021-01-01 RX ADMIN — SERTRALINE HYDROCHLORIDE 50 MG: 50 TABLET, FILM COATED ORAL at 20:25

## 2021-01-01 RX ADMIN — AMLODIPINE BESYLATE 5 MG: 5 TABLET ORAL at 09:05

## 2021-01-01 RX ADMIN — FUROSEMIDE 80 MG: 80 TABLET ORAL at 09:25

## 2021-01-01 RX ADMIN — ARFORMOTEROL TARTRATE 15 MCG: 15 SOLUTION RESPIRATORY (INHALATION) at 19:27

## 2021-01-01 RX ADMIN — LEVOTHYROXINE SODIUM 50 MCG: 0.05 TABLET ORAL at 09:26

## 2021-01-01 RX ADMIN — LORAZEPAM 0.5 MG: 0.5 TABLET ORAL at 22:23

## 2021-01-01 RX ADMIN — Medication 1000 UNITS: at 09:03

## 2021-01-01 RX ADMIN — SERTRALINE HYDROCHLORIDE 50 MG: 50 TABLET, FILM COATED ORAL at 20:15

## 2021-01-01 RX ADMIN — BUDESONIDE 0.5 MG: 0.5 SUSPENSION RESPIRATORY (INHALATION) at 19:34

## 2021-01-01 RX ADMIN — PANTOPRAZOLE SODIUM 40 MG: 40 TABLET, DELAYED RELEASE ORAL at 06:04

## 2021-01-01 RX ADMIN — SERTRALINE HYDROCHLORIDE 50 MG: 50 TABLET, FILM COATED ORAL at 21:50

## 2021-01-01 RX ADMIN — PROMETHAZINE HYDROCHLORIDE 12.5 MG: 12.5 TABLET ORAL at 09:03

## 2021-01-01 RX ADMIN — FUROSEMIDE 80 MG: 80 TABLET ORAL at 08:40

## 2021-01-01 RX ADMIN — NYSTATIN: 100000 POWDER TOPICAL at 09:02

## 2021-01-01 RX ADMIN — ACETAMINOPHEN 500 MG: 325 TABLET, FILM COATED ORAL at 17:14

## 2021-01-01 RX ADMIN — POTASSIUM CHLORIDE 20 MEQ: 1.5 POWDER, FOR SOLUTION ORAL at 08:40

## 2021-01-01 RX ADMIN — IPRATROPIUM BROMIDE AND ALBUTEROL SULFATE 3 ML: 2.5; .5 SOLUTION RESPIRATORY (INHALATION) at 15:19

## 2021-01-01 RX ADMIN — PROMETHAZINE HYDROCHLORIDE 12.5 MG: 12.5 TABLET ORAL at 20:19

## 2021-01-01 RX ADMIN — LISINOPRIL 20 MG: 20 TABLET ORAL at 08:37

## 2021-01-01 RX ADMIN — ENOXAPARIN SODIUM 30 MG: 30 INJECTION SUBCUTANEOUS at 14:44

## 2021-01-01 RX ADMIN — SODIUM CHLORIDE, PRESERVATIVE FREE 10 ML: 5 INJECTION INTRAVENOUS at 08:29

## 2021-01-01 RX ADMIN — ARFORMOTEROL TARTRATE 15 MCG: 15 SOLUTION RESPIRATORY (INHALATION) at 19:45

## 2021-01-01 RX ADMIN — TRAMADOL HYDROCHLORIDE 50 MG: 50 TABLET, FILM COATED ORAL at 09:05

## 2021-01-01 RX ADMIN — SODIUM CHLORIDE, PRESERVATIVE FREE 10 ML: 5 INJECTION INTRAVENOUS at 09:10

## 2021-01-01 RX ADMIN — BUDESONIDE 0.5 MG: 0.5 SUSPENSION RESPIRATORY (INHALATION) at 07:14

## 2021-01-01 RX ADMIN — MORPHINE SULFATE 10 MG: 100 SOLUTION ORAL at 20:00

## 2021-01-01 RX ADMIN — ONDANSETRON HYDROCHLORIDE 4 MG: 4 TABLET, FILM COATED ORAL at 08:37

## 2021-01-01 RX ADMIN — PROMETHAZINE HYDROCHLORIDE 12.5 MG: 12.5 TABLET ORAL at 13:13

## 2021-01-01 RX ADMIN — PROMETHAZINE HYDROCHLORIDE 12.5 MG: 12.5 TABLET ORAL at 22:16

## 2021-01-01 RX ADMIN — BUDESONIDE 0.5 MG: 0.5 INHALANT ORAL at 19:49

## 2021-01-01 RX ADMIN — LISINOPRIL 20 MG: 20 TABLET ORAL at 20:38

## 2021-01-01 RX ADMIN — TRAMADOL HYDROCHLORIDE 50 MG: 50 TABLET, FILM COATED ORAL at 19:22

## 2021-01-01 RX ADMIN — ARFORMOTEROL TARTRATE 15 MCG: 15 SOLUTION RESPIRATORY (INHALATION) at 09:48

## 2021-01-01 RX ADMIN — ONDANSETRON HYDROCHLORIDE 4 MG: 4 TABLET, FILM COATED ORAL at 15:13

## 2021-01-01 RX ADMIN — CARVEDILOL 6.25 MG: 6.25 TABLET, FILM COATED ORAL at 08:44

## 2021-01-01 RX ADMIN — Medication 5 MG: at 22:44

## 2021-01-01 RX ADMIN — LEVOTHYROXINE SODIUM 25 MCG: 0.03 TABLET ORAL at 06:02

## 2021-01-01 RX ADMIN — SODIUM CHLORIDE, PRESERVATIVE FREE 10 ML: 5 INJECTION INTRAVENOUS at 08:41

## 2021-01-01 RX ADMIN — PANTOPRAZOLE SODIUM 40 MG: 40 TABLET, DELAYED RELEASE ORAL at 06:03

## 2021-01-01 RX ADMIN — ENOXAPARIN SODIUM 30 MG: 30 INJECTION SUBCUTANEOUS at 16:54

## 2021-01-01 RX ADMIN — SODIUM CHLORIDE, PRESERVATIVE FREE 10 ML: 5 INJECTION INTRAVENOUS at 14:41

## 2021-01-01 RX ADMIN — POTASSIUM CHLORIDE 40 MEQ: 750 TABLET, EXTENDED RELEASE ORAL at 08:57

## 2021-01-01 RX ADMIN — Medication 5 MG: at 20:38

## 2021-01-01 RX ADMIN — CARVEDILOL 6.25 MG: 6.25 TABLET, FILM COATED ORAL at 17:29

## 2021-01-01 RX ADMIN — BUDESONIDE 0.5 MG: 0.5 INHALANT ORAL at 08:24

## 2021-01-01 RX ADMIN — FUROSEMIDE 40 MG: 40 TABLET ORAL at 09:01

## 2021-01-01 RX ADMIN — ATORVASTATIN CALCIUM 10 MG: 20 TABLET, FILM COATED ORAL at 09:26

## 2021-01-01 RX ADMIN — LEVOTHYROXINE SODIUM 50 MCG: 50 TABLET ORAL at 05:53

## 2021-01-01 RX ADMIN — SODIUM CHLORIDE, PRESERVATIVE FREE 10 ML: 5 INJECTION INTRAVENOUS at 08:50

## 2021-01-01 RX ADMIN — ACETAMINOPHEN 500 MG: 325 TABLET, FILM COATED ORAL at 22:52

## 2021-01-01 RX ADMIN — FUROSEMIDE 80 MG: 10 INJECTION, SOLUTION INTRAMUSCULAR; INTRAVENOUS at 08:57

## 2021-01-01 RX ADMIN — FUROSEMIDE 80 MG: 80 TABLET ORAL at 08:44

## 2021-01-01 RX ADMIN — Medication 5 MG: at 20:30

## 2021-01-01 RX ADMIN — SODIUM CHLORIDE, PRESERVATIVE FREE 10 ML: 5 INJECTION INTRAVENOUS at 20:28

## 2021-01-01 RX ADMIN — Medication 5 MG: at 20:35

## 2021-01-01 RX ADMIN — SODIUM CHLORIDE 4 ML: 7 NEBU SOLN,3 % NEBU at 22:11

## 2021-01-01 RX ADMIN — PANTOPRAZOLE SODIUM 40 MG: 40 TABLET, DELAYED RELEASE ORAL at 06:23

## 2021-01-01 RX ADMIN — ACETAMINOPHEN 500 MG: 325 TABLET, FILM COATED ORAL at 06:33

## 2021-01-01 RX ADMIN — LORAZEPAM 0.5 MG: 0.5 TABLET ORAL at 06:32

## 2021-01-01 RX ADMIN — PANTOPRAZOLE SODIUM 40 MG: 40 TABLET, DELAYED RELEASE ORAL at 05:39

## 2021-01-01 RX ADMIN — CARVEDILOL 3.12 MG: 3.12 TABLET, FILM COATED ORAL at 09:34

## 2021-01-01 RX ADMIN — IPRATROPIUM BROMIDE AND ALBUTEROL SULFATE 3 ML: 2.5; .5 SOLUTION RESPIRATORY (INHALATION) at 15:33

## 2021-01-01 RX ADMIN — LORAZEPAM 0.25 MG: 0.5 TABLET ORAL at 09:41

## 2021-01-01 RX ADMIN — IPRATROPIUM BROMIDE AND ALBUTEROL SULFATE 3 ML: 2.5; .5 SOLUTION RESPIRATORY (INHALATION) at 11:36

## 2021-01-01 RX ADMIN — SODIUM CHLORIDE 4 ML: 7 NEBU SOLN,3 % NEBU at 08:05

## 2021-01-01 RX ADMIN — BUDESONIDE 0.5 MG: 0.5 INHALANT ORAL at 19:26

## 2021-01-01 RX ADMIN — SODIUM CHLORIDE, PRESERVATIVE FREE 10 ML: 5 INJECTION INTRAVENOUS at 09:21

## 2021-01-01 RX ADMIN — ARFORMOTEROL TARTRATE 15 MCG: 15 SOLUTION RESPIRATORY (INHALATION) at 20:14

## 2021-01-01 RX ADMIN — TRAMADOL HYDROCHLORIDE 50 MG: 50 TABLET, FILM COATED ORAL at 18:20

## 2021-01-01 RX ADMIN — SODIUM CHLORIDE, PRESERVATIVE FREE 10 ML: 5 INJECTION INTRAVENOUS at 09:05

## 2021-01-01 RX ADMIN — ARFORMOTEROL TARTRATE 15 MCG: 15 SOLUTION RESPIRATORY (INHALATION) at 07:55

## 2021-01-01 RX ADMIN — PANTOPRAZOLE SODIUM 40 MG: 40 TABLET, DELAYED RELEASE ORAL at 06:11

## 2021-01-01 RX ADMIN — BUDESONIDE 0.5 MG: 0.5 INHALANT ORAL at 20:14

## 2021-01-01 RX ADMIN — Medication 2 MG: at 20:25

## 2021-01-01 RX ADMIN — SERTRALINE HYDROCHLORIDE 50 MG: 50 TABLET, FILM COATED ORAL at 20:28

## 2021-01-01 RX ADMIN — Medication 1000 UNITS: at 09:26

## 2021-01-01 RX ADMIN — Medication 2 MG: at 20:15

## 2021-01-01 RX ADMIN — CARVEDILOL 3.12 MG: 3.12 TABLET, FILM COATED ORAL at 09:00

## 2021-01-01 RX ADMIN — ACETAMINOPHEN 500 MG: 500 TABLET ORAL at 15:17

## 2021-01-01 RX ADMIN — BUDESONIDE 0.5 MG: 0.5 SUSPENSION RESPIRATORY (INHALATION) at 19:31

## 2021-01-01 RX ADMIN — LORAZEPAM 0.25 MG: 0.5 TABLET ORAL at 09:16

## 2021-01-01 RX ADMIN — ARFORMOTEROL TARTRATE 15 MCG: 15 SOLUTION RESPIRATORY (INHALATION) at 07:56

## 2021-01-01 RX ADMIN — PROPAFENONE HYDROCHLORIDE 150 MG: 150 TABLET, COATED ORAL at 22:23

## 2021-01-01 RX ADMIN — SODIUM CHLORIDE 4 ML: 7 NEBU SOLN,3 % NEBU at 20:04

## 2021-01-01 RX ADMIN — SODIUM CHLORIDE, PRESERVATIVE FREE 10 ML: 5 INJECTION INTRAVENOUS at 08:13

## 2021-01-01 RX ADMIN — ACETAMINOPHEN 500 MG: 500 TABLET ORAL at 02:56

## 2021-01-01 RX ADMIN — ONDANSETRON HYDROCHLORIDE 4 MG: 4 TABLET, FILM COATED ORAL at 03:48

## 2021-01-01 RX ADMIN — PROPAFENONE HYDROCHLORIDE 150 MG: 150 TABLET, COATED ORAL at 06:46

## 2021-01-01 RX ADMIN — SODIUM CHLORIDE, PRESERVATIVE FREE 10 ML: 5 INJECTION INTRAVENOUS at 09:59

## 2021-01-01 RX ADMIN — LEVOTHYROXINE SODIUM 50 MCG: 50 TABLET ORAL at 05:46

## 2021-01-01 RX ADMIN — SODIUM CHLORIDE, PRESERVATIVE FREE 10 ML: 5 INJECTION INTRAVENOUS at 20:25

## 2021-01-01 RX ADMIN — CARVEDILOL 6.25 MG: 12.5 TABLET, FILM COATED ORAL at 18:25

## 2021-01-01 RX ADMIN — LISINOPRIL 20 MG: 20 TABLET ORAL at 09:21

## 2021-01-01 RX ADMIN — LORAZEPAM 0.5 MG: 0.5 TABLET ORAL at 09:35

## 2021-01-01 RX ADMIN — TRAMADOL HYDROCHLORIDE 50 MG: 50 TABLET, FILM COATED ORAL at 06:13

## 2021-01-01 RX ADMIN — NYSTATIN: 100000 POWDER TOPICAL at 08:49

## 2021-01-01 RX ADMIN — ACETAMINOPHEN 500 MG: 325 TABLET, FILM COATED ORAL at 13:07

## 2021-01-01 RX ADMIN — PANTOPRAZOLE SODIUM 40 MG: 40 TABLET, DELAYED RELEASE ORAL at 06:32

## 2021-01-01 RX ADMIN — SERTRALINE HYDROCHLORIDE 50 MG: 50 TABLET, FILM COATED ORAL at 20:38

## 2021-01-01 RX ADMIN — ARFORMOTEROL TARTRATE 15 MCG: 15 SOLUTION RESPIRATORY (INHALATION) at 09:26

## 2021-01-01 RX ADMIN — LEVOTHYROXINE SODIUM 25 MCG: 0.03 TABLET ORAL at 06:34

## 2021-01-01 RX ADMIN — CARVEDILOL 6.25 MG: 6.25 TABLET, FILM COATED ORAL at 08:41

## 2021-01-01 RX ADMIN — ENOXAPARIN SODIUM 30 MG: 30 INJECTION SUBCUTANEOUS at 17:34

## 2021-01-01 RX ADMIN — LEVOTHYROXINE SODIUM 25 MCG: 0.03 TABLET ORAL at 09:34

## 2021-01-01 RX ADMIN — LORAZEPAM 0.5 MG: 0.5 TABLET ORAL at 15:17

## 2021-01-01 RX ADMIN — BUDESONIDE 0.5 MG: 0.5 INHALANT ORAL at 18:46

## 2021-01-01 RX ADMIN — CARVEDILOL 3.12 MG: 3.12 TABLET, FILM COATED ORAL at 17:34

## 2021-01-01 RX ADMIN — CARVEDILOL 6.25 MG: 6.25 TABLET, FILM COATED ORAL at 18:28

## 2021-01-01 RX ADMIN — LISINOPRIL 20 MG: 20 TABLET ORAL at 09:03

## 2021-01-01 RX ADMIN — SODIUM CHLORIDE, POTASSIUM CHLORIDE, SODIUM LACTATE AND CALCIUM CHLORIDE 75 ML/HR: 600; 310; 30; 20 INJECTION, SOLUTION INTRAVENOUS at 09:16

## 2021-01-01 RX ADMIN — AMLODIPINE BESYLATE 5 MG: 5 TABLET ORAL at 09:16

## 2021-01-01 RX ADMIN — LORAZEPAM 0.25 MG: 0.5 TABLET ORAL at 05:39

## 2021-01-01 RX ADMIN — ACETAMINOPHEN 500 MG: 500 TABLET ORAL at 05:53

## 2021-01-01 RX ADMIN — IPRATROPIUM BROMIDE AND ALBUTEROL SULFATE 3 ML: 2.5; .5 SOLUTION RESPIRATORY (INHALATION) at 15:20

## 2021-01-01 RX ADMIN — SODIUM CHLORIDE, PRESERVATIVE FREE 10 ML: 5 INJECTION INTRAVENOUS at 20:36

## 2021-01-01 RX ADMIN — FERROUS SULFATE TAB 325 MG (65 MG ELEMENTAL FE) 325 MG: 325 (65 FE) TAB at 08:28

## 2021-01-01 RX ADMIN — LEVOTHYROXINE SODIUM 50 MCG: 0.05 TABLET ORAL at 08:57

## 2021-01-01 RX ADMIN — BUDESONIDE 0.5 MG: 0.5 INHALANT ORAL at 08:53

## 2021-01-01 RX ADMIN — LORAZEPAM 0.25 MG: 0.5 TABLET ORAL at 23:03

## 2021-01-01 RX ADMIN — ARFORMOTEROL TARTRATE 15 MCG: 15 SOLUTION RESPIRATORY (INHALATION) at 22:11

## 2021-01-01 RX ADMIN — POTASSIUM CHLORIDE 20 MEQ: 1.5 POWDER, FOR SOLUTION ORAL at 09:18

## 2021-01-01 RX ADMIN — PROPAFENONE HYDROCHLORIDE 150 MG: 150 TABLET, COATED ORAL at 06:32

## 2021-01-01 RX ADMIN — CARVEDILOL 6.25 MG: 6.25 TABLET, FILM COATED ORAL at 09:19

## 2021-01-01 RX ADMIN — LEVOTHYROXINE SODIUM 50 MCG: 0.05 TABLET ORAL at 09:03

## 2021-01-01 RX ADMIN — PROPAFENONE HYDROCHLORIDE 150 MG: 150 TABLET, COATED ORAL at 23:38

## 2021-01-01 RX ADMIN — LISINOPRIL 20 MG: 20 TABLET ORAL at 09:01

## 2021-01-01 RX ADMIN — PANTOPRAZOLE SODIUM 40 MG: 40 TABLET, DELAYED RELEASE ORAL at 06:07

## 2021-01-01 RX ADMIN — SCOLOPAMINE TRANSDERMAL SYSTEM 1 PATCH: 1 PATCH, EXTENDED RELEASE TRANSDERMAL at 06:37

## 2021-01-01 RX ADMIN — ONDANSETRON HYDROCHLORIDE 4 MG: 4 TABLET, FILM COATED ORAL at 12:28

## 2021-01-01 RX ADMIN — BUDESONIDE 0.5 MG: 0.5 INHALANT ORAL at 20:22

## 2021-01-01 RX ADMIN — PROMETHAZINE HYDROCHLORIDE 12.5 MG: 12.5 TABLET ORAL at 15:28

## 2021-01-01 RX ADMIN — ZINC OXIDE 1 APPLICATION: 200 OINTMENT TOPICAL at 20:00

## 2021-01-01 RX ADMIN — ARFORMOTEROL TARTRATE 15 MCG: 15 SOLUTION RESPIRATORY (INHALATION) at 18:46

## 2021-01-01 RX ADMIN — FUROSEMIDE 80 MG: 10 INJECTION, SOLUTION INTRAMUSCULAR; INTRAVENOUS at 14:11

## 2021-01-01 RX ADMIN — LEVOTHYROXINE SODIUM 25 MCG: 0.03 TABLET ORAL at 05:38

## 2021-01-01 RX ADMIN — BUDESONIDE 0.5 MG: 0.5 INHALANT ORAL at 22:15

## 2021-01-01 RX ADMIN — CARVEDILOL 3.12 MG: 3.12 TABLET, FILM COATED ORAL at 17:57

## 2021-01-01 RX ADMIN — ENOXAPARIN SODIUM 30 MG: 30 INJECTION SUBCUTANEOUS at 17:14

## 2021-01-01 RX ADMIN — IPRATROPIUM BROMIDE AND ALBUTEROL SULFATE 3 ML: 2.5; .5 SOLUTION RESPIRATORY (INHALATION) at 07:13

## 2021-01-01 RX ADMIN — BUDESONIDE 0.5 MG: 0.5 SUSPENSION RESPIRATORY (INHALATION) at 07:21

## 2021-01-01 RX ADMIN — ONDANSETRON HYDROCHLORIDE 4 MG: 4 TABLET, FILM COATED ORAL at 17:14

## 2021-01-01 RX ADMIN — ONDANSETRON 4 MG: 2 INJECTION INTRAMUSCULAR; INTRAVENOUS at 12:04

## 2021-01-01 RX ADMIN — FUROSEMIDE 80 MG: 80 TABLET ORAL at 08:57

## 2021-01-01 RX ADMIN — ARFORMOTEROL TARTRATE 15 MCG: 15 SOLUTION RESPIRATORY (INHALATION) at 20:25

## 2021-01-01 RX ADMIN — IPRATROPIUM BROMIDE AND ALBUTEROL SULFATE 3 ML: 2.5; .5 SOLUTION RESPIRATORY (INHALATION) at 11:02

## 2021-01-01 RX ADMIN — ZINC OXIDE 1 APPLICATION: 200 OINTMENT TOPICAL at 09:59

## 2021-01-01 RX ADMIN — NYSTATIN: 100000 POWDER TOPICAL at 09:59

## 2021-01-01 RX ADMIN — IPRATROPIUM BROMIDE AND ALBUTEROL SULFATE 3 ML: 2.5; .5 SOLUTION RESPIRATORY (INHALATION) at 22:33

## 2021-01-01 RX ADMIN — PROPAFENONE HYDROCHLORIDE 150 MG: 150 TABLET, COATED ORAL at 13:01

## 2021-01-01 RX ADMIN — LORAZEPAM 0.25 MG: 0.5 TABLET ORAL at 01:07

## 2021-01-01 RX ADMIN — LISINOPRIL 20 MG: 20 TABLET ORAL at 20:30

## 2021-01-01 RX ADMIN — ARFORMOTEROL TARTRATE 15 MCG: 15 SOLUTION RESPIRATORY (INHALATION) at 19:49

## 2021-01-01 RX ADMIN — BUDESONIDE 0.5 MG: 0.5 INHALANT ORAL at 09:52

## 2021-01-01 RX ADMIN — NYSTATIN: 100000 POWDER TOPICAL at 09:11

## 2021-01-01 RX ADMIN — LEVOTHYROXINE SODIUM 25 MCG: 0.03 TABLET ORAL at 09:04

## 2021-01-01 RX ADMIN — IPRATROPIUM BROMIDE AND ALBUTEROL SULFATE 3 ML: 2.5; .5 SOLUTION RESPIRATORY (INHALATION) at 13:20

## 2021-01-01 RX ADMIN — ONDANSETRON HYDROCHLORIDE 4 MG: 4 TABLET, FILM COATED ORAL at 14:43

## 2021-01-01 RX ADMIN — AMLODIPINE BESYLATE 5 MG: 5 TABLET ORAL at 08:57

## 2021-01-01 RX ADMIN — LORAZEPAM 0.5 MG: 0.5 TABLET ORAL at 21:17

## 2021-01-01 RX ADMIN — TRAMADOL HYDROCHLORIDE 50 MG: 50 TABLET, FILM COATED ORAL at 23:56

## 2021-01-01 RX ADMIN — ONDANSETRON HYDROCHLORIDE 4 MG: 4 TABLET, FILM COATED ORAL at 14:57

## 2021-01-01 RX ADMIN — AMLODIPINE BESYLATE 5 MG: 5 TABLET ORAL at 09:26

## 2021-01-01 RX ADMIN — IPRATROPIUM BROMIDE AND ALBUTEROL SULFATE 3 ML: 2.5; .5 SOLUTION RESPIRATORY (INHALATION) at 11:58

## 2021-01-01 RX ADMIN — NYSTATIN: 100000 POWDER TOPICAL at 20:31

## 2021-01-01 RX ADMIN — SODIUM CHLORIDE, PRESERVATIVE FREE 10 ML: 5 INJECTION INTRAVENOUS at 09:26

## 2021-01-01 RX ADMIN — SODIUM CHLORIDE, PRESERVATIVE FREE 10 ML: 5 INJECTION INTRAVENOUS at 21:20

## 2021-01-01 RX ADMIN — SODIUM CHLORIDE 250 MG: 9 INJECTION, SOLUTION INTRAVENOUS at 12:14

## 2021-01-01 RX ADMIN — MORPHINE SULFATE 10 MG: 100 SOLUTION ORAL at 17:55

## 2021-01-01 RX ADMIN — SODIUM CHLORIDE, PRESERVATIVE FREE 10 ML: 5 INJECTION INTRAVENOUS at 20:31

## 2021-01-01 RX ADMIN — AMLODIPINE BESYLATE 5 MG: 5 TABLET ORAL at 08:44

## 2021-01-01 RX ADMIN — CARVEDILOL 3.12 MG: 3.12 TABLET, FILM COATED ORAL at 09:21

## 2021-01-01 RX ADMIN — PANTOPRAZOLE SODIUM 40 MG: 40 TABLET, DELAYED RELEASE ORAL at 06:38

## 2021-01-01 RX ADMIN — SODIUM CHLORIDE, PRESERVATIVE FREE 10 ML: 5 INJECTION INTRAVENOUS at 20:21

## 2021-01-01 RX ADMIN — SERTRALINE HYDROCHLORIDE 50 MG: 50 TABLET, FILM COATED ORAL at 20:35

## 2021-01-01 RX ADMIN — BISACODYL 10 MG: 10 SUPPOSITORY RECTAL at 11:34

## 2021-01-01 RX ADMIN — POTASSIUM CHLORIDE 20 MEQ: 1.5 POWDER, FOR SOLUTION ORAL at 08:44

## 2021-01-01 RX ADMIN — ONDANSETRON HYDROCHLORIDE 4 MG: 4 TABLET, FILM COATED ORAL at 21:06

## 2021-01-01 RX ADMIN — ACETAMINOPHEN 500 MG: 325 TABLET, FILM COATED ORAL at 17:27

## 2021-01-01 RX ADMIN — ONDANSETRON HYDROCHLORIDE 4 MG: 4 TABLET, FILM COATED ORAL at 09:59

## 2021-01-01 RX ADMIN — PANTOPRAZOLE SODIUM 40 MG: 40 TABLET, DELAYED RELEASE ORAL at 05:46

## 2021-01-01 RX ADMIN — LORAZEPAM 0.25 MG: 0.5 TABLET ORAL at 16:55

## 2021-01-01 RX ADMIN — POTASSIUM CHLORIDE 20 MEQ: 1.5 POWDER, FOR SOLUTION ORAL at 17:57

## 2021-01-01 RX ADMIN — ONDANSETRON HYDROCHLORIDE 4 MG: 4 TABLET, FILM COATED ORAL at 09:34

## 2021-01-01 RX ADMIN — SODIUM CHLORIDE, PRESERVATIVE FREE 10 ML: 5 INJECTION INTRAVENOUS at 09:02

## 2021-01-01 RX ADMIN — CARVEDILOL 3.12 MG: 3.12 TABLET, FILM COATED ORAL at 17:55

## 2021-01-01 RX ADMIN — RIVAROXABAN 20 MG: 20 TABLET, FILM COATED ORAL at 18:21

## 2021-01-01 RX ADMIN — SCOLOPAMINE TRANSDERMAL SYSTEM 1 PATCH: 1 PATCH, EXTENDED RELEASE TRANSDERMAL at 17:15

## 2021-01-01 RX ADMIN — BUDESONIDE 0.5 MG: 0.5 INHALANT ORAL at 09:02

## 2021-01-01 RX ADMIN — SODIUM CHLORIDE, PRESERVATIVE FREE 10 ML: 5 INJECTION INTRAVENOUS at 09:34

## 2021-01-01 RX ADMIN — IPRATROPIUM BROMIDE AND ALBUTEROL SULFATE 3 ML: 2.5; .5 SOLUTION RESPIRATORY (INHALATION) at 19:31

## 2021-01-01 RX ADMIN — FUROSEMIDE 40 MG: 40 TABLET ORAL at 08:28

## 2021-01-01 RX ADMIN — LISINOPRIL 20 MG: 20 TABLET ORAL at 09:02

## 2021-01-01 RX ADMIN — LORAZEPAM 0.25 MG: 0.5 TABLET ORAL at 17:27

## 2021-01-01 RX ADMIN — PROPAFENONE HYDROCHLORIDE 150 MG: 150 TABLET, COATED ORAL at 05:53

## 2021-01-01 RX ADMIN — Medication 5 MG: at 20:21

## 2021-01-01 RX ADMIN — SERTRALINE HYDROCHLORIDE 50 MG: 50 TABLET, FILM COATED ORAL at 21:25

## 2021-01-01 RX ADMIN — IPRATROPIUM BROMIDE AND ALBUTEROL SULFATE 3 ML: 2.5; .5 SOLUTION RESPIRATORY (INHALATION) at 20:03

## 2021-01-01 RX ADMIN — LORAZEPAM 0.25 MG: 0.5 TABLET ORAL at 06:33

## 2021-01-01 RX ADMIN — ARFORMOTEROL TARTRATE 15 MCG: 15 SOLUTION RESPIRATORY (INHALATION) at 07:57

## 2021-01-01 RX ADMIN — IPRATROPIUM BROMIDE AND ALBUTEROL SULFATE 3 ML: 2.5; .5 SOLUTION RESPIRATORY (INHALATION) at 14:59

## 2021-01-01 RX ADMIN — ACETAMINOPHEN 500 MG: 325 TABLET, FILM COATED ORAL at 15:06

## 2021-01-01 RX ADMIN — IPRATROPIUM BROMIDE AND ALBUTEROL SULFATE 3 ML: 2.5; .5 SOLUTION RESPIRATORY (INHALATION) at 11:44

## 2021-01-01 RX ADMIN — LORAZEPAM 0.25 MG: 0.5 TABLET ORAL at 09:21

## 2021-01-01 RX ADMIN — Medication 1000 UNITS: at 09:05

## 2021-01-01 RX ADMIN — LISINOPRIL 20 MG: 20 TABLET ORAL at 09:34

## 2021-01-01 RX ADMIN — CARVEDILOL 3.12 MG: 3.12 TABLET, FILM COATED ORAL at 18:20

## 2021-01-01 RX ADMIN — ONDANSETRON HYDROCHLORIDE 4 MG: 4 TABLET, FILM COATED ORAL at 09:33

## 2021-01-01 RX ADMIN — ACETAMINOPHEN 500 MG: 500 TABLET ORAL at 20:30

## 2021-01-01 RX ADMIN — PANTOPRAZOLE SODIUM 40 MG: 40 TABLET, DELAYED RELEASE ORAL at 06:08

## 2021-01-01 RX ADMIN — AMLODIPINE BESYLATE 5 MG: 5 TABLET ORAL at 09:03

## 2021-01-01 RX ADMIN — ARFORMOTEROL TARTRATE 15 MCG: 15 SOLUTION RESPIRATORY (INHALATION) at 07:39

## 2021-01-01 RX ADMIN — IPRATROPIUM BROMIDE AND ALBUTEROL SULFATE 3 ML: 2.5; .5 SOLUTION RESPIRATORY (INHALATION) at 19:34

## 2021-01-01 RX ADMIN — LEVOTHYROXINE SODIUM 25 MCG: 0.03 TABLET ORAL at 06:03

## 2021-01-01 RX ADMIN — LORAZEPAM 0.25 MG: 0.5 TABLET ORAL at 22:52

## 2021-01-01 RX ADMIN — LEVOTHYROXINE SODIUM 50 MCG: 50 TABLET ORAL at 06:08

## 2021-01-01 RX ADMIN — SODIUM CHLORIDE, PRESERVATIVE FREE 10 ML: 5 INJECTION INTRAVENOUS at 08:44

## 2021-01-01 RX ADMIN — PROPAFENONE HYDROCHLORIDE 150 MG: 150 TABLET, COATED ORAL at 14:36

## 2021-01-01 RX ADMIN — ACETAMINOPHEN 500 MG: 325 TABLET, FILM COATED ORAL at 05:39

## 2021-01-01 RX ADMIN — NYSTATIN: 100000 POWDER TOPICAL at 21:06

## 2021-01-01 RX ADMIN — SCOLOPAMINE TRANSDERMAL SYSTEM 1 PATCH: 1 PATCH, EXTENDED RELEASE TRANSDERMAL at 06:34

## 2021-01-01 RX ADMIN — POTASSIUM CHLORIDE 20 MEQ: 750 TABLET, EXTENDED RELEASE ORAL at 09:02

## 2021-01-01 RX ADMIN — AMLODIPINE BESYLATE 5 MG: 5 TABLET ORAL at 08:28

## 2021-01-01 RX ADMIN — ONDANSETRON 4 MG: 2 INJECTION INTRAMUSCULAR; INTRAVENOUS at 11:05

## 2021-01-08 PROBLEM — R06.00 DYSPNEA: Status: ACTIVE | Noted: 2021-01-01

## 2021-01-08 NOTE — ED PROVIDER NOTES
EMERGENCY DEPARTMENT ENCOUNTER    Room Number:  E454/1  Date of encounter:  1/8/2021  PCP: Harmeet Payne MD  Historian: Patient       HPI:  Chief Complaint: Shortness of breath  A complete HPI/ROS/PMH/PSH/SH/FH are unobtainable due to: None    Context: Ale Narayan is a 80 y.o. female who presents to the ED via [facility] from home for 1 month of more frequent episodes of shortness of breath, headaches, nausea.  Patient states that the shortness of breath is with exertion, denies associated fevers, chest pains, vomiting, diarrhea.  Reports that she did increase her baseline 4-1/2 L of oxygen to 5 L yesterday.  She denies any current shortness of breath, nausea, headache at this time.  Patient has had a baseline cough without change in sputum.  Does describe decreased appetite and p.o. intake overall.  Reports baseline edema with no significant increase from baseline and no significant new weight gain.  Reports compliance with her medications.      MEDICAL RECORD REVIEW    Adult transthoracic echo October 9, 2019    · Left ventricular systolic function is normal. Calculated EF = 64.0%. Estimated EF was in agreement with the calculated EF. Normal left ventricular cavity size noted. All left ventricular wall segments contract normally. Left ventricular wall thickness is consistent with mild concentric hypertrophy. Left ventricular diastolic dysfunction is noted (grade I) consistent with impaired relaxation.    PAST MEDICAL HISTORY  Active Ambulatory Problems     Diagnosis Date Noted   • Adenocarcinoma of right lung (CMS/HCC) 02/03/2016   • Decreased blood pressure, not hypotension 02/03/2016   • Cardiomyopathy (CMS/HCC) 02/03/2016   • Cardiovascular disease 02/03/2016   • Dizziness 02/03/2016   • Dyspnea on exertion 02/03/2016   • Nodule of left lung 02/03/2016   • Peripheral arterial occlusive disease (CMS/HCC) 02/03/2016   • Iron deficiency anemia 04/26/2016   • Pulmonary emphysema (CMS/HCC) 07/27/2016   •  Pneumonitis, radiation (CMS/HCC) 12/21/2016   • Palpitations 03/03/2017   • Localized edema 03/03/2017   • Weight gain 03/15/2017   • Anemia 09/05/2019   • Rectal bleeding 09/05/2019   • Hypercapnia 10/06/2019     Resolved Ambulatory Problems     Diagnosis Date Noted   • Disease of lung 02/03/2016   • Blood loss anemia 09/05/2019   • Temporal headache 09/13/2019     Past Medical History:   Diagnosis Date   • Adenocarcinoma of lung (CMS/HCC)    • Asthma    • Carotid artery disease (CMS/HCC)    • Cough    • Deep vein thrombosis (CMS/HCC)    • Emphysema of lung (CMS/HCC)    • Hyperlipidemia    • Hypertension    • Hypothyroidism    • Lung cancer (CMS/HCC)    • Osteoporosis    • Paroxysmal atrial fibrillation (CMS/HCC)    • Peripheral arterial disease (CMS/HCC)    • Pulmonary embolism (CMS/HCC)    • Thrombophilia (CMS/HCC)          PAST SURGICAL HISTORY  Past Surgical History:   Procedure Laterality Date   • ANKLE SURGERY     • BREAST SURGERY  2015    removal of benign melanoma spot on nipple of left breast 7/2013   • COLONOSCOPY      Negative   • COLONOSCOPY N/A 9/6/2019    Procedure: COLONOSCOPY to cecum with biopsy / polypectomy and  APC cautery;  Surgeon: Alexsandra Ruiz MD;  [place] [facility];  Service: Gastroenterology   • ENDOSCOPY N/A 9/6/2019    Procedure: ESOPHAGOGASTRODUODENOSCOPY with biopsies;  Surgeon: Alexsandra Ruiz MD;  [place] [facility];  Service: Gastroenterology   • HERNIA REPAIR      Lumbar spine   • HYSTERECTOMY     • LUNG LOBECTOMY  2009   • OTHER SURGICAL HISTORY      Bypass of lower extremities   • OTHER SURGICAL HISTORY      IVC filter placed         FAMILY HISTORY  Family History   Problem Relation Age of Onset   • Lung cancer Mother          SOCIAL HISTORY  Social History     Socioeconomic History   • Marital status:      Spouse name: Not on file   • Number of children: Not on file   • Years of education: High School   • Highest education level: Not on file    Occupational History   • Occupation: [place], retired     Employer: [employer] [place], [place]    Tobacco Use   • Smoking status: Former Smoker     Packs/day: 1.00     Years: 50.00     Pack years: 50.00     Types: Cigarettes     Start date:      Quit date:      Years since quittin.0   • Smokeless tobacco: Former User   Substance and Sexual Activity   • Alcohol use: No   • Drug use: No   • Sexual activity: Defer         ALLERGIES  Adhesive tape, Augmentin [amoxicillin-pot clavulanate], Cephalexin, Metoclopramide, Pentazocine, Simvastatin, and Sucralfate        REVIEW OF SYSTEMS  Review of Systems     All systems reviewed and negative except for those discussed in HPI.       PHYSICAL EXAM    I have reviewed the triage vital signs and nursing notes.    ED Triage Vitals   Temp Heart Rate Resp BP SpO2   21 1146 21 1146 21 1146 21 1146 21 1151   98.3 °F (36.8 °C) 59 20 129/99 100 %      Temp src Heart Rate Source Patient Position BP Location FiO2 (%)   21 1146 21 1146 21 1146 21 1146 --   Tympanic Monitor Sitting Right arm        Physical Exam  General: Awake, alert, mildly uncomfortable appearing, nondiaphoretic  HEENT: Mucous membranes moist, atraumatic, EOMI  Neck: Full ROM  Pulm: Symmetric chest rise, mild tachypnea, lungs diminished bilaterally without overt wheezes/rales/rhonchi  Cardiovascular: Regular rate and rhythm, intact distal pulses, 1+ nonpitting edema bilateral lower extremities  GI: Soft, nontender, nondistended, no rebound, no guarding, bowel sounds present  MSK: Full ROM, no deformity  Skin: Warm, dry  Neuro: Alert and oriented x 3, GCS 15, moving all extremities, no focal deficits  Psych: Calm, cooperative      Gown, surgical mask, protective eye goggles, and gloves used during this encounter. Patient in surgical mask.      LAB RESULTS  Recent Results (from the past 24 hour(s))   Comprehensive Metabolic Panel    Collection  Time: 01/08/21 12:23 PM    Specimen: Blood   Result Value Ref Range    Glucose 168 (H) 65 - 99 mg/dL    BUN 30 (H) 8 - 23 mg/dL    Creatinine 0.83 0.57 - 1.00 mg/dL    Sodium 142 136 - 145 mmol/L    Potassium 5.0 3.5 - 5.2 mmol/L    Chloride 104 98 - 107 mmol/L    CO2 31.1 (H) 22.0 - 29.0 mmol/L    Calcium 8.6 8.6 - 10.5 mg/dL    Total Protein 6.4 6.0 - 8.5 g/dL    Albumin 3.30 (L) 3.50 - 5.20 g/dL    ALT (SGPT) 17 1 - 33 U/L    AST (SGOT) 19 1 - 32 U/L    Alkaline Phosphatase 68 39 - 117 U/L    Total Bilirubin 0.2 0.0 - 1.2 mg/dL    eGFR Non African Amer 66 >60 mL/min/1.73    Globulin 3.1 gm/dL    A/G Ratio 1.1 g/dL    BUN/Creatinine Ratio 36.1 (H) 7.0 - 25.0    Anion Gap 6.9 5.0 - 15.0 mmol/L   Lactate Dehydrogenase    Collection Time: 01/08/21 12:23 PM    Specimen: Blood   Result Value Ref Range     135 - 214 U/L   Procalcitonin    Collection Time: 01/08/21 12:23 PM    Specimen: Blood   Result Value Ref Range    Procalcitonin 0.11 0.00 - 0.25 ng/mL   C-reactive Protein    Collection Time: 01/08/21 12:23 PM    Specimen: Blood   Result Value Ref Range    C-Reactive Protein 0.99 (H) 0.00 - 0.50 mg/dL   Lactic Acid, Plasma    Collection Time: 01/08/21 12:23 PM    Specimen: Blood   Result Value Ref Range    Lactate 1.9 0.5 - 2.0 mmol/L   Ferritin    Collection Time: 01/08/21 12:23 PM    Specimen: Blood   Result Value Ref Range    Ferritin 35.30 13.00 - 150.00 ng/mL   BNP    Collection Time: 01/08/21 12:23 PM    Specimen: Blood   Result Value Ref Range    proBNP 3,167.0 (H) 0.0-1,800.0 pg/mL   Troponin    Collection Time: 01/08/21 12:23 PM    Specimen: Blood   Result Value Ref Range    Troponin T 0.014 0.000 - 0.030 ng/mL   Magnesium    Collection Time: 01/08/21 12:23 PM    Specimen: Blood   Result Value Ref Range    Magnesium 2.0 1.6 - 2.4 mg/dL   TSH    Collection Time: 01/08/21 12:23 PM    Specimen: Blood   Result Value Ref Range    TSH 1.910 0.270 - 4.200 uIU/mL   T4, Free    Collection Time: 01/08/21 12:23  PM    Specimen: Blood   Result Value Ref Range    Free T4 1.04 0.93 - 1.70 ng/dL   CBC Auto Differential    Collection Time: 01/08/21 12:23 PM    Specimen: Blood   Result Value Ref Range    WBC 7.45 3.40 - 10.80 10*3/mm3    RBC 3.23 (L) 3.77 - 5.28 10*6/mm3    Hemoglobin 9.8 (L) 12.0 - 15.9 g/dL    Hematocrit 31.0 (L) 34.0 - 46.6 %    MCV 96.0 79.0 - 97.0 fL    MCH 30.3 26.6 - 33.0 pg    MCHC 31.6 31.5 - 35.7 g/dL    RDW 13.8 12.3 - 15.4 %    RDW-SD 48.5 37.0 - 54.0 fl    MPV 10.6 6.0 - 12.0 fL    Platelets 193 140 - 450 10*3/mm3    Neutrophil % 79.1 (H) 42.7 - 76.0 %    Lymphocyte % 11.1 (L) 19.6 - 45.3 %    Monocyte % 7.8 5.0 - 12.0 %    Eosinophil % 1.2 0.3 - 6.2 %    Basophil % 0.7 0.0 - 1.5 %    Immature Grans % 0.1 0.0 - 0.5 %    Neutrophils, Absolute 5.89 1.70 - 7.00 10*3/mm3    Lymphocytes, Absolute 0.83 0.70 - 3.10 10*3/mm3    Monocytes, Absolute 0.58 0.10 - 0.90 10*3/mm3    Eosinophils, Absolute 0.09 0.00 - 0.40 10*3/mm3    Basophils, Absolute 0.05 0.00 - 0.20 10*3/mm3    Immature Grans, Absolute 0.01 0.00 - 0.05 10*3/mm3    nRBC 0.0 0.0 - 0.2 /100 WBC   Blood Gas, Arterial -    Collection Time: 01/08/21 12:29 PM    Specimen: Arterial Blood   Result Value Ref Range    Site Arterial: right radial     Ector's Test Positive     pH, Arterial 7.268 (C) 7.350 - 7.450 pH units    pCO2, Arterial 77.9 (C) 35.0 - 45.0 mm Hg    pO2, Arterial 218.7 (H) 80.0 - 100.0 mm Hg    HCO3, Arterial 35.6 (H) 22.0 - 28.0 mmol/L    Base Excess, Arterial 6.3 (H) 0.0 - 2.0 mmol/L    O2 Saturation Calculated 99.6 (H) 92.0 - 99.0 %    Barometric Pressure for Blood Gas 757.4 mmHg    Modality Cannula     Flow Rate 4 lpm    Rate 24 Breaths/minute   Respiratory Panel PCR w/COVID-19(SARS-CoV-2) TRAVIS/YESSICA/CATHLEEN/PAD/COR/MAD/JACQUELINE In-House, NP Swab in [facility], 3-4 HR TAT - Swab, Nasopharynx    Collection Time: 01/08/21  1:00 PM    Specimen: Nasopharynx; Swab   Result Value Ref Range    ADENOVIRUS, PCR Not Detected Not Detected    Coronavirus 229E  Not Detected Not Detected    Coronavirus HKU1 Not Detected Not Detected    Coronavirus NL63 Not Detected Not Detected    Coronavirus OC43 Not Detected Not Detected    COVID19 Not Detected Not Detected - Ref. Range    Human Metapneumovirus Not Detected Not Detected    Human Rhinovirus/Enterovirus Not Detected Not Detected    Influenza A PCR Not Detected Not Detected    Influenza B PCR Not Detected Not Detected    Parainfluenza Virus 1 Not Detected Not Detected    Parainfluenza Virus 2 Not Detected Not Detected    Parainfluenza Virus 3 Not Detected Not Detected    Parainfluenza Virus 4 Not Detected Not Detected    RSV, PCR Not Detected Not Detected    Bordetella pertussis pcr Not Detected Not Detected    Bordetella parapertussis PCR Not Detected Not Detected    Chlamydophila pneumoniae PCR Not Detected Not Detected    Mycoplasma pneumo by PCR Not Detected Not Detected   ECG 12 Lead    Collection Time: 01/08/21  1:26 PM   Result Value Ref Range    QT Interval 507 ms   Blood Gas, Arterial -    Collection Time: 01/08/21  3:17 PM    Specimen: Arterial Blood   Result Value Ref Range    Site Arterial: right radial     Ector's Test Positive     pH, Arterial 7.300 (L) 7.350 - 7.450 pH units    pCO2, Arterial 74.7 (C) 35.0 - 45.0 mm Hg    pO2, Arterial 130.6 (H) 80.0 - 100.0 mm Hg    HCO3, Arterial 36.8 (H) 22.0 - 28.0 mmol/L    Base Excess, Arterial 7.5 (H) 0.0 - 2.0 mmol/L    O2 Saturation Calculated 98.4 92.0 - 99.0 %    Barometric Pressure for Blood Gas 757.4 mmHg    Modality Cannula     Flow Rate 2 lpm   Urinalysis With Microscopic If Indicated (No Culture) - Urine, Catheter    Collection Time: 01/08/21  4:26 PM    Specimen: Urine, Catheter   Result Value Ref Range    Color, UA Yellow Yellow, Straw    Appearance, UA Clear Clear    pH, UA 6.0 5.0 - 8.0    Specific Gravity, UA 1.006 1.005 - 1.030    Glucose, UA Negative Negative    Ketones, UA Negative Negative    Bilirubin, UA Negative Negative    Blood, UA Negative  Negative    Protein, UA Negative Negative    Leuk Esterase, UA Trace (A) Negative    Nitrite, UA Negative Negative    Urobilinogen, UA 0.2 E.U./dL 0.2 - 1.0 E.U./dL   Urinalysis, Microscopic Only - Urine, Catheter    Collection Time: 01/08/21  4:26 PM    Specimen: Urine, Catheter   Result Value Ref Range    RBC, UA 0-2 None Seen, 0-2 /HPF    WBC, UA 0-2 None Seen, 0-2 /HPF    Bacteria, UA 4+ (A) None Seen /HPF    Squamous Epithelial Cells, UA 0-2 None Seen, 0-2 /HPF    Hyaline Casts, UA None Seen None Seen /LPF    Methodology Automated Microscopy    Basic Metabolic Panel    Collection Time: 01/08/21  4:34 PM    Specimen: Arm, Right; Blood   Result Value Ref Range    Glucose 100 (H) 65 - 99 mg/dL    BUN 32 (H) 8 - 23 mg/dL    Creatinine 0.89 0.57 - 1.00 mg/dL    Sodium 152 (H) 136 - 145 mmol/L    Potassium 5.1 3.5 - 5.2 mmol/L    Chloride 105 98 - 107 mmol/L    CO2 35.9 (H) 22.0 - 29.0 mmol/L    Calcium 9.4 8.6 - 10.5 mg/dL    eGFR Non African Amer 61 >60 mL/min/1.73    BUN/Creatinine Ratio 36.0 (H) 7.0 - 25.0    Anion Gap 11.1 5.0 - 15.0 mmol/L   Phosphorus    Collection Time: 01/08/21  4:34 PM    Specimen: Arm, Right; Blood   Result Value Ref Range    Phosphorus 3.8 2.5 - 4.5 mg/dL   Troponin    Collection Time: 01/08/21  4:34 PM    Specimen: Arm, Right; Blood   Result Value Ref Range    Troponin T 0.018 0.000 - 0.030 ng/mL   TSH    Collection Time: 01/08/21  4:34 PM    Specimen: Arm, Right; Blood   Result Value Ref Range    TSH 1.580 0.270 - 4.200 uIU/mL   Reticulocytes    Collection Time: 01/08/21  4:34 PM    Specimen: Blood   Result Value Ref Range    Reticulocyte % 2.13 (H) 0.70 - 1.90 %    Reticulocyte Absolute 0.0711 0.0200 - 0.1300 10*6/mm3   ECG 12 Lead    Collection Time: 01/08/21  4:37 PM   Result Value Ref Range    QT Interval 584 ms       Ordered the above labs and independently reviewed the results.        RADIOLOGY  Xr Chest Ap    Result Date: 1/8/2021  ONE VIEW PORTABLE CHEST  HISTORY: Shortness of  breath. Possible COVID-19 pneumonia.  FINDINGS: There is cardiomegaly with slight vascular congestion and small bilateral pleural effusions and suspicious for changes of mild congestive heart failure. The right effusion is slightly larger when compared to an exam dating back to 10/06/2019. No definite localized pneumonia is seen.  This report was finalized on 1/8/2021 2:01 PM by Dr. Quang Mejía M.D.        I ordered the above noted radiological studies. Reviewed by me.  See dictation for official radiology interpretation.      PROCEDURES    Critical Care  Performed by: Mahendra Severino MD  Authorized by: Mahendra eSverino MD     Critical care provider statement:     Critical care time (minutes):  37    Critical care time was exclusive of:  Separately billable procedures and treating other patients    Critical care was necessary to treat or prevent imminent or life-threatening deterioration of the following conditions:  Respiratory failure    Critical care was time spent personally by me on the following activities:  Development of treatment plan with patient or surrogate, discussions with consultants, evaluation of patient's response to treatment, examination of patient, obtaining history from patient or surrogate, ordering and performing treatments and interventions, ordering and review of laboratory studies, ordering and review of radiographic studies, pulse oximetry, re-evaluation of patient's condition and review of old charts      EKG    EKG Time: 1326  Rhythm/Rate: Atrial fibrillation with a rate of 48  Left axis deviation with a left bundle branch block  T wave inversions noted leads V2 through V6 which are new changes as compared to October 2019  No STEMI     Interpreted Contemporaneously by me, independently viewed  These are new changes as compared to October 2019, atrial fibrillation was present      MEDICATIONS GIVEN IN ER    Medications   sodium chloride 0.9 % flush 10 mL (10 mL Intravenous Given 1/8/21  1759)   sodium chloride 0.9 % flush 10 mL (has no administration in time range)   nitroglycerin (NITROSTAT) SL tablet 0.4 mg (has no administration in time range)   budesonide (PULMICORT) nebulizer solution 0.5 mg (0.5 mg Nebulization Not Given 1/8/21 1331)   ipratropium-albuterol (DUO-NEB) nebulizer solution 3 mL (3 mL Nebulization Not Given 1/8/21 1525)   carvedilol (COREG) tablet 12.5 mg (12.5 mg Oral Not Given 1/8/21 1757)   potassium chloride (KLOR-CON) packet 20 mEq (20 mEq Oral Given 1/8/21 1757)   levothyroxine (SYNTHROID, LEVOTHROID) tablet 50 mcg (has no administration in time range)   lisinopril (PRINIVIL,ZESTRIL) tablet 20 mg (has no administration in time range)   pantoprazole (PROTONIX) EC tablet 40 mg (has no administration in time range)   propafenone (RYTHMOL) tablet 150 mg (has no administration in time range)   sertraline (ZOLOFT) tablet 50 mg (has no administration in time range)   rivaroxaban (XARELTO) tablet 20 mg (20 mg Oral Not Given 1/8/21 1756)   ondansetron (ZOFRAN) injection 4 mg (has no administration in time range)   LORazepam (ATIVAN) tablet 0.5 mg (has no administration in time range)   furosemide (LASIX) injection 80 mg (has no administration in time range)   furosemide (LASIX) injection 80 mg (80 mg Intravenous Given 1/8/21 1411)         PROGRESS, DATA ANALYSIS, CONSULTS, AND MEDICAL DECISION MAKING    All labs have been independently reviewed by me.  All radiology studies have been reviewed by me and discussed with radiologist dictating the report.   EKG's independently viewed and interpreted by me.  Discussion below represents my analysis of pertinent findings related to patient's condition, differential diagnosis, treatment plan and final disposition.    Patient with likely COPD exacerbation, possibly some underlying heart failure exacerbation as well.  Will work-up for possible Covid though I think this is less likely.  ABG showing hypercapnia, also with some mild respiratory  acidosis patient may need some positive pressure ventilation.  Will evaluate for any possible underlying pneumonia, renal failure, electrolyte abnormalities, arrhythmias, among others.    ED Course as of Jan 08 2106 Fri Jan 08, 2021   1215 Discussed with Dr. Payne, PCP, he is going to see the patient in the ER and take over care from here.  I will put in admission orders under his name.    [DC]   1245 Discussed with Dr. Payne who has evaluated her at bedside, will put on her PPV     [DC]   1323 proBNP(!): 3,167.0 [DC]   1324 C-Reactive Protein(!): 0.99 [DC]   1324 Hemoglobin(!): 9.8 [DC]   1324 WBC: 7.45 [DC]   1324 Creatinine: 0.83 [DC]   1324 CO2(!): 31.1 [DC]   1324 Procalcitonin: 0.11 [DC]   1324 pCO2, Arterial(!!): 77.9 [DC]   1324 pH, Arterial(!!): 7.268 [DC]   1324 Troponin T: 0.014 [DC]   1523 Repeat ABG slightly improved to 7.3 pH and then PCO2 of 74.  Will maintain current settings as patient does appear to be symptomatically improved and moving good air with the BiPAP.    [DC]      ED Course User Index  [DC] Mahendra Severino MD     Patient with improved work of breathing on the Bipap. Will repeat ABG.    AS OF 21:06 EST VITALS:    BP - 152/88  HR - 95  TEMP - 97.9 °F (36.6 °C) (Oral)  02 SATS - 93%        DIAGNOSIS  Final diagnoses:   Dyspnea, unspecified type   History of COPD   Acute anemia   COPD exacerbation (CMS/[facility])         DISPOSITION  HOSPITALIZATION    Discussed treatment plan and reason for hospitalization with pt/family and hospitalizing physician.  Pt/family voiced understanding of the plan for hospitalization for further testing/treatment as needed.                  Mahendra Severino MD  01/08/21 2107

## 2021-01-08 NOTE — H&P
Date of admission: January 8, 2021    Chief complaint: Respiratory failure    History of present illness:  This is a very nice 80-year-old lady.  She has a complicated past medical history.  She has severe COPD with hypercapnia.  She is on a trilogy machine at home.  He has had Takotsubo's heart failure x2 and has had adenocarcinoma of the lung x2.  In addition she has a history of hypertension, hyperlipidemia, GERD, hypothyroidism, osteoarthritis, paroxysmal atrial fib and past DVT/PEs.  The patient is homebound.  She has not left her home in almost a year and a half.  She is on oxygen and positive pressure breathing.  She uses budesonide and albuterol at home.  Over the past 2 weeks the patient states she has been having more nausea.  She has had some discomfort in the right lower quadrant.  She has had no vomiting.  Diarrhea is gotten much worse.  Over the past 4 days she has had some constipation.  She said no blood in her stool or black stool.    The patient states her breathing has not changed much.  She is always very short of breath.  She gets short of breath just walking from 1 room to another.  She has a chronic cough which is productive of some clear sputum.  This has not changed.  He has noticed more swelling in her abdomen and some swelling in her legs.  She is at orthopnea and has trouble laying down as she gets too short winded.  She denies any chest pain or chest pressure.  She denies palpitations.    Past medical history:     Allergies: Augmentin, Carafate, Keflex, Reglan, Talwin, Zocor     Current medications:  Tylenol 500 mg every 6 hours as needed  Tramadol 50 mg every 6 hours as needed  Pulmicort 2 mL's twice daily  Albuterol mini nebs every 6 hours as needed  Xarelto 20 mill grams a day  Zoloft 50 mg a day  Lisinopril 20 mg p.o. daily  Coreg 12.5 mg twice daily  Lasix 40 mill grams a day  MiraLAX 17.5 mL's PRN daily  Potassium chloride 20 mg a day  Synthroid 50 mcg a day  Nexium 40 mg a  day  Propafenone 150 mg p.o. 3 times daily  Oxygen 4-1/2 L nasal cannula        Past medical history:  Colonic angiodysplasia  Essential hypertension  Hyperlipidemia  GERD  MISSY  Osteoarthritis  Hypothyroid  Peripheral vascular disease  COPD  Distant polymyalgia rheumatica  Past DVT/PEs, multiple occasions  Paroxysmal atrial fibrillation  Takasubo cardiomyopathy 2012 and   Malignant melanoma in situ left breast region   Adenocarcinoma of the lung 2009 likely resected, 2016 treated with radiation     Surgical history:  Bilateral tubal ligation   Total hysterectomy 1971  Nephrectomy   Vena cava filter   Hernia repair at   Breast biopsy   Vena cava filter   Video-assisted thorascopic wedge resection right lower lobe 2009  Vein stripping   Femorofemoral bypass right lower extremity   Open reduction internal fixation left bimalleolar ankle fracture   Irrigation and debridement removal of hardware left ankle      Colonoscopy 2019 demonstrated diverticulosis and a couple colonic angiodysplasia which were bleeding.  EGD 2019 demonstrated a hiatal hernia and some esophageal polyps.  The patient also had some gastritis.     Family history:  Father  age 54, esophageal rupture  Mother  age 65, lung cancer and heart disease.  Patient has 3 brothers and one sister who are living.  They are in their 70s.  One has prostate cancer.  One has polymyalgia rheumatica.  She had a brother who  age 7.  The death was accidental.  The patient has a daughter 64.  She has hypertension irritable bowel syndrome and hyperlipidemia.  The patient has 8 grandchildren and 6 great-grandchildren     Social history  Former smoker.  80-pack-year history of cigarette abuse.  Quit   The patient rarely drinks alcohol.  The patient is a .  Her   in .  She has been  5 times.    The patient lives alone.  The patient is sedentary.     Review of systems:    General: No fevers or chills.    No night sweats.  Energy poor.  Neurologic: No current issues.  Cardiorespiratory: See HPI  GI:  See HPI  : Urinates 5-6 times a day and couple times at night.  No incontinence.  GYN: Negative.  Dermatologic: Unremarkable.  Musculoskeletal: Unremarkable.  Psychiatric: No depression.  Patient does suffer from anxiety.    She has had more stress over the last few weeks due to the holidays.  She is not suicidal homicidal.     Physical exam:  Vital signs: BP  blood pressure 133/98, pulse 55, respirate 24, temperature 98.3 oxygen saturation 90% on 2 L  Appearance: Elderly  lady with central obesity.      The patient is obviously short of breath.  She is sitting up and is tachypneic.  She is much more short of breath than she has been when I have made home visits.  HEENT: Normocephalic and atraumatic.  Scalp unremarkable.  Pupils are round and equal.  Pharynx clear mucous membranes moist.  Neck: Decreased range of motion.  No lymphadenopathy.  The patient had significant JVD and HJR.  She had no thyromegaly.  Lungs: Decreased breath sounds.    Rales in both bases.  Rales more more on the right than the left.  Heart: PMI not palpable.    Rate was 55-60.  The patient sounded regular with some ectopy.  1/6 systolic murmur.  Peripheral vascular exam: Radial pulses difficult to palpate.  Good brachial pulses.  Good popliteal pulses.  Dorsal needle and posterior tibial pulses not palpable.  Abdomen: Protuberant with a large fat pad.      Normal active bowel sounds.  No rebound, tenderness or guarding.  No organomegaly or masses.  Extremities: Patient had 1+ edema on the right and trace in the left and the ankle and tibial area.  No clubbing, cyanosis or edema.  There was atrophy in the muscles of her arms and legs.  Neurologic: Nonfocal.  Lethargic    Significant lab studies:  ABGs on 4-1/2 L: pH 7.26, PCO2 77, PO2 218, HCO3 35  Troponin 0 0.014  proBNP 3167  Lactic acid 1.9  CMP  significant for BUN 30, creatinine 1.83.  Potassium was 5.0.  TSH 1.910  Free T4 1.04  Ferritin 35.30  CRP 0.99  Lactate 1.9  Magnesium 2.0  Pro calcitonin 0.11  White count 7.45, hemoglobin 9.8, hematocrit 31.0, MCV 96, platelet count 193    EKG atrial flutter with a ventricular rate of about 50.  Left bundle branch block.    Chest x-ray demonstrated cardiomegaly with bilateral pleural effusions and increased vascular markings suggestive of some CHF.    Viral PCR panel is pending.    Impression:  1.  Acute on chronic respiratory failure.  Patient has significant hypercapnia.   There are no significant signal to suggest bronchitis, pneumonia or pulmonary emboli.  The patient is in heart failure.  The patient heart failure may in fact be heart failure with preserved ejection fraction.  Patient has had Takotsubo's cardiomyopathy twice in the past.  This must be considered.  The patient's EKG has not changed and her troponin level is normal.  This suggests against ischemia.  2.  Normocytic anemia.  The patient's hemoglobin is dropped a couple grams in the past month.  She has a history of GI bleed from angiodysplasia.  No obvious source of blood loss at this time.  3.  Past adenocarcinoma of the lung.  4.  Hypothyroidism  5.  Essential hypertension.  6.  Atherosclerotic cardiovascular disease.  7.  Generalized anxiety disorder.  8.  Atrial fibrillation.  9.  History of DVT/PEs    Plan:  The patient has been admitted to monitored bed.  We have cut her oxygen back to 2 L.  We will recheck an ABG.  We will start BiPAP.  Patient will be given IV Lasix.  I plan to further evaluate her anemia.  We will need to get an echocardiogram once she is stable.  It should be noted this patient is a no code.

## 2021-01-08 NOTE — ED NOTES
"Pt presents to ED via EMS from home. Pt reports \"I am a direct admit, Dr. Harmeet Lopez made me a direct admit.\" Pt refused IV access with EMS and mask in route. Upon arrival to ED pt is A&OX4, did not ambulate, and placed in a mask. Pt reports SOA for a week, with increased weakness. Pt had blood work done and reported abnormal hemoglobin and kidney function. Pt is on 5L of oxygen [place] at all times.      Aleks Fisher, RN  01/08/21 1146    "

## 2021-01-08 NOTE — ED NOTES
Pt states she is at 4.5L O2 all the time. Dr Payne at bedside ordered this RN to turn O2 down to 2L after reviewing ABG's.      Joanie Santana, RN  01/08/21 5106

## 2021-01-08 NOTE — CONSULTS
CONSULT NOTE    Patient Identification:  Ale Narayan  80 y.o.  female  1940  [id number]            Requesting physician: Dr Harmeet Payne    Reason for Consultation:  Acute on chronic hypercapnic resp failure, mgmt of NIV,     CC: soa    History of Present Illness:  Patient is a 80-year-old with significant severe COPD with chronic hypercapnic respiratory failure for which she uses a triology at home as well as a history of stress-induced cardiomyopathy and adenocarcinoma of her lungs and pulmonary embolism who presented for admission with worsening short of breath.  Most of her symptoms really seem chronic when you discussed with her.  She says she is always short of breath worse with exertion worse with laying flat.  She says she does not have any chest pain fever chills.  She always has a cough however she says is really has not changed much for her.  No hemoptysis no pleuritic chest pain no fever no chills.    At home for her respiratory medication she does take Pulmicort nebulizers.  She does say that she feels better since admission. Some results from the lasix.  She was able to eat a little during break from bipap    Review of Systems:  CONSTITUTIONAL:  Denies fevers or chills  EYE:  No new vision changes  EAR:  No change in hearing  CARDIAC:  No chest pain  PULMONARY:  No productive cough +shortness of breath  GI:  No diarrhea, hematemesis or hematochezia,  RENAL:  No dysuria or urinary frequency  MUSCULOSKELETAL:  No musculoskeletal complaints +gen weakness  ENDOCRINE:  No heat or cold intolerance  INTEGUMENTARY: No skin rashes  NEUROLOGICAL:  No dizziness or confusion.  No seizure activity  PSYCHIATRIC:  No new anxiety or depression  12 system review of systems performed and all else negative    Past Medical History:   Diagnosis Date   • Adenocarcinoma of lung (CMS/HCC)    • Asthma    • Carotid artery disease (CMS/HCC)    • Cough    • Deep vein thrombosis (CMS/HCC)    • Emphysema of lung  (CMS/HCC)    • Hyperlipidemia    • Hypertension    • Hypothyroidism    • Lung cancer (CMS/HCC)    • Osteoporosis    • Paroxysmal atrial fibrillation (CMS/HCC)    • Peripheral arterial disease (CMS/HCC)    • Pulmonary embolism (CMS/HCC)    • Thrombophilia (CMS/HCC)     Since age 25       Past Surgical History:   Procedure Laterality Date   • ANKLE SURGERY     • BREAST SURGERY  2015    removal of benign melanoma spot on nipple of left breast 7/2013   • COLONOSCOPY      Negative   • COLONOSCOPY N/A 9/6/2019    Procedure: COLONOSCOPY to cecum with biopsy / polypectomy and  APC cautery;  Surgeon: Alexsandra Ruiz MD;  Location: [facility] ENDOSCOPY;  Service: Gastroenterology   • ENDOSCOPY N/A 9/6/2019    Procedure: ESOPHAGOGASTRODUODENOSCOPY with biopsies;  Surgeon: Alexsandra Ruiz MD;  Location: [facility] ENDOSCOPY;  Service: Gastroenterology   • HERNIA REPAIR      Lumbar spine   • HYSTERECTOMY     • LUNG LOBECTOMY  2009   • OTHER SURGICAL HISTORY      Bypass of lower extremities   • OTHER SURGICAL HISTORY      IVC filter placed        Medications Prior to Admission   Medication Sig Dispense Refill Last Dose   • acetaminophen (TYLENOL) 325 MG tablet Take 2 tablets by mouth Every 6 (Six) Hours As Needed for Mild Pain . 100 tablet 3    • atorvastatin (LIPITOR) 10 MG tablet Take 10 mg by mouth Daily.      • budesonide (PULMICORT) 0.5 MG/2ML nebulizer solution Take 2 mL by nebulization 2 (Two) Times a Day. 100 each 3    • carvedilol (COREG) 12.5 MG tablet Take 1 tablet by mouth 2 (Two) Times a Day With Meals. 180 tablet 3    • cholecalciferol (VITAMIN D3) 1000 units tablet Take 1,000 Units by mouth Daily.      • clotrimazole-betamethasone (LOTRISONE) 1-0.05 % cream Apply 1 application topically to the appropriate area as directed 2 (Two) Times a Day. Abdominal folds: Clean with soap and water, dry well, apply equal parts of Lotrisone and mupirocin, place abd pads in folds BID and PRN      • esomeprazole (nexIUM) 40 MG capsule  Take 40 mg by mouth Every Morning Before Breakfast.      • ferrous sulfate 325 (65 FE) MG tablet Take 1 tablet by mouth 2 (Two) Times a Day With Meals. 120 tablet 3    • furosemide (LASIX) 40 MG tablet Take 1 tablet by mouth 2 (Two) Times a Day. 360 tablet 3    • ipratropium-albuterol (DUO-NEB) 0.5-2.5 mg/3 ml nebulizer Take 3 mL by nebulization 4 (Four) Times a Day. 360 mL 3    • levothyroxine (SYNTHROID, LEVOTHROID) 50 MCG tablet Take 1 tablet by mouth Daily. 120 tablet 3    • lisinopril (PRINIVIL,ZESTRIL) 20 MG tablet Take 1 tablet by mouth Daily. 90 tablet 3    • loperamide (IMODIUM) 2 MG capsule Take 2 mg by mouth 4 (Four) Times a Day As Needed for Diarrhea. Administer after each loose stool      • mupirocin (BACTROBAN) 2 % ointment Apply 1 application topically to the appropriate area as directed 2 (Two) Times a Day. Abdominal folds: Clean with soap and water, dry well, apply equal parts of Lotrisone and mupirocin, place abd pads in folds BID and PRN      • polyethylene glycol (MIRALAX) pack packet Take 17 g by mouth Daily As Needed (Constipation). 30 each 3    • potassium chloride ER (K-TAB) 20 MEQ tablet controlled-release ER tablet Take 1 tablet by mouth Daily. 90 tablet 3    • promethazine (PHENERGAN) 12.5 MG tablet Take 1 tablet by mouth Every 6 (Six) Hours As Needed for Nausea or Vomiting. 60 tablet 3    • rivaroxaban (XARELTO) 20 MG tablet Take 20 mg by mouth Daily With Dinner.      • traMADol (ULTRAM) 50 MG tablet Take 50 mg by mouth Every 8 (Eight) Hours As Needed for Moderate Pain .          Allergies   Allergen Reactions   • Adhesive Tape    • Augmentin [Amoxicillin-Pot Clavulanate]    • Cephalexin    • Metoclopramide Unknown (See Comments)   • Pentazocine    • Simvastatin Unknown (See Comments)   • Sucralfate Unknown (See Comments)       Social History     Socioeconomic History   • Marital status:      Spouse name: Not on file   • Number of children: Not on file   • Years of education: High  "School   • Highest education level: Not on file   Occupational History   • Occupation: [employer], retired     Employer: [employer] [place], [place]    Tobacco Use   • Smoking status: Former Smoker     Packs/day: 1.00     Years: 50.00     Pack years: 50.00     Types: Cigarettes     Start date:      Quit date:      Years since quittin.0   • Smokeless tobacco: Former User   Substance and Sexual Activity   • Alcohol use: No   • Drug use: No   • Sexual activity: Defer       Family History   Problem Relation Age of Onset   • Lung cancer Mother        Physical Exam:  /88 (BP Location: Left arm, Patient Position: Sitting)   Pulse 54   Temp 97.9 °F (36.6 °C) (Oral)   Resp 22   Ht 157.5 cm (62\")   Wt 77 kg (169 lb 12.1 oz)   LMP  (LMP Unknown)   SpO2 94%   BMI 31.05 kg/m²   Body mass index is 31.05 kg/m².   bipap  General appearance: chronically ill non toxic however, conversant   Eyes: anicteric sclerae, moist conjunctivae; no lid-lag; PERRLA  HENT: Atraumatic; oropharynx clear with moist mucous membranes and no mucosal ulcerations; normal hard and soft palate  Neck: Trachea midline; FROM, supple, no thyromegaly or lymphadenopathy  Lungs: diminished breath sounds bilaterally no wheeze mild crackles at abses, with normal respiratory effort and no intercostal retractions  CV: irreg irreg no rub  Abdomen: Soft, non-tenderBS+  Extremities: +peripheral edema or extremity lymphadenopathy  Skin: wwp no cyanosis  Psych: Appropriate affect, alert and oriented to person, place and time  Neuro speech intact, remembers docs name, medications    LABS:  Results from last 7 days   Lab Units 21  1223 21  1530   WBC 10*3/mm3 7.45 8.65   HEMOGLOBIN g/dL 9.8* 9.9*   PLATELETS 10*3/mm3 193 191     Results from last 7 days   Lab Units 21  1634 21  1223 21  1530   SODIUM mmol/L 152* 142 140   POTASSIUM mmol/L 5.1 5.0 4.9   CHLORIDE mmol/L 105 104 99   CO2 mmol/L 35.9* 31.1* 34.2*   "   BUN mg/dL 32* 30* 41*   CREATININE mg/dL 0.89 0.83 1.12*   GLUCOSE mg/dL 100* 168* 88   CALCIUM mg/dL 9.4 8.6 8.6   MAGNESIUM mg/dL  --  2.0  --    PHOSPHORUS mg/dL 3.8  --   --    Estimated Creatinine Clearance: 50.1 mL/min (by C-G formula based on SCr of 0.89 mg/dL).    Imaging: I personally visualized the images of scans/x-rays performed within last 3 days.  Imaging Results (Most Recent)     Procedure Component Value Units Date/Time    XR Chest AP [[id number]] Collected: 01/08/21 1303     Updated: 01/08/21 1404    Narrative:      ONE VIEW PORTABLE CHEST     HISTORY: Shortness of breath. Possible COVID-19 pneumonia.     FINDINGS: There is cardiomegaly with slight vascular congestion and  small bilateral pleural effusions and suspicious for changes of mild  congestive heart failure. The right effusion is slightly larger when  compared to an exam dating back to 10/06/2019. No definite localized  pneumonia is seen.     This report was finalized on 1/8/2021 2:01 PM by Dr. Quang Mejía M.D.             Assessment / Recommendations:  Acute on chronic hypercapnic respiratory failure  Management of non invasive ventilation  Severe COPD  History of adenocarcinoma of the lung  History of stress-induced cardiomyopathy  Anxiety disorder  Atrial fibrillation  History of pulmonary embolism and DVT  Hypertension  Hypothyroidism  Normocytic anemia  History of angiodysplasia    BiPAP reviewed ABGs reviewed.  We will continue her on noninvasive ventilation overnight and while she is asleep.  Confusing volume status chest x-ray certainly looks volume up with bilateral effusions and vascular congestion however sodium is 152.  We will see what happens after the IV Lasix I think this is definitely the right idea here.  We will see how her labs go after this.    Cont neb budesonide neb schedule duonebs agree that wouldn't go for systemic steroids, no wheeze at present.    No limitations of DO NOT RESUSCITATE as well as DO NOT  INTUBATE.    Recheck abg trending in right direction, still needs longer on.  Re-evaluate in am.  Total critical care time was 40 minutes, excluding any separately billable procedure time.  Time did not overlap with any other provider.      Alirio Alfaro MD  [facility]  01/08/21  18:01 EST

## 2021-01-08 NOTE — ED NOTES
This RN wore goggles, mask, and gloves while in pts room. Pt wearing surgical mask.       Paola Alcala, RN  01/08/21 1200

## 2021-01-08 NOTE — PLAN OF CARE
Goal Outcome Evaluation:      Received patient from ED at this time.  Patient is awake and alert. Pt with c/o shortness of breath.  On bipap.  Pt tolerating well.  Sao2 maintained with bipap use. Awaiting further orders.

## 2021-01-09 NOTE — PROGRESS NOTES
Jhonatan Scott MD                          [phone]      Patient ID:    Name:  Ale Narayan    MRN:  [id number]    1940   80 y.o.  female            Patient Care Team:  Harmeet Payne MD as PCP - Yoan Ty MD as Consulting Physician (Hematology and Oncology)  Devon Lamb MD as Referring Physician (Thoracic Surgery)    CC/ Reason for visit: Acute on chronic hypoxic/hypercapnic respiratory failure, COPD, congestive heart failure    Subjective: Pt seen and examined this AM. No acute overnight events noted. Doing better.  Improvement in oxygen requirements with sats in the low 90s with 3 L nasal cannula.  States that she is feeling much better.  Diuresed well yesterday    ROS: Denies any subjective fevers, syncope or presyncopal events, new neurological deficits, nausea or vomiting currently    Objective     Vital Signs past 24hrs    BP range: BP: (154-196)/(54-75) 154/54  Pulse range: Heart Rate:  [39-95] 87  Resp rate range: Resp:  [20-24] 22  Temp range: Temp (24hrs), Av °F (36.7 °C), Min:97.9 °F (36.6 °C), Max:98 °F (36.7 °C)      Ventilator/Non-Invasive Ventilation Settings (From admission, onward)     Start     Ordered    21 1308  NIPPV (CPAP or BIPAP)  Until Discontinued     Question Answer Comment   Type: BIPAP    NIPPV Mask Interface: Per Patient Preference        21 1308                Device (Oxygen Therapy): nasal cannula       77.6 kg (171 lb); Body mass index is 31.28 kg/m².      Intake/Output Summary (Last 24 hours) at 2021 1841  Last data filed at 2021 1700  Gross per 24 hour   Intake 540 ml   Output 3810 ml   Net -3270 ml       PHYSICAL EXAM   Constitutional: Middle aged obese white female pt in bed, No acute respiratory distress, + accessory muscle use  Head: - NCAT  Eyes: No pallor.  Anicteric sclerae, EOMI.  ENMT:  Mallampati 4, no oral thrush. Moist MM.   NECK: Trachea midline, No thyromegaly, no  palpable cervical lymphadenopathy  Heart: RRR, no murmur. trace pedal edema   Lungs: YANICK +, distant breath sounds, decreased present at the bases, no wheezes/ crackles heard    Abdomen: Soft.  Obese, no tenderness, guarding or rigidity. No palpable masses  Extremities: Extremities warm and well perfused. No cyanosis/ clubbing  Neuro: Conscious, answers appropriately, no gross focal neuro deficits  Psych: Mood and affect appropriate    PPE recommended per [facility] infectious disease Isolation protocol for the current clinical scenario(as mentioned below) was followed.     Scheduled meds:  amLODIPine, 5 mg, Oral, Q24H  budesonide, 0.5 mg, Nebulization, BID - RT  carvedilol, 6.25 mg, Oral, BID With Meals  [START ON 1/10/2021] furosemide, 80 mg, Oral, Daily  ipratropium-albuterol, 3 mL, Nebulization, 4x Daily - RT  levothyroxine, 50 mcg, Oral, Q AM  lisinopril, 20 mg, Oral, Nightly  pantoprazole, 40 mg, Oral, Q AM  potassium chloride, 20 mEq, Oral, Daily  propafenone, 150 mg, Oral, Q8H  rivaroxaban, 20 mg, Oral, Daily With Dinner  sertraline, 50 mg, Oral, Nightly  sodium chloride, 10 mL, Intravenous, Q12H        IV meds:                           Data Review:      Results from last 7 days   Lab Units 01/09/21  0500 01/08/21  1634 01/08/21  1223 01/06/21  1530   SODIUM mmol/L 146* 152* 142 140   POTASSIUM mmol/L 4.1 5.1 5.0 4.9   CHLORIDE mmol/L 100 105 104 99   CO2 mmol/L 37.3* 35.9* 31.1* 34.2*   BUN mg/dL 29* 32* 30* 41*   CREATININE mg/dL 0.78 0.89 0.83 1.12*   CALCIUM mg/dL 9.1 9.4 8.6 8.6   BILIRUBIN mg/dL  --   --  0.2 0.3   ALK PHOS U/L  --   --  68 72   ALT (SGPT) U/L  --   --  17 18   AST (SGOT) U/L  --   --  19 19   GLUCOSE mg/dL 84 100* 168* 88   WBC 10*3/mm3  --   --  7.45 8.65   HEMOGLOBIN g/dL  --   --  9.8* 9.9*   PLATELETS 10*3/mm3  --   --  193 191   PROBNP pg/mL  --   --  3,167.0*  --    PROCALCITONIN ng/mL  --   --  0.11  --        Lab Results   Component Value Date    CALCIUM 9.1 01/09/2021       PHOS 3.8 01/08/2021             Results from last 7 days   Lab Units 01/08/21  1517 01/08/21  1229   PH, ARTERIAL pH units 7.300* 7.268*   PO2 ART mm Hg 130.6* 218.7*   PCO2, ARTERIAL mm Hg 74.7* 77.9*   HCO3 ART mmol/L 36.8* 35.6*        Results Review:    I have reviewed the relevant laboratory results and independently reviewed the chest imaging from this hospitalization including the available echocardiogram reports personally and summarized it if/ when appropriate below    Assessment    Acute on chronic hypoxic respiratory failure(3 to 4 L)  Acute on chronic hypercapnic respiratory failure -home trilogy ventilator  Acute congestive heart failure  Severe COPD not in exacerbation -Dr. Fernandes  Bilateral pleural effusion  Hypernatremia  Adenocarcinoma of the lung  A. Fib on anticoagulation  History of DVT/PE  Hypothyroidism  LISSETTE  Obesity  DNR    PLAN:  Patient has made progress with regards to her respiratory failure with diuresis and noninvasive ventilator.  She states that she uses a trilogy ventilator at home but was asked not to bring it in the hospital due to current pandemic.  Will adjust settings on the BiPAP and make it AVAPS  Would defer further diuretics to primary service versus cardiology consultation.  Echocardiogram is pending  Continue with bronchodilators for COPD.  Does not seem to be having much of wheezing.  Agree with holding steroids  Restart home medications  Physical therapy and out of bed.    Jhonatan Scott MD  1/9/2021

## 2021-01-09 NOTE — PROGRESS NOTES
History:  This 80-year-old lady looks much better this morning.  She has severe COPD.  She did well with BiPAP last night.  She is breathing comfortably today.  Patient also likely had an element of heart failure.  The patient was given 80 mg of Lasix x2.  Although it is not reflected in the I's and O's the patient states she had a great amount of urine output.  She is very comfortable this morning.  No shortness of air, PND or orthopnea.    The patient does complain of significant constipation.  She has had anxiety and required a small amount of Ativan.    Allergies  Adhesive tape, Augmentin [amoxicillin-pot clavulanate], Cephalexin, Metoclopramide, Pentazocine, Simvastatin, and Sucralfate      Current Facility-Administered Medications:   •  amLODIPine (NORVASC) tablet 5 mg, 5 mg, Oral, Q24H, Harmeet Payne MD, 5 mg at 01/09/21 0916  •  budesonide (PULMICORT) nebulizer solution 0.5 mg, 0.5 mg, Nebulization, BID - RT, Harmeet Payne MD, 0.5 mg at 01/09/21 0717  •  carvedilol (COREG) tablet 6.25 mg, 6.25 mg, Oral, BID With Meals, Harmeet Payne MD, 6.25 mg at 01/09/21 0919  •  ipratropium-albuterol (DUO-NEB) nebulizer solution 3 mL, 3 mL, Nebulization, 4x Daily - RT, Harmeet Payne MD, 3 mL at 01/09/21 1136  •  levothyroxine (SYNTHROID, LEVOTHROID) tablet 50 mcg, 50 mcg, Oral, Q AM, Harmeet Payne MD, 50 mcg at 01/09/21 0608  •  lisinopril (PRINIVIL,ZESTRIL) tablet 20 mg, 20 mg, Oral, Nightly, Harmeet Payne MD, 20 mg at 01/08/21 2125  •  LORazepam (ATIVAN) tablet 0.5 mg, 0.5 mg, Oral, Q8H PRN, Harmeet Payne MD, 0.5 mg at 01/08/21 2125  •  nitroglycerin (NITROSTAT) SL tablet 0.4 mg, 0.4 mg, Sublingual, Q5 Min PRN, Harmeet Payne MD  •  ondansetron (ZOFRAN) injection 4 mg, 4 mg, Intravenous, Q6H PRN, Harmeet Payne MD, 4 mg at 01/09/21 1105  •  pantoprazole (PROTONIX) EC tablet 40 mg, 40 mg, Oral, Q AM, Harmeet Payne MD, 40 mg at 01/09/21 0608  •  potassium chloride (KLOR-CON) packet 20 mEq, 20 mEq, Oral, Daily,  "Harmeet Payne MD, 20 mEq at 01/09/21 0918  •  propafenone (RYTHMOL) tablet 150 mg, 150 mg, Oral, Q8H, Harmeet Payne MD, 150 mg at 01/09/21 0608  •  rivaroxaban (XARELTO) tablet 20 mg, 20 mg, Oral, Daily With Dinner, Harmeet Payne MD  •  sertraline (ZOLOFT) tablet 50 mg, 50 mg, Oral, Nightly, Harmeet Payne MD, 50 mg at 01/08/21 2125  •  sodium chloride 0.9 % flush 10 mL, 10 mL, Intravenous, Q12H, Harmeet Payne MD, 10 mL at 01/09/21 0858  •  sodium chloride 0.9 % flush 10 mL, 10 mL, Intravenous, PRN, Harmeet Payne MD    /69 (BP Location: Left arm, Patient Position: Lying)   Pulse 60   Temp 97.9 °F (36.6 °C) (Oral)   Resp 20   Ht 157.5 cm (62\")   Wt 77.7 kg (171 lb 4.8 oz)   LMP  (LMP Unknown)   SpO2 98%   BMI 31.33 kg/m²     Physical Exam  Appearance: Elderly  female.  She has truncal obesity.  She is in no distress.  HEENT: Normocephalic, atraumatic.  Pupils round and equal.  Pharynx clear.  Neck: Decreased range of motion.  No JVD or HJR this morning.  Lungs: Diminished breath sounds bilaterally.  No wheezes.  A few rales in the right base.  Heart: Regular rate of about 55.  1/6 systolic murmur.  No gallop or rub.  Abdomen: Protuberant, benign.  No rebound, tenderness or guarding.  No hepatosplenomegaly.  Extremities: Trace pedal edema.  Atrophy below the knees.  Neurologic: Nonfocal    Lab Results (last 24 hours)     Procedure Component Value Units Date/Time    Basic Metabolic Panel [[id number]]  (Abnormal) Collected: 01/09/21 0500    Specimen: Blood Updated: 01/09/21 0795     Glucose 84 mg/dL      BUN 29 mg/dL      Creatinine 0.78 mg/dL      Sodium 146 mmol/L      Potassium 4.1 mmol/L      Chloride 100 mmol/L      CO2 37.3 mmol/L      Calcium 9.1 mg/dL      eGFR Non African Amer 71 mL/min/1.73      BUN/Creatinine Ratio 37.2     Anion Gap 8.7 mmol/L     Narrative:      GFR Normal >60  Chronic Kidney Disease <60  Kidney Failure <15      Basic Metabolic Panel [[id number]]  (Abnormal) " Collected: 01/08/21 1634    Specimen: Blood from Arm, Right Updated: 01/08/21 1727     Glucose 100 mg/dL      BUN 32 mg/dL      Creatinine 0.89 mg/dL      Sodium 152 mmol/L      Potassium 5.1 mmol/L      Chloride 105 mmol/L      CO2 35.9 mmol/L      Calcium 9.4 mg/dL      eGFR Non African Amer 61 mL/min/1.73      BUN/Creatinine Ratio 36.0     Anion Gap 11.1 mmol/L     Narrative:      GFR Normal >60  Chronic Kidney Disease <60  Kidney Failure <15      Phosphorus [[id number]]  (Normal) Collected: 01/08/21 1634    Specimen: Blood from Arm, Right Updated: 01/08/21 1727     Phosphorus 3.8 mg/dL     Troponin [[id number]]  (Normal) Collected: 01/08/21 1634    Specimen: Blood from Arm, Right Updated: 01/08/21 1723     Troponin T 0.018 ng/mL     Narrative:      Troponin T Reference Range:  <= 0.03 ng/mL-   Negative for AMI  >0.03 ng/mL-     Abnormal for myocardial necrosis.  Clinicians would have to utilize clinical acumen, EKG, Troponin and serial changes to determine if it is an Acute Myocardial Infarction or myocardial injury due to an underlying chronic condition.       Results may be falsely decreased if patient taking Biotin.      TSH [[id number]]  (Normal) Collected: 01/08/21 1634    Specimen: Blood from Arm, Right Updated: 01/08/21 1723     TSH 1.580 uIU/mL     Urinalysis, Microscopic Only - Urine, Catheter [[id number]]  (Abnormal) Collected: 01/08/21 1626    Specimen: Urine, Catheter Updated: 01/08/21 1705     RBC, UA 0-2 /HPF      WBC, UA 0-2 /HPF      Bacteria, UA 4+ /HPF      Squamous Epithelial Cells, UA 0-2 /HPF      Hyaline Casts, UA None Seen /LPF      Methodology Automated Microscopy    Urinalysis With Microscopic If Indicated (No Culture) - Urine, Catheter [[id number]]  (Abnormal) Collected: 01/08/21 1626    Specimen: Urine, Catheter Updated: 01/08/21 1703     Color, UA Yellow     Appearance, UA Clear     pH, UA 6.0     Specific Gravity, UA 1.006     Glucose, UA Negative     Ketones, UA Negative     Bilirubin,  UA Negative     Blood, UA Negative     Protein, UA Negative     Leuk Esterase, UA Trace     Nitrite, UA Negative     Urobilinogen, UA 0.2 E.U./dL    Reticulocytes [[id number]]  (Abnormal) Collected: 01/08/21 1634    Specimen: Blood Updated: 01/08/21 1654     Reticulocyte % 2.13 %      Reticulocyte Absolute 0.0711 10*6/mm3     Blood Gas, Arterial - [[id number]]  (Abnormal) Collected: 01/08/21 1517    Specimen: Arterial Blood Updated: 01/08/21 1521     Site Arterial: right radial     Ector's Test Positive     pH, Arterial 7.300 pH units      pCO2, Arterial 74.7 mm Hg      Comment: Critical:Notify Dr DR ACHARYA (08-Jan-21 15:19:58)Read back ok        pO2, Arterial 130.6 mm Hg      HCO3, Arterial 36.8 mmol/L      Base Excess, Arterial 7.5 mmol/L      O2 Saturation Calculated 98.4 %      Barometric Pressure for Blood Gas 757.4 mmHg      Modality Cannula     Flow Rate 2 lpm     Respiratory Panel PCR w/COVID-19(SARS-CoV-2) TRAVIS/YESSICA/CATHLEEN/PAD/COR/MAD/JACQUELINE In-House, NP Swab in UTM/VTM, 3-4 HR TAT - Swab, Nasopharynx [[id number]]  (Normal) Collected: 01/08/21 1300    Specimen: Swab from Nasopharynx Updated: 01/08/21 1408     ADENOVIRUS, PCR Not Detected     Coronavirus 229E Not Detected     Coronavirus HKU1 Not Detected     Coronavirus NL63 Not Detected     Coronavirus OC43 Not Detected     COVID19 Not Detected     Human Metapneumovirus Not Detected     Human Rhinovirus/Enterovirus Not Detected     Influenza A PCR Not Detected     Influenza B PCR Not Detected     Parainfluenza Virus 1 Not Detected     Parainfluenza Virus 2 Not Detected     Parainfluenza Virus 3 Not Detected     Parainfluenza Virus 4 Not Detected     RSV, PCR Not Detected     Bordetella pertussis pcr Not Detected     Bordetella parapertussis PCR Not Detected     Chlamydophila pneumoniae PCR Not Detected     Mycoplasma pneumo by PCR Not Detected    Narrative:      Fact sheet for providers:  "https://docs.[employer]/wp-content/uploads/OAU7982-3697-XK4.1-EUA-Provider-Fact-Sheet-3.pdf    Fact sheet for patients: https://docs.[employer]/wp-content/uploads/EJD5560-2645-GC7.1-EUA-Patient-Fact-Sheet-1.pdf    Test performed by PCR.    Procalcitonin [[id number]]  (Normal) Collected: 01/08/21 1223    Specimen: Blood Updated: 01/08/21 1309     Procalcitonin 0.11 ng/mL     Narrative:      As a Marker for Sepsis (Non-Neonates):   1. <0.5 ng/mL represents a low risk of severe sepsis and/or septic shock.  1. >2 ng/mL represents a high risk of severe sepsis and/or septic shock.    As a Marker for Lower Respiratory Tract Infections that require antibiotic therapy:  PCT on Admission     Antibiotic Therapy             6-12 Hrs later  > 0.5                Strongly Recommended            >0.25 - <0.5         Recommended  0.1 - 0.25           Discouraged                   Remeasure/reassess PCT  <0.1                 Strongly Discouraged          Remeasure/reassess PCT      As 28 day mortality risk marker: \"Change in Procalcitonin Result\" (> 80 % or <=80 %) if Day 0 (or Day 1) and Day 4 values are available. Refer to http://www.[employer]-pct-calculator.com/   Change in PCT <=80 %   A decrease of PCT levels below or equal to 80 % defines a positive change in PCT test result representing a higher risk for 28-day all-cause mortality of patients diagnosed with severe sepsis or septic shock.  Change in PCT > 80 %   A decrease of PCT levels of more than 80 % defines a negative change in PCT result representing a lower risk for 28-day all-cause mortality of patients diagnosed with severe sepsis or septic shock.                Results may be falsely decreased if patient taking Biotin.     Ferritin [[id number]]  (Normal) Collected: 01/08/21 1223    Specimen: Blood Updated: 01/08/21 1309     Ferritin 35.30 ng/mL     Narrative:      Results may be falsely decreased if patient taking Biotin.      BNP [[id number]]  (Abnormal) Collected: " 01/08/21 1223    Specimen: Blood Updated: 01/08/21 1309     proBNP 3,167.0 pg/mL     Narrative:      Among patients with dyspnea, NT-proBNP is highly sensitive for the detection of acute congestive heart failure. In addition NT-proBNP of <300 pg/ml effectively rules out acute congestive heart failure with 99% negative predictive value.    Results may be falsely decreased if patient taking Biotin.      TSH [[id number]]  (Normal) Collected: 01/08/21 1223    Specimen: Blood Updated: 01/08/21 1309     TSH 1.910 uIU/mL     T4, Free [[id number]]  (Normal) Collected: 01/08/21 1223    Specimen: Blood Updated: 01/08/21 1309     Free T4 1.04 ng/dL     Narrative:      Results may be falsely increased if patient taking Biotin.      Troponin [[id number]]  (Normal) Collected: 01/08/21 1223    Specimen: Blood Updated: 01/08/21 1309     Troponin T 0.014 ng/mL     Narrative:      Troponin T Reference Range:  <= 0.03 ng/mL-   Negative for AMI  >0.03 ng/mL-     Abnormal for myocardial necrosis.  Clinicians would have to utilize clinical acumen, EKG, Troponin and serial changes to determine if it is an Acute Myocardial Infarction or myocardial injury due to an underlying chronic condition.       Results may be falsely decreased if patient taking Biotin.      Comprehensive Metabolic Panel [[id number]]  (Abnormal) Collected: 01/08/21 1223    Specimen: Blood Updated: 01/08/21 1305     Glucose 168 mg/dL      BUN 30 mg/dL      Creatinine 0.83 mg/dL      Sodium 142 mmol/L      Potassium 5.0 mmol/L      Chloride 104 mmol/L      CO2 31.1 mmol/L      Calcium 8.6 mg/dL      Total Protein 6.4 g/dL      Albumin 3.30 g/dL      ALT (SGPT) 17 U/L      AST (SGOT) 19 U/L      Alkaline Phosphatase 68 U/L      Total Bilirubin 0.2 mg/dL      eGFR Non African Amer 66 mL/min/1.73      Globulin 3.1 gm/dL      A/G Ratio 1.1 g/dL      BUN/Creatinine Ratio 36.1     Anion Gap 6.9 mmol/L     Narrative:      GFR Normal >60  Chronic Kidney Disease <60  Kidney Failure  <15      Lactate Dehydrogenase [[id number]]  (Normal) Collected: 01/08/21 1223    Specimen: Blood Updated: 01/08/21 1305      U/L     C-reactive Protein [[id number]]  (Abnormal) Collected: 01/08/21 1223    Specimen: Blood Updated: 01/08/21 1305     C-Reactive Protein 0.99 mg/dL     Magnesium [[id number]]  (Normal) Collected: 01/08/21 1223    Specimen: Blood Updated: 01/08/21 1305     Magnesium 2.0 mg/dL     Lactic Acid, Plasma [[id number]]  (Normal) Collected: 01/08/21 1223    Specimen: Blood Updated: 01/08/21 1302     Lactate 1.9 mmol/L     Blood Gas, Arterial - [[id number]]  (Abnormal) Collected: 01/08/21 1229    Specimen: Arterial Blood Updated: 01/08/21 1239     Site Arterial: right radial     Ector's Test Positive     pH, Arterial 7.268 pH units      Comment: Critical:Notify Dr DR JACQUES (08-Jan-21 12:32:37)Read back ok        pCO2, Arterial 77.9 mm Hg      Comment: Critical:Notify Dr DR JACQUES (08-Jan-21 12:32:37)Read back ok        pO2, Arterial 218.7 mm Hg      HCO3, Arterial 35.6 mmol/L      Base Excess, Arterial 6.3 mmol/L      O2 Saturation Calculated 99.6 %      Barometric Pressure for Blood Gas 757.4 mmHg      Modality Cannula     Flow Rate 4 lpm      Rate 24 Breaths/minute     CBC & Differential [[id number]]  (Abnormal) Collected: 01/08/21 1223    Specimen: Blood Updated: 01/08/21 1237    Narrative:      The following orders were created for panel order CBC & Differential.  Procedure                               Abnormality         Status                     ---------                               -----------         ------                     CBC Auto Differential[[id number]]        Abnormal            Final result                 Please view results for these tests on the individual orders.    CBC Auto Differential [[id number]]  (Abnormal) Collected: 01/08/21 1223    Specimen: Blood Updated: 01/08/21 1237     WBC 7.45 10*3/mm3      RBC 3.23 10*6/mm3      Hemoglobin 9.8 g/dL      Hematocrit 31.0 %       MCV 96.0 fL      MCH 30.3 pg      MCHC 31.6 g/dL      RDW 13.8 %      RDW-SD 48.5 fl      MPV 10.6 fL      Platelets 193 10*3/mm3      Neutrophil % 79.1 %      Lymphocyte % 11.1 %      Monocyte % 7.8 %      Eosinophil % 1.2 %      Basophil % 0.7 %      Immature Grans % 0.1 %      Neutrophils, Absolute 5.89 10*3/mm3      Lymphocytes, Absolute 0.83 10*3/mm3      Monocytes, Absolute 0.58 10*3/mm3      Eosinophils, Absolute 0.09 10*3/mm3      Basophils, Absolute 0.05 10*3/mm3      Immature Grans, Absolute 0.01 10*3/mm3      nRBC 0.0 /100 WBC           Imp:  1.  Hypercapnic respiratory failure.  History of COPD.  Doing good with budesonide, duo nebs and positive pressure breathing  2.  Heart failure.  Much improved.  Likely heart failure with preserved ejection fraction.  Echo is pending.  3.  Generalized anxiety.  4.  Normocytic anemia, likely multifactorial  5.  Essential hypertension.  6.  Atrial fibrillation with controlled ventricular rate.  7.  Constipation      Plan:  We will continue her current respiratory treatments.  I will dose her with 80 mg of Lasix a day.  We will check the echo report.  I will check iron studies B12 and folate.  Patient will be prescribed MiraLAX.  I am going to have physical therapy work with the patient and increase her activity.  Appreciate help from pulmonology.    Harmeet Payne MD  1/9/2021  11:51 EST

## 2021-01-09 NOTE — PLAN OF CARE
Goal Outcome Evaluation:  Plan of Care Reviewed With: patient  Progress: no change  Outcome Summary: Pt.wasn't able to get comfortable and kept moving from the bed to the chair and then to the BSC.  Patient paranoid she had had a bowel movement on herself.  No BM present.  Patient had an Ativan and was placed back on the Bipap.  Anxiety improved, patient resting.  Patient will be getting an echo today.  Patient bradycardia AFIB on monitor.  Will continue to monitor patient.

## 2021-01-09 NOTE — PLAN OF CARE
Goal Outcome Evaluation:  Plan of Care Reviewed With: patient  VSS. PT off BIPAP  most of the day, O2 sats 93-94% on 3L 02 via N/C,de-sats with activity. Continues on IV lasix ,good output .ECHO completed this shift,Up in recliner at this time ,no SOB or distress noted.PRN miralax administered d/t complaints of constipation,no BM yet .PT consulted , will continue to monitor.

## 2021-01-10 NOTE — PLAN OF CARE
Goal Outcome Evaluation:  Plan of Care Reviewed With: patient  Progress: no change  Outcome Summary: Pt is a 80 y.o. female admitted on 1/8 for acute on chronic hypercapnic respiratory failure. PMH includes severe COPD, HTN, HLD, and paroxysmal a-fib. Pt is homebound at baseline and only ambulates room to room. Pt currently on 3L of O2 and wears O2 at home as well. Pt performed bed mobility with CGAx1, transferred with [facility]/Ax1, and ambulated a total of 8ft within her room with CGAx1/HHAx1 taking steps away from and back to her bed. O2 sats dropped to the mid 80s during mobility and required a seated rest break of at least 60 seconds before improving back to 90+%. 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 Pt may benefit from skilled PT to improve her activity tolerance and improve her independence with functional mobility. PT anticipates d/c back home vs. SNF pending progress. Pt does live alone and is very limited in her mobility at baseline, but currently no major balance or safety concerns beyond deconditioning. Patient was wearing a face mask during this therapy encounter. Therapist used appropriate personal protective equipment including eye protection, mask, and gloves.  Mask used was standard procedure mask. Appropriate PPE was worn during the entire therapy session. Hand hygiene was completed before and after therapy session. Patient is not in enhanced droplet precautions.

## 2021-01-10 NOTE — PLAN OF CARE
Goal Outcome Evaluation:  Plan of Care Reviewed With: patient  VSS.O2 sats more stable on 3L,85-88%  on 2L 02 via N/C,may de-sats with activity. Switched to PO lasix .PRN dulcolax suppository administered with good result.Large BM this shift continues to improve with PT, will continue to monitor.

## 2021-01-10 NOTE — PROGRESS NOTES
Jhonatan Scott MD                          [phone]      Patient ID:    Name:  Ale Narayan    MRN:  [id number]    1940   80 y.o.  female            Patient Care Team:  Harmeet Payne MD as PCP - Yoan Ty MD as Consulting Physician (Hematology and Oncology)  Devon Lamb MD as Referring Physician (Thoracic Surgery)    CC/ Reason for visit: Acute on chronic hypoxic/hypercapnic respiratory failure, COPD, congestive heart failure    Subjective: Pt seen and examined this AM. No acute overnight events noted. Doing better.  Improvement in oxygen requirements with sats in the high 90s with 3 L nasal cannula.  States that she is feeling much better.  Diuresed well yesterday    ROS: Denies any subjective fevers, syncope or presyncopal events, new neurological deficits, nausea or vomiting currently    Objective     Vital Signs past 24hrs    BP range: BP: (133-174)/(50-67) 155/67  Pulse range: Heart Rate:  [50-84] 63  Resp rate range: Resp:  [14-22] 18  Temp range: Temp (24hrs), Av.3 °F (36.8 °C), Min:97.9 °F (36.6 °C), Max:99 °F (37.2 °C)      Ventilator/Non-Invasive Ventilation Settings (From admission, onward)     Start     Ordered    21 1308  NIPPV (CPAP or BIPAP)  Until Discontinued     Question Answer Comment   Type: BIPAP    NIPPV Mask Interface: Per Patient Preference        21 1308                Device (Oxygen Therapy): nasal cannula       79.6 kg (175 lb 7.8 oz); Body mass index is 32.1 kg/m².      Intake/Output Summary (Last 24 hours) at 1/10/2021 1538  Last data filed at 1/10/2021 1455  Gross per 24 hour   Intake 480 ml   Output 1950 ml   Net -1470 ml       PHYSICAL EXAM   Constitutional: Middle aged obese white female pt in bed, No acute respiratory distress, + accessory muscle use  Head: - NCAT  Eyes: No pallor.  Anicteric sclerae, EOMI.  ENMT:  Mallampati 4, no oral thrush. Moist MM.   NECK: Trachea midline, No thyromegaly,  no palpable cervical lymphadenopathy  Heart: RRR, no murmur. trace pedal edema   Lungs: YANICK +, distant breath sounds, decreased present at the bases, no wheezes/ crackles heard    Abdomen: Soft.  Obese, no tenderness, guarding or rigidity. No palpable masses  Extremities: Extremities warm and well perfused. No cyanosis/ clubbing  Neuro: Conscious, generally weak, answers appropriately, no gross focal neuro deficits  Psych: Mood and affect appropriate    PPE recommended per [facility] infectious disease Isolation protocol for the current clinical scenario(as mentioned below) was followed.     Scheduled meds:  amLODIPine, 5 mg, Oral, Q24H  budesonide, 0.5 mg, Nebulization, BID - RT  carvedilol, 6.25 mg, Oral, BID With Meals  ferrous sulfate, 325 mg, Oral, Daily With Breakfast  furosemide, 80 mg, Oral, Daily  ipratropium-albuterol, 3 mL, Nebulization, 4x Daily - RT  levothyroxine, 50 mcg, Oral, Q AM  lisinopril, 20 mg, Oral, Nightly  pantoprazole, 40 mg, Oral, Q AM  potassium chloride, 20 mEq, Oral, Daily  propafenone, 150 mg, Oral, Q8H  rivaroxaban, 20 mg, Oral, Daily With Dinner  sertraline, 50 mg, Oral, Nightly  sodium chloride, 10 mL, Intravenous, Q12H        IV meds:                           Data Review:      Results from last 7 days   Lab Units 01/10/21  0437 01/09/21  0500 01/08/21  1634 01/08/21  1223 01/06/21  1530   SODIUM mmol/L 139 146* 152* 142 140   POTASSIUM mmol/L 4.1 4.1 5.1 5.0 4.9   CHLORIDE mmol/L 93* 100 105 104 99   CO2 mmol/L 37.0* 37.3* 35.9* 31.1* 34.2*   BUN mg/dL 27* 29* 32* 30* 41*   CREATININE mg/dL 1.05* 0.78 0.89 0.83 1.12*   CALCIUM mg/dL 8.7 9.1 9.4 8.6 8.6   BILIRUBIN mg/dL  --   --   --  0.2 0.3   ALK PHOS U/L  --   --   --  68 72   ALT (SGPT) U/L  --   --   --  17 18   AST (SGOT) U/L  --   --   --  19 19   GLUCOSE mg/dL 102* 84 100* 168* 88   WBC 10*3/mm3 9.74  --   --  7.45 8.65   HEMOGLOBIN g/dL 9.7*  --   --  9.8* 9.9*   PLATELETS 10*3/mm3 244  --   --  193 191   PROBNP  pg/mL  --   --   --  3,167.0*  --    PROCALCITONIN ng/mL  --   --   --  0.11  --        Lab Results   Component Value Date    CALCIUM 8.7 01/10/2021    PHOS 3.8 01/08/2021             Results from last 7 days   Lab Units 01/08/21  1517 01/08/21  1229   PH, ARTERIAL pH units 7.300* 7.268*   PO2 ART mm Hg 130.6* 218.7*   PCO2, ARTERIAL mm Hg 74.7* 77.9*   HCO3 ART mmol/L 36.8* 35.6*        Results Review:    I have reviewed the relevant laboratory results and independently reviewed the chest imaging from this hospitalization including the available echocardiogram reports personally and summarized it if/ when appropriate below    Assessment    Acute on chronic hypoxic respiratory failure(3 to 4 L)  Acute on chronic hypercapnic respiratory failure -home trilogy ventilator  Acute congestive heart failure  Severe COPD not in exacerbation -Dr. Fernandes  Severe PH - multifactorial  Hypernatremia  Adenocarcinoma of the lung  A. Fib on anticoagulation  History of DVT/PE  Hypothyroidism  LISSETTE  Obesity  DNR    PLAN:  Patient is stable from a respiratory standpoint.  Sats are in the high 90s.  Discussed with the patient and nurse to titrate sats to low 90s only.  Likely will not need 4 L nasal cannula which she was using before.  Continue with BiPAP for now and restart trilogy ventilator when home  Would defer further diuretics to primary service versus cardiology consultation.  Echocardiogram reviewed.  Continue with bronchodilators for COPD.    Restart home medications  Physical therapy and out of bed.    Should be okay to be discharged likely to rehab from my standpoint    Jhonatan Scott MD  1/10/2021

## 2021-01-10 NOTE — THERAPY EVALUATION
Patient Name: Ale Narayan  : 1940    MRN: [id number]                              Today's Date: 1/10/2021       Admit Date: 2021    Visit Dx:     ICD-10-CM ICD-9-CM   1. Dyspnea, unspecified type  R06.00 786.09   2. History of COPD  Z87.09 V12.69   3. Acute anemia  D64.9 285.9   4. COPD exacerbation (CMS/HCC)  J44.1 491.21     Patient Active Problem List   Diagnosis   • Adenocarcinoma of right lung (CMS/HCC)   • Decreased blood pressure, not hypotension   • Cardiomyopathy (CMS/HCC)   • Cardiovascular disease   • Dizziness   • Dyspnea on exertion   • Nodule of left lung   • Peripheral arterial occlusive disease (CMS/HCC)   • Iron deficiency anemia   • Pulmonary emphysema (CMS/HCC)   • Pneumonitis, radiation (CMS/HCC)   • Palpitations   • Localized edema   • Weight gain   • Anemia   • Rectal bleeding   • Hypercapnia   • Dyspnea     Past Medical History:   Diagnosis Date   • Adenocarcinoma of lung (CMS/HCC)    • Asthma    • Carotid artery disease (CMS/HCC)    • Cough    • Deep vein thrombosis (CMS/HCC)    • Emphysema of lung (CMS/HCC)    • Hyperlipidemia    • Hypertension    • Hypothyroidism    • Lung cancer (CMS/HCC)    • Osteoporosis    • Paroxysmal atrial fibrillation (CMS/HCC)    • Peripheral arterial disease (CMS/HCC)    • Pulmonary embolism (CMS/HCC)    • Thrombophilia (CMS/HCC)     Since age 25     Past Surgical History:   Procedure Laterality Date   • ANKLE SURGERY     • BREAST SURGERY      removal of benign melanoma spot on nipple of left breast 2013   • COLONOSCOPY      Negative   • COLONOSCOPY N/A 2019    Procedure: COLONOSCOPY to cecum with biopsy / polypectomy and  APC cautery;  Surgeon: Alexsandra Ruiz MD;  Location: [facility] ENDOSCOPY;  Service: Gastroenterology   • ENDOSCOPY N/A 2019    Procedure: ESOPHAGOGASTRODUODENOSCOPY with biopsies;  Surgeon: Alexsandra Ruiz MD;  Location: [facility] ENDOSCOPY;  Service: Gastroenterology   • HERNIA REPAIR      Lumbar spine   •  HYSTERECTOMY     • LUNG LOBECTOMY  2009   • OTHER SURGICAL HISTORY      Bypass of lower extremities   • OTHER SURGICAL HISTORY      IVC filter placed     General Information     [facility] Name 01/10/21 1452          Physical Therapy Time and Intention    Document Type  evaluation  -DO     Mode of Treatment  physical therapy  -DO     [facility] Name 01/10/21 1452          General Information    Patient Profile Reviewed  yes  -DO     Prior Level of Function  independent:;all household mobility  -DO     Existing Precautions/Restrictions  fall;oxygen therapy device and L/min 3L  -DO     Barriers to Rehab  none identified  -DO     [facility] Name 01/10/21 1452          Living Environment    Lives With  alone  -DO     [facility] Name 01/10/21 1452          Home Main Entrance    Number of Stairs, Main Entrance  none  -DO     [facility] Name 01/10/21 1452          Stairs Within Home, Primary    Number of Stairs, Within Home, Primary  none  -DO     [facility] Name 01/10/21 1452          Cognition    Orientation Status (Cognition)  oriented x 3  -DO     [facility] Name 01/10/21 1452          Safety Issues, Functional Mobility    Impairments Affecting Function (Mobility)  endurance/activity tolerance;shortness of breath  -DO       User Key  (r) = Recorded By, (t) = Taken By, (c) = Cosigned By    Initials Name Provider Type    DO Bg Trevino, PT Physical Therapist        Mobility     Row Name 01/10/21 1453          Bed Mobility    Bed Mobility  supine-sit;sit-supine  -DO     Supine-Sit [place] (Bed Mobility)  contact guard  -DO     Sit-Supine [place] (Bed Mobility)  contact guard  -DO     Assistive Device (Bed Mobility)  bed rails  -DO     [facility] Name 01/10/21 1453          Sit-Stand Transfer    Sit-Stand Edwards (Transfers)  contact guard  -DO     Assistive Device (Sit-Stand Transfers)  -- HHAx1  -DO     [facility] Name 01/10/21 1453          Gait/Stairs (Locomotion)    [place] Level (Gait)  contact guard  -DO     Assistive Device (Gait)  -- HHAx1  -DO      Distance in Feet (Gait)  8 steps away from and back to bed.  -DO     Deviations/Abnormal Patterns (Gait)  gait speed decreased;migdalia decreased  -DO     Bilateral Gait Deviations  forward flexed posture;heel strike decreased  -DO     [place] Level (Stairs)  not tested  -DO       User Key  (r) = Recorded By, (t) = Taken By, (c) = Cosigned By    Initials Name Provider Type    DO Bg Trevino PT Physical Therapist        Obj/Interventions     Row Name 01/10/21 1454          Range of Motion Comprehensive    General Range of Motion  no range of motion deficits identified  -DO     Row Name 01/10/21 1454          Strength Comprehensive (MMT)    General Manual Muscle Testing (MMT) Assessment  -- grossly 4/5 in BUE/BLE.  -DO     Row Name 01/10/21 1454          Balance    Balance Assessment  sitting static balance;standing static balance  -DO     Static Sitting Balance  WFL  -DO     Static Standing Balance  mild impairment  -DO     Row Name 01/10/21 1454          Sensory Assessment (Somatosensory)    Sensory Assessment (Somatosensory)  sensation intact  -DO       User Key  (r) = Recorded By, (t) = Taken By, (c) = Cosigned By    Initials Name Provider Type    DO Bg Trevino, PT Physical Therapist        Goals/Plan     Row Name 01/10/21 1459          Bed Mobility Goal 1 (PT)    Activity/Assistive Device (Bed Mobility Goal 1, PT)  bed mobility activities, all  -DO     [place] Level/Cues Needed (Bed Mobility Goal 1, PT)  independent  -DO     Time Frame (Bed Mobility Goal 1, PT)  long term goal (LTG);1 week  -DO     Progress/Outcomes (Bed Mobility Goal 1, PT)  goal ongoing  -DO     Row Name 01/10/21 1459          Transfer Goal 1 (PT)    Activity/Assistive Device (Transfer Goal 1, PT)  transfers, all  -DO     [place] Level/Cues Needed (Transfer Goal 1, PT)  modified independence  -DO     Time Frame (Transfer Goal 1, PT)  long term goal (LTG);1 week  -DO     Progress/Outcome (Transfer Goal 1, PT)  goal  ongoing  -DO     Row Name 01/10/21 1459          Gait Training Goal 1 (PT)    Activity/Assistive Device (Gait Training Goal 1, PT)  gait (walking locomotion)  -DO     [place] Level (Gait Training Goal 1, PT)  modified independence  -DO     Distance (Gait Training Goal 1, PT)  100  -DO     Time Frame (Gait Training Goal 1, PT)  long term goal (LTG);1 week  -DO     Progress/Outcome (Gait Training Goal 1, PT)  goal ongoing  -DO       User Key  (r) = Recorded By, (t) = Taken By, (c) = Cosigned By    Initials Name Provider Type    DO Bg Trevino, PT Physical Therapist        Clinical Impression     Row Name 01/10/21 1459          Pain    Additional Documentation  Pain Scale: Numbers Pre/Post-Treatment (Group)  -DO     Row Name 01/10/21 145          Pain Scale: Numbers Pre/Post-Treatment    Pretreatment Pain Rating  0/10 - no pain  -DO     Posttreatment Pain Rating  0/10 - no pain  -DO     Row Name 01/10/21 1459          Plan of Care Review    Plan of Care Reviewed With  patient  -DO     Outcome Summary  Pt is a 80 y.o. female admitted on 1/8 for acute on chronic hypercapnic respiratory failure. PMH includes severe COPD, HTN, HLD, and paroxysmal a-fib. Pt is homebound at baseline and only ambulates room to room. Pt currently on 3L of O2 and wears O2 at home as well. Pt performed bed mobility with CGAx1, transferred with [facility]/Ax1, and ambulated a total of 8ft within her room with [facility]/Ax1 taking steps away from and back to her bed. O2 sats dropped to the mid 80s during mobility and required a seated rest break of at least 60 seconds before improving back to 90+%. 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 Pt may benefit from skilled PT to improve her activity tolerance and improve her independence with functional mobility. PT anticipates d/c back home vs. SNF pending progress. Pt does live alone and is very limited in her mobility at  baseline, but currently no major balance or safety concerns beyond deconditioning.  -DO     Row Name 01/10/21 1455          Therapy Assessment/Plan (PT)    Rehab Potential (PT)  good, to achieve stated therapy goals  -DO     Criteria for Skilled Interventions Met (PT)  yes  -DO     Row Name 01/10/21 1455          Vital Signs    Pre SpO2 (%)  90  -DO     O2 Delivery Pre Treatment  nasal cannula  -DO     Intra SpO2 (%)  84  -DO     O2 Delivery Intra Treatment  nasal cannula  -DO     Post SpO2 (%)  90  -DO     O2 Delivery Post Treatment  nasal cannula  -DO     Pre Patient Position  Supine  -DO     Intra Patient Position  Standing  -DO     Post Patient Position  Supine  -DO     Rest Breaks   1  -DO     Row Name 01/10/21 1455          Positioning and Restraints    Pre-Treatment Position  in bed  -DO     Post Treatment Position  bed  -DO     In Bed  notified nsg;supine;call light within reach;encouraged to call for assist;exit alarm on;RUE elevated;LUE elevated  -DO       User Key  (r) = Recorded By, (t) = Taken By, (c) = Cosigned By    Initials Name Provider Type    DO Bg Trevino, PT Physical Therapist        Outcome Measures     Row Name 01/10/21 1500          How much help from another person do you currently need...    Turning from your back to your side while in flat bed without using bedrails?  4  -DO     Moving from lying on back to sitting on the side of a flat bed without bedrails?  3  -DO     Moving to and from a bed to a chair (including a wheelchair)?  3  -DO     Standing up from a chair using your arms (e.g., wheelchair, bedside chair)?  3  -DO     Climbing 3-5 steps with a railing?  2  -DO     To walk in hospital room?  3  -DO     AM-PAC 6 Clicks Score (PT)  18  -DO     Row Name 01/10/21 1500          Functional Assessment    Outcome Measure Options  AM-PAC 6 Clicks Basic Mobility (PT)  -DO       User Key  (r) = Recorded By, (t) = Taken By, (c) = Cosigned By    Initials Name Provider Type    DO Uriel  Bg PT Physical Therapist        Physical Therapy Education                 Title: PT OT SLP Therapies (Done)     Topic: Physical Therapy (Done)     Point: Mobility training (Done)     Learning Progress Summary           Patient Acceptance, E, VU,DU by DO at 1/10/2021 1500                   Point: Home exercise program (Done)     Learning Progress Summary           Patient Acceptance, E, VU,DU by DO at 1/10/2021 1500                   Point: Body mechanics (Done)     Learning Progress Summary           Patient Acceptance, E, VU,DU by DO at 1/10/2021 1500                   Point: Precautions (Done)     Learning Progress Summary           Patient Acceptance, E, VU,DU by DO at 1/10/2021 1500                               User Key     Initials Effective Dates Name Provider Type Discipline    DO 03/07/18 -  Bg Trevino PT Physical Therapist PT              PT Recommendation and Plan  Planned Therapy Interventions (PT): bed mobility training, gait training, strengthening, transfer training  Plan of Care Reviewed With: patient  Outcome Summary: Pt is a 80 y.o. female admitted on 1/8 for acute on chronic hypercapnic respiratory failure. PMH includes severe COPD, HTN, HLD, and paroxysmal a-fib. Pt is homebound at baseline and only ambulates room to room. Pt currently on 3L of O2 and wears O2 at home as well. Pt performed bed mobility with CGAx1, transferred with CGAx1/HHAx1, and ambulated a total of 8ft within her room with CGAx1/HHAx1 taking steps away from and back to her bed. O2 sats dropped to the mid 80s during mobility and required a seated rest break of at least 60 seconds before improving back to 90+%. 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 Pt may benefit from skilled PT to improve her activity tolerance and improve her independence with functional mobility. PT anticipates d/c back home vs. SNF pending progress. Pt does live alone  and is very limited in her mobility at baseline, but currently no major balance or safety concerns beyond deconditioning.     Time Calculation:   PT Charges     Row Name 01/10/21 1501             Time Calculation    Start Time  1438  -DO      Stop Time  1448  -DO      Time Calculation (min)  10 min  -DO      PT Received On  01/10/21  -DO      PT - Next Appointment  01/11/21  -DO      PT Goal Re-Cert Due Date  01/17/21  -DO        User Key  (r) = Recorded By, (t) = Taken By, (c) = Cosigned By    Initials Name Provider Type    DO Bg Trevino, PT Physical Therapist        Therapy Charges for Today     Code Description Service Date Service Provider Modifiers Qty    96517228414 HC PT EVAL MOD COMPLEXITY 2 1/10/2021 Bg Trevino, PT GP 1    61614301022 HC PT THER PROC EA 15 MIN 1/10/2021 Bg Trevino, PT GP 1          PT G-Codes  Outcome Measure Options: AM-PAC 6 Clicks Basic Mobility (PT)  AM-PAC 6 Clicks Score (PT): 18    Bg Trevino PT  1/10/2021

## 2021-01-10 NOTE — PLAN OF CARE
Goal Outcome Evaluation:  Plan of Care Reviewed With: patient  Progress: no change  Outcome Summary: VSS, afebrile. from chair to bed around 2200. Pt given warm apple and prune juice, very concerned about no BM for 6 days. States she did not take any meds for it at home. Used NIVP for approx 6 hours. Medicated x1 for anxiety. Will continue to monitor.

## 2021-01-10 NOTE — PROGRESS NOTES
History:  This is a 80-year-old lady with severe COPD.  She was admitted with acute on chronic respiratory failure.  On admission she was found to have a decrease in her hemoglobin and hematocrit.    Patient's breathing is back to baseline.  She has diuresed well over the past few days.  She is on budesonide, duo nebs and positive pressure breathing.    Patient complains of constipation.  She has not had a bowel movement since admission.  She has a bit of nausea.    She is doing well with physical therapy.  She is able to ambulate a little bit around the room.    Allergies  Adhesive tape, Augmentin [amoxicillin-pot clavulanate], Cephalexin, Metoclopramide, Pentazocine, Simvastatin, and Sucralfate      Current Facility-Administered Medications:   •  amLODIPine (NORVASC) tablet 5 mg, 5 mg, Oral, Q24H, Harmeet Payne MD, 5 mg at 01/10/21 0840  •  bisacodyl (DULCOLAX) suppository 10 mg, 10 mg, Rectal, Daily PRN, Harmeet Payne MD  •  budesonide (PULMICORT) nebulizer solution 0.5 mg, 0.5 mg, Nebulization, BID - RT, Harmeet Payne MD, 0.5 mg at 01/10/21 0648  •  carvedilol (COREG) tablet 6.25 mg, 6.25 mg, Oral, BID With Meals, Harmeet Payne MD, 6.25 mg at 01/10/21 0841  •  ferrous sulfate tablet 325 mg, 325 mg, Oral, Daily With Breakfast, Harmeet Payne MD  •  furosemide (LASIX) tablet 80 mg, 80 mg, Oral, Daily, Harmeet Payne MD, 80 mg at 01/10/21 0840  •  ipratropium-albuterol (DUO-NEB) nebulizer solution 3 mL, 3 mL, Nebulization, 4x Daily - RT, Harmeet Payne MD, 3 mL at 01/10/21 1102  •  levothyroxine (SYNTHROID, LEVOTHROID) tablet 50 mcg, 50 mcg, Oral, Q AM, Harmeet Payne MD, 50 mcg at 01/10/21 0546  •  lisinopril (PRINIVIL,ZESTRIL) tablet 20 mg, 20 mg, Oral, Nightly, Harmeet Payne MD, 20 mg at 01/09/21 2120  •  LORazepam (ATIVAN) tablet 0.5 mg, 0.5 mg, Oral, Q8H PRN, Harmeet Payne MD, 0.5 mg at 01/09/21 2123  •  nitroglycerin (NITROSTAT) SL tablet 0.4 mg, 0.4 mg, Sublingual, Q5 Min PRN, Harmeet Payne MD  •   "ondansetron (ZOFRAN) injection 4 mg, 4 mg, Intravenous, Q6H PRN, Hamreet Payne MD, 4 mg at 01/10/21 0546  •  pantoprazole (PROTONIX) EC tablet 40 mg, 40 mg, Oral, Q AM, Harmeet Payne MD, 40 mg at 01/10/21 0546  •  polyethylene glycol (MIRALAX) packet 17 g, 17 g, Oral, Daily PRN, Harmeet Payne MD, 17 g at 01/09/21 1615  •  potassium chloride (KLOR-CON) packet 20 mEq, 20 mEq, Oral, Daily, Harmeet Payne MD, 20 mEq at 01/10/21 0840  •  propafenone (RYTHMOL) tablet 150 mg, 150 mg, Oral, Q8H, Harmeet Payne MD, 150 mg at 01/10/21 0646  •  rivaroxaban (XARELTO) tablet 20 mg, 20 mg, Oral, Daily With Dinner, Harmeet Payne MD, 20 mg at 01/09/21 1828  •  sertraline (ZOLOFT) tablet 50 mg, 50 mg, Oral, Nightly, Harmeet Payne MD, 50 mg at 01/09/21 2120  •  sodium chloride 0.9 % flush 10 mL, 10 mL, Intravenous, Q12H, Harmeet Payne MD, 10 mL at 01/10/21 0841  •  sodium chloride 0.9 % flush 10 mL, 10 mL, Intravenous, PRN, Harmeet Payne MD    /58 (BP Location: Right arm, Patient Position: Lying)   Pulse 54   Temp 99 °F (37.2 °C) (Oral)   Resp 14   Ht 157.5 cm (62\")   Wt 79.6 kg (175 lb 7.8 oz)   LMP  (LMP Unknown)   SpO2 99%   BMI 32.10 kg/m²     Physical Exam  Appearance: Elderly  lady with central obesity.  She is lying in bed in no distress.  HEENT: Normocephalic and atraumatic.  Pupils round and equal.  Pharynx clear.  Neck: Decreased range of motion.  No lymphadenopathy or JVD.  Lungs: Diminished breath sounds with a few faint rales in the right base.  Heart: Bradycardic, regular, 1/6 systolic murmur  Abdomen: Protuberant.  Bowel sounds were present.  The patient had no rebound, tenderness or guarding.  Extremities: Without edema, clubbing or cyanosis    Lab Results (last 24 hours)     Procedure Component Value Units Date/Time    Vitamin B12 [[id number]]  (Abnormal) Collected: 01/10/21 0437    Specimen: Blood Updated: 01/10/21 0546     Vitamin B-12 1,037 pg/mL     Narrative:      Results may be " falsely increased if patient taking Biotin.      Basic Metabolic Panel [[id number]]  (Abnormal) Collected: 01/10/21 0437    Specimen: Blood Updated: 01/10/21 0529     Glucose 102 mg/dL      BUN 27 mg/dL      Creatinine 1.05 mg/dL      Sodium 139 mmol/L      Potassium 4.1 mmol/L      Chloride 93 mmol/L      CO2 37.0 mmol/L      Calcium 8.7 mg/dL      eGFR Non African Amer 51 mL/min/1.73      BUN/Creatinine Ratio 25.7     Anion Gap 9.0 mmol/L     Narrative:      GFR Normal >60  Chronic Kidney Disease <60  Kidney Failure <15      Iron Profile [[id number]]  (Abnormal) Collected: 01/10/21 0437    Specimen: Blood Updated: 01/10/21 0529     Iron 37 mcg/dL      Iron Saturation 10 %      Transferrin 248 mg/dL      TIBC 370 mcg/dL     CBC & Differential [[id number]]  (Abnormal) Collected: 01/10/21 0437    Specimen: Blood Updated: 01/10/21 0501    Narrative:      The following orders were created for panel order CBC & Differential.  Procedure                               Abnormality         Status                     ---------                               -----------         ------                     CBC Auto Differential[[id number]]        Abnormal            Final result                 Please view results for these tests on the individual orders.    CBC Auto Differential [[id number]]  (Abnormal) Collected: 01/10/21 0437    Specimen: Blood Updated: 01/10/21 0501     WBC 9.74 10*3/mm3      RBC 3.18 10*6/mm3      Hemoglobin 9.7 g/dL      Hematocrit 29.1 %      MCV 91.5 fL      MCH 30.5 pg      MCHC 33.3 g/dL      RDW 13.9 %      RDW-SD 46.6 fl      MPV 10.4 fL      Platelets 244 10*3/mm3      Neutrophil % 71.5 %      Lymphocyte % 14.7 %      Monocyte % 10.3 %      Eosinophil % 2.5 %      Basophil % 0.6 %      Immature Grans % 0.4 %      Neutrophils, Absolute 6.97 10*3/mm3      Lymphocytes, Absolute 1.43 10*3/mm3      Monocytes, Absolute 1.00 10*3/mm3      Eosinophils, Absolute 0.24 10*3/mm3      Basophils, Absolute 0.06 10*3/mm3       Immature Grans, Absolute 0.04 10*3/mm3      nRBC 0.0 /100 WBC     Homocysteine [[id number]]  (Normal) Collected: 01/09/21 0500    Specimen: Blood Updated: 01/09/21 1902     Homocysteine, Plasma (Quant) 11.7 umol/L         Echocardiogram:  Left ventricular wall thickness is consistent with concentric hypertrophy.  Estimated right ventricular systolic pressure from tricuspid regurgitation is markedly elevated (>55 mmHg).  Severe pulmonary hypertension is present.  The right ventricular cavity is dilated.  Calculated left ventricular EF = 68% Estimated left ventricular EF was in agreement with the calculated left ventricular EF. Left ventricular systolic function is normal.  The right atrial cavity is moderate to severely dilated.  Moderate to severe tricuspid valve regurgitation is present.  Left atrial volume is mildly increased.    Imp:  1.  Acute on chronic respiratory failure, patient now back to baseline.  2.  Cor pulmonale.  Patient has diuresed well with Lasix  3.  Anemia likely in part related to chronic inflammation.  Patient could have a degree of iron deficiency.  She has had GI blood loss in the past.  4.  Essential hypertension.  5.  Atrial fibrillation with controlled ventricular rate.  6.  Generalized anxiety disorder, past depression      Plan:  I will continue the patient's current  Lasix dose for now.  She has had excellent diuresis.  This may need to be decreased a little bit at the time of discharge.  I am going to give her a Dulcolax suppository.  Hopefully she will have a bowel movement with this medication.  I will start iron at 325 mg every other day.  Patient has too many comorbidities to consider endoscopic work-up for iron deficiency.  In the past GI blood loss was related to angiodysplasia.  Consider discharge in the morning if all goes well.    Harmeet Payne MD  1/10/2021  11:15 EST

## 2021-01-11 NOTE — PROGRESS NOTES
History:  This is a 80-year-old lady.  She has COPD and was admitted with hypercapnic respiratory failure.  She has cor pulmonale and likely had an element of heart failure with preserved ejection fraction.  In any event with positive pressure breathing bronchodilators and diuretic she is back to her baseline.    The patient was having trouble with constipation but this has resolved.  She does complain of back pain and does have high anxiety.  Her functional level is poor.  She is getting out of bed for a little    Allergies  Adhesive tape, Augmentin [amoxicillin-pot clavulanate], Cephalexin, Metoclopramide, Pentazocine, Simvastatin, and Sucralfate      Current Facility-Administered Medications:   •  acetaminophen (TYLENOL) tablet 500 mg, 500 mg, Oral, Q6H PRN, Harmeet Payne MD, 500 mg at 01/11/21 0256  •  amLODIPine (NORVASC) tablet 5 mg, 5 mg, Oral, Q24H, Harmeet Payne MD, 5 mg at 01/11/21 0844  •  bisacodyl (DULCOLAX) suppository 10 mg, 10 mg, Rectal, Daily PRN, Harmeet Payne MD, 10 mg at 01/10/21 1134  •  budesonide (PULMICORT) nebulizer solution 0.5 mg, 0.5 mg, Nebulization, BID - RT, Harmeet Payne MD, 0.5 mg at 01/11/21 0714  •  carvedilol (COREG) tablet 6.25 mg, 6.25 mg, Oral, BID With Meals, Harmeet Payne MD, 6.25 mg at 01/11/21 0844  •  ferrous sulfate tablet 325 mg, 325 mg, Oral, Daily With Breakfast, Harmeet Payne MD, 325 mg at 01/11/21 0844  •  furosemide (LASIX) tablet 80 mg, 80 mg, Oral, Daily, Harmeet Payne MD, 80 mg at 01/11/21 0844  •  ipratropium-albuterol (DUO-NEB) nebulizer solution 3 mL, 3 mL, Nebulization, 4x Daily - RT, Harmeet Payne MD, 3 mL at 01/11/21 0713  •  levothyroxine (SYNTHROID, LEVOTHROID) tablet 50 mcg, 50 mcg, Oral, Q AM, Harmeet Payne MD, 50 mcg at 01/11/21 0632  •  lisinopril (PRINIVIL,ZESTRIL) tablet 20 mg, 20 mg, Oral, Nightly, Harmeet Payne MD, 20 mg at 01/10/21 2030  •  LORazepam (ATIVAN) tablet 0.5 mg, 0.5 mg, Oral, Q8H PRN, Harmeet Payne MD, 0.5 mg at 01/11/21  "0632  •  nitroglycerin (NITROSTAT) SL tablet 0.4 mg, 0.4 mg, Sublingual, Q5 Min PRN, Harmeet Payne MD  •  ondansetron (ZOFRAN) injection 4 mg, 4 mg, Intravenous, Q6H PRN, Harmeet Payne MD, 4 mg at 01/10/21 1121  •  pantoprazole (PROTONIX) EC tablet 40 mg, 40 mg, Oral, Q AM, Harmeet Payne MD, 40 mg at 01/11/21 0632  •  polyethylene glycol (MIRALAX) packet 17 g, 17 g, Oral, Daily PRN, Harmeet Payne MD, 17 g at 01/09/21 1615  •  potassium chloride (KLOR-CON) packet 20 mEq, 20 mEq, Oral, Daily, Harmeet Payne MD, 20 mEq at 01/11/21 0844  •  propafenone (RYTHMOL) tablet 150 mg, 150 mg, Oral, Q8H, Harmeet Payne MD, 150 mg at 01/11/21 0632  •  rivaroxaban (XARELTO) tablet 20 mg, 20 mg, Oral, Daily With Dinner, Harmeet Payne MD, 20 mg at 01/10/21 1729  •  sertraline (ZOLOFT) tablet 50 mg, 50 mg, Oral, Nightly, Harmeet Payne MD, 50 mg at 01/10/21 2030  •  sodium chloride 0.9 % flush 10 mL, 10 mL, Intravenous, Q12H, Harmeet Payne MD, 10 mL at 01/11/21 0844  •  sodium chloride 0.9 % flush 10 mL, 10 mL, Intravenous, PRN, Harmeet Payne MD    /66 (BP Location: Right arm, Patient Position: Lying)   Pulse 63   Temp 98 °F (36.7 °C) (Oral)   Resp 20   Ht 157.5 cm (62\")   Wt 76.6 kg (168 lb 14 oz)   LMP  (LMP Unknown)   SpO2 94%   BMI 30.89 kg/m²     Physical Exam  Appearance: Elderly  lady with central obesity.  She is lying in bed in no distress.  HEENT: Normocephalic and atraumatic.  Pupils round and equal.  Pharynx clear.  Neck: Decreased range of motion.  No lymphadenopathy or JVD.  Lungs: Diminished breath sounds with a few faint rales in the right base.  Heart: Bradycardic, regular, 1/6 systolic murmur  Abdomen: Protuberant.  Bowel sounds were present.  The patient had no rebound, tenderness or guarding.  Extremities: Without edema, clubbing or cyanosis    Lab Results (last 24 hours)     Procedure Component Value Units Date/Time    Basic Metabolic Panel [[id number]]  (Abnormal) Collected: 01/11/21 " 0341    Specimen: Blood Updated: 01/11/21 0449     Glucose 118 mg/dL      BUN 24 mg/dL      Creatinine 1.26 mg/dL      Sodium 137 mmol/L      Potassium 4.2 mmol/L      Chloride 91 mmol/L      CO2 35.5 mmol/L      Calcium 8.6 mg/dL      eGFR Non African Amer 41 mL/min/1.73      BUN/Creatinine Ratio 19.0     Anion Gap 10.5 mmol/L     Narrative:      GFR Normal >60  Chronic Kidney Disease <60  Kidney Failure <15            Imp:  1.  Acute on chronic respiratory failure, patient now back to baseline.  2.  Cor pulmonale.  Patient has diuresed well with Lasix  3.  Heart failure with preserved ejection fraction, resolved  4.  Anemia likely in part related to chronic inflammation.  Patient could have a degree of iron deficiency.  She has had GI blood loss in the past.  5.  Essential hypertension.  6.  Atrial fibrillation with controlled ventricular rate.  7.  Generalized anxiety disorder, past depression        Plan:  I will get the patient more active today.  We will get her up in a chair and have physical therapy work with her.  I am going to decrease her Lasix back to 40 mg a day.  I plan to put her on iron every other day as an outpatient.  She will be discharged tomorrow.  At that time she can go back on her trilogy.  She does have help at home.    Harmeet Payne MD  1/11/2021  09:12 EST

## 2021-01-11 NOTE — PLAN OF CARE
Problem: Adult Inpatient Plan of Care  Goal: Plan of Care Review  Outcome: Ongoing, Progressing  Flowsheets (Taken 1/11/2021 1610)  Progress: improving  Plan of Care Reviewed With: patient  Outcome Summary: Vitals stable. c/o chronic back pain - prn tylenol given. Up to chair with staff. Q2 turns - zguard applied to coccyx. Discharge planned for am. Will continue to monitor.

## 2021-01-11 NOTE — PROGRESS NOTES
Continued Stay Note  [facility]     Patient Name: Ale Narayan  MRN: [id number]  Today's Date: 1/11/2021    Admit Date: 1/8/2021    Discharge Plan     Row Name 01/11/21 1632       Plan    Plan  Home with family to assist. Needs WC van transport at D/C    Plan Comments  Met with pt. at bedside. Explained roll of . Face sheet and pharmacy verified. Pt lives with alone with assist from family. There is one step to enter home.  DME equipment includes Nebulizer, Home O2, and Trilogy through [employer].  Pt also has a rolling walker. Pt is requires some assist with ADLs. Pt has been to [facility] and [facility] for Rehab.  She has used [facility]. Pt’s PCP is Dr. MILEY Payne. U[facility] [address]. Pt family drives her to appointments. At discharge, pt needs WC Van transport. Explained that CCP would follow to assess for discharge needs.  Jose G Griffin RN-BC        Discharge Codes    No documentation.       Expected Discharge Date and Time     Expected Discharge Date Expected Discharge Time    Jan 12, 2021             Jose G Griffin RN

## 2021-01-11 NOTE — PROGRESS NOTES
Jhonatan Scott MD                          [phone]      Patient ID:    Name:  Ale Narayan    MRN:  [id number]    1940   80 y.o.  female            Patient Care Team:  Harmeet Payne MD as PCP - Yoan Ty MD as Consulting Physician (Hematology and Oncology)  Devon Lamb MD as Referring Physician (Thoracic Surgery)    CC/ Reason for visit: Acute on chronic hypoxic/hypercapnic respiratory failure, COPD, congestive heart failure    Subjective: Pt seen and examined this AM. No acute overnight events noted. Doing better. Stable o2 needs. States that she is feeling much better.  Diuresed well yesterday - Cr up some.     ROS: Denies any subjective fevers, syncope or presyncopal events, new neurological deficits, nausea or vomiting currently    Objective     Vital Signs past 24hrs    BP range: BP: (116-154)/(44-69) 117/53  Pulse range: Heart Rate:  [53-78] 63  Resp rate range: Resp:  [18-20] 18  Temp range: Temp (24hrs), Av.3 °F (36.8 °C), Min:98 °F (36.7 °C), Max:98.7 °F (37.1 °C)      Ventilator/Non-Invasive Ventilation Settings (From admission, onward)     Start     Ordered    21 1308  NIPPV (CPAP or BIPAP)  Until Discontinued     Question Answer Comment   Type: BIPAP    NIPPV Mask Interface: Per Patient Preference        21 1308                Device (Oxygen Therapy): nasal cannula       76.6 kg (168 lb 14 oz); Body mass index is 30.89 kg/m².      Intake/Output Summary (Last 24 hours) at 2021 1704  Last data filed at 2021 1353  Gross per 24 hour   Intake 570 ml   Output 1700 ml   Net -1130 ml       PHYSICAL EXAM   Constitutional: Middle aged obese white female pt in bed, No acute respiratory distress, + accessory muscle use  Head: - NCAT  Eyes: No pallor.  Anicteric sclerae, EOMI.  ENMT:  Mallampati 4, no oral thrush. Moist MM.   NECK: Trachea midline, No thyromegaly, no palpable cervical lymphadenopathy  Heart: RRR, no  murmur. trace pedal edema   Lungs: YANICK +, distant breath sounds, decreased present at the bases, no wheezes/ crackles heard    Abdomen: Soft.  Obese, no tenderness, guarding or rigidity. No palpable masses  Extremities: Extremities warm and well perfused. No cyanosis/ clubbing  Neuro: Conscious, generally weak, answers appropriately, no gross focal neuro deficits  Psych: Mood and affect appropriate    PPE recommended per [facility] infectious disease Isolation protocol for the current clinical scenario(as mentioned below) was followed.     Scheduled meds:  amLODIPine, 5 mg, Oral, Q24H  budesonide, 0.5 mg, Nebulization, BID - RT  carvedilol, 6.25 mg, Oral, BID With Meals  ferrous sulfate, 325 mg, Oral, Daily With Breakfast  [START ON 1/12/2021] furosemide, 40 mg, Oral, Daily  ipratropium-albuterol, 3 mL, Nebulization, 4x Daily - RT  levothyroxine, 50 mcg, Oral, Q AM  lisinopril, 20 mg, Oral, Nightly  pantoprazole, 40 mg, Oral, Q AM  potassium chloride, 20 mEq, Oral, Daily  propafenone, 150 mg, Oral, Q8H  rivaroxaban, 20 mg, Oral, Daily With Dinner  sertraline, 50 mg, Oral, Nightly  sodium chloride, 10 mL, Intravenous, Q12H        IV meds:                           Data Review:      Results from last 7 days   Lab Units 01/11/21  0341 01/10/21  0437 01/09/21  0500  01/08/21  1223 01/06/21  1530   SODIUM mmol/L 137 139 146*   < > 142 140   POTASSIUM mmol/L 4.2 4.1 4.1   < > 5.0 4.9   CHLORIDE mmol/L 91* 93* 100   < > 104 99   CO2 mmol/L 35.5* 37.0* 37.3*   < > 31.1* 34.2*   BUN mg/dL 24* 27* 29*   < > 30* 41*   CREATININE mg/dL 1.26* 1.05* 0.78   < > 0.83 1.12*   CALCIUM mg/dL 8.6 8.7 9.1   < > 8.6 8.6   BILIRUBIN mg/dL  --   --   --   --  0.2 0.3   ALK PHOS U/L  --   --   --   --  68 72   ALT (SGPT) U/L  --   --   --   --  17 18   AST (SGOT) U/L  --   --   --   --  19 19   GLUCOSE mg/dL 118* 102* 84   < > 168* 88   WBC 10*3/mm3  --  9.74  --   --  7.45 8.65   HEMOGLOBIN g/dL  --  9.7*  --   --  9.8* 9.9*      PLATELETS 10*3/mm3  --  244  --   --  193 191   PROBNP pg/mL  --   --   --   --  3,167.0*  --    PROCALCITONIN ng/mL  --   --   --   --  0.11  --     < > = values in this interval not displayed.       Lab Results   Component Value Date    CALCIUM 8.6 01/11/2021    PHOS 3.8 01/08/2021             Results from last 7 days   Lab Units 01/08/21  1517 01/08/21  1229   PH, ARTERIAL pH units 7.300* 7.268*   PO2 ART mm Hg 130.6* 218.7*   PCO2, ARTERIAL mm Hg 74.7* 77.9*   HCO3 ART mmol/L 36.8* 35.6*        Results Review:    I have reviewed the relevant laboratory results and independently reviewed the chest imaging from this hospitalization including the available echocardiogram reports personally and summarized it if/ when appropriate below    Assessment    Acute on chronic hypoxic respiratory failure (3 L)  Acute on chronic hypercapnic respiratory failure -home trilogy ventilator  Acute congestive heart failure; Diastolic  Severe COPD not in exacerbation -   Severe PH with RVSP 66 - multifactorial  Hypernatremia  Adenocarcinoma of the lung  A. Fib on anticoagulation  History of DVT/PE  Hypothyroidism  LISSETTE  Obesity  DNR    PLAN:  Patient is stable from a respiratory standpoint.  Continue with supplemental oxygen to keep sats around 90%.  Continue with BiPAP for now and restart trilogy ventilator when home  Would defer further diuretics to primary service. Echocardiogram reviewed.  Continue with bronchodilators for COPD.    Restart home medications  Physical therapy and out of bed.    Should be okay to be discharged from my standpoint.  Follow-up with Dr. Fernandes in a month    Jhonatan Scott MD  1/11/2021

## 2021-01-11 NOTE — PLAN OF CARE
Problem: Adult Inpatient Plan of Care  Goal: Plan of Care Review  Outcome: Ongoing, Progressing  Flowsheets (Taken 1/11/2021 7820)  Progress: no change  Plan of Care Reviewed With: patient  Outcome Summary: VSS. C/o back pain, PRN tylenol given. Pt wasnt able to get comfortable overnight, frequently repositioning in bed. PRN ativan given for anxiety. Wore bipap for a few hours last night then wanted to switch back to 3L NC. Will continue to monitor.

## 2021-01-11 NOTE — SIGNIFICANT NOTE
"   01/11/21 1512   OTHER   Discipline physical therapist   Rehab Time/Intention   Session Not Performed patient/family declined treatment  (pt declines PT this afternoon. She insists that she is \"fine\". She is up in chair. She is hopeful to go home tomorrow. Will follow up.)   Recommendation   PT - Next Appointment 01/12/21     "

## 2021-01-11 NOTE — NURSING NOTE
CWOCN consult for buttocks.  Patient still up in chair and unable to assess.  Spoke to staff RN who reports stage 1 only.  Treat per protocol, orders entered.

## 2021-01-11 NOTE — PROGRESS NOTES
Discharge Planning Assessment  [facility]     Patient Name: Ale Narayan  MRN: [id number]  Today's Date: 1/11/2021    Admit Date: 1/8/2021    Discharge Needs Assessment     Row Name 01/11/21 1628       Living Environment    Lives With  alone    Unique Family Situation  Lives alone with family assist.    Current Living Arrangements  home/apartment/condo    Primary Care Provided by  self;other (see comments)    Provides Primary Care For  no one, unable/limited ability to care for self    Family Caregiver if Needed  child(ramon), adult    Family Caregiver Names  Michelle Gallegos, daughter, [phone]    Quality of Family Relationships  involved    Able to Return to Prior Arrangements  yes       Resource/Environmental Concerns    Resource/Environmental Concerns  none       Transition Planning    Patient/Family Anticipates Transition to  home    Patient/Family Anticipated Services at Transition  none    Transportation Anticipated  family or friend will provide       Discharge Needs Assessment    Readmission Within the Last 30 Days  no previous admission in last 30 days    Equipment Currently Used at Home  oxygen;nebulizer;noninvasive ventilator;walker, rolling    Concerns to be Addressed  care coordination/care conferences;decision-making;discharge planning    Anticipated Changes Related to Illness  none    Equipment Needed After Discharge  none    Provided Post Acute Provider List?  Refused    Current Discharge Risk  lives alone        Discharge Plan     Row Name 01/11/21 1632       Plan    Plan  Home with family to assist. Needs WC van transport at D/C    Plan Comments  Met with pt. at bedside. Explained roll of . Face sheet and pharmacy verified. Pt lives with alone with assist from family. There is one step to enter home.  DME equipment includes Nebulizer, Home O2, and Trilogy through [employer].  Pt also has a rolling walker. Pt is requires some assist with ADLs. Pt has been to [facility] and  [facility] for Rehab.  She has used [facility]. Pt’s PCP is Dr. MILEY Payne. Uses [facility] on [address]. Pt family drives her to appointments. At discharge, pt needs  Van transport. Explained that CCP would follow to assess for discharge needs.  Jose G Griffin RN-BC        Continued Care and Services - Admitted Since 1/8/2021    Coordination has not been started for this encounter.       Expected Discharge Date and Time     Expected Discharge Date Expected Discharge Time    Jan 12, 2021         Demographic Summary     Row Name 01/11/21 1626       General Information    Admission Type  inpatient    Arrived From  emergency department    Required Notices Provided  Important Message from Medicare    Reason for Consult  discharge planning;care coordination/care conference    Preferred Language  English     Used During This Interaction  no        Functional Status     Row Name 01/11/21 1627       Functional Status    Usual Activity Tolerance  fair    Current Activity Tolerance  fair       Functional Status, IADL    Medications  assistive equipment and person    Meal Preparation  assistive equipment and person    Housekeeping  assistive equipment and person    Laundry  completely dependent    Shopping  completely dependent       Mental Status    General Appearance WDL  WDL       Mental Status Summary    Recent Changes in Mental Status/Cognitive Functioning  no changes                Jose G Griffin RN

## 2021-01-12 PROBLEM — R06.00 DYSPNEA: Status: RESOLVED | Noted: 2021-01-01 | Resolved: 2021-01-01

## 2021-01-12 NOTE — PLAN OF CARE
Goal Outcome Evaluation:  Plan of Care Reviewed With: patient  Progress: no change  Outcome Summary: VSS, pt wore Bipap for about 2 hours then back to 3L O2 via n/c. Prn Tylenol given for chronic back pain, pt repositioned numerously with multiple pillows per pt request, pt refusing to turn off backside, educated on pressure injury/ skin care. Prn Ativan given and effective. No distress noted, continue to monitor.

## 2021-01-12 NOTE — PROGRESS NOTES
Case Management Discharge Note      Final Note: Home with family to assist. [facility] setup today at 1400    Provided Post Acute Provider List?: Refused    Selected Continued Care - Admitted Since 1/8/2021     Destination    No services have been selected for the patient.              Durable Medical Equipment    No services have been selected for the patient.              Dialysis/Infusion    No services have been selected for the patient.              Home Medical Care    No services have been selected for the patient.              Therapy    No services have been selected for the patient.              Community Resources    No services have been selected for the patient.                  Transportation Services  W/C Van: [facility]    Final Discharge Disposition Code: 01 - home or self-care

## 2021-01-12 NOTE — PLAN OF CARE
Problem: Adult Inpatient Plan of Care  Goal: Plan of Care Review  Outcome: Met  Flowsheets (Taken 1/12/2021 1235)  Progress: improving  Plan of Care Reviewed With: patient  Outcome Summary: Vitals stable. pt discharged to home with HH and family. Rx , instructions and follow ups provided.

## 2021-01-12 NOTE — PROGRESS NOTES
Continued Stay Note  [facility]     Patient Name: Ale Narayan  MRN: [id number]  Today's Date: 1/12/2021    Admit Date: 1/8/2021    Discharge Plan     Row Name 01/12/21 0939       Plan    Plan  Home with family to assist. [employer] Van setup today at 1400    Patient/Family in Agreement with Plan  yes    Plan Comments  Home with family assist. [employer] van set up for transport today at 1400. Res ## [id number]. Called and notified Michelle Gallegos, daughter, [phone] of D/C plan. Daughter stated she will call [employer] and pay for transportation. Nurse and pt informed. Jose G Griffin RN-BC    Final Discharge Disposition Code  01 - home or self-care    Final Note  Home with family to assist. [employer] Van setup today at 1400        Discharge Codes    No documentation.       Expected Discharge Date and Time     Expected Discharge Date Expected Discharge Time    Jan 12, 2021             Jose G Griffin RN

## 2021-01-12 NOTE — DISCHARGE SUMMARY
Date of admission: January 8, 2021  Date of discharge: January 12, 2021    Discharge diagnosis:  1.  Acute on chronic respiratory failure, patient now back to baseline.  2.  Cor pulmonale.  Patient has diuresed well with Lasix  3.  Heart failure with preserved ejection fraction, resolved  4.  Anemia likely in part related to chronic inflammation.  Patient could have a degree of iron deficiency.  She has had GI blood loss in the past.  5.  Essential hypertension.  6.  Atrial fibrillation with controlled ventricular rate.  7.  Generalized anxiety disorder, past depression  8.  Hypothyroid on replacement.  9.  History of adeno CA of the lung x2.  10.  Past DVT  11.  Atherosclerotic cardiovascular disease    Procedures performed during this admission: None    Consults during this admission: Pulmonary, Dr. Alirio Alfaro    History of present illness:  This is a very nice 80-year-old lady who has multiple comorbidities.  She has a history of severe COPD and hypercapnia.  She has atherosclerotic cardiovascular disease and has had Takotsubo's cardiomyopathy x2.  She has a history of adenocarcinoma of the lung x2, GERD, hypothyroidism, osteoarthritis, paroxysmal atrial fib and past DVTs.  The patient is homebound.  He had not left her home for over a year.  Prior to admission to the hospital the patient complained of increasing shortness of air, nonproductive cough and difficulty even walking short distance due to the dyspnea.  She noticed some increased swelling in her legs.  She noticed some orthopnea and PND.  She denied chest pressure, chest pain or palpitations.  She denied fever or chills.    Physical exam at the time of discharge:  /58, pulse 52, respirate 16, temperature 97.9 wt 76.6 kg (168 lb 14 oz)   LMP  (LMP Unknown)   SpO2 99% on 3 L/min  BMI 30.89 kg/m²   Appearance: Elderly  lady with central obesity.  She is lying in bed in no distress.  HEENT: Normocephalic and atraumatic.  Pupils round  and equal.  Pharynx clear.  Neck: Decreased range of motion.  No lymphadenopathy or JVD.  Lungs: Diminished breath sounds with a few faint rales in the right base.  Heart: Bradycardic, regular, 1/6 systolic murmur  Abdomen: Protuberant.  Bowel sounds were present.  The patient had no rebound, tenderness or guarding.  Extremities: Without edema, clubbing or cyanosis    Significant laboratory studies:  BMP prior to discharge was unremarkable with BUN 24, creatinine 1.26.  TSH 1.580  Iron 37, IBC 10, transferrin 348, TIBC 370  B12 1037  Homocystine 11.7  Troponin 0 0.018  White blood cell count 9.74, hemoglobin 9.7, hematocrit 29.1, MCV 91, platelet count 244  Urine analysis was within normal limits.  Pro BNP on admission was 3167  Respiratory panel PCR was negative for all pathogens including COVID-19    EKG: Atrial fibrillation left bundle branch block.  No change when compared to previous EKGs.    Chest x-ray:There is cardiomegaly with slight vascular congestion and  small bilateral pleural effusions and suspicious for changes of mild  congestive heart failure. The right effusion is slightly larger when  compared to an exam dating back to 10/06/2019. No definite localized  pneumonia is seen.    Echocardiogram:Left ventricular wall thickness is consistent with concentric hypertrophy.  Estimated right ventricular systolic pressure from tricuspid regurgitation is markedly elevated (>55 mmHg).  Severe pulmonary hypertension is present.  The right ventricular cavity is dilated.  Calculated left ventricular EF = 68% Estimated left ventricular EF was in agreement with the calculated left ventricular EF. Left ventricular systolic function is normal.  The right atrial cavity is moderate to severely dilated.  Moderate to severe tricuspid valve regurgitation is present.  Left atrial volume is mildly increased.    Hospital course:  The patient was admitted with hypercapnic respiratory failure.  She was felt to have heart  failure with preserved ejection fraction as well.  She was placed on positive pressure breathing apparatus.  Her inhaled bronchodilator therapy was continued.  She was given increased doses of Lasix.  With this treatment her breathing came back to baseline.    The patient was noted to have anemia on admission.  Her hemoglobin has dropped about a gram in the past month or so.  Patient has a history of angiodysplasia of the colon.  It is likely she had some bleeding from this pathology.  Her significant comorbidities do not prevent further evaluation of this problem.  The patient has been placed on iron and her H&H will be  watched closely    The patient was seen by physical therapy during this admission at discharge she was able to ambulate short distances around her room.    The patient been discharged on January 12.  The patient will eat a healthy heart diet.  The patient's activities are as tolerated.  The patient will be on 3 L of oxygen via nasal cannula.  The patient has a trilogy machine she uses every night and through the day if she needs it.  The patient is followed by a home health nursing service.  They will continue to see the patient and she will be weighed at least 3 times a week.    Medications at the time of discharge:  Tylenol 500 mg every 6 hours as needed  Tramadol 50 mg every 6 hours as needed  Pulmicort 2 mL's twice daily  DuoNeb  every 6 hours as needed  Xarelto 20 mill grams a day  Zoloft 50 mg a day  Lisinopril 20 mg p.o. daily  Coreg 12.5 mg twice daily  Lasix 40 mill grams a day  MiraLAX 17.5 mL's PRN daily  Potassium chloride 20 mg a day  Synthroid 50 mcg a day  Nexium 40 mg a day  Propafenone 150 mg p.o. 3 times daily  Ativan 0.5 twice daily as needed severe anxiety  Ferrous sulfate 325 mg every other day  Amlodipine 5 mg a day    I will follow up with the patient.  I will make a house call to her home within the next few days.  I am going to watch her weight very closely as she may need  an increased dose of the furosemide.  The patient to follow-up with pulmonary in 1 month.    It should be noted this patient is a DNR.

## 2021-01-12 NOTE — PROGRESS NOTES
Jhonatan Scott MD                          [phone]      Patient ID:    Name:  Ale Narayan    MRN:  [id number]    1940   80 y.o.  female            Patient Care Team:  Harmeet Payne MD as PCP - Yoan Ty MD as Consulting Physician (Hematology and Oncology)  Devon Lamb MD as Referring Physician (Thoracic Surgery)    CC/ Reason for visit: Acute on chronic hypoxic/hypercapnic respiratory failure, COPD, congestive heart failure    Subjective: Pt seen and examined this AM. No acute overnight events noted. Doing better. Stable o2 needs. States that she is feeling ok.  Diuresed well yesterday - Cr up some.     ROS: Denies any subjective fevers, syncope or presyncopal events, new neurological deficits, nausea or vomiting currently    Objective     Vital Signs past 24hrs    BP range: BP: (107-155)/(41-58) 155/58  Pulse range: Heart Rate:  [51-63] 55  Resp rate range: Resp:  [16-18] 16  Temp range: Temp (24hrs), Av.9 °F (36.6 °C), Min:97.8 °F (36.6 °C), Max:98.1 °F (36.7 °C)      Ventilator/Non-Invasive Ventilation Settings (From admission, onward)     Start     Ordered    21 1308  NIPPV (CPAP or BIPAP)  Until Discontinued     Question Answer Comment   Type: BIPAP    NIPPV Mask Interface: Per Patient Preference        21 1308                Device (Oxygen Therapy): nasal cannula       74.5 kg (164 lb 3.9 oz); Body mass index is 30.04 kg/m².      Intake/Output Summary (Last 24 hours) at 2021 1451  Last data filed at 2021 1100  Gross per 24 hour   Intake 630 ml   Output 1700 ml   Net -1070 ml       PHYSICAL EXAM   Constitutional: Middle aged obese white female pt in bed, No acute respiratory distress, + accessory muscle use  Head: - NCAT  Eyes: No pallor.  Anicteric sclerae, EOMI.  ENMT:  Mallampati 4, no oral thrush. Moist MM.   NECK: Trachea midline, No thyromegaly, no palpable cervical lymphadenopathy  Heart: RRR, no murmur.  trace pedal edema   Lungs: YANICK +, distant breath sounds, decreased present at the bases, no wheezes/ crackles heard    Abdomen: Soft.  Obese, no tenderness, guarding or rigidity. No palpable masses  Extremities: Extremities warm and well perfused. No cyanosis/ clubbing  Neuro: Conscious, generally weak, answers appropriately, no gross focal neuro deficits  Psych: Mood and affect appropriate    PPE recommended per [facility] infectious disease Isolation protocol for the current clinical scenario(as mentioned below) was followed.     Scheduled meds:  amLODIPine, 5 mg, Oral, Q24H  budesonide, 0.5 mg, Nebulization, BID - RT  carvedilol, 6.25 mg, Oral, BID With Meals  ferrous sulfate, 325 mg, Oral, Daily With Breakfast  furosemide, 40 mg, Oral, Daily  ipratropium-albuterol, 3 mL, Nebulization, 4x Daily - RT  levothyroxine, 50 mcg, Oral, Q AM  lisinopril, 20 mg, Oral, Nightly  pantoprazole, 40 mg, Oral, Q AM  potassium chloride, 20 mEq, Oral, Daily  propafenone, 150 mg, Oral, Q8H  rivaroxaban, 20 mg, Oral, Daily With Dinner  sertraline, 50 mg, Oral, Nightly  sodium chloride, 10 mL, Intravenous, Q12H        IV meds:                           Data Review:      Results from last 7 days   Lab Units 01/11/21  0341 01/10/21  0437 01/09/21  0500  01/08/21  1223 01/06/21  1530   SODIUM mmol/L 137 139 146*   < > 142 140   POTASSIUM mmol/L 4.2 4.1 4.1   < > 5.0 4.9   CHLORIDE mmol/L 91* 93* 100   < > 104 99   CO2 mmol/L 35.5* 37.0* 37.3*   < > 31.1* 34.2*   BUN mg/dL 24* 27* 29*   < > 30* 41*   CREATININE mg/dL 1.26* 1.05* 0.78   < > 0.83 1.12*   CALCIUM mg/dL 8.6 8.7 9.1   < > 8.6 8.6   BILIRUBIN mg/dL  --   --   --   --  0.2 0.3   ALK PHOS U/L  --   --   --   --  68 72   ALT (SGPT) U/L  --   --   --   --  17 18   AST (SGOT) U/L  --   --   --   --  19 19   GLUCOSE mg/dL 118* 102* 84   < > 168* 88   WBC 10*3/mm3  --  9.74  --   --  7.45 8.65   HEMOGLOBIN g/dL  --  9.7*  --   --  9.8* 9.9*   PLATELETS 10*3/mm3  --  244  --    --  193 191   PROBNP pg/mL  --   --   --   --  3,167.0*  --    PROCALCITONIN ng/mL  --   --   --   --  0.11  --     < > = values in this interval not displayed.       Lab Results   Component Value Date    CALCIUM 8.6 01/11/2021    PHOS 3.8 01/08/2021             Results from last 7 days   Lab Units 01/08/21  1517 01/08/21  1229   PH, ARTERIAL pH units 7.300* 7.268*   PO2 ART mm Hg 130.6* 218.7*   PCO2, ARTERIAL mm Hg 74.7* 77.9*   HCO3 ART mmol/L 36.8* 35.6*        Results Review:    I have reviewed the relevant laboratory results and independently reviewed the chest imaging from this hospitalization including the available echocardiogram reports personally and summarized it if/ when appropriate below    Assessment    Acute on chronic hypoxic respiratory failure (3 L)  Acute on chronic hypercapnic respiratory failure -home trilogy ventilator  Acute congestive heart failure; Diastolic  Severe COPD not in exacerbation -   Severe PH with RVSP 66 - multifactorial  Hypernatremia  Adenocarcinoma of the lung  A. Fib on anticoagulation  History of DVT/PE  Hypothyroidism  LISSETTE  Obesity  DNR    PLAN:  Patient is stable from a respiratory standpoint.  Continue with supplemental oxygen to keep sats around 90%.  Continue with BiPAP for now and restart trilogy ventilator when home    Should be okay to be discharged from my standpoint.  Follow-up with Dr. Fernandes in a month    Jhonatan Scott MD  1/12/2021

## 2021-01-13 NOTE — OUTREACH NOTE
Prep Survey      Responses   [facility] facility patient discharged from?  [place]   Is LACE score < 7 ?  No   Emergency Room discharge w/ pulse ox?  No   Eligibility  Readm Mgmt   Discharge diagnosis  Acute on chronic respiratory failure, patient now back to baseline   Does the patient have one of the following disease processes/diagnoses(primary or secondary)?  CHF   Does the patient have Home health ordered?  No   Is there a DME ordered?  No   Prep survey completed?  Yes          Antonia Vale RN

## 2021-01-15 NOTE — OUTREACH NOTE
CHF Week 1 Survey      Responses   [facility] patient discharged from?  [place]   Does the patient have one of the following disease processes/diagnoses(primary or secondary)?  CHF   CHF Week 1 attempt successful?  Yes   Call start time  1139   Rescheduled  Rescheduled-pt requested [Does not feel like talking right now]   Discharge diagnosis  Acute on chronic respiratory failure, patient now back to baseline          Vicente Prabhakar RN

## 2021-01-19 NOTE — OUTREACH NOTE
CHF Week 1 Survey      Responses   [facility] patient discharged from?  [place]   Does the patient have one of the following disease processes/diagnoses(primary or secondary)?  CHF   CHF Week 1 attempt successful?  Yes   Call start time  1354   Revoke  Decline to participate   Call end time  1354   Discharge diagnosis  Acute on chronic respiratory failure, patient now back to baseline          Ariadna Poole RN

## 2021-03-30 PROBLEM — R06.02 SHORTNESS OF BREATH: Status: ACTIVE | Noted: 2021-01-01

## 2021-04-01 NOTE — PROGRESS NOTES
History:  This 81-year-old lady still gets short of air with minimal activity.  Her heart rates are running low.  She is in atrial fibrillation.  She had a good response to the Lasix yesterday.  She has had no fever, chills, abdominal symptoms or chest pain.    Allergies  Adhesive tape, Augmentin [amoxicillin-pot clavulanate], Cephalexin, Metoclopramide, Pentazocine, Simvastatin, and Sucralfate      Current Facility-Administered Medications:   •  acetaminophen (TYLENOL) tablet 650 mg, 650 mg, Oral, Q6H PRN, Harmeet Payne MD  •  amLODIPine (NORVASC) tablet 5 mg, 5 mg, Oral, Q24H, Harmeet Payne MD, 5 mg at 03/31/21 0903  •  arformoterol (BROVANA) nebulizer solution 15 mcg, 15 mcg, Nebulization, BID - RT, Moon Hartley MD, 15 mcg at 03/31/21 1954  •  atorvastatin (LIPITOR) tablet 10 mg, 10 mg, Oral, Daily, Harmeet Payne MD, 10 mg at 03/31/21 0902  •  budesonide (PULMICORT) nebulizer solution 0.5 mg, 0.5 mg, Nebulization, BID - RT, Harmeet Payne MD, 0.5 mg at 03/31/21 1954  •  carvedilol (COREG) tablet 6.25 mg, 6.25 mg, Oral, BID With Meals, Harmeet Payne MD, 6.25 mg at 03/31/21 1821  •  cholecalciferol (VITAMIN D3) tablet 1,000 Units, 1,000 Units, Oral, Daily, Harmeet Payne MD, 1,000 Units at 03/31/21 0903  •  ipratropium-albuterol (DUO-NEB) nebulizer solution 3 mL, 3 mL, Nebulization, Q6H PRN, Moon Hartley MD  •  levothyroxine (SYNTHROID, LEVOTHROID) tablet 50 mcg, 50 mcg, Oral, Daily, Harmeet Payne MD, 50 mcg at 03/31/21 0903  •  lisinopril (PRINIVIL,ZESTRIL) tablet 20 mg, 20 mg, Oral, Q24H, Harmeet Payne MD, 20 mg at 03/31/21 0902  •  melatonin tablet 2 mg, 2 mg, Oral, Nightly, Harmeet Payne MD, 2 mg at 03/31/21 2226  •  nitroglycerin (NITROSTAT) SL tablet 0.4 mg, 0.4 mg, Sublingual, Q5 Min PRN, Harmeet Payne MD  •  pantoprazole (PROTONIX) EC tablet 40 mg, 40 mg, Oral, QAM, Harmeet Payne MD, 40 mg at 04/01/21 0609  •  potassium chloride (K-DUR,KLOR-CON) ER tablet 20 mEq, 20 mEq, Oral, Daily, Kerry  "Harmeet PRUITT MD, 20 mEq at 03/31/21 0902  •  propafenone (RYTHMOL) tablet 150 mg, 150 mg, Oral, Q8H, Harmeet Payne MD, 150 mg at 03/31/21 2226  •  rivaroxaban (XARELTO) tablet 20 mg, 20 mg, Oral, Daily With Dinner, Harmeet Payne MD, 20 mg at 03/31/21 1821  •  sertraline (ZOLOFT) tablet 50 mg, 50 mg, Oral, Nightly, Harmeet Payne MD, 50 mg at 03/31/21 2028  •  sodium chloride 0.9 % flush 10 mL, 10 mL, Intravenous, Q12H, Harmeet Payne MD, 10 mL at 03/31/21 2028  •  sodium chloride 0.9 % flush 10 mL, 10 mL, Intravenous, PRN, Harmeet Payne MD  •  sodium chloride 7 % nebulizer solution nebulizer solution 4 mL, 4 mL, Nebulization, BID - RT, Moon Hartley MD, 4 mL at 03/31/21 2004  •  traMADol (ULTRAM) tablet 50 mg, 50 mg, Oral, Q8H PRN, Harmeet Payne MD, 50 mg at 04/01/21 0613    /67 (BP Location: Left arm, Patient Position: Lying)   Pulse 52   Temp 97 °F (36.1 °C) (Oral)   Resp 20   Ht 157.5 cm (62.01\")   Wt 73.6 kg (162 lb 4.8 oz)   LMP  (LMP Unknown)   SpO2 94%   BMI 29.68 kg/m²     Physical Exam  Appearance: Elderly  lady with central obesity.  She is currently doing a mini neb.  Neck: No JVD or HJR  Lungs: Diminished breath sounds bilaterally patient has a few faint rales in the bases.  Heart: Distant tones.  Bradycardic rate.  Abdomen: Protuberant, benign.  Normal active bowel sounds.  Extremities: Without edema, clubbing or cyanosis  Neurologic: Nonfocal    Lab Results (last 24 hours)     Procedure Component Value Units Date/Time    Basic Metabolic Panel [[id number]]  (Abnormal) Collected: 04/01/21 0637    Specimen: Blood Updated: 04/01/21 0728     Glucose 97 mg/dL      BUN 30 mg/dL      Creatinine 1.01 mg/dL      Sodium 140 mmol/L      Potassium 3.8 mmol/L      Chloride 95 mmol/L      CO2 34.9 mmol/L      Calcium 8.9 mg/dL      eGFR Non African Amer 53 mL/min/1.73      BUN/Creatinine Ratio 29.7     Anion Gap 10.1 mmol/L     Narrative:      GFR Normal >60  Chronic Kidney Disease <60  Kidney " Failure <15            Imp:  1.  End-stage COPD.  2.  Possible heart failure with preserved ejection fraction.  3.  Atrial fibrillation, significant bradycardia  4.  Essential hypertension.  5.  Hyperlipidemia.  6.  Generalized anxiety.  7.  Anemia in large part related to chronic inflammation        Plan:  I am going to stop this patient's Rythmol.  This may be contributing to some  Of the bradycardia.  Carvedilol has been decreased.  I am going to start the patient on a daily dose of 80 mg furosemide.  I will increase her daily potassium  We will continue other treatment.  I anticipate sending the patient back home tomorrow.    Harmeet Payne MD  4/1/2021  07:49 EDT

## 2021-04-01 NOTE — PLAN OF CARE
Goal Outcome Evaluation:  Plan of Care Reviewed With: patient  Progress: improving  Outcome Summary: VSS except HR is still running very low-mostly 40s and even dips into the 30s at times. Pt is asymptomatic and states she runs this low. Dr. Payne had changed up some medications yday to prevent this but will pass on that pt still remained very low overnight. Will hold AM dose of rhythmol. Pt on 2-3 L O2 while awake and home trilogy while sleeping. Pt given IV lasix by dayshift and had good UO. PT was placed on purewick this shift by request and was unable to use it. Got pt up to [facility] and pt urinated well. Pt recieved one dose of tramadol for headache. No other issues. Will CTM.

## 2021-04-01 NOTE — PROGRESS NOTES
PROGRESS NOTE  Patient Name: Ale Narayan  Age/Sex: 80 y.o. female  : 1940  MRN: [id number]    Date of Admission: 3/30/2021  Date of Encounter Visit: 21   LOS: 2 days   Patient Care Team:  Harmeet Payne MD as PCP - Yoan Ty MD as Consulting Physician (Hematology and Oncology)  Devon Lamb MD as Referring Physician (Thoracic Surgery)    Chief Complaint: Pleural effusion, COPD, pulmonary hypertension, chronic hypoxemia    Hospital course: Patient is currently on Lasix p.o., she is diuresing well, she is feeling better, she is down to 3 L/min nasal cannula oxygen, she is on hypertonic saline and she denies any significant thick secretion or productive cough.  No GI or  complaints         REVIEW OF SYSTEMS:   CONSTITUTIONAL: no fever or chills  CARDIOVASCULAR: No chest pain, chest pressure or chest discomfort. No palpitations or edema.   RESPIRATORY: Improving shortness of breath, almost back to baseline.   GASTROINTESTINAL: No anorexia, nausea, vomiting or diarrhea. No abdominal pain or blood.   HEMATOLOGIC: No bleeding or bruising.     Ventilator/Non-Invasive Ventilation Settings (From admission, onward) Comment     Start     Ordered    21  NIPPV (CPAP or BIPAP)  At Bedtime & As Needed-RT,   Status:  Canceled     Question Answer Comment   Indication: Acute Respiratory Failure    Type: AVAPS-AE    NIPPV Mask Interface: Per Patient Preference    Rate 10    VT (mL) 450    EPAP Min (cm H2O) 6    EPAP Max (cm H2O) 12    Max Pressure (cm H2O) 24    Pressure Support Min (cm H2O) 4    Pressure Support Max (cm H2O) 12    Titrate for SPO2 90%        21  NIPPV (CPAP or BIPAP)  At Bedtime As Needed-RT,   Status:  Canceled     Question Answer Comment   Type: AutoBIPAP    NIPPV Mask Interface: Per Patient Preference        21                  Vital Signs  Temp:  [97 °F (36.1 °C)-97.7 °F (36.5 °C)] 97.5 °F (36.4 °C)  Heart Rate:  [50-92]  "69  Resp:  [18-20] 20  BP: (132-184)/(50-67) 156/50  SpO2:  [90 %-99 %] 93 %  on  Flow (L/min):  [2-3] 3 Device (Oxygen Therapy): nasal cannula    Intake/Output Summary (Last 24 hours) at 4/1/2021 1758  Last data filed at 4/1/2021 1700  Gross per 24 hour   Intake 1020 ml   Output 850 ml   Net 170 ml     Flowsheet Rows      First Filed Value   Admission Height  157.5 cm (62\") Documented at 03/30/2021 1606   Admission Weight  72.1 kg (159 lb) Documented at 03/30/2021 1606        Body mass index is 29.68 kg/m².      03/30/21  1606 03/31/21  0605 04/01/21  0636   Weight: 72.1 kg (159 lb) 73 kg (160 lb 14.4 oz) 73.6 kg (162 lb 4.8 oz)       Physical Exam:  GEN:  No acute distress, alert, cooperative, well developed, on nasal cannula oxygen  EYES:   Sclerae clear. No icterus. PERRL. Normal EOM  ENT:   External ears/nose normal, no oral lesions, no thrush, mucous membranes moist  NECK:  Supple, midline trachea, no JVD  LUNGS: Normal chest on inspection, breath sounds are diminished but she has no wheezes or crackles lar, even and unlabored.   CV:  Regular rhythm and rate. Normal S1/S2. No murmurs, gallops, or rubs noted.  ABD:  Soft, nontender and nondistended. Normal bowel sounds. No guarding  EXT:  Moves all extremities well. No cyanosis. No redness.  Improved upper extremity edema, no edema over the lower extremities a.   Skin: Dry, intact, no bleeding    Results Review:    Results From Last 14 Days   Lab Units 03/30/21  1610   D DIMER QUANT MCGFEU/mL 0.83*     Results from last 7 days   Lab Units 04/01/21  0637 03/31/21  0523 03/30/21  1610   SODIUM mmol/L 140 142 144   POTASSIUM mmol/L 3.8 3.7 3.8   CHLORIDE mmol/L 95* 100 102   CO2 mmol/L 34.9* 28.9 35.4*   BUN mg/dL 30* 23 16   CREATININE mg/dL 1.01* 0.82 1.03*   CALCIUM mg/dL 8.9 8.7 9.1   AST (SGOT) U/L  --   --  10   ALT (SGPT) U/L  --   --  9   ANION GAP mmol/L 10.1 13.1 6.6   ALBUMIN g/dL  --   --  3.80     Results from last 7 days   Lab Units 03/30/21  1610 "   TROPONIN T ng/mL <0.010   D DIMER QUANT MCGFEU/mL 0.83*     Results from last 7 days   Lab Units 03/31/21  0523   TSH uIU/mL 0.979     Results from last 7 days   Lab Units 03/30/21  1610   PROBNP pg/mL 1,575.0     Results from last 7 days   Lab Units 03/31/21  0523 03/30/21  1610   WBC 10*3/mm3 7.22 8.83   HEMOGLOBIN g/dL 10.2* 11.0*   HEMATOCRIT % 31.1* 33.9*   PLATELETS 10*3/mm3 255 282   MCV fL 90.9 91.1   NEUTROPHIL % % 85.3* 73.9   LYMPHOCYTE % % 9.4* 14.3*   MONOCYTES % % 4.6* 9.6   EOSINOPHIL % % 0.0* 1.1   BASOPHIL % % 0.3 0.9   IMM GRAN % % 0.4 0.2     Results from last 7 days   Lab Units 03/30/21  1610   INR  2.40*     Results from last 7 days   Lab Units 03/31/21  0523   MAGNESIUM mg/dL 2.0           Invalid input(s): LDLCALC  Results from last 7 days   Lab Units 03/30/21  1742   PH, ARTERIAL pH units 7.373   PCO2, ARTERIAL mm Hg 57.9*   PO2 ART mm Hg 63.0*   HCO3 ART mmol/L 33.7*         No results found for: POCGLU              Results from last 7 days   Lab Units 03/30/21  1624   COVID19  Not Detected   ADENOVIRUS DETECTION BY PCR  Not Detected   CORONAVIRUS 229E  Not Detected   CORONAVIRUS HKU1  Not Detected   CORONAVIRUS NL63  Not Detected   CORONAVIRUS OC43  Not Detected   HUMAN METAPNEUMOVIRUS  Not Detected   HUMAN RHINOVIRUS/ENTEROVIRUS  Not Detected   INFLUENZA B PCR  Not Detected   PARAINFLUENZA 1  Not Detected   PARAINFLUENZA VIRUS 2  Not Detected   PARAINFLUENZA VIRUS 3  Not Detected   PARAINFLUENZA VIRUS 4  Not Detected   BORDETELLA PERTUSSIS PCR  Not Detected   BORDETELLA PARAPERTUSSIS PCR  Not Detected   CHLAMYDOPHILA PNEUMONIAE PCR  Not Detected   MYCOPLAMA PNEUMO PCR  Not Detected   RSV, PCR  Not Detected               Imaging:   Imaging Results (All)     Procedure Component Value Units Date/Time    XR Chest 1 View [[id number]] Collected: 03/30/21 1728     Updated: 03/30/21 1741    Narrative:      ONE VIEW PORTABLE CHEST     HISTORY: Shortness of breath.     FINDINGS: There is  cardiomegaly with mild vascular congestion combined  with superimposed chronic changes similar to the study of 01/08/2021. I  suspect there are very small associated pleural effusions unchanged and  likely related to mild chronic CHF.     This report was finalized on 3/30/2021 5:38 PM by Dr. Quang Mejía M.D.             I reviewed the patient's new clinical results.  I personally viewed and interpreted the patient's imaging results:        Medication Review:   amLODIPine, 5 mg, Oral, Q24H  arformoterol, 15 mcg, Nebulization, BID - RT  atorvastatin, 10 mg, Oral, Daily  budesonide, 0.5 mg, Nebulization, BID - RT  carvedilol, 6.25 mg, Oral, BID With Meals  cholecalciferol, 1,000 Units, Oral, Daily  furosemide, 80 mg, Oral, Daily  levothyroxine, 50 mcg, Oral, Daily  lisinopril, 20 mg, Oral, Q24H  melatonin, 2 mg, Oral, Nightly  pantoprazole, 40 mg, Oral, QAM  potassium chloride, 40 mEq, Oral, Daily  rivaroxaban, 20 mg, Oral, Daily With Dinner  sertraline, 50 mg, Oral, Nightly  sodium chloride, 10 mL, Intravenous, Q12H  sodium chloride, 4 mL, Nebulization, BID - RT             ASSESSMENT:   Chronic hypoxemic and hypercapnic respiratory failure on nasal cannula oxygen and on nocturnal trilogy  Bilateral pleural effusion  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  Discussed with patient in details, notes reviewed, will continue to follow while in the hospital    Disposition: Per primary team    Nelson Fernandes,  MD  04/01/21  17:58 EDT             Dictated utilizing Dragon dictation

## 2021-04-02 NOTE — PROGRESS NOTES
History:  80-year-old lady is doing better.  We have made significant adjustments in her medication.  I discontinued the Rythmol.  Cut back on carvedilol dose.  With the added Brovana and increased her furosemide.  Patient did well yesterday.  She still has episodes of bradycardia with her heart rate dropping below 40.  She is having no wheezing.  She is having no cough.  She is having no chest pain, PND orthopnea    Allergies  Adhesive tape, Augmentin [amoxicillin-pot clavulanate], Cephalexin, Metoclopramide, Pentazocine, Simvastatin, and Sucralfate      Current Facility-Administered Medications:   •  acetaminophen (TYLENOL) tablet 650 mg, 650 mg, Oral, Q6H PRN, Harmeet Payen MD  •  amLODIPine (NORVASC) tablet 5 mg, 5 mg, Oral, Q24H, Harmeet Payne MD, 5 mg at 04/01/21 0926  •  arformoterol (BROVANA) nebulizer solution 15 mcg, 15 mcg, Nebulization, BID - RT, Moon Hartley MD, 15 mcg at 04/01/21 2211  •  atorvastatin (LIPITOR) tablet 10 mg, 10 mg, Oral, Daily, Harmeet Payne MD, 10 mg at 04/01/21 0926  •  budesonide (PULMICORT) nebulizer solution 0.5 mg, 0.5 mg, Nebulization, BID - RT, Harmeet Payne MD, 0.5 mg at 04/01/21 2211  •  carvedilol (COREG) tablet 6.25 mg, 6.25 mg, Oral, BID With Meals, Harmeet Payne MD, 6.25 mg at 04/01/21 1825  •  cholecalciferol (VITAMIN D3) tablet 1,000 Units, 1,000 Units, Oral, Daily, Harmeet Payne MD, 1,000 Units at 04/01/21 0926  •  furosemide (LASIX) tablet 80 mg, 80 mg, Oral, Daily, Harmeet Payne MD, 80 mg at 04/01/21 0925  •  ipratropium-albuterol (DUO-NEB) nebulizer solution 3 mL, 3 mL, Nebulization, Q6H PRN, Moon Hartley MD, 3 mL at 04/01/21 1520  •  levothyroxine (SYNTHROID, LEVOTHROID) tablet 50 mcg, 50 mcg, Oral, Daily, Harmeet Payne MD, 50 mcg at 04/01/21 0926  •  lisinopril (PRINIVIL,ZESTRIL) tablet 20 mg, 20 mg, Oral, Q24H, Harmeet Payne MD, 20 mg at 04/01/21 0926  •  melatonin tablet 2 mg, 2 mg, Oral, Nightly, Harmeet Payne MD, 2 mg at 04/01/21 2025  •   "nitroglycerin (NITROSTAT) SL tablet 0.4 mg, 0.4 mg, Sublingual, Q5 Min PRN, Harmeet Payne MD  •  pantoprazole (PROTONIX) EC tablet 40 mg, 40 mg, Oral, QAM, Harmeet Payne MD, 40 mg at 04/02/21 0623  •  potassium chloride (K-DUR,KLOR-CON) ER tablet 40 mEq, 40 mEq, Oral, Daily, Harmeet Payne MD, 40 mEq at 04/01/21 0925  •  rivaroxaban (XARELTO) tablet 20 mg, 20 mg, Oral, Daily With Dinner, Harmeet Payne MD, 20 mg at 04/01/21 1825  •  sertraline (ZOLOFT) tablet 50 mg, 50 mg, Oral, Nightly, Harmeet Payne MD, 50 mg at 04/01/21 2025  •  sodium chloride 0.9 % flush 10 mL, 10 mL, Intravenous, Q12H, Harmeet Payne MD, 10 mL at 04/01/21 2025  •  sodium chloride 0.9 % flush 10 mL, 10 mL, Intravenous, PRN, Harmeet Payne MD  •  sodium chloride 7 % nebulizer solution nebulizer solution 4 mL, 4 mL, Nebulization, BID - RT, Moon Hartley MD, 4 mL at 04/01/21 2211  •  traMADol (ULTRAM) tablet 50 mg, 50 mg, Oral, Q8H PRN, Harmeet Payne MD, 50 mg at 04/01/21 1922    /68 (BP Location: Right arm, Patient Position: Lying)   Pulse 50   Temp 97.8 °F (36.6 °C) (Oral)   Resp 16   Ht 157.5 cm (62.01\")   Wt 76.4 kg (168 lb 6.4 oz)   LMP  (LMP Unknown)   SpO2 90%   BMI 30.79 kg/m²     Physical Exam  Appearance: Elderly  lady with central obesity.  She is currently doing a mini neb.  Neck: No JVD or HJR  Lungs: Diminished breath sounds bilaterally patient has a few faint rales in the bases.  Heart: Distant tones.  Bradycardic rate.  Abdomen: Protuberant, benign.  Normal active bowel sounds.  Extremities: Without edema, clubbing or cyanosis  Neurologic: Nonfocal    Lab Results (last 24 hours)     Procedure Component Value Units Date/Time    Basic Metabolic Panel [[id number]]  (Abnormal) Collected: 04/02/21 0628    Specimen: Blood Updated: 04/02/21 0722     Glucose 97 mg/dL      BUN 29 mg/dL      Creatinine 0.90 mg/dL      Sodium 143 mmol/L      Potassium 4.2 mmol/L      Chloride 99 mmol/L      CO2 35.6 mmol/L      " Calcium 9.0 mg/dL      eGFR Non African Amer 61 mL/min/1.73      BUN/Creatinine Ratio 32.2     Anion Gap 8.4 mmol/L     Narrative:      GFR Normal >60  Chronic Kidney Disease <60  Kidney Failure <15          EKG: Atrial flutter, left bundle branch block heart rate 36    Imp:  1.  End-stage COPD with hypoxemic, hypercapnic respiratory failure.  2.  Possible heart failure with preserved ejection fraction.  Resolved.  3.  Atrial fibrillation, significant bradycardia  4.  Essential hypertension.  5.  Hyperlipidemia.  6.  Generalized anxiety.  7.  Anemia in large part related to chronic inflammation        Plan:  I am going to keep the patient in the hospital for 1 more day.  I am going to decrease her carvedilol to 3.25 daily.  Hopefully home tomorrow with therapist needs home health.    Harmeet Payne MD  4/2/2021  07:40 EDT

## 2021-04-02 NOTE — PROGRESS NOTES
"  PROGRESS NOTE  Patient Name: Ale Narayan  Age/Sex: 80 y.o. female  : 1940  MRN: [id number]    Date of Admission: 3/30/2021  Date of Encounter Visit: 21   LOS: 3 days   Patient Care Team:  Harmeet Payne MD as PCP - Yoan Ty MD as Consulting Physician (Hematology and Oncology)  Devon Lamb MD as Referring Physician (Thoracic Surgery)    Chief Complaint: Pleural effusion, COPD, pulmonary hypertension, chronic hypoxemia    Hospital course: Patient is currently on Lasix p.o., and on nebulized steroid with long-acting nebulized bronchodilators as well.  She continues to slowly improve, she is on home oxygen flow at 2 L/min, still having some cough and congestion and not back completely to her baseline yet.  She is reporting good urine output with negative fluid balance         REVIEW OF SYSTEMS:   CONSTITUTIONAL: no fever or chills  CARDIOVASCULAR: No chest pain, chest pressure or chest discomfort. No palpitations or edema.   RESPIRATORY: Improving shortness of breath, almost back to baseline.   GASTROINTESTINAL: No anorexia, nausea, vomiting or diarrhea. No abdominal pain or blood.   HEMATOLOGIC: No bleeding or bruising.     Ventilator/Non-Invasive Ventilation Settings (From admission, onward) Comment       Vital Signs  Temp:  [97.5 °F (36.4 °C)-98 °F (36.7 °C)] 98 °F (36.7 °C)  Heart Rate:  [46-69] 56  Resp:  [16-22] 20  BP: (129-156)/(46-68) 142/61  SpO2:  [90 %-99 %] 95 %  on  Flow (L/min):  [2-3] 2 Device (Oxygen Therapy): nasal cannula    Intake/Output Summary (Last 24 hours) at 2021 1303  Last data filed at 2021 0937  Gross per 24 hour   Intake 800 ml   Output 1400 ml   Net -600 ml     Flowsheet Rows      First Filed Value   Admission Height  157.5 cm (62\") Documented at 2021 1606   Admission Weight  72.1 kg (159 lb) Documented at 2021 1606        Body mass index is 30.79 kg/m².      21  0605 21  0636 21  0500   Weight: 73 kg (160 " lb 14.4 oz) 73.6 kg (162 lb 4.8 oz) 76.4 kg (168 lb 6.4 oz)       Physical Exam:  GEN:  No acute distress, alert, cooperative, well developed, on nasal cannula oxygen  EYES:   Sclerae clear. No icterus. PERRL. Normal EOM  ENT:   External ears/nose normal, no oral lesions, no thrush, mucous membranes moist  NECK:  Supple, midline trachea, no JVD  LUNGS: Normal chest on inspection, diminished breath sounds with no crackles or wheezing on quiet breathing, minimal rhonchi on coughing, even and unlabored.   CV:  Regular rhythm and rate. Normal S1/S2. No murmurs, gallops, or rubs noted.  ABD:  Soft, nontender and nondistended. Normal bowel sounds. No guarding  EXT:  Moves all extremities well. No cyanosis. No redness.  Minimal upper extremity edema, improving .   Skin: Dry, intact, no bleeding    Results Review:    Results From Last 14 Days   Lab Units 03/30/21  1610   D DIMER QUANT MCGFEU/mL 0.83*     Results from last 7 days   Lab Units 04/02/21  0628 04/01/21  0637 03/31/21  0523 03/30/21  1610   SODIUM mmol/L 143 140 142 144   POTASSIUM mmol/L 4.2 3.8 3.7 3.8   CHLORIDE mmol/L 99 95* 100 102   CO2 mmol/L 35.6* 34.9* 28.9 35.4*   BUN mg/dL 29* 30* 23 16   CREATININE mg/dL 0.90 1.01* 0.82 1.03*   CALCIUM mg/dL 9.0 8.9 8.7 9.1   AST (SGOT) U/L  --   --   --  10   ALT (SGPT) U/L  --   --   --  9   ANION GAP mmol/L 8.4 10.1 13.1 6.6   ALBUMIN g/dL  --   --   --  3.80     Results from last 7 days   Lab Units 03/30/21  1610   TROPONIN T ng/mL <0.010   D DIMER QUANT MCGFEU/mL 0.83*     Results from last 7 days   Lab Units 03/31/21  0523   TSH uIU/mL 0.979     Results from last 7 days   Lab Units 03/30/21  1610   PROBNP pg/mL 1,575.0     Results from last 7 days   Lab Units 03/31/21  0523 03/30/21  1610   WBC 10*3/mm3 7.22 8.83   HEMOGLOBIN g/dL 10.2* 11.0*   HEMATOCRIT % 31.1* 33.9*   PLATELETS 10*3/mm3 255 282   MCV fL 90.9 91.1   NEUTROPHIL % % 85.3* 73.9   LYMPHOCYTE % % 9.4* 14.3*   MONOCYTES % % 4.6* 9.6   EOSINOPHIL % %  0.0* 1.1   BASOPHIL % % 0.3 0.9   IMM GRAN % % 0.4 0.2     Results from last 7 days   Lab Units 03/30/21  1610   INR  2.40*     Results from last 7 days   Lab Units 03/31/21  0523   MAGNESIUM mg/dL 2.0           Invalid input(s): LDLCALC  Results from last 7 days   Lab Units 03/30/21  1742   PH, ARTERIAL pH units 7.373   PCO2, ARTERIAL mm Hg 57.9*   PO2 ART mm Hg 63.0*   HCO3 ART mmol/L 33.7*         No results found for: POCGLU              Results from last 7 days   Lab Units 03/30/21  1624   COVID19  Not Detected   ADENOVIRUS DETECTION BY PCR  Not Detected   CORONAVIRUS 229E  Not Detected   CORONAVIRUS HKU1  Not Detected   CORONAVIRUS NL63  Not Detected   CORONAVIRUS OC43  Not Detected   HUMAN METAPNEUMOVIRUS  Not Detected   HUMAN RHINOVIRUS/ENTEROVIRUS  Not Detected   INFLUENZA B PCR  Not Detected   PARAINFLUENZA 1  Not Detected   PARAINFLUENZA VIRUS 2  Not Detected   PARAINFLUENZA VIRUS 3  Not Detected   PARAINFLUENZA VIRUS 4  Not Detected   BORDETELLA PERTUSSIS PCR  Not Detected   BORDETELLA PARAPERTUSSIS PCR  Not Detected   CHLAMYDOPHILA PNEUMONIAE PCR  Not Detected   MYCOPLAMA PNEUMO PCR  Not Detected   RSV, PCR  Not Detected               Imaging:   Imaging Results (All)     Procedure Component Value Units Date/Time    XR Chest 1 View [[id number]] Collected: 03/30/21 1728     Updated: 03/30/21 1741    Narrative:      ONE VIEW PORTABLE CHEST     HISTORY: Shortness of breath.     FINDINGS: There is cardiomegaly with mild vascular congestion combined  with superimposed chronic changes similar to the study of 01/08/2021. I  suspect there are very small associated pleural effusions unchanged and  likely related to mild chronic CHF.     This report was finalized on 3/30/2021 5:38 PM by Dr. Quang Mejía M.D.             I reviewed the patient's new clinical results.  I personally viewed and interpreted the patient's imaging results:        Medication Review:   amLODIPine, 5 mg, Oral, Q24H  arformoterol, 15 mcg,  Nebulization, BID - RT  atorvastatin, 10 mg, Oral, Daily  budesonide, 0.5 mg, Nebulization, BID - RT  carvedilol, 3.125 mg, Oral, BID With Meals  cholecalciferol, 1,000 Units, Oral, Daily  furosemide, 80 mg, Oral, Daily  levothyroxine, 50 mcg, Oral, Daily  lisinopril, 20 mg, Oral, Q24H  melatonin, 2 mg, Oral, Nightly  pantoprazole, 40 mg, Oral, QAM  potassium chloride, 40 mEq, Oral, Daily  rivaroxaban, 20 mg, Oral, Daily With Dinner  sertraline, 50 mg, Oral, Nightly  sodium chloride, 10 mL, Intravenous, Q12H  sodium chloride, 4 mL, Nebulization, BID - RT             ASSESSMENT:   1. Chronic hypoxemic and hypercapnic respiratory failure on nasal cannula oxygen and on nocturnal trilogy  2. Bilateral pleural effusion  3. 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 Severe pulmonary hypertension  5. Cor pulmonale  6. Shortness of breath secondary to the above  7. History of adenocarcinoma of the lung    PLAN:  Feeling better down to 2 L/min  She seems ready for discharge, plan by primary team noted for discharge tomorrow if continues to improve    Disposition: Per primary team    Nelson Fernandes MD  04/02/21  13:03 EDT             Dictated utilizing Dragon dictation

## 2021-04-02 NOTE — SIGNIFICANT NOTE
04/02/21 1444   OTHER   Discipline physical therapy assistant   Rehab Time/Intention   Session Not Performed patient/family declined treatment  (pt declined PT treatment today. Pt just got back into bed after sitting up in the chair for several hrs. Will f/u with pt tomorrow.)   Recommendation   PT - Next Appointment 04/03/21

## 2021-04-02 NOTE — PROGRESS NOTES
Continued Stay Note  [facility]     Patient Name: Ale Narayan  MRN: [id number]  Today's Date: 4/2/2021    Admit Date: 3/30/2021    Discharge Plan     Row Name 04/02/21 1332       Plan    Plan  home with [employer] - will need [employer] w/c van @ NH    Patient/Family in Agreement with Plan  yes    Plan Comments  Spoke with patient at bedside.  She confirms that plan is home with [employer]  to follow.  Will need [employer] w/c van @ NH.  States that she has credit card with her and is able to pay. She will need to use portable o2 from [employer] as she does not have a way to get portable o2 to hospital.  She denies any additional needs.  [facility] will follow. Aidee Camarillo RN        Discharge Codes    No documentation.       Expected Discharge Date and Time     Expected Discharge Date Expected Discharge Time    Apr 2, 2021             Aidee Camarillo RN

## 2021-04-03 NOTE — DISCHARGE SUMMARY
Date of admission: March 30, 2021  Date of discharge: April 3, 2021    Discharge diagnosis:  1.  End-stage COPD with hypoxemic, hypercapnic respiratory failure.  2.  Heart failure with preserved ejection fraction.  Resolved.  3.  Atrial fibrillation, significant bradycardia  4.  Essential hypertension.  5.  Hyperlipidemia.  6.  Generalized anxiety.  7.  Anemia in large part related to chronic inflammation  8..  Past DVTs/PEs  9.  Past adenocarcinoma of the lung x2  10.  Takotsubo's cardiomyopathy x2    Procedures performed during this admission: None    Consults during this admission:  Pulmonary Tre Phillips md    History of present illness:  This is a very nice 80-year-old lady.  She has a history of severe COPD with chronic hypoxemia and hypercapnia.  She uses trilogy machine at home.  She has multiple other medical problems including past lung cancer x2, hypertension, hyperlipidemia, past DVTs, paroxysmal atrial fib and past Takotsubo's cardiomyopathy x2.  The patient was becoming progressively more short of breath at home.  She was not able to make simple transfers without being short of breath.  She stated she was having more swelling in her abdomen.  She had no chest pain.  No palpitations.    Physical exam at time of discharge:  Vital signs at discharge: Blood pressure 135/57, pulse 54, respirate 18, O2 sat 98% on 3 L, weight 160 pounds  Appearance: Elderly  lady with central obesity.  She is currently doing a mini neb.  Neck: No JVD or HJR  Lungs: Diminished breath sounds bilaterally patient has a few faint rales in the bases.  Heart: Distant tones.  Bradycardic rate.  Abdomen: Protuberant, benign.  Normal active bowel sounds.  Extremities: Without edema, clubbing or cyanosis  Neurologic: Nonfocal    Significant laboratory studies:  ABGs pH 7.37, CO2 57.9, O2 63, HCO3 33.7 this was on 2 L nasal cannula.  Troponin less than 0.010  Pro BNP 1575  Comprehensive metabolic panel unremarkable.  D-dimer  0.83  White blood cell count 8.83, hemoglobin 11.0, hematocrit 33.9, MCV 91, platelet count 282  TSH 0.979  Magnesium 2.0  Chest x-ray:There is cardiomegaly with mild vascular congestion combined  with superimposed chronic changes similar to the study of 01/08/2021. I  suspect there are very small associated pleural effusions unchanged and  likely related to mild chronic CHF.  EKG: Atrial fib controlled reticular rate, left bundle branch block.  No change from previous EKGs    Hospital course:  Patient was admitted to monitored bed.  She was seen by myself and pulmonology.  We will maintain her on the nocturnal positive pressure breathing apparatus.  Her medications were adjusted.  Patient was admitted bradycardic.  She was in chronic atrial fibrillation.  I discontinued Rythmol.  We decreased the carvedilol.  Pulmonary started the patient on Brovanna inhaler.  Maintain her budesonide and DuoNeb inhalers.  She is also felt to have a little diastolic heart failure.  The patient was given an increased dose of furosemide.  With this treatment the patient's breathing returned to baseline.    Patient been discharged home on April 3.  She will eat a healthy heart diet.  She will be seen by [facility].  I will make a home visit with the patient in the next 7 days    Discharge medications:  Tylenol 500 mg every 6 hours as needed  Tramadol 50 mg every 6 hours as needed.  Patient rarely uses this.  Pulmicort 2 mL's twice daily  DuoNeb  every 6 hours as needed  Xarelto 20 mill grams a day  Zoloft 50 mg a day  Lisinopril 20 mg p.o. daily  Coreg  3.25 mg twice daily  Lasix 80 mill grams a day  Potassium chloride 20 mg a day  Synthroid 50 mcg a day  Prilosec 20 mg a day  Brovana 15 mcg nebulizer twice daily  Ferrous sulfate 325 mg every other day  Amlodipine 5 mg a day  Oxygen 3 L by nasal cannula continuous.  Potassium chloride 20 mEq twice daily  Melatonin 2 mg at night  Trilogy machine nightly    It should be  noted this patient is a no code.

## 2021-04-03 NOTE — PROGRESS NOTES
Continued Stay Note  [facility]     Patient Name: Ale Narayan  MRN: [id number]  Today's Date: 4/3/2021    Admit Date: 3/30/2021    Discharge Plan     Row Name 04/03/21 0900       Plan    Plan  Home w/Sharad CORONA.    Plan Comments  Received call from patient's nurse, Calvin Lewis van to transport home however not until 1400 due to a friend bringing a housekey so patient can get into home. Called patient, introduced self & role, explained CCP can arrange but will need credit card information or patient can schedule. Patient wants to schedule but cannot take down [employer] number due to no paper or pen at bedside,informed patient will call patient's nurse to give [employer] number to hand off to patient on next safety round so the patient can schedule transportation. CCP called patient's nurse, busy at moment but agreeable to Secure Chat [employer] number to hand off to patient. CCP to follow. Ronna Cheney RN.        Discharge Codes    No documentation.       Expected Discharge Date and Time     Expected Discharge Date Expected Discharge Time    Apr 3, 2021             Carolee Cheney RN

## 2021-04-04 NOTE — OUTREACH NOTE
Prep Survey      Responses   [facility] facility patient discharged from?  [place]   Is LACE score < 7 ?  No   Emergency Room discharge w/ pulse ox?  No   Eligibility  Readm Mgmt   Discharge diagnosis  End-stage COPD with hypoxemic, hypercapnic respiratory failure   Does the patient have one of the following disease processes/diagnoses(primary or secondary)?  COPD/Pneumonia   Does the patient have Home health ordered?  Yes   What is the Home health agency?   [facility]   Is there a DME ordered?  No   Prep survey completed?  Yes          Chelsie Chery RN

## 2021-04-05 NOTE — PROGRESS NOTES
Case Management Discharge Note      Final Note: DC'd home with [facility] via [place] w/kehinde fitzpatrick. Aidee Camarillo, RN    Provided Post Acute Provider List?: N/A  N/A Provider List Comment: Is current with [facility] and will continue    Selected Continued Care - Discharged on 4/3/2021 Admission date: 3/30/2021 - Discharge disposition: Home or Self Care    Destination    No services have been selected for the patient.              Durable Medical Equipment Coordination complete    Service Provider Selected Services Address Phone Fax Patient Preferred    [facility]  Durable Medical Equipment [address] [address] [phone] --          Dialysis/Infusion    No services have been selected for the patient.              Home Medical Care Coordination complete    Service Provider Selected Services Address Phone Fax Patient Preferred    [facility] [place]  Home Health Services [address] [id number] [phone] --          Therapy    No services have been selected for the patient.              Community Resources    No services have been selected for the patient.                  Transportation Services  W/C Pete: [facility]    Final Discharge Disposition Code: 06 - home with home health care

## 2021-04-07 NOTE — OUTREACH NOTE
COPD/PN Week 1 Survey      Responses   [facility] patient discharged from?  [place]   Does the patient have one of the following disease processes/diagnoses(primary or secondary)?  COPD/Pneumonia   Was the primary reason for admission:  COPD exacerbation   Week 1 attempt successful?  Yes   Call start time  0955   Call end time  1004   Discharge diagnosis  End-stage COPD with hypoxemic, hypercapnic respiratory failure   Is patient permission given to speak with other caregiver?  No   List who call center can speak with  Patient only   Meds reviewed with patient/caregiver?  Yes   Is the patient having any side effects they believe may be caused by any medication additions or changes?  No   Does the patient have all medications ordered at discharge?  Yes   Is the patient taking all medications as directed (includes completed medication regime)?  Yes   Does the patient have a primary care provider?   Yes   Has the patient kept scheduled appointments due by today?  N/A   Comments  Dr Payne will come to home   What is the Home health agency?   [facility]   Has home health visited the patient within 72 hours of discharge?  Yes   What DME was ordered?  Wears O2 all the time    Has all DME been delivered?  Yes   Pulse Ox monitoring  Intermittent   Pulse Ox device source  Patient   O2 Sat: education provided  Sat levels, Monitoring frequency, When to seek care   Psychosocial issues?  No   Did the patient receive a copy of their discharge instructions?  Yes   Nursing interventions  Reviewed instructions with patient   What is the patient's perception of their health status since discharge?  Improving   Nursing Interventions  Nurse provided patient education   Are the patient's immunizations up to date?   -- [Waiting for COVID vaccine to be administered in the home]   Is the patient/caregiver able to teach back the hierarchy of who to call/visit for symptoms/problems? PCP, Specialist, Home health nurse, Urgent Care, ED, 911   Yes   Is the patient able to teach back COPD zones?  Yes   Nursing interventions  Education provided on various zones   Patient reports what zone on this call?  Green Zone   Green Zone  Reports doing well   Green Zone interventions:  Take daily medications, Use oxygen as prescribed   Week 1 call completed?  Yes          Charis Shine RN

## 2021-04-16 NOTE — OUTREACH NOTE
COPD/PN Week 2 Survey      Responses   [facility] patient discharged from?  [place]   Does the patient have one of the following disease processes/diagnoses(primary or secondary)?  COPD/Pneumonia   Was the primary reason for admission:  COPD exacerbation   Week 2 attempt successful?  Yes   Call start time  1553   Call end time  1559   Discharge diagnosis  End-stage COPD with hypoxemic, hypercapnic respiratory failure   Meds reviewed with patient/caregiver?  Yes   Is the patient having any side effects they believe may be caused by any medication additions or changes?  No   Does the patient have all medications ordered at discharge?  Yes   Is the patient taking all medications as directed (includes completed medication regime)?  Yes   Does the patient have a primary care provider?   Yes   Does the patient have an appointment with their PCP or specialist within 7 days of discharge?  No   What is preventing the patient from scheduling follow up appointments within 7 days of discharge?  Haven't had time   Nursing Interventions  Educated patient on importance of making appointment, Advised patient to make appointment   Has the patient kept scheduled appointments due by today?  N/A   Comments  Dr Payne will come to home   What is the Home health agency?   [facility]   What DME was ordered?  Wears O2 all the time    Pulse Ox monitoring  Intermittent   Pulse Ox device source  Patient   O2 Sat comments  95% on 3L O2   O2 Sat: education provided  Sat levels, Monitoring frequency, When to seek care   Psychosocial comments  Told pt. if unable to maintain O2 level 90% or greater to return to ED.   Did the patient receive a copy of their discharge instructions?  Yes   Nursing interventions  Reviewed instructions with patient   What is the patient's perception of their health status since discharge?  Improving   Nursing Interventions  Nurse provided patient education   Are the patient's immunizations up to date?   Yes   If the  patient is a current smoker, are they able to teach back resources for cessation?  Not a smoker   Is the patient/caregiver able to teach back the hierarchy of who to call/visit for symptoms/problems? PCP, Specialist, Home health nurse, Urgent Care, ED, 911  Yes   Is the patient able to teach back COPD zones?  Yes   Nursing interventions  Education provided on various zones   Patient reports what zone on this call?  Green Zone   Green Zone  Reports doing well, Usual activity and exercise level, Usual amount of phlegm/mucus without difficulty coughing up, Sleeping well, Appetite is good   Green Zone interventions:  Take daily medications, Use oxygen as prescribed, Continue regular exercise/diet plan   Week 2 call completed?  Yes          Eryn Medrano, RN

## 2021-04-22 NOTE — OUTREACH NOTE
COPD/PN Week 3 Survey      Responses   [facility] patient discharged from?  [place]   Does the patient have one of the following disease processes/diagnoses(primary or secondary)?  COPD/Pneumonia   Was the primary reason for admission:  COPD exacerbation   Week 3 attempt successful?  Yes   Call start time  1727   Rescheduled  Rescheduled-pt requested [she was at an appt and asked we call back tomorrow]   Call end time  1728   Discharge diagnosis  End-stage COPD with hypoxemic, hypercapnic respiratory failure          Ariadna Poole RN

## 2021-04-23 NOTE — OUTREACH NOTE
"COPD/PN Week 3 Survey      Responses   [facility] patient discharged from?  [place]   Does the patient have one of the following disease processes/diagnoses(primary or secondary)?  COPD/Pneumonia   Was the primary reason for admission:  COPD exacerbation   Week 3 attempt successful?  Yes   Call start time  1729   Call end time  1734   Discharge diagnosis  End-stage COPD with hypoxemic, hypercapnic respiratory failure   Meds reviewed with patient/caregiver?  Yes   Is the patient having any side effects they believe may be caused by any medication additions or changes?  No   Does the patient have all medications ordered at discharge?  Yes   Is the patient taking all medications as directed (includes completed medication regime)?  Yes   Comments regarding appointments  PCP makes house calls as needed   Does the patient have a primary care provider?   Yes   Does the patient have an appointment with their PCP or specialist within 7 days of discharge?  Yes   Has the patient kept scheduled appointments due by today?  N/A   What is the Home health agency?   [facility]   Has home health visited the patient within 72 hours of discharge?  Yes   Pulse Ox monitoring  Intermittent   Pulse Ox device source  Patient   O2 Sat comments  94-96% on 3L O2   O2 Sat: education provided  When to seek care   Did the patient receive a copy of their discharge instructions?  Yes   Nursing interventions  Reviewed instructions with patient   What is the patient's perception of their health status since discharge?  Improving   Nursing Interventions  Nurse provided patient education   Is the patient/caregiver able to teach back the hierarchy of who to call/visit for symptoms/problems? PCP, Specialist, Home health nurse, Urgent Care, ED, 911  Yes   Additional teach back comments  pt uses a \"pickle\" lung exerciser   Is the patient able to teach back COPD zones?  Yes   Patient reports what zone on this call?  Green Zone   Green Zone  Reports doing " well, Breathing without shortness of breath, Usual amount of phlegm/mucus without difficulty coughing up, Usual activity and exercise level, Sleeping well, Appetite is good   Green Zone interventions:  Take daily medications, Use oxygen as prescribed, Continue regular exercise/diet plan, Avoid indoor/outdoor triggers   Week 3 call completed?  Yes          Linda Damico RN

## 2021-12-21 NOTE — ED NOTES
Pt arrives via EMS from home with c/o generalized weakness and diarrhea x 1 week. Pt a&ox4, abc's intact, NAD noted/    Patient wearing mask during triage. RN wearing appropriate PPE during triage. Hand hygiene performed.       Karly Orellana, RN  12/21/21 0836

## 2021-12-26 PROBLEM — K55.1 MESENTERIC ISCHEMIA, CHRONIC (HCC): Status: ACTIVE | Noted: 2021-01-01

## 2022-01-01 VITALS
DIASTOLIC BLOOD PRESSURE: 50 MMHG | OXYGEN SATURATION: 73 % | HEIGHT: 63 IN | BODY MASS INDEX: 29.13 KG/M2 | TEMPERATURE: 101 F | WEIGHT: 164.4 LBS | SYSTOLIC BLOOD PRESSURE: 118 MMHG

## 2022-01-01 PROCEDURE — 94799 UNLISTED PULMONARY SVC/PX: CPT

## 2022-01-01 PROCEDURE — 94761 N-INVAS EAR/PLS OXIMETRY MLT: CPT

## 2022-01-01 PROCEDURE — 63710000001 PROMETHAZINE PER 25 MG: Performed by: INTERNAL MEDICINE

## 2022-01-01 PROCEDURE — 25010000002 LORAZEPAM PER 2 MG: Performed by: INTERNAL MEDICINE

## 2022-01-01 PROCEDURE — 25010000002 MORPHINE PER 10 MG: Performed by: INTERNAL MEDICINE

## 2022-01-01 RX ORDER — MORPHINE SULFATE 4 MG/ML
4 INJECTION, SOLUTION INTRAMUSCULAR; INTRAVENOUS
Status: DISCONTINUED | OUTPATIENT
Start: 2022-01-01 | End: 2022-01-01 | Stop reason: HOSPADM

## 2022-01-01 RX ORDER — GLYCOPYRROLATE 0.2 MG/ML
0.4 INJECTION INTRAMUSCULAR; INTRAVENOUS
Status: DISCONTINUED | OUTPATIENT
Start: 2022-01-01 | End: 2022-01-01 | Stop reason: HOSPADM

## 2022-01-01 RX ORDER — DIPHENOXYLATE HYDROCHLORIDE AND ATROPINE SULFATE 2.5; .025 MG/1; MG/1
1 TABLET ORAL
Status: DISCONTINUED | OUTPATIENT
Start: 2022-01-01 | End: 2022-01-01 | Stop reason: HOSPADM

## 2022-01-01 RX ORDER — LORAZEPAM 2 MG/ML
0.5 INJECTION INTRAMUSCULAR
Status: DISCONTINUED | OUTPATIENT
Start: 2022-01-01 | End: 2022-01-01 | Stop reason: HOSPADM

## 2022-01-01 RX ORDER — GLYCOPYRROLATE 0.2 MG/ML
0.2 INJECTION INTRAMUSCULAR; INTRAVENOUS
Status: DISCONTINUED | OUTPATIENT
Start: 2022-01-01 | End: 2022-01-01 | Stop reason: HOSPADM

## 2022-01-01 RX ORDER — LORAZEPAM 2 MG/ML
1 CONCENTRATE ORAL
Status: DISCONTINUED | OUTPATIENT
Start: 2022-01-01 | End: 2022-01-01 | Stop reason: HOSPADM

## 2022-01-01 RX ORDER — MORPHINE SULFATE 2 MG/ML
2 INJECTION, SOLUTION INTRAMUSCULAR; INTRAVENOUS
Status: DISCONTINUED | OUTPATIENT
Start: 2022-01-01 | End: 2022-01-01 | Stop reason: HOSPADM

## 2022-01-01 RX ORDER — LORAZEPAM 2 MG/ML
2 INJECTION INTRAMUSCULAR
Status: DISCONTINUED | OUTPATIENT
Start: 2022-01-01 | End: 2022-01-01 | Stop reason: HOSPADM

## 2022-01-01 RX ORDER — MORPHINE SULFATE 20 MG/ML
10 SOLUTION ORAL
Status: DISCONTINUED | OUTPATIENT
Start: 2022-01-01 | End: 2022-01-01 | Stop reason: HOSPADM

## 2022-01-01 RX ORDER — MORPHINE SULFATE 20 MG/ML
5 SOLUTION ORAL
Status: DISCONTINUED | OUTPATIENT
Start: 2022-01-01 | End: 2022-01-01 | Stop reason: HOSPADM

## 2022-01-01 RX ORDER — LORAZEPAM 2 MG/ML
0.5 CONCENTRATE ORAL
Status: DISCONTINUED | OUTPATIENT
Start: 2022-01-01 | End: 2022-01-01 | Stop reason: HOSPADM

## 2022-01-01 RX ORDER — SCOLOPAMINE TRANSDERMAL SYSTEM 1 MG/1
1 PATCH, EXTENDED RELEASE TRANSDERMAL
Status: DISCONTINUED | OUTPATIENT
Start: 2022-01-01 | End: 2022-01-01 | Stop reason: HOSPADM

## 2022-01-01 RX ORDER — LORAZEPAM 2 MG/ML
2 CONCENTRATE ORAL
Status: DISCONTINUED | OUTPATIENT
Start: 2022-01-01 | End: 2022-01-01 | Stop reason: HOSPADM

## 2022-01-01 RX ORDER — KETOROLAC TROMETHAMINE 15 MG/ML
15 INJECTION, SOLUTION INTRAMUSCULAR; INTRAVENOUS EVERY 6 HOURS PRN
Status: DISCONTINUED | OUTPATIENT
Start: 2022-01-01 | End: 2022-01-01 | Stop reason: HOSPADM

## 2022-01-01 RX ORDER — LORAZEPAM 2 MG/ML
1 INJECTION INTRAMUSCULAR
Status: DISCONTINUED | OUTPATIENT
Start: 2022-01-01 | End: 2022-01-01 | Stop reason: HOSPADM

## 2022-01-01 RX ADMIN — BUDESONIDE 0.5 MG: 0.5 INHALANT ORAL at 19:37

## 2022-01-01 RX ADMIN — NYSTATIN: 100000 POWDER TOPICAL at 20:21

## 2022-01-01 RX ADMIN — CARVEDILOL 3.12 MG: 3.12 TABLET, FILM COATED ORAL at 08:06

## 2022-01-01 RX ADMIN — NYSTATIN: 100000 POWDER TOPICAL at 08:05

## 2022-01-01 RX ADMIN — ARFORMOTEROL TARTRATE 15 MCG: 15 SOLUTION RESPIRATORY (INHALATION) at 07:33

## 2022-01-01 RX ADMIN — ARFORMOTEROL TARTRATE 15 MCG: 15 SOLUTION RESPIRATORY (INHALATION) at 09:10

## 2022-01-01 RX ADMIN — MORPHINE SULFATE 10 MG: 100 SOLUTION ORAL at 14:58

## 2022-01-01 RX ADMIN — DOCUSATE SODIUM 100 MG: 100 CAPSULE, LIQUID FILLED ORAL at 08:05

## 2022-01-01 RX ADMIN — PROMETHAZINE HYDROCHLORIDE 12.5 MG: 12.5 TABLET ORAL at 02:38

## 2022-01-01 RX ADMIN — MORPHINE SULFATE 4 MG: 4 INJECTION, SOLUTION INTRAMUSCULAR; INTRAVENOUS at 20:40

## 2022-01-01 RX ADMIN — LORAZEPAM 0.25 MG: 0.5 TABLET ORAL at 14:58

## 2022-01-01 RX ADMIN — BUDESONIDE 0.5 MG: 0.5 INHALANT ORAL at 09:10

## 2022-01-01 RX ADMIN — FUROSEMIDE 40 MG: 40 TABLET ORAL at 08:06

## 2022-01-01 RX ADMIN — CARVEDILOL 3.12 MG: 3.12 TABLET, FILM COATED ORAL at 18:07

## 2022-01-01 RX ADMIN — SODIUM CHLORIDE, PRESERVATIVE FREE 10 ML: 5 INJECTION INTRAVENOUS at 08:08

## 2022-01-01 RX ADMIN — MORPHINE SULFATE 10 MG: 100 SOLUTION ORAL at 22:20

## 2022-01-01 RX ADMIN — LORAZEPAM 0.25 MG: 0.5 TABLET ORAL at 20:19

## 2022-01-01 RX ADMIN — MORPHINE SULFATE 10 MG: 100 SOLUTION ORAL at 03:56

## 2022-01-01 RX ADMIN — LEVOTHYROXINE SODIUM 25 MCG: 0.03 TABLET ORAL at 06:42

## 2022-01-01 RX ADMIN — SODIUM CHLORIDE, PRESERVATIVE FREE 10 ML: 5 INJECTION INTRAVENOUS at 20:21

## 2022-01-01 RX ADMIN — LORAZEPAM 0.5 MG: 2 INJECTION INTRAMUSCULAR; INTRAVENOUS at 18:42

## 2022-01-01 RX ADMIN — NYSTATIN: 100000 POWDER TOPICAL at 20:39

## 2022-01-01 RX ADMIN — LISINOPRIL 20 MG: 20 TABLET ORAL at 08:03

## 2022-01-01 RX ADMIN — DOCUSATE SODIUM 100 MG: 100 CAPSULE, LIQUID FILLED ORAL at 08:03

## 2022-01-01 RX ADMIN — SODIUM CHLORIDE, PRESERVATIVE FREE 10 ML: 5 INJECTION INTRAVENOUS at 08:03

## 2022-01-01 RX ADMIN — ARFORMOTEROL TARTRATE 15 MCG: 15 SOLUTION RESPIRATORY (INHALATION) at 19:39

## 2022-01-01 RX ADMIN — MORPHINE SULFATE 4 MG: 4 INJECTION, SOLUTION INTRAMUSCULAR; INTRAVENOUS at 04:50

## 2022-01-01 RX ADMIN — LORAZEPAM 1 MG: 2 INJECTION INTRAMUSCULAR; INTRAVENOUS at 20:39

## 2022-01-01 RX ADMIN — PANTOPRAZOLE SODIUM 40 MG: 40 TABLET, DELAYED RELEASE ORAL at 06:42

## 2022-01-01 RX ADMIN — NYSTATIN: 100000 POWDER TOPICAL at 08:03

## 2022-01-01 RX ADMIN — LORAZEPAM 1 MG: 2 INJECTION INTRAMUSCULAR; INTRAVENOUS at 04:50

## 2022-01-01 RX ADMIN — MORPHINE SULFATE 20 MG: 100 SOLUTION ORAL at 11:27

## 2022-01-01 RX ADMIN — LORAZEPAM 0.25 MG: 0.5 TABLET ORAL at 03:56

## 2022-01-01 RX ADMIN — FUROSEMIDE 40 MG: 40 TABLET ORAL at 08:03

## 2022-01-01 RX ADMIN — MORPHINE SULFATE 10 MG: 100 SOLUTION ORAL at 20:19

## 2022-01-01 RX ADMIN — PROMETHAZINE HYDROCHLORIDE 12.5 MG: 12.5 TABLET ORAL at 11:28

## 2022-01-01 RX ADMIN — SODIUM CHLORIDE, PRESERVATIVE FREE 10 ML: 5 INJECTION INTRAVENOUS at 20:39

## 2022-01-01 RX ADMIN — Medication 5 MG: at 20:21

## 2022-01-01 RX ADMIN — MORPHINE SULFATE 10 MG: 100 SOLUTION ORAL at 03:24

## 2022-01-01 RX ADMIN — MORPHINE SULFATE 10 MG: 100 SOLUTION ORAL at 10:11

## 2022-01-01 RX ADMIN — MORPHINE SULFATE 10 MG: 100 SOLUTION ORAL at 06:47

## 2022-01-01 RX ADMIN — LORAZEPAM 1 MG: 2 INJECTION INTRAMUSCULAR; INTRAVENOUS at 00:39

## 2022-01-01 RX ADMIN — PANTOPRAZOLE SODIUM 40 MG: 40 TABLET, DELAYED RELEASE ORAL at 06:06

## 2022-01-01 RX ADMIN — LEVOTHYROXINE SODIUM 25 MCG: 0.03 TABLET ORAL at 06:06

## 2022-01-01 RX ADMIN — BUDESONIDE 0.5 MG: 0.5 INHALANT ORAL at 19:39

## 2022-01-01 RX ADMIN — MORPHINE SULFATE 10 MG: 100 SOLUTION ORAL at 06:05

## 2022-01-01 RX ADMIN — ARFORMOTEROL TARTRATE 15 MCG: 15 SOLUTION RESPIRATORY (INHALATION) at 19:37

## 2022-01-01 RX ADMIN — LORAZEPAM 0.25 MG: 0.5 TABLET ORAL at 06:05

## 2022-01-01 RX ADMIN — LISINOPRIL 20 MG: 20 TABLET ORAL at 08:06

## 2022-01-01 RX ADMIN — MORPHINE SULFATE 4 MG: 4 INJECTION, SOLUTION INTRAMUSCULAR; INTRAVENOUS at 00:39

## 2022-01-01 RX ADMIN — MORPHINE SULFATE 4 MG: 4 INJECTION, SOLUTION INTRAMUSCULAR; INTRAVENOUS at 18:42

## 2022-01-01 RX ADMIN — BUDESONIDE 0.5 MG: 0.5 INHALANT ORAL at 11:46

## 2022-01-01 RX ADMIN — SERTRALINE HYDROCHLORIDE 50 MG: 50 TABLET, FILM COATED ORAL at 20:21

## 2022-01-01 RX ADMIN — ACETAMINOPHEN 500 MG: 325 TABLET, FILM COATED ORAL at 22:20

## 2022-01-01 NOTE — PLAN OF CARE
Problem: Adult Inpatient Plan of Care  Goal: Patient-Specific Goal (Individualized)  1/1/2022 0430 by Hayley Luo, RN  Flowsheets (Taken 12/31/2021 9955)  Patient-Specific Goals (Include Timeframe):  • Ativan as needed  • tylenol as needed  Individualized Care Needs:  • Reposition as needed  • keep triology at bsd  Anxieties, Fears or Concerns:  • Shortness of breath  • discomfort in bed  1/1/2022 0428 by Hayley Luo, RN  Outcome: Ongoing, Progressing   Goal Outcome Evaluation:           Progress: no change  Outcome Summary: Patient alert and oriented. Trilogy machine in place. FC in place. Pt given SL morphine and PO ativan PRN this shift for increased anxiety and SOB. Phenegran given x1 for nausea. Pt resting majority of this night shift. Pt refusing assistance with turns at times; RN educated. Will continue to monitor for s/sx of discomfort.

## 2022-01-01 NOTE — PROGRESS NOTES
This patient rested well last night.  She has not had a bowel movement in a few days although she has no abdominal discomfort.  She is eating a little and drinking some.  She is using her trilogy machine off and on throughout the day and night.  She states it just makes her feel better.  She denies shortness of breath or chest pain.  No fevers or chills.  Patient complains that her TV does continuously shut off during the day and not come back on for about 10 minutes    Physical exam:  Vital signs: /57, pulse 52, respirate 16, temperature 96.9, oxygen saturation 98% on 4 L.  Appearance: Elderly obese  lady who is eating her lunch.  She is in no distress.  Lungs: Clear anteriorly.  Heart: Distant tones, regular  Abdomen: Protuberant.  Hypoactive bowel sounds.  No rebound, tenderness or guarding.    Impression:  1.  End-stage COPD on positive pressure breathing apparatus.  2.  Chronic mesenteric ischemia  3.  Acute kidney injury.  History of chronic renal insufficiency  4.  Anemia, in large part iron deficient related to GI blood loss  5.  History of DVT/PEs  6.  Stage II pressure ulcer sacrum.  Developing pressure lesion left heel    Plan:  The patient is definitely comfortable.  The trilogy machine is an issue as the patient cannot go to hospice on this machine.  She could go if she was changed to BiPAP.  We discussed this.  The patient is not willing to make a decision now.  She states she just feels more comfortable on trilogy at this time.  She states when she is on the trilogy she can rest easily.  For now we will continue everything.

## 2022-01-02 NOTE — PROGRESS NOTES
This patient is much more lethargic today.  She does in and out of consciousness while I was talking to her.  She has a little bit of nausea.  She is in no distress.    Physical exam:  Vital signs: Blood pressure 118/50, pulse 65, respiratory rate 20, temperature 97.3, O2 sat 95% on 4 L.  Appearance: Elderly obese  lady who is very somnolent.  She is in no distress.  Lungs: Clear anteriorly but decreased breath sounds.  Heart: Regular rate with frequent ectopy.  1/6 systolic murmur.  No gallop.  Abdomen: Diminished bowel sounds.  Soft and nontender.  Extremities: Without edema clubbing or cyanosis.    Impression:  1.  End-stage COPD   2.  Chronic mesenteric ischemia  3.  Acute kidney injury.  History of chronic renal insufficiency  4.  Anemia, in large part iron deficient related to GI blood loss  5.  History of DVT/PEs  6.  Stage II pressure ulcer sacrum.  Developing pressure lesion left heel    Plan:  The patient will receive some Phenergan for her nausea.  The patient is less arousable today.  She is a bit more to tachypneic and her pressure is slowly dropping.  I was going to talk to her about changing to BiPAP from the trilogy machine.  Then the patient could have hosporus benefits.  I really do not think this is an issue now.  The patient can use a trilogy if she asked for it otherwise we will continue palliative measures.

## 2022-01-02 NOTE — PLAN OF CARE
Problem: Adult Inpatient Plan of Care  Goal: Plan of Care Review  Outcome: Ongoing, Progressing  Flowsheets (Taken 1/1/2022 1809)  Progress: no change  Plan of Care Reviewed With:   patient   family  Outcome Summary: Patient alternates between trilogy/oxygen independently. She was medicated with sublingual morphine twice for pain relief and PO ativan once for anxiety. She refuses turns, but does allow for weight shift adjusting in bed. Patient tolerating diet. Family at bedside updated on plan of care. Will continue to monitor.

## 2022-01-02 NOTE — PLAN OF CARE
Goal Outcome Evaluation:  Plan of Care Reviewed With: patient        Progress: declining  Outcome Summary: Pt c/o pain/anxiety often (see MAR). Pt removes trilogy mask several times throughout the night. Pt needs palliative care medications to get her comfortable. RN does not believe the pt fully understands the gravity of her decline. Continue comfort care.

## 2022-01-03 NOTE — PROGRESS NOTES
Case Management Discharge Note      Final Note: The patient  on 1/3/22 @ 06:37. MILEY Claros RN, CCP.    Provided Post Acute Provider List?: N/A  N/A Provider List Comment: Has used [facility] in the past and if need it again is willing to use them    Selected Continued Care - Discharged on 1/3/2022 Admission date: 2021 - Discharge disposition:     Destination    No services have been selected for the patient.              Durable Medical Equipment    No services have been selected for the patient.              Dialysis/Infusion    No services have been selected for the patient.              Home Medical Care    No services have been selected for the patient.              Therapy    No services have been selected for the patient.              Community Resources    No services have been selected for the patient.              Community & DME    No services have been selected for the patient.                       Final Discharge Disposition Code: 20 -

## 2022-01-03 NOTE — DISCHARGE SUMMARY
Date of admission: December 24, 2021  Date of death: January 3, 2022    Final diagnosis:  1.  End-stage COPD   2.  Chronic mesenteric ischemia  3.  Acute kidney injury.  History of chronic renal insufficiency  4.  Anemia, in large part iron deficient related to GI blood loss  5.  History of DVT/PEs  6.  Stage II pressure ulcer sacrum.  Developing pressure lesion left heel  7.  Atherosclerotic cardiovascular disease.  8.  Past history of adenocarcinoma of the lung x2  9.  Takotsubo cardiomyopathy x2  10.  GERD  11.  Hypothyroid  12.  MISSY    History of present illness:  This was a very nice 81-year-old lady.  She has a past history significant for severe COPD with hypercapnia.  She also has past adenocarcinoma of the lung x2, Takotsubo's cardiomyopathy, hypertension, hyperlipidemia, GERD, hypothyroidism, past DVTs/PEs and paroxysmal atrial fib.  Patient felt poorly for 2 or 3 weeks prior to admission..  She states she has had nausea but no vomiting.  Her appetite was poor although she has been craving ice cream.  She  had 3-5 loose bowel movements a day.  They were watery.  They were not black.  They were not bloody.  She occasionally had these bowel movements at night.  She had no appetite but she  tryed to drink fluids.  She had diffuse abdominal discomfort.  He has no dysphagia or odynophagia.  I saw her on a housecall couple days ago.  I obtained some blood work.  She was found to be severely anemic with a hemoglobin of 7.5.  Her creatinine had gone from a baseline of less than 2  to 3.    Hospital course:  The patient was admitted.  She was found to have chronic ischemic bowel, heme positive stool with significant multifactorial anemia and acute kidney injury.  At baseline she has very severe COPD and was on a positive pressure breathing apparatus.  Patient was seen by myself, pulmonary, vascular surgery and gastroenterology.  After long discussion with the patient and her family, the patient  felt that she would  best be served by going into palliative care.  Patient declined aggressive care.  She was transferred to the palliative floor where she was given comfort care.  Patient did well for several days and then she had longer apneic periods and periods of lethargy.  Patient passed away quietly on the morning of January 3, 2022

## 2022-01-03 NOTE — PLAN OF CARE
Goal Outcome Evaluation:  Plan of Care Reviewed With: patient, sibling        Progress: declining  Outcome Summary: Family agreed to medicated for comfort prior to Q4 turns. Pt rested well with 1mg ativan and 4mg morphine prior to Q4 turns. Several loose BM's tonight. Continue comfort care.
